# Patient Record
Sex: FEMALE | Race: BLACK OR AFRICAN AMERICAN | Employment: OTHER | URBAN - METROPOLITAN AREA
[De-identification: names, ages, dates, MRNs, and addresses within clinical notes are randomized per-mention and may not be internally consistent; named-entity substitution may affect disease eponyms.]

---

## 2017-01-30 NOTE — PROGRESS NOTES
HISTORY OF PRESENT ILLNESS  Dutch Ladd is a [de-identified] y.o. female. HPI   Follow up on chronic medical problems. Feeling well. No c/o noted. Sleeping good. Appetite is good. Staying active. HTN follow up:  Compliant w/ meds, low salt diet, and exercise. Home bp monitoring 120s/70s. Pt has bp log. Swelling is stable, no headache or dizziness. No chest pain, SOB, palpitations. Otherwise feeling well since the last visit. DM type II follow up:  Compliant w/ meds, diabetic diet, and exercise. She has been better with watching diet. Obtains home glucose monitoring averaging 100-120s. Checks BS daily on most days and prn. Pt does have BS log at visit today. No Rf done for today. Denies any tingling sensation, polyuria and polydipsia. No blurred vision. No significant weight changes. Feeling well since last OV. Hypercholesterolemia follow up:  Compliant low fat, low cholesterol diet. Intolerant to stains and still did not tolerate zetia or welchol. Exercising some. Osteoarthritis:  Patient has osteoarthritis. Takes an occasional Advil which helps the pain. Has had increased pain in the hands from the arthritits. Has stiffness noted. Also has been having more \"crunching noise\" in the neck when turning neck. Has pain in the neck which comes and goes. Symptoms onset: problem is longstanding. Rheumatological ROS: stable, mild-to-moderate joint symptoms intermittently, reasonably well controlled by PRN meds. Response to treatment plan: stable   HM:  Microalbumin: 11/2/2016  Eye exam 11/15/2016 by Dr. Cristhian Soto.   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 180 Tab 3    glucose blood VI test strips (ACCU-CHEK PARESH PLUS TEST STRP) strip use to check blood sugar 2 times daily. 100 Strip 3    furosemide (LASIX) 20 mg tablet Take 1 Tab by mouth daily. 90 Tab 3    Blood-Glucose Meter (ACCU-CHEK PARESH PLUS METER) misc Use to check blood sugar as directed 1 Each 0    ibuprofen (ADVIL LIQUI-GEL) 200 mg cap Take 1 Tab by mouth two (2) times daily as needed.  GARLIC PO Take 1 Tab by mouth daily.  red yeast rice extract 600 mg Cap Take 600 mg by mouth daily.  cod liver oil Cap Take 1 Cap by mouth daily.  omega-3 fatty acids-vitamin e (FISH OIL) 1,000 mg Cap Take 1 Cap by mouth daily.  cholecalciferol, vitamin d3, (VITAMIN D) 1,000 unit tablet Take 1,000 Units by mouth daily. Allergies   Allergen Reactions    Latex Contact Dermatitis    Aspirin Palpitations    Glipizide Shortness of Breath and Swelling    Bactrim Ds [Sulfamethoxazole-Trimethoprim] Other (comments)     Patient experienced pain in legs and buttock and muscle cramping.  Amaryl [Glimepiride] Other (comments)     \"nervous feeling\"    Avocado Rash     Pt states she bruises.     Feldene [Piroxicam] Diarrhea    Losartan Itching     Caused congestion per stated by pt    Derrick Hives    Pcn [Penicillins] Rash    Tylenol [Acetaminophen] Palpitations    Welchol [Colesevelam] Other (comments)     Pt states \"made throat feel raw\"    Zestril [Lisinopril] Cough       Past Medical History   Diagnosis Date    Chronic edema     Diabetes ([facility]     Family history of ischemic heart disease 4/12/2012    Hypercholesterolemia     Hypertension        Past Surgical History   Procedure Laterality Date    Hx breast biopsy  1997    Hx hysterectomy  1972    Endoscopy, colon, diagnostic  12/2006     Dr. Courtney Moya        Family History   Problem Relation Age of Onset    Stroke Mother     Hypertension Mother     Stroke Father     Hypertension Father     Hypertension Brother        Social History   Substance Use Topics    Smoking status: Never Smoker    Smokeless tobacco: Never Used    Alcohol use No        Lab Results  Component Value Date/Time   WBC 7.8 07/30/2016 11:57 AM   HGB 14.0 07/30/2016 11:57 AM   HCT 41.1 07/30/2016 11:57 AM   PLATELET 601 98/89/7715 11:57 AM   MCV 81.5 07/30/2016 11:57 AM       Lab Results  Component Value Date/Time   Cholesterol, total 250 11/02/2016 08:55 AM   HDL Cholesterol 49 11/02/2016 08:55 AM   LDL, calculated 171 11/02/2016 08:55 AM   Triglyceride 149 11/02/2016 08:55 AM   CHOL/HDL Ratio 5.6 12/28/2009 10:43 AM       Lab Results   Component Value Date/Time    Glucose 158 11/02/2016 08:55 AM    Glucose (POC) 270 07/30/2016 01:27 PM    Glucose  11/02/2016 09:00 AM      Lab Results   Component Value Date/Time    Sodium 141 11/02/2016 08:55 AM    Potassium 4.7 11/02/2016 08:55 AM    Chloride 98 11/02/2016 08:55 AM    CO2 27 11/02/2016 08:55 AM    Anion gap 10 07/30/2016 11:57 AM    Glucose 158 11/02/2016 08:55 AM    BUN 19 11/02/2016 08:55 AM    Creatinine 1.17 11/02/2016 08:55 AM    BUN/Creatinine ratio 16 11/02/2016 08:55 AM    GFR est AA 51 11/02/2016 08:55 AM    GFR est non-AA 44 11/02/2016 08:55 AM    Calcium 9.8 11/02/2016 08:55 AM    Bilirubin, total 0.4 07/30/2016 11:57 AM    ALT 23 07/30/2016 11:57 AM    AST 13 07/30/2016 11:57 AM    Alk.  phosphatase 106 07/30/2016 11:57 AM    Protein, total 7.8 07/30/2016 11:57 AM    Albumin 3.3 07/30/2016 11:57 AM    Globulin 4.5 07/30/2016 11:57 AM    A-G Ratio 0.7 07/30/2016 11:57 AM      Lab Results   Component Value Date/Time          Hemoglobin A1c (POC) 7.8 11/02/2016 09:00 AM         Review of Systems Constitutional: Negative for malaise/fatigue. HENT: Negative for congestion. Eyes: Negative for blurred vision. Respiratory: Negative for cough and shortness of breath. Cardiovascular: Negative for chest pain, palpitations and leg swelling. Gastrointestinal: Negative for abdominal pain, constipation and heartburn. Genitourinary: Negative for dysuria, frequency and urgency. Musculoskeletal: Positive for joint pain. Negative for back pain. Neurological: Negative for dizziness, tingling and headaches. Endo/Heme/Allergies: Negative for environmental allergies. Psychiatric/Behavioral: Negative for depression. The patient does not have insomnia. Physical Exam   Constitutional: She appears well-developed and well-nourished. /70  Pulse 78  Temp 97.6 °F (36.4 °C) (Oral)   Resp 16  Ht 5' 2\" (1.575 m)  Wt 181 lb 3.2 oz (82.2 kg)  LMP 04/28/1972  SpO2 97%  BMI 33.14 kg/m2     HENT:   Right Ear: Tympanic membrane and ear canal normal.   Left Ear: Tympanic membrane and ear canal normal.   Nose: No mucosal edema or rhinorrhea. Mouth/Throat: Oropharynx is clear and moist and mucous membranes are normal.   Neck: Normal range of motion. Neck supple. No thyromegaly present. Cardiovascular: Normal rate and regular rhythm. No murmur heard. Pulmonary/Chest: Effort normal and breath sounds normal.   Abdominal: Soft. Bowel sounds are normal. There is no tenderness. Musculoskeletal: Normal range of motion. She exhibits no edema. Lymphadenopathy:     She has no cervical adenopathy. Skin: Skin is warm and dry. Psychiatric: She has a normal mood and affect. Nursing note and vitals reviewed. Marco David was seen today for hypertension, diabetes and cholesterol problem.     Diagnoses and all orders for this visit:    Essential hypertension  Discussed sodium restriction, high k rich diet, maintaining ideal body weight and regular exercise program such as daily walking 30 min perday 4-5 times per week, as physiologic means to achieve blood pressure control.  Medication compliance advised. Controlled type 2 diabetes mellitus without complication, without long-term current use of insulin (HCC)  -     AMB POC HEMOGLOBIN A1C  -     AMB POC GLUCOSE, QUANTITATIVE, BLOOD  -     glyBURIDE (DIABETA) 5 mg tablet; Take 1 Tab by mouth two (2) times daily (with meals). Mixed hyperlipidemia  Continue to monitor. Work on diet and exercise. Statin intolerance  Pt is not interested in medications of any kind for her cholesterol. Understands the complications d/t high cholesterol. Generalized OA  Stable     Encounter for medication monitoring  -     METABOLIC PANEL, BASIC  -     CBC W/O DIFF        Follow-up Disposition:  Return in about 3 months (around 5/17/2017). reviewed diet, exercise and weight control  cardiovascular risk and specific lipid/LDL goals reviewed  reviewed medications and side effects in detail  specific diabetic recommendations: low cholesterol diet, weight control and daily exercise discussed, home glucose monitoring emphasized, foot care discussed and Podiatry visits discussed, annual eye examinations at Ophthalmology discussed and glycohemoglobin and other lab monitoring discussed     I have discussed diagnosis listed in this note with pt and/or family. I have discussed treatment plans and options and the risk/benefit analysis of those options, including safe use of medications and possible medication side effects. Through the use of shared decision making we have agreed to the above plan. The patient has received an after-visit summary and questions were answered concerning future plans and follow up. Advise pt of any urgent changes then to proceed to the ER.

## 2017-02-01 ENCOUNTER — OFFICE VISIT (OUTPATIENT)
Dept: FAMILY MEDICINE CLINIC | Age: 82
End: 2017-02-01

## 2017-02-01 VITALS
RESPIRATION RATE: 16 BRPM | DIASTOLIC BLOOD PRESSURE: 70 MMHG | OXYGEN SATURATION: 97 % | HEART RATE: 78 BPM | TEMPERATURE: 97.6 F | BODY MASS INDEX: 33.34 KG/M2 | SYSTOLIC BLOOD PRESSURE: 130 MMHG | HEIGHT: 62 IN | WEIGHT: 181.2 LBS

## 2017-02-01 DIAGNOSIS — E78.2 MIXED HYPERLIPIDEMIA: ICD-10-CM

## 2017-02-01 DIAGNOSIS — M15.9 GENERALIZED OA: ICD-10-CM

## 2017-02-01 DIAGNOSIS — E11.9 CONTROLLED TYPE 2 DIABETES MELLITUS WITHOUT COMPLICATION, WITHOUT LONG-TERM CURRENT USE OF INSULIN (HCC): ICD-10-CM

## 2017-02-01 DIAGNOSIS — Z51.81 ENCOUNTER FOR MEDICATION MONITORING: ICD-10-CM

## 2017-02-01 DIAGNOSIS — Z78.9 STATIN INTOLERANCE: ICD-10-CM

## 2017-02-01 DIAGNOSIS — I10 ESSENTIAL HYPERTENSION: Primary | ICD-10-CM

## 2017-02-01 LAB
GLUCOSE POC: 137 MG/DL
HBA1C MFR BLD HPLC: 7.7 %

## 2017-02-01 RX ORDER — GLYBURIDE 5 MG/1
5 TABLET ORAL 2 TIMES DAILY WITH MEALS
Qty: 180 TAB | Refills: 3 | Status: SHIPPED | OUTPATIENT
Start: 2017-02-01 | End: 2017-05-17 | Stop reason: SDUPTHER

## 2017-02-01 NOTE — PROGRESS NOTES
Pt here today for follow up on BP, cholesterol, and DM. Hgb A1C:  11/2/2016    BMP 11/2/2016    Lipid: 11/2/2016    Microalbumin: 11/2/2016    Eye exam 11/15/2016 by Dr. Rain Duckworth.     Mammogram: 8/1/2016

## 2017-02-01 NOTE — MR AVS SNAPSHOT
Visit Information Date & Time Provider Department Dept. Phone Encounter #  
 2/1/2017  8:30 AM Naseem Hester Alex [phone] [id number] Follow-up Instructions Return in about 3 months (around 5/17/2017). Upcoming Health Maintenance Date Due  
 FOOT EXAM Q1 10/28/2015 HEMOGLOBIN A1C Q6M 5/2/2017 MEDICARE YEARLY EXAM 8/3/2017 MICROALBUMIN Q1 11/2/2017 LIPID PANEL Q1 11/2/2017 EYE EXAM RETINAL OR DILATED Q1 11/15/2017 GLAUCOMA SCREENING Q2Y 11/15/2018 DTaP/Tdap/Td series (3 - Td) 4/15/2025 Allergies as of 2/1/2017  Review Complete On: 2/1/2017 By: Kirit Thomas MD  
  
 Severity Noted Reaction Type Reactions Latex  02/21/2012   Systemic Contact Dermatitis Aspirin High 04/09/2010    Palpitations Glipizide High 04/29/2013   Systemic Shortness of Breath, Swelling Bactrim Ds [Sulfamethoxazole-trimethoprim] Medium 05/12/2010    Other (comments) Patient experienced pain in legs and buttock and muscle cramping. Amaryl [Glimepiride]  01/06/2016    Other (comments) \"nervous feeling\" Avocado  05/06/2016    Rash Pt states she bruises. Feldene [Piroxicam]  04/09/2010    Diarrhea Losartan  07/22/2011    Itching Caused congestion per stated by pt [place]  05/06/2016    Hives Pcn [Penicillins]  04/09/2010    Rash Tylenol [Acetaminophen]  09/07/2013    Palpitations Welchol [Colesevelam]  11/27/2012    Other (comments) Pt states \"made throat feel raw\" Zestril [Lisinopril]  04/09/2010    Cough Current Immunizations  Reviewed on 2/1/2017 Name Date DTAP Vaccine 12/13/2000 Pneumococcal Conjugate (PCV-13) 4/15/2015 Pneumococcal Vaccine (Unspecified Type) 10/18/2005 Td, Adsorbed PF 4/15/2015 Reviewed by Kirit Thomas MD on 2/1/2017 at  8:18 AM  
You Were Diagnosed With   
  
 Codes Comments  Essential hypertension    -  Primary ICD-10-CM: I10 
 ICD-9-CM: 401.9 Controlled type 2 diabetes mellitus without complication, without long-term current use of insulin ([facility]     ICD-10-CM: E11.9 ICD-9-CM: 250.00 Mixed hyperlipidemia     ICD-10-CM: E78.2 ICD-9-CM: 272.2 Generalized OA     ICD-10-CM: M15.9 ICD-9-CM: 715.00 Encounter for medication monitoring     ICD-10-CM: Z51.81 
ICD-9-CM: V58.83 Statin intolerance     ICD-10-CM: Z78.9 ICD-9-CM: 995.27 Vitals BP Pulse Temp Resp Height(growth percentile) Weight(growth percentile) 140/74 (BP 1 Location: Left arm, BP Patient Position: Sitting) 78 97.6 °F (36.4 °C) (Oral) 16 5' 2\" (1.575 m) 181 lb 3.2 oz (82.2 kg) LMP SpO2 BMI OB Status Smoking Status 04/28/1972 97% 33.14 kg/m2 Hysterectomy Never Smoker BMI and BSA Data Body Mass Index Body Surface Area  
 33.14 kg/m 2 1.9 m 2 Preferred Pharmacy Pharmacy Name Phone [address] [phone] Your Updated Medication List  
  
   
This list is accurate as of: 2/1/17  8:45 AM.  Always use your most recent med list. ADVIL LIQUI- mg Cap Generic drug:  ibuprofen Take 1 Tab by mouth two (2) times daily as needed. Blood-Glucose Meter Misc Commonly known as:  ACCU-CHEK PARESH PLUS METER Use to check blood sugar as directed  
  
 cod liver oil Cap Take 1 Cap by mouth daily. FISH OIL 1,000 mg Cap Generic drug:  omega-3 fatty acids-vitamin e Take 1 Cap by mouth daily. furosemide 20 mg tablet Commonly known as:  LASIX Take 1 Tab by mouth daily. GARLIC PO Take 1 Tab by mouth daily. glucose blood VI test strips strip Commonly known as:  ACCU-CHEK PARESH PLUS TEST STRP  
use to check blood sugar 2 times daily. glyBURIDE 5 mg tablet Commonly known as:  Margaret Albadt Take 1 Tab by mouth two (2) times daily (with meals). red yeast rice extract 600 mg Cap Take 600 mg by mouth daily. VITAMIN D3 1,000 unit tablet Generic drug:  cholecalciferol Take 1,000 Units by mouth daily. Prescriptions Sent to Pharmacy Refills  
 glyBURIDE (DIABETA) 5 mg tablet 3 Sig: Take 1 Tab by mouth two (2) times daily (with meals). Class: Normal  
 Pharmacy: [address] [place] [address], [address] [place] [address] #: [phone] Route: Oral  
  
We Performed the Following AMB POC GLUCOSE, QUANTITATIVE, BLOOD [89464 CPT(R)] AMB POC HEMOGLOBIN A1C [43502 CPT(R)] CBC W/O DIFF [15890 CPT(R)] METABOLIC PANEL, BASIC [98413 CPT(R)] Follow-up Instructions Return in about 3 months (around 5/17/2017). Introducing [facility]! Libia Labm introduces Quick[employer] patient portal. Now you can access parts of your medical record, email your doctor's office, and request medication refills online. 1. In your internet browser, go to [email or web address] 2. Click on the First Time User? Click Here link in the Sign In box. You will see the New Member Sign Up page. 3. Enter your QuickPay Access Code exactly as it appears below. You will not need to use this code after youve completed the sign-up process. If you do not sign up before the expiration date, you must request a new code. · QuickPay Access Code: [email or web address] Expires: 5/2/2017  8:45 AM 
 
4. Enter the last four digits of your Social Security Number (xxxx) and Date of Birth (mm/dd/yyyy) as indicated and click Submit. You will be taken to the next sign-up page. 5. Create a [employer] ID. This will be your QuickPay login ID and cannot be changed, so think of one that is secure and easy to remember. 6. Create a QuickPay password. You can change your password at any time. 7. Enter your Password Reset Question and Answer. This can be used at a later time if you forget your password. 8. Enter your e-mail address. You will receive e-mail notification when new information is available in [address] 9. Click Sign Up. You can now view and download portions of your medical record. 10. Click the Download Summary menu link to download a portable copy of your medical information. If you have questions, please visit the Frequently Asked Questions section of the [employer] website. Remember, [employer] is NOT to be used for urgent needs. For medical emergencies, dial 911. Now available from your iPhone and Android! Please provide this summary of care documentation to your next provider. Your primary care clinician is listed as Pinky Fitzgerald. If you have any questions after today's visit, please call [phone].

## 2017-02-02 LAB
BUN SERPL-MCNC: 16 MG/DL (ref 8–27)
BUN/CREAT SERPL: 17 (ref 11–26)
CALCIUM SERPL-MCNC: 9.8 MG/DL (ref 8.7–10.3)
CHLORIDE SERPL-SCNC: 104 MMOL/L (ref 96–106)
CO2 SERPL-SCNC: 26 MMOL/L (ref 18–29)
CREAT SERPL-MCNC: 0.93 MG/DL (ref 0.57–1)
ERYTHROCYTE [DISTWIDTH] IN BLOOD BY AUTOMATED COUNT: 14.3 % (ref 12.3–15.4)
GLUCOSE SERPL-MCNC: 128 MG/DL (ref 65–99)
HCT VFR BLD AUTO: 37.5 % (ref 34–46.6)
HGB BLD-MCNC: 12.6 G/DL (ref 11.1–15.9)
INTERPRETATION: NORMAL
MCH RBC QN AUTO: 27.1 PG (ref 26.6–33)
MCHC RBC AUTO-ENTMCNC: 33.6 G/DL (ref 31.5–35.7)
MCV RBC AUTO: 81 FL (ref 79–97)
PLATELET # BLD AUTO: 242 X10E3/UL (ref 150–379)
POTASSIUM SERPL-SCNC: 4.8 MMOL/L (ref 3.5–5.2)
RBC # BLD AUTO: 4.65 X10E6/UL (ref 3.77–5.28)
SODIUM SERPL-SCNC: 143 MMOL/L (ref 134–144)
WBC # BLD AUTO: 6.1 X10E3/UL (ref 3.4–10.8)

## 2017-03-08 DIAGNOSIS — E11.9 CONTROLLED TYPE 2 DIABETES MELLITUS WITHOUT COMPLICATION, WITHOUT LONG-TERM CURRENT USE OF INSULIN (HCC): ICD-10-CM

## 2017-03-08 NOTE — TELEPHONE ENCOUNTER
Pt says she needs help with getting her Accu check test strips. She says they only gave her 2 boxes, was supposed to send another 2 boxes after she called them back, but she hasn't gotten them yet. She says this was about a month ago and she has opened her last box. She has not called them in the past month \"assuming they just weren't going to send it. \" If possible to call her [facility] DATA mail order she would appreciate it.     Pt# [phone]

## 2017-04-15 ENCOUNTER — HOSPITAL ENCOUNTER (EMERGENCY)
Age: 82
Discharge: HOME | End: 2017-04-15
Attending: EMERGENCY MEDICINE
Payer: COMMERCIAL

## 2017-04-15 ENCOUNTER — APPOINTMENT (OUTPATIENT)
Dept: CT IMAGING | Age: 82
End: 2017-04-15
Attending: EMERGENCY MEDICINE
Payer: COMMERCIAL

## 2017-04-15 VITALS
OXYGEN SATURATION: 97 % | BODY MASS INDEX: 33.31 KG/M2 | TEMPERATURE: 98.7 F | DIASTOLIC BLOOD PRESSURE: 53 MMHG | HEART RATE: 82 BPM | HEIGHT: 62 IN | SYSTOLIC BLOOD PRESSURE: 116 MMHG | RESPIRATION RATE: 16 BRPM | WEIGHT: 181 LBS

## 2017-04-15 DIAGNOSIS — N39.0 URINARY TRACT INFECTION WITHOUT HEMATURIA, SITE UNSPECIFIED: Primary | ICD-10-CM

## 2017-04-15 LAB
ALBUMIN SERPL BCP-MCNC: 3.4 G/DL (ref 3.5–5)
ALBUMIN/GLOB SERPL: 0.8 {RATIO} (ref 1.1–2.2)
ALP SERPL-CCNC: 107 U/L (ref 45–117)
ALT SERPL-CCNC: 20 U/L (ref 12–78)
AMYLASE SERPL-CCNC: 67 U/L (ref 25–115)
ANION GAP BLD CALC-SCNC: 7 MMOL/L (ref 5–15)
APPEARANCE UR: ABNORMAL
AST SERPL W P-5'-P-CCNC: 9 U/L (ref 15–37)
BACTERIA URNS QL MICRO: ABNORMAL /HPF
BASOPHILS # BLD AUTO: 0 K/UL (ref 0–0.1)
BASOPHILS # BLD: 0 % (ref 0–1)
BILIRUB SERPL-MCNC: 0.4 MG/DL (ref 0.2–1)
BILIRUB UR QL: NEGATIVE
BUN SERPL-MCNC: 21 MG/DL (ref 6–20)
BUN/CREAT SERPL: 19 (ref 12–20)
CALCIUM SERPL-MCNC: 9.4 MG/DL (ref 8.5–10.1)
CHLORIDE SERPL-SCNC: 104 MMOL/L (ref 97–108)
CO2 SERPL-SCNC: 29 MMOL/L (ref 21–32)
COLOR UR: ABNORMAL
CREAT SERPL-MCNC: 1.12 MG/DL (ref 0.55–1.02)
EOSINOPHIL # BLD: 0.2 K/UL (ref 0–0.4)
EOSINOPHIL NFR BLD: 4 % (ref 0–7)
EPITH CASTS URNS QL MICRO: ABNORMAL /LPF
ERYTHROCYTE [DISTWIDTH] IN BLOOD BY AUTOMATED COUNT: 13.9 % (ref 11.5–14.5)
GLOBULIN SER CALC-MCNC: 4.5 G/DL (ref 2–4)
GLUCOSE SERPL-MCNC: 154 MG/DL (ref 65–100)
GLUCOSE UR STRIP.AUTO-MCNC: NEGATIVE MG/DL
HCT VFR BLD AUTO: 38 % (ref 35–47)
HGB BLD-MCNC: 12.7 G/DL (ref 11.5–16)
HGB UR QL STRIP: ABNORMAL
KETONES UR QL STRIP.AUTO: NEGATIVE MG/DL
LEUKOCYTE ESTERASE UR QL STRIP.AUTO: ABNORMAL
LIPASE SERPL-CCNC: 176 U/L (ref 73–393)
LYMPHOCYTES # BLD AUTO: 27 % (ref 12–49)
LYMPHOCYTES # BLD: 1.8 K/UL (ref 0.8–3.5)
MCH RBC QN AUTO: 26.7 PG (ref 26–34)
MCHC RBC AUTO-ENTMCNC: 33.4 G/DL (ref 30–36.5)
MCV RBC AUTO: 79.8 FL (ref 80–99)
MONOCYTES # BLD: 0.5 K/UL (ref 0–1)
MONOCYTES NFR BLD AUTO: 8 % (ref 5–13)
NEUTS SEG # BLD: 4.1 K/UL (ref 1.8–8)
NEUTS SEG NFR BLD AUTO: 61 % (ref 32–75)
NITRITE UR QL STRIP.AUTO: NEGATIVE
PH UR STRIP: 5.5 [PH] (ref 5–8)
PLATELET # BLD AUTO: 244 K/UL (ref 150–400)
POTASSIUM SERPL-SCNC: 3.9 MMOL/L (ref 3.5–5.1)
PROT SERPL-MCNC: 7.9 G/DL (ref 6.4–8.2)
PROT UR STRIP-MCNC: ABNORMAL MG/DL
RBC # BLD AUTO: 4.76 M/UL (ref 3.8–5.2)
RBC #/AREA URNS HPF: ABNORMAL /HPF (ref 0–5)
SODIUM SERPL-SCNC: 140 MMOL/L (ref 136–145)
SP GR UR REFRACTOMETRY: 1.01 (ref 1–1.03)
UROBILINOGEN UR QL STRIP.AUTO: 0.2 EU/DL (ref 0.2–1)
WBC # BLD AUTO: 6.6 K/UL (ref 3.6–11)
WBC URNS QL MICRO: >100 /HPF (ref 0–4)

## 2017-04-15 PROCEDURE — 74177 CT ABD & PELVIS W/CONTRAST: CPT

## 2017-04-15 PROCEDURE — 74011636320 HC RX REV CODE- 636/320: Performed by: EMERGENCY MEDICINE

## 2017-04-15 PROCEDURE — 74011250636 HC RX REV CODE- 250/636: Performed by: EMERGENCY MEDICINE

## 2017-04-15 PROCEDURE — 99284 EMERGENCY DEPT VISIT MOD MDM: CPT

## 2017-04-15 PROCEDURE — 96361 HYDRATE IV INFUSION ADD-ON: CPT

## 2017-04-15 PROCEDURE — 81001 URINALYSIS AUTO W/SCOPE: CPT | Performed by: EMERGENCY MEDICINE

## 2017-04-15 PROCEDURE — 96365 THER/PROPH/DIAG IV INF INIT: CPT

## 2017-04-15 PROCEDURE — 96375 TX/PRO/DX INJ NEW DRUG ADDON: CPT

## 2017-04-15 PROCEDURE — 74011000250 HC RX REV CODE- 250: Performed by: EMERGENCY MEDICINE

## 2017-04-15 PROCEDURE — 82150 ASSAY OF AMYLASE: CPT | Performed by: EMERGENCY MEDICINE

## 2017-04-15 PROCEDURE — 74011000258 HC RX REV CODE- 258: Performed by: EMERGENCY MEDICINE

## 2017-04-15 PROCEDURE — 83690 ASSAY OF LIPASE: CPT | Performed by: EMERGENCY MEDICINE

## 2017-04-15 PROCEDURE — 36415 COLL VENOUS BLD VENIPUNCTURE: CPT | Performed by: EMERGENCY MEDICINE

## 2017-04-15 PROCEDURE — 80053 COMPREHEN METABOLIC PANEL: CPT | Performed by: EMERGENCY MEDICINE

## 2017-04-15 PROCEDURE — 85025 COMPLETE CBC W/AUTO DIFF WBC: CPT | Performed by: EMERGENCY MEDICINE

## 2017-04-15 RX ORDER — CEPHALEXIN 500 MG/1
500 CAPSULE ORAL 4 TIMES DAILY
Qty: 28 CAP | Refills: 0 | Status: SHIPPED | OUTPATIENT
Start: 2017-04-15 | End: 2017-04-22

## 2017-04-15 RX ORDER — SODIUM CHLORIDE 0.9 % (FLUSH) 0.9 %
10 SYRINGE (ML) INJECTION
Status: COMPLETED | OUTPATIENT
Start: 2017-04-15 | End: 2017-04-15

## 2017-04-15 RX ORDER — FAMOTIDINE 10 MG/ML
20 INJECTION INTRAVENOUS
Status: COMPLETED | OUTPATIENT
Start: 2017-04-15 | End: 2017-04-15

## 2017-04-15 RX ORDER — DIPHENHYDRAMINE HYDROCHLORIDE 50 MG/ML
50 INJECTION, SOLUTION INTRAMUSCULAR; INTRAVENOUS ONCE
Status: COMPLETED | OUTPATIENT
Start: 2017-04-15 | End: 2017-04-15

## 2017-04-15 RX ADMIN — SODIUM CHLORIDE 1000 ML: 900 INJECTION, SOLUTION INTRAVENOUS at 12:45

## 2017-04-15 RX ADMIN — IOPAMIDOL 100 ML: 755 INJECTION, SOLUTION INTRAVENOUS at 13:27

## 2017-04-15 RX ADMIN — FAMOTIDINE 20 MG: 10 INJECTION, SOLUTION INTRAVENOUS at 13:39

## 2017-04-15 RX ADMIN — Medication 10 ML: at 13:27

## 2017-04-15 RX ADMIN — DIPHENHYDRAMINE HYDROCHLORIDE 50 MG: 50 INJECTION, SOLUTION INTRAMUSCULAR; INTRAVENOUS at 13:39

## 2017-04-15 RX ADMIN — SODIUM CHLORIDE 100 ML: 900 INJECTION, SOLUTION INTRAVENOUS at 13:27

## 2017-04-15 RX ADMIN — METHYLPREDNISOLONE SODIUM SUCCINATE 125 MG: 125 INJECTION, POWDER, FOR SOLUTION INTRAMUSCULAR; INTRAVENOUS at 13:39

## 2017-04-15 RX ADMIN — CEFTRIAXONE 1 G: 1 INJECTION, POWDER, FOR SOLUTION INTRAMUSCULAR; INTRAVENOUS at 14:00

## 2017-04-15 NOTE — ED TRIAGE NOTES
Pt reports left lower quad abd pain that began 1 weeks ago. Pt states she has not been having normal bowel movements. Pt also reports the pain is worse if she coughs or sneezes.

## 2017-04-15 NOTE — ED NOTES
I have reviewed discharge instructions with the patient. The patient verbalized understanding. Pt assisted to exit via wheel chair, no acute distress noted.

## 2017-04-15 NOTE — ED NOTES
Pt returned from CT, stated to CT tech that she is feeling itchy and daughter states she looks like she is having some redness to her cheeks and lips, Dr. Wilson Andrade notified and at bedside, medications ordered.

## 2017-04-15 NOTE — DISCHARGE INSTRUCTIONS
Urinary Tract Infection in Women: Care Instructions  Your Care Instructions    A urinary tract infection, or UTI, is a general term for an infection anywhere between the kidneys and the urethra (where urine comes out). Most UTIs are bladder infections. They often cause pain or burning when you urinate. UTIs are caused by bacteria and can be cured with antibiotics. Be sure to complete your treatment so that the infection goes away. Follow-up care is a key part of your treatment and safety. Be sure to make and go to all appointments, and call your doctor if you are having problems. It's also a good idea to know your test results and keep a list of the medicines you take. How can you care for yourself at home? · Take your antibiotics as directed. Do not stop taking them just because you feel better. You need to take the full course of antibiotics. · Drink extra water and other fluids for the next day or two. This may help wash out the bacteria that are causing the infection. (If you have kidney, heart, or liver disease and have to limit fluids, talk with your doctor before you increase your fluid intake.)  · Avoid drinks that are carbonated or have caffeine. They can irritate the bladder. · Urinate often. Try to empty your bladder each time. · To relieve pain, take a hot bath or lay a heating pad set on low over your lower belly or genital area. Never go to sleep with a heating pad in place. To prevent UTIs  · Drink plenty of water each day. This helps you urinate often, which clears bacteria from your system. (If you have kidney, heart, or liver disease and have to limit fluids, talk with your doctor before you increase your fluid intake.)  · Urinate when you need to. · Urinate right after you have sex. · Change sanitary pads often. · Avoid douches, bubble baths, feminine hygiene sprays, and other feminine hygiene products that have deodorants.   · After going to the bathroom, wipe from front to back.  When should you call for help? Call your doctor now or seek immediate medical care if:  · Symptoms such as fever, chills, nausea, or vomiting get worse or appear for the first time. · You have new pain in your back just below your rib cage. This is called flank pain. · There is new blood or pus in your urine. · You have any problems with your antibiotic medicine. Watch closely for changes in your health, and be sure to contact your doctor if:  · You are not getting better after taking an antibiotic for 2 days. · Your symptoms go away but then come back. Where can you learn more? Go to http://james-rachel.info/. Enter Q588 in the search box to learn more about \"Urinary Tract Infection in Women: Care Instructions. \"  Current as of: November 28, 2016  Content Version: 11.2  © 7312-6155 [employer]. Care instructions adapted under license by [employer] (which disclaims liability or warranty for this information). If you have questions about a medical condition or this instruction, always ask your healthcare professional. [address] any warranty or liability for your use of this information. We hope that we have addressed all of your medical concerns. The examination and treatment you received in the Emergency Department were for an emergent problem and were not intended as complete care. It is important that you follow up with your healthcare provider(s) for ongoing care. If your symptoms worsen or do not improve as expected, and you are unable to reach your usual health care provider(s), you should return to the Emergency Department. Today's healthcare is undergoing tremendous change, and patient satisfaction surveys are one of the many tools to assess the quality of medical care. You may receive a survey from the [employer] organization regarding your experience in the Emergency Department.   I hope that your experience has been completely positive, particularly the medical care that I provided. As such, please participate in the survey; anything less than excellent does not meet my expectations or intentions. [address] [facility] and [address] [place] [address] participate in nationally recognized quality of care measures. If your blood pressure is greater than 120/80, as reported below, we urge that you seek medical care to address the potential of high blood pressure, commonly known as hypertension. Hypertension can be hereditary or can be caused by certain medical conditions, pain, stress, or \"white coat syndrome. \"       Please make an appointment with your health care provider(s) for follow up of your Emergency Department visit. VITALS:   Patient Vitals for the past 8 hrs:   Temp Pulse Resp BP SpO2   04/15/17 1336 - - - 142/71 99 %   04/15/17 1300 - - - 126/70 97 %   04/15/17 1230 - - - 121/73 97 %   04/15/17 1200 - - - 126/80 97 %   04/15/17 1116 98.7 °F (37.1 °C) (!) 102 16 135/75 98 %          Thank you for allowing us to provide you with medical care today. We realize that you have many choices for your emergency care needs. Please choose us in the future for any continued health care needs. Marquetta Osgood Edda Rice, MD    [facility].   Office: [phone]            Recent Results (from the past 24 hour(s))   CBC WITH AUTOMATED DIFF    Collection Time: 04/15/17 11:22 AM   Result Value Ref Range    WBC 6.6 3.6 - 11.0 K/uL    RBC 4.76 3.80 - 5.20 M/uL    HGB 12.7 11.5 - 16.0 g/dL    HCT 38.0 35.0 - 47.0 %    MCV 79.8 (L) 80.0 - 99.0 FL    MCH 26.7 26.0 - 34.0 PG    MCHC 33.4 30.0 - 36.5 g/dL    RDW 13.9 11.5 - 14.5 %    PLATELET 798 098 - 840 K/uL    NEUTROPHILS 61 32 - 75 %    LYMPHOCYTES 27 12 - 49 %    MONOCYTES 8 5 - 13 %    EOSINOPHILS 4 0 - 7 %    BASOPHILS 0 0 - 1 %    ABS. NEUTROPHILS 4.1 1.8 - 8.0 K/UL    ABS.  LYMPHOCYTES 1.8 0.8 - 3.5 K/UL ABS. MONOCYTES 0.5 0.0 - 1.0 K/UL    ABS. EOSINOPHILS 0.2 0.0 - 0.4 K/UL    ABS. BASOPHILS 0.0 0.0 - 0.1 K/UL   METABOLIC PANEL, COMPREHENSIVE    Collection Time: 04/15/17 11:22 AM   Result Value Ref Range    Sodium 140 136 - 145 mmol/L    Potassium 3.9 3.5 - 5.1 mmol/L    Chloride 104 97 - 108 mmol/L    CO2 29 21 - 32 mmol/L    Anion gap 7 5 - 15 mmol/L    Glucose 154 (H) 65 - 100 mg/dL    BUN 21 (H) 6 - 20 MG/DL    Creatinine 1.12 (H) 0.55 - 1.02 MG/DL    BUN/Creatinine ratio 19 12 - 20      GFR est AA 57 (L) >60 ml/min/1.73m2    GFR est non-AA 47 (L) >60 ml/min/1.73m2    Calcium 9.4 8.5 - 10.1 MG/DL    Bilirubin, total 0.4 0.2 - 1.0 MG/DL    ALT (SGPT) 20 12 - 78 U/L    AST (SGOT) 9 (L) 15 - 37 U/L    Alk. phosphatase 107 45 - 117 U/L    Protein, total 7.9 6.4 - 8.2 g/dL    Albumin 3.4 (L) 3.5 - 5.0 g/dL    Globulin 4.5 (H) 2.0 - 4.0 g/dL    A-G Ratio 0.8 (L) 1.1 - 2.2     LIPASE    Collection Time: 04/15/17 11:22 AM   Result Value Ref Range    Lipase 176 73 - 393 U/L   AMYLASE    Collection Time: 04/15/17 11:22 AM   Result Value Ref Range    Amylase 67 25 - 115 U/L   URINALYSIS W/MICROSCOPIC    Collection Time: 04/15/17 11:52 AM   Result Value Ref Range    Color YELLOW/STRAW      Appearance TURBID (A) CLEAR      Specific gravity 1.013 1.003 - 1.030      pH (UA) 5.5 5.0 - 8.0      Protein TRACE (A) NEG mg/dL    Glucose NEGATIVE  NEG mg/dL    Ketone NEGATIVE  NEG mg/dL    Bilirubin NEGATIVE  NEG      Blood MODERATE (A) NEG      Urobilinogen 0.2 0.2 - 1.0 EU/dL    Nitrites NEGATIVE  NEG      Leukocyte Esterase LARGE (A) NEG      WBC >100 (H) 0 - 4 /hpf    RBC 0-5 0 - 5 /hpf    Epithelial cells FEW FEW /lpf    Bacteria 1+ (A) NEG /hpf       Ct Abd Pelv W Cont    Result Date: 4/15/2017  EXAM: CT ABDOMEN PELVIS WITH CONTRAST INDICATION:  Left lower quadrant. . COMPARISON: None. CONTRAST: 100 mL of Isovue-370.  TECHNIQUE: Multislice helical CT was performed from the diaphragm to the symphysis pubis during uneventful rapid bolus intravenous contrast administration. Oral contrast was not administered. Contiguous 5 mm axial images were reconstructed and lung and soft tissue windows were generated. Coronal and sagittal reformations were generated. CT dose reduction was achieved through use of a standardized protocol tailored for this examination and automatic exposure control for dose modulation. FINDINGS: LOWER CHEST: The visualized portions of the lung bases are clear. ABDOMEN: Liver: The liver is normal in size and contour with no focal abnormality. The attenuation of the liver is diffusely decreased throughout. Gallbladder and bile ducts: There is no calcified gallstone or biliary dilatation. Spleen: No abnormality. Pancreas: No abnormality. Adrenal glands: No abnormality. Kidneys: No abnormality. PELVIS: Reproductive organs: The uterus is absent. Bladder: No abnormality. BOWEL AND MESENTERY: The small bowel is normal.  There is no mesenteric mass or adenopathy. The appendix is not identified. PERITONEUM: There is no ascites or free intraperitoneal air. RETROPERITONEUM: The aorta is atherosclerotic and tapers without aneurysm. There is no retroperitoneal adenopathy or mass. There is no pelvic mass or adenopathy. BONES AND SOFT TISSUES: There is multilevel degenerative disc disease throughout the thoracic and lumbar spine. IMPRESSION: 1. No acute abdominal or pelvic abnormality. 2. Hepatic steatosis. 3. Atherosclerotic abdominal aorta without aneurysm. 4. Status post hysterectomy. 5. Lumbar spondylosis.

## 2017-04-15 NOTE — ED NOTES
Pt assisted to bedside commode. Pt now back in bed resting comfortably, no complaints at this time VSS, call bell within reach, family at bedside. States the itching is now gone.

## 2017-04-15 NOTE — ED PROVIDER NOTES
HPI Comments: 81 y.o. female with past medical history significant for DM, hypercholesterolemia, HTN, chronic edema, and family history of ischemic heart disease who presents to the ED with chief complaint of abdominal pain. Patient reports LLQ abdominal pain that occasionally radiates to the back and nausea with onset ~1 week ago. Patient reports her pain is worse with deep breathing. Patient denies a history of kidney stones and diverticulitis. Patient denies dysuria, fever, vomiting, and loss of appetite. There are no other acute medical concerns at this time. PCP: Hina Bianchi MD    Note written by Tristan Kumar, as dictated by Hernán Plascencia MD 12:23 PM     The history is provided by the patient. Past Medical History:   Diagnosis Date    Chronic edema     Diabetes ([address]     Family history of ischemic heart disease 4/12/2012    Hypercholesterolemia     Hypertension        Past Surgical History:   Procedure Laterality Date    ENDOSCOPY, COLON, DIAGNOSTIC  12/2006    Dr. Tati Domingo HX BREAST BIOPSY  1997    HX HYSTERECTOMY  1972         Family History:   Problem Relation Age of Onset    Stroke Mother     Hypertension Mother     Stroke Father     Hypertension Father     Hypertension Brother        Social History     Social History    Marital status:      Spouse name: N/A    Number of children: N/A    Years of education: N/A     Occupational History    Not on file. Social History Main Topics    Smoking status: Never Smoker    Smokeless tobacco: Never Used    Alcohol use No    Drug use: No    Sexual activity: Not Currently     Other Topics Concern    Not on file     Social History Narrative         ALLERGIES: Latex; Aspirin; Glipizide; Bactrim ds [sulfamethoxazole-trimethoprim]; Amaryl [glimepiride]; Avocado; Feldene [piroxicam]; Losartan; Galax; Pcn [penicillins]; Tylenol [acetaminophen];  Welchol [colesevelam]; and Zestril [lisinopril]    Review of Systems   Constitutional: Negative for appetite change and fever. Gastrointestinal: Positive for abdominal pain and nausea. Negative for vomiting. Genitourinary: Negative for dysuria. All other systems reviewed and are negative. Vitals:    04/15/17 1116   BP: 135/75   Pulse: (!) 102   Resp: 16   Temp: 98.7 °F (37.1 °C)   SpO2: 98%   Weight: 82.1 kg (181 lb)   Height: 5' 2\" (1.575 m)            Physical Exam   Constitutional: She is oriented to person, place, and time. She appears well-developed and well-nourished. No distress. No acute distress. HENT:   Head: Normocephalic and atraumatic. Eyes: Conjunctivae are normal. No scleral icterus. Neck: Neck supple. No tracheal deviation present. Cardiovascular: Normal rate, regular rhythm, normal heart sounds and intact distal pulses. Exam reveals no gallop and no friction rub. No murmur heard. Pulmonary/Chest: Effort normal and breath sounds normal. She has no wheezes. She has no rales. Abdominal: Soft. She exhibits no distension. There is tenderness. There is no rebound and no guarding. Minimally tender in the LLQ of the abdomen; no flank tenderness. Musculoskeletal: She exhibits no edema. Neurological: She is alert and oriented to person, place, and time. Skin: Skin is warm and dry. No rash noted. Psychiatric: She has a normal mood and affect. Nursing note and vitals reviewed.   Note written by Tristan Woodson, as dictated by Eduardo Elaine MD 12:26 PM      MDM  Number of Diagnoses or Management Options  Urinary tract infection without hematuria, site unspecified:      Amount and/or Complexity of Data Reviewed  Clinical lab tests: ordered and reviewed  Tests in the radiology section of CPT®: ordered and reviewed  Tests in the medicine section of CPT®: ordered and reviewed    Risk of Complications, Morbidity, and/or Mortality  Presenting problems: moderate  Diagnostic procedures: moderate  Management options: moderate    Patient Progress  Patient progress: improved    ED Course       Procedures    PROGRESS NOTE:  2:35 PM  Patient has a UTI, probably a lower one. Her CT scan was negative for any acute problems. Will discharge home on Keflex.

## 2017-04-26 ENCOUNTER — APPOINTMENT (OUTPATIENT)
Dept: GENERAL RADIOLOGY | Age: 82
End: 2017-04-26
Attending: EMERGENCY MEDICINE
Payer: MEDICARE

## 2017-04-26 ENCOUNTER — HOSPITAL ENCOUNTER (EMERGENCY)
Age: 82
Discharge: HOME | End: 2017-04-26
Attending: EMERGENCY MEDICINE
Payer: MEDICARE

## 2017-04-26 ENCOUNTER — APPOINTMENT (OUTPATIENT)
Dept: CT IMAGING | Age: 82
End: 2017-04-26
Attending: EMERGENCY MEDICINE
Payer: MEDICARE

## 2017-04-26 VITALS
SYSTOLIC BLOOD PRESSURE: 123 MMHG | HEART RATE: 86 BPM | HEIGHT: 62 IN | TEMPERATURE: 98.2 F | BODY MASS INDEX: 33.75 KG/M2 | WEIGHT: 183.4 LBS | DIASTOLIC BLOOD PRESSURE: 59 MMHG | RESPIRATION RATE: 16 BRPM | OXYGEN SATURATION: 99 %

## 2017-04-26 DIAGNOSIS — M51.9 LUMBAR DISC DISEASE: ICD-10-CM

## 2017-04-26 DIAGNOSIS — N39.0 URINARY TRACT INFECTION WITHOUT HEMATURIA, SITE UNSPECIFIED: Primary | ICD-10-CM

## 2017-04-26 LAB
ALBUMIN SERPL BCP-MCNC: 3 G/DL (ref 3.5–5)
ALBUMIN/GLOB SERPL: 0.7 {RATIO} (ref 1.1–2.2)
ALP SERPL-CCNC: 98 U/L (ref 45–117)
ALT SERPL-CCNC: 23 U/L (ref 12–78)
ANION GAP BLD CALC-SCNC: 5 MMOL/L (ref 5–15)
APPEARANCE UR: CLEAR
AST SERPL W P-5'-P-CCNC: 12 U/L (ref 15–37)
BACTERIA URNS QL MICRO: NEGATIVE /HPF
BASOPHILS # BLD AUTO: 0 K/UL (ref 0–0.1)
BASOPHILS # BLD: 0 % (ref 0–1)
BILIRUB SERPL-MCNC: 0.3 MG/DL (ref 0.2–1)
BILIRUB UR QL: NEGATIVE
BUN SERPL-MCNC: 20 MG/DL (ref 6–20)
BUN/CREAT SERPL: 19 (ref 12–20)
CALCIUM SERPL-MCNC: 8.9 MG/DL (ref 8.5–10.1)
CHLORIDE SERPL-SCNC: 107 MMOL/L (ref 97–108)
CO2 SERPL-SCNC: 30 MMOL/L (ref 21–32)
COLOR UR: ABNORMAL
CREAT SERPL-MCNC: 1.08 MG/DL (ref 0.55–1.02)
EOSINOPHIL # BLD: 0.2 K/UL (ref 0–0.4)
EOSINOPHIL NFR BLD: 3 % (ref 0–7)
EPITH CASTS URNS QL MICRO: ABNORMAL /LPF
ERYTHROCYTE [DISTWIDTH] IN BLOOD BY AUTOMATED COUNT: 14 % (ref 11.5–14.5)
GLOBULIN SER CALC-MCNC: 4.1 G/DL (ref 2–4)
GLUCOSE SERPL-MCNC: 120 MG/DL (ref 65–100)
GLUCOSE UR STRIP.AUTO-MCNC: NEGATIVE MG/DL
HCT VFR BLD AUTO: 35.9 % (ref 35–47)
HGB BLD-MCNC: 12.1 G/DL (ref 11.5–16)
HGB UR QL STRIP: ABNORMAL
HYALINE CASTS URNS QL MICRO: ABNORMAL /LPF (ref 0–5)
KETONES UR QL STRIP.AUTO: NEGATIVE MG/DL
LEUKOCYTE ESTERASE UR QL STRIP.AUTO: ABNORMAL
LIPASE SERPL-CCNC: 143 U/L (ref 73–393)
LYMPHOCYTES # BLD AUTO: 29 % (ref 12–49)
LYMPHOCYTES # BLD: 2.1 K/UL (ref 0.8–3.5)
MCH RBC QN AUTO: 26.9 PG (ref 26–34)
MCHC RBC AUTO-ENTMCNC: 33.7 G/DL (ref 30–36.5)
MCV RBC AUTO: 79.8 FL (ref 80–99)
MONOCYTES # BLD: 0.4 K/UL (ref 0–1)
MONOCYTES NFR BLD AUTO: 6 % (ref 5–13)
NEUTS SEG # BLD: 4.4 K/UL (ref 1.8–8)
NEUTS SEG NFR BLD AUTO: 62 % (ref 32–75)
NITRITE UR QL STRIP.AUTO: NEGATIVE
PH UR STRIP: 5.5 [PH] (ref 5–8)
PLATELET # BLD AUTO: 240 K/UL (ref 150–400)
POTASSIUM SERPL-SCNC: 3.5 MMOL/L (ref 3.5–5.1)
PROT SERPL-MCNC: 7.1 G/DL (ref 6.4–8.2)
PROT UR STRIP-MCNC: ABNORMAL MG/DL
RBC # BLD AUTO: 4.5 M/UL (ref 3.8–5.2)
RBC #/AREA URNS HPF: ABNORMAL /HPF (ref 0–5)
SODIUM SERPL-SCNC: 142 MMOL/L (ref 136–145)
SP GR UR REFRACTOMETRY: 1.01 (ref 1–1.03)
UROBILINOGEN UR QL STRIP.AUTO: 0.2 EU/DL (ref 0.2–1)
WBC # BLD AUTO: 7.2 K/UL (ref 3.6–11)
WBC URNS QL MICRO: ABNORMAL /HPF (ref 0–4)

## 2017-04-26 PROCEDURE — 81001 URINALYSIS AUTO W/SCOPE: CPT | Performed by: EMERGENCY MEDICINE

## 2017-04-26 PROCEDURE — 83690 ASSAY OF LIPASE: CPT | Performed by: EMERGENCY MEDICINE

## 2017-04-26 PROCEDURE — 85025 COMPLETE CBC W/AUTO DIFF WBC: CPT | Performed by: EMERGENCY MEDICINE

## 2017-04-26 PROCEDURE — 71020 XR CHEST PA LAT: CPT

## 2017-04-26 PROCEDURE — 99284 EMERGENCY DEPT VISIT MOD MDM: CPT

## 2017-04-26 PROCEDURE — 36415 COLL VENOUS BLD VENIPUNCTURE: CPT | Performed by: EMERGENCY MEDICINE

## 2017-04-26 PROCEDURE — 72131 CT LUMBAR SPINE W/O DYE: CPT

## 2017-04-26 PROCEDURE — 80053 COMPREHEN METABOLIC PANEL: CPT | Performed by: EMERGENCY MEDICINE

## 2017-04-26 RX ORDER — CEFUROXIME AXETIL 250 MG/1
250 TABLET ORAL 2 TIMES DAILY
Qty: 20 TAB | Refills: 0 | Status: SHIPPED | OUTPATIENT
Start: 2017-04-26 | End: 2017-05-17

## 2017-04-26 RX ORDER — PREDNISONE 20 MG/1
40 TABLET ORAL DAILY
Qty: 14 TAB | Refills: 0 | Status: SHIPPED | OUTPATIENT
Start: 2017-04-26 | End: 2017-05-03

## 2017-04-26 RX ORDER — OXYCODONE HYDROCHLORIDE 5 MG/1
5 TABLET ORAL
Qty: 30 TAB | Refills: 0 | Status: SHIPPED | OUTPATIENT
Start: 2017-04-26 | End: 2017-05-17

## 2017-04-26 RX ORDER — PREDNISONE 20 MG/1
40 TABLET ORAL DAILY
Qty: 14 TAB | Refills: 0 | Status: SHIPPED | OUTPATIENT
Start: 2017-04-26 | End: 2017-04-26

## 2017-04-26 RX ORDER — CEFUROXIME AXETIL 250 MG/1
250 TABLET ORAL 2 TIMES DAILY
Qty: 20 TAB | Refills: 0 | Status: SHIPPED | OUTPATIENT
Start: 2017-04-26 | End: 2017-04-26

## 2017-04-26 NOTE — ED TRIAGE NOTES
Triage note: Patient c/o left sided flank pain, worse with deep inspiration and coughing beginning 2-3 weeks ago. Patient denies shortness of breath.

## 2017-04-26 NOTE — DISCHARGE INSTRUCTIONS
Urinary Tract Infection in Women: Care Instructions  Your Care Instructions    A urinary tract infection, or UTI, is a general term for an infection anywhere between the kidneys and the urethra (where urine comes out). Most UTIs are bladder infections. They often cause pain or burning when you urinate. UTIs are caused by bacteria and can be cured with antibiotics. Be sure to complete your treatment so that the infection goes away. Follow-up care is a key part of your treatment and safety. Be sure to make and go to all appointments, and call your doctor if you are having problems. It's also a good idea to know your test results and keep a list of the medicines you take. How can you care for yourself at home? · Take your antibiotics as directed. Do not stop taking them just because you feel better. You need to take the full course of antibiotics. · Drink extra water and other fluids for the next day or two. This may help wash out the bacteria that are causing the infection. (If you have kidney, heart, or liver disease and have to limit fluids, talk with your doctor before you increase your fluid intake.)  · Avoid drinks that are carbonated or have caffeine. They can irritate the bladder. · Urinate often. Try to empty your bladder each time. · To relieve pain, take a hot bath or lay a heating pad set on low over your lower belly or genital area. Never go to sleep with a heating pad in place. To prevent UTIs  · Drink plenty of water each day. This helps you urinate often, which clears bacteria from your system. (If you have kidney, heart, or liver disease and have to limit fluids, talk with your doctor before you increase your fluid intake.)  · Urinate when you need to. · Urinate right after you have sex. · Change sanitary pads often. · Avoid douches, bubble baths, feminine hygiene sprays, and other feminine hygiene products that have deodorants.   · After going to the bathroom, wipe from front to back.  When should you call for help? Call your doctor now or seek immediate medical care if:  · Symptoms such as fever, chills, nausea, or vomiting get worse or appear for the first time. · You have new pain in your back just below your rib cage. This is called flank pain. · There is new blood or pus in your urine. · You have any problems with your antibiotic medicine. Watch closely for changes in your health, and be sure to contact your doctor if:  · You are not getting better after taking an antibiotic for 2 days. · Your symptoms go away but then come back. Where can you learn more? Go to http://james-rachel.info/. Enter P574 in the search box to learn more about \"Urinary Tract Infection in Women: Care Instructions. \"  Current as of: November 28, 2016  Content Version: 11.2  © 8190-0328 [employer]. Care instructions adapted under license by [employer] (which disclaims liability or warranty for this information). If you have questions about a medical condition or this instruction, always ask your healthcare professional. [address] any warranty or liability for your use of this information. We hope that we have addressed all of your medical concerns. The examination and treatment you received in the Emergency Department were for an emergent problem and were not intended as complete care. It is important that you follow up with your healthcare provider(s) for ongoing care. If your symptoms worsen or do not improve as expected, and you are unable to reach your usual health care provider(s), you should return to the Emergency Department. Today's healthcare is undergoing tremendous change, and patient satisfaction surveys are one of the many tools to assess the quality of medical care. You may receive a survey from the [employer] organization regarding your experience in the Emergency Department.   I hope that your experience has been completely positive, particularly the medical care that I provided. As such, please participate in the survey; anything less than excellent does not meet my expectations or intentions. [address] and [facility] participate in nationally recognized quality of care measures. If your blood pressure is greater than 120/80, as reported below, we urge that you seek medical care to address the potential of high blood pressure, commonly known as hypertension. Hypertension can be hereditary or can be caused by certain medical conditions, pain, stress, or \"white coat syndrome. \"       Please make an appointment with your health care provider(s) for follow up of your Emergency Department visit. VITALS:   Patient Vitals for the past 8 hrs:   Temp Pulse Resp BP SpO2   04/26/17 1342 - 86 16 128/63 98 %   04/26/17 0908 98.2 °F (36.8 °C) 88 16 147/79 97 %          Thank you for allowing us to provide you with medical care today. We realize that you have many choices for your emergency care needs. Please choose us in the future for any continued health care needs. Mauro Francis, [address].   Office: [phone]            Recent Results (from the past 24 hour(s))   URINALYSIS W/MICROSCOPIC    Collection Time: 04/26/17 10:01 AM   Result Value Ref Range    Color YELLOW/STRAW      Appearance CLEAR CLEAR      Specific gravity 1.013 1.003 - 1.030      pH (UA) 5.5 5.0 - 8.0      Protein TRACE (A) NEG mg/dL    Glucose NEGATIVE  NEG mg/dL    Ketone NEGATIVE  NEG mg/dL    Bilirubin NEGATIVE  NEG      Blood TRACE (A) NEG      Urobilinogen 0.2 0.2 - 1.0 EU/dL    Nitrites NEGATIVE  NEG      Leukocyte Esterase LARGE (A) NEG      WBC 20-50 0 - 4 /hpf    RBC 0-5 0 - 5 /hpf    Epithelial cells FEW FEW /lpf    Bacteria NEGATIVE  NEG /hpf    Hyaline cast 0-2 0 - 5 /lpf   CBC WITH AUTOMATED DIFF    Collection Time: 04/26/17 12:10 PM   Result Value Ref Range    WBC 7.2 3.6 - 11.0 K/uL    RBC 4.50 3.80 - 5.20 M/uL    HGB 12.1 11.5 - 16.0 g/dL    HCT 35.9 35.0 - 47.0 %    MCV 79.8 (L) 80.0 - 99.0 FL    MCH 26.9 26.0 - 34.0 PG    MCHC 33.7 30.0 - 36.5 g/dL    RDW 14.0 11.5 - 14.5 %    PLATELET 451 416 - 613 K/uL    NEUTROPHILS 62 32 - 75 %    LYMPHOCYTES 29 12 - 49 %    MONOCYTES 6 5 - 13 %    EOSINOPHILS 3 0 - 7 %    BASOPHILS 0 0 - 1 %    ABS. NEUTROPHILS 4.4 1.8 - 8.0 K/UL    ABS. LYMPHOCYTES 2.1 0.8 - 3.5 K/UL    ABS. MONOCYTES 0.4 0.0 - 1.0 K/UL    ABS. EOSINOPHILS 0.2 0.0 - 0.4 K/UL    ABS. BASOPHILS 0.0 0.0 - 0.1 K/UL   METABOLIC PANEL, COMPREHENSIVE    Collection Time: 04/26/17 12:10 PM   Result Value Ref Range    Sodium 142 136 - 145 mmol/L    Potassium 3.5 3.5 - 5.1 mmol/L    Chloride 107 97 - 108 mmol/L    CO2 30 21 - 32 mmol/L    Anion gap 5 5 - 15 mmol/L    Glucose 120 (H) 65 - 100 mg/dL    BUN 20 6 - 20 MG/DL    Creatinine 1.08 (H) 0.55 - 1.02 MG/DL    BUN/Creatinine ratio 19 12 - 20      GFR est AA 59 (L) >60 ml/min/1.73m2    GFR est non-AA 49 (L) >60 ml/min/1.73m2    Calcium 8.9 8.5 - 10.1 MG/DL    Bilirubin, total 0.3 0.2 - 1.0 MG/DL    ALT (SGPT) 23 12 - 78 U/L    AST (SGOT) 12 (L) 15 - 37 U/L    Alk. phosphatase 98 45 - 117 U/L    Protein, total 7.1 6.4 - 8.2 g/dL    Albumin 3.0 (L) 3.5 - 5.0 g/dL    Globulin 4.1 (H) 2.0 - 4.0 g/dL    A-G Ratio 0.7 (L) 1.1 - 2.2     LIPASE    Collection Time: 04/26/17 12:10 PM   Result Value Ref Range    Lipase 143 73 - 393 U/L       Xr Chest Pa Lat    Result Date: 4/26/2017  History: Cough and right pleural pain Frontal and lateral views of the chest show clear lungs. There is atherosclerosis of the aorta. The heart, mediastinum and pulmonary vasculature are normal. There are degenerative changes of the spine. IMPRESSION: No acute findings.      Ct Spine Lumb Wo Cont    Result Date: 4/26/2017  EXAM:  CT SPINE LUMB WO CONT INDICATION:  Back pain, <6 wks; radiation of back pain to ant iliac crest on the right- ?compression fx COMPARISON: None. TECHNIQUE:   Unenhanced multislice helical CT of the lumbar spine was performed in the axial plane. Coronal and sagittal reconstructions were obtained. CT dose reduction was achieved through use of a standardized protocol tailored for this examination and automatic exposure control for dose modulation. FINDINGS: There is a slight C-shaped curvature, the apex directed to the left. Vacuum phenomena are noted at most lumbar levels. T11-12: There is a diffuse disc bulge and bilateral ligamentum flavum hypertrophy. This results in narrowing of the central canal to 7.1 mm. There is mild right and mild to moderate left neural foraminal stenosis. (Series 3, image 10). T12-L1: The spinal canal is widely patent. There is a disc osteophyte complex, eccentric to the left which results in mild left-sided neural foraminal stenosis. (Series 3, image 19) L1-L2:   There is a disc osteophyte complex, eccentric to the left. Bilateral ligamentum flavum hypertrophy is present. The central canal is triangulated to 6.8 mm. There is mild left-sided neural foraminal stenosis (reference series 3, image 33). L2-L3:   There is a disc osteophyte complex, eccentric to the right. The central canal measures 6.5 mm anterior to posterior. There is mild bilateral neural foraminal stenosis (reference series 3, image 45). L3-L4:   There is a disc osteophyte complex, bilateral ligamentum flavum hypertrophy and bilateral facet arthropathy. The central canal measures 8.8 mm anterior to posterior. There is mild left and moderate right neural foraminal stenosis (reference series 3, image 57) L4-L5:   There is a disc osteophyte complex. The central canal measures 6.8 mm anterior to posterior. There is bilateral facet arthropathy and ligamentum flavum hypertrophy. Moderate severe right and severe left neuroforaminal stenosis is present (reference series 3, image 65).  L5-S1:   There is a disc osteophyte complex. Bilateral facet arthropathy is noted. There is severe right and moderate severe left neural foraminal stenosis (reference series 3, image 74). 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

## 2017-04-26 NOTE — ED PROVIDER NOTES
HPI Comments: 81 y.o. female with past medical history significant for DM, hypercholesterolemia, hypertension and hysterectomy who presents from home with chief complaint of flank pain. Patient complains that for the past 2-3 weeks she has had LUQ abdominal and left chest pain, generally rating at 7/10. She notes pleuritic pain. She denies dyspnea on exertion. She denies any constipation, abnormal bowels movements, change in appetite, weight loss, wheezing, fever, headache, double vision or blurred vision. Per daughters, patient recently had a UTI for which she completed a course of antibiotics. Patient does not have history of diverticulitis but family does. There are no other acute medical concerns at this time. Social hx: Nonsmoker. No alcohol use. PCP: Hoang Stevens MD    Note written by clayton Kuo, as dictated by Mercedes Baker MD 10:10 AM      The history is provided by the patient. Past Medical History:   Diagnosis Date    Chronic edema     Diabetes ([facility] [address]     Family history of ischemic heart disease 4/12/2012    Hypercholesterolemia     Hypertension        Past Surgical History:   Procedure Laterality Date    ENDOSCOPY, COLON, DIAGNOSTIC  12/2006    Dr. Beverley Hickey HX BREAST BIOPSY  1997    HX HYSTERECTOMY  1972         Family History:   Problem Relation Age of Onset    Stroke Mother     Hypertension Mother     Stroke Father     Hypertension Father     Hypertension Brother        Social History     Social History    Marital status:      Spouse name: N/A    Number of children: N/A    Years of education: N/A     Occupational History    Not on file. Social History Main Topics    Smoking status: Never Smoker    Smokeless tobacco: Never Used    Alcohol use No    Drug use: No    Sexual activity: Not Currently     Other Topics Concern    Not on file     Social History Narrative         ALLERGIES: Latex; Aspirin; Glipizide;  Bactrim ds [sulfamethoxazole-trimethoprim]; Contrast agent [iodine]; Amaryl [glimepiride]; Avocado; Feldene [piroxicam]; Losartan; Derrick; Pcn [penicillins]; Tylenol [acetaminophen]; Welchol [colesevelam]; and Zestril [lisinopril]    Review of Systems   Constitutional: Negative for appetite change, chills and fever. HENT: Negative for congestion. Eyes: Negative for visual disturbance. Respiratory: Positive for cough. Negative for chest tightness, shortness of breath and wheezing. Cardiovascular: Positive for chest pain. Gastrointestinal: Positive for abdominal pain. Negative for diarrhea and vomiting. Genitourinary: Negative for dysuria and frequency. Musculoskeletal: Negative for joint swelling. Skin: Negative for rash. Neurological: Negative for speech difficulty and headaches. All other systems reviewed and are negative. Vitals:    04/26/17 0908   BP: 147/79   Pulse: 88   Resp: 16   Temp: 98.2 °F (36.8 °C)   SpO2: 97%   Weight: 83.2 kg (183 lb 6.4 oz)   Height: 5' 2\" (1.575 m)            Physical Exam   Constitutional: She is oriented to person, place, and time. She appears well-developed and well-nourished. No distress. HENT:   Head: Normocephalic and atraumatic. Nose: Nose normal.   Eyes: Conjunctivae are normal. Pupils are equal, round, and reactive to light. No scleral icterus. Neck: Normal range of motion. Neck supple. No JVD present. No tracheal deviation present. No thyromegaly present. Cardiovascular: Normal rate, regular rhythm and normal heart sounds. No murmur heard. Pulmonary/Chest: Effort normal. No respiratory distress. She has no wheezes. She has no rales. She exhibits tenderness (Left chest). Basilar crackles. Abdominal: Soft. Bowel sounds are normal. She exhibits no mass. There is tenderness (LUQ). There is no rebound and no guarding. Musculoskeletal: Normal range of motion. She exhibits no edema.    Neurological: She is alert and oriented to person, place, and time. No cranial nerve deficit. Coordination normal.   Skin: Skin is warm and dry. No rash noted. She is not diaphoretic. No erythema. Psychiatric: She has a normal mood and affect. Her behavior is normal.   Nursing note and vitals reviewed. Note written by clayton Olvera, as dictated by Mendel Bellis, MD 10:12 AM      [facility]  ED Course       Procedures  12:38 PM  Patient is describing radicular pain which wraps around her left hip. Will CT Lumbar spine to see if she has a compression fracture. 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 Will discharge with rx for Lortab and Ceftin.

## 2017-04-26 NOTE — PROGRESS NOTES
Admission Medication Reconciliation:    Information obtained from: Patient    Significant PMH/Disease States:   Past Medical History:   Diagnosis Date    Chronic edema     Diabetes ([facility] [address]     Family history of ischemic heart disease 4/12/2012    Hypercholesterolemia     Hypertension        Chief Complaint for this Admission:    Chief Complaint   Patient presents with    Flank Pain         Allergies:  Latex; Aspirin; Glipizide; Bactrim ds [sulfamethoxazole-trimethoprim]; Contrast agent [iodine]; Amaryl [glimepiride]; Avocado; Feldene [piroxicam]; Losartan; [place]; Pcn [penicillins]; Tylenol [acetaminophen]; Welchol [colesevelam]; and Zestril [lisinopril]    Prior to Admission Medications:   Prior to Admission Medications   Prescriptions Last Dose Informant Patient Reported? Taking? GARLIC PO 2/00/0556  Yes Yes   Sig: Take 1,000 mg by mouth daily. cholecalciferol, vitamin d3, (VITAMIN D) 1,000 unit tablet 4/26/2017  Yes Yes   Sig: Take 1,000 Units by mouth daily. cod liver oil Cap 4/26/2017  Yes Yes   Sig: Take 1 Cap by mouth daily. furosemide (LASIX) 20 mg tablet 4/25/2017  No Yes   Sig: Take 1 Tab by mouth daily. glyBURIDE (DIABETA) 5 mg tablet 4/26/2017  No Yes   Sig: Take 1 Tab by mouth two (2) times daily (with meals). ibuprofen (ADVIL LIQUI-GEL) 200 mg cap   Yes Yes   Sig: Take 1 Tab by mouth two (2) times daily as needed. omega-3 fatty acids-vitamin e (FISH OIL) 1,000 mg Cap 4/26/2017  Yes Yes   Sig: Take 1 Cap by mouth daily. red yeast rice extract 600 mg Cap 4/26/2017  Yes Yes   Sig: Take 600 mg by mouth daily. Facility-Administered Medications: None         Comments/Recommendations: Spoke with patient. No changes to list on file. Last doses updated.       Edson Muller, SienaD

## 2017-04-27 NOTE — CALL BACK NOTE
[facility] Department Follow Up Call Record    Discharged to : Home/Family Home/Home Health/Skilled Facility/Rehab/Assisted Living/Other__home_____  1) Did you receive your discharge instructions? Yes        2) Do you understand them? Yes         3) Are you able to follow them? Yes          If NO, what can I clarify for you? 4) Do you understand your diagnosis? Yes         5) Do you know which symptoms should prompt you to call the doctor? Yes     6) Were you able to fill and  any medications that were prescribed? Yes     7) You were prescribed ___________for ____________________. Common side effects of this medication are____________________. This is not a complete list so please review the forms given from the pharmacy for a complete list.      8) Are there any questions about your medications? No   Benita Avila reports she is not taking the oxycodone due to it makes her nauseous . She is taking Advil instead. She reports feeling \"a little better today. Have you scheduled any recommended doctors appointments (specialty, PCP) Encouraged follow up. If NO, what barriers are you encountering (transportation/lost contact info/cost/  didnt think necessary/no PCP  9) If discharged with Home Health, has the agency contacted you to schedule visit? No  10) Is there anyone available to help you at home (meals, errands, transportation    monitoring) (adult children, neighbors, private duty companions) Yes daughters   6) Are you on a special diet? Yes DM        If YES, do you understand the requirements for this diet? Education provided? 12) If presented with cough, bronchitis, COPD, asthma, is it ok to ask that the   respiratory disease management educator call you? Not applicable      13)  A) If presented with fall, were you issued an assistive device in the ED    Are you using? Not applicable  B) If given RX for device, have you obtained? Not applicable       If NO, barriers? C) Therapist recommended:NO   Are you able to implement the suggestions? Not applicable        If NO, barriers to implementation? D) Are you having any difficulties with mobility inside your home?     (steps, bed, tub)No   If YES, ask if the [facility] PT can contact patient and good time and number?  14)  At the end of your discharge instructions, there is information about accessing [facility], have you had a chance to review those? No         Do you have any questions about signing up for this service? We encourage our patients to be active participants in their healthcare and this site is one of the ways to do that. It will allow you to access parts of your medical record, email your doctors office, schedule appointments, and request medications refills . 15) Are there any other questions that I can answer for you regarding    your Emergency department visit?  NO             Estimated Call Time:____9:57 AM  _______________ Date/Time:_______________

## 2017-05-17 ENCOUNTER — OFFICE VISIT (OUTPATIENT)
Dept: FAMILY MEDICINE CLINIC | Age: 82
End: 2017-05-17

## 2017-05-17 VITALS
DIASTOLIC BLOOD PRESSURE: 79 MMHG | BODY MASS INDEX: 33.34 KG/M2 | SYSTOLIC BLOOD PRESSURE: 145 MMHG | RESPIRATION RATE: 18 BRPM | HEIGHT: 62 IN | WEIGHT: 181.2 LBS | TEMPERATURE: 98.2 F | OXYGEN SATURATION: 96 % | HEART RATE: 86 BPM

## 2017-05-17 DIAGNOSIS — Z51.81 ENCOUNTER FOR MEDICATION MONITORING: ICD-10-CM

## 2017-05-17 DIAGNOSIS — M15.9 PRIMARY OSTEOARTHRITIS INVOLVING MULTIPLE JOINTS: ICD-10-CM

## 2017-05-17 DIAGNOSIS — E55.9 HYPOVITAMINOSIS D: ICD-10-CM

## 2017-05-17 DIAGNOSIS — E78.2 MIXED HYPERLIPIDEMIA: ICD-10-CM

## 2017-05-17 DIAGNOSIS — I10 ESSENTIAL HYPERTENSION: Primary | ICD-10-CM

## 2017-05-17 DIAGNOSIS — Z78.9 STATIN INTOLERANCE: ICD-10-CM

## 2017-05-17 DIAGNOSIS — E11.9 CONTROLLED TYPE 2 DIABETES MELLITUS WITHOUT COMPLICATION, WITHOUT LONG-TERM CURRENT USE OF INSULIN (HCC): ICD-10-CM

## 2017-05-17 LAB
BILIRUB UR QL STRIP: NEGATIVE
GLUCOSE POC: 127 MG/DL
GLUCOSE UR-MCNC: NEGATIVE MG/DL
HBA1C MFR BLD HPLC: 8.7 %
KETONES P FAST UR STRIP-MCNC: NEGATIVE MG/DL
PH UR STRIP: 7.5 [PH] (ref 4.6–8)
PROT UR QL STRIP: NORMAL MG/DL
SP GR UR STRIP: 1.01 (ref 1–1.03)
UA UROBILINOGEN AMB POC: NORMAL (ref 0.2–1)
URINALYSIS CLARITY POC: CLEAR
URINALYSIS COLOR POC: YELLOW
URINE BLOOD POC: NEGATIVE
URINE LEUKOCYTES POC: NORMAL
URINE NITRITES POC: NEGATIVE

## 2017-05-17 RX ORDER — BLOOD SUGAR DIAGNOSTIC
STRIP MISCELLANEOUS
COMMUNITY
Start: 2017-05-08 | End: 2017-12-19 | Stop reason: SDUPTHER

## 2017-05-17 RX ORDER — GLYBURIDE 5 MG/1
7.5 TABLET ORAL 2 TIMES DAILY WITH MEALS
Qty: 180 TAB | Refills: 3
Start: 2017-05-17 | End: 2017-07-31 | Stop reason: SDUPTHER

## 2017-05-17 NOTE — MR AVS SNAPSHOT
Visit Information Date & Time Provider Department Dept. Phone Encounter #  
 5/17/2017  8:30 AM Freddie ValverdeNaseem [phone] [id number] Follow-up Instructions Return in about 3 months (around 8/17/2017). Upcoming Health Maintenance Date Due  
 FOOT EXAM Q1 10/28/2015 HEMOGLOBIN A1C Q6M 8/1/2017 INFLUENZA AGE 9 TO ADULT 8/1/2017 MEDICARE YEARLY EXAM 8/3/2017 MICROALBUMIN Q1 11/2/2017 LIPID PANEL Q1 11/2/2017 EYE EXAM RETINAL OR DILATED Q1 11/15/2017 GLAUCOMA SCREENING Q2Y 11/15/2018 DTaP/Tdap/Td series (3 - Td) 4/15/2025 Allergies as of 5/17/2017  Review Complete On: 5/17/2017 By: Freddie Valverde MD  
  
 Severity Noted Reaction Type Reactions Latex  02/21/2012   Systemic Contact Dermatitis Aspirin High 04/09/2010    Palpitations Glipizide High 04/29/2013   Systemic Shortness of Breath, Swelling Bactrim Ds [Sulfamethoxazole-trimethoprim] Medium 05/12/2010    Other (comments) Patient experienced pain in legs and buttock and muscle cramping. Contrast Agent [Iodine] Medium 04/15/2017   Intolerance Hives, Itching Itchy throat Amaryl [Glimepiride]  01/06/2016    Other (comments) \"nervous feeling\" Avocado  05/06/2016    Rash Pt states she bruises. Feldene [Piroxicam]  04/09/2010    Diarrhea Losartan  07/22/2011    Itching Caused congestion per stated by pt Derrick  05/06/2016    Hives Pcn [Penicillins]  04/09/2010    Rash Tylenol [Acetaminophen]  09/07/2013    Palpitations Welchol [Colesevelam]  11/27/2012    Other (comments) Pt states \"made throat feel raw\" Zestril [Lisinopril]  04/09/2010    Cough Current Immunizations  Reviewed on 5/17/2017 Name Date DTAP Vaccine 12/13/2000 Pneumococcal Conjugate (PCV-13) 4/15/2015 Pneumococcal Vaccine (Unspecified Type) 10/18/2005 Td, Adsorbed PF 4/15/2015 Reviewed by Romain Bansal MD on 5/17/2017 at  8:43 AM  
You Were Diagnosed With   
  
 Codes Comments Essential hypertension    -  Primary ICD-10-CM: I10 
ICD-9-CM: 401.9 Controlled type 2 diabetes mellitus without complication, without long-term current use of insulin ([facility]     ICD-10-CM: E11.9 ICD-9-CM: 250.00 Statin intolerance     ICD-10-CM: Z78.9 ICD-9-CM: 995.27 Mixed hyperlipidemia     ICD-10-CM: E78.2 ICD-9-CM: 272.2 Primary osteoarthritis involving multiple joints     ICD-10-CM: M15.0 ICD-9-CM: 715.09 Hypovitaminosis D     ICD-10-CM: E55.9 ICD-9-CM: 268.9 Encounter for medication monitoring     ICD-10-CM: Z51.81 
ICD-9-CM: V58.83 Vitals BP Pulse Temp Resp Height(growth percentile) Weight(growth percentile) 145/79 (BP 1 Location: Left arm, BP Patient Position: Sitting) 86 98.2 °F (36.8 °C) (Oral) 18 5' 2\" (1.575 m) 181 lb 3.2 oz (82.2 kg) LMP SpO2 BMI OB Status Smoking Status 04/28/1972 96% 33.14 kg/m2 Hysterectomy Never Smoker Vitals History BMI and BSA Data Body Mass Index Body Surface Area  
 33.14 kg/m 2 1.9 m 2 Preferred Pharmacy Pharmacy Name Phone [place] [phone] [facility] [address] [phone] Your Updated Medication List  
  
   
This list is accurate as of: 5/17/17  9:30 AM.  Always use your most recent med list.  
  
  
  
  
 ACCU-CHEK PARESH PLUS TEST STRP strip Generic drug:  glucose blood VI test strips ADVIL LIQUI- mg Cap Generic drug:  ibuprofen Take 1 Tab by mouth two (2) times daily as needed. cod liver oil Cap Take 1 Cap by mouth daily. FISH OIL 1,000 mg Cap Generic drug:  omega-3 fatty acids-vitamin e Take 1 Cap by mouth daily. furosemide 20 mg tablet Commonly known as:  LASIX Take 1 Tab by mouth daily. GARLIC PO Take 1,000 mg by mouth daily. glyBURIDE 5 mg tablet Commonly known as:  Ernesto Barry Take 1.5 Tabs by mouth two (2) times daily (with meals). red yeast rice extract 600 mg Cap Take 600 mg by mouth daily. VITAMIN D3 1,000 unit tablet Generic drug:  cholecalciferol Take 1,000 Units by mouth daily. We Performed the Following AMB POC GLUCOSE, QUANTITATIVE, BLOOD [90217 CPT(R)] AMB POC HEMOGLOBIN A1C [98547 CPT(R)] AMB POC URINALYSIS DIP STICK AUTO W/ MICRO [14506 CPT(R)] LIPID PANEL [58447 CPT(R)] METABOLIC PANEL, BASIC [32342 CPT(R)] VITAMIN D, 25 HYDROXY U1392946 CPT(R)] Follow-up Instructions Return in about 3 months (around 8/17/2017). Introducing \[facility] [place] [facility]\"" & HEALTH SERVICES! [facility] introduces [employer] patient portal. Now you can access parts of your medical record, email your doctor's office, and request medication refills online. 1. In your internet browser, go to [email or web address] 2. Click on the First Time User? Click Here link in the Sign In box. You will see the New Member Sign Up page. 3. Enter your Appifier Access Code exactly as it appears below. You will not need to use this code after youve completed the sign-up process. If you do not sign up before the expiration date, you must request a new code. · Appifier Access Code: [email or web address] Expires: 8/2/2017  9:01 PM 
 
4. Enter the last four digits of your Social Security Number (xxxx) and Date of Birth (mm/dd/yyyy) as indicated and click Submit. You will be taken to the next sign-up page. 5. Create a betNOWt ID. This will be your Appifier login ID and cannot be changed, so think of one that is secure and easy to remember. 6. Create a Appifier password. You can change your password at any time. 7. Enter your Password Reset Question and Answer. This can be used at a later time if you forget your password. 8. Enter your e-mail address. You will receive e-mail notification when new information is available in [id number]. 9. Click Sign Up. You can now view and download portions of your medical record. 10. Click the Download Summary menu link to download a portable copy of your medical information. If you have questions, please visit the Frequently Asked Questions section of the [employer] website. Remember, [employer] is NOT to be used for urgent needs. For medical emergencies, dial 911. Now available from your iPhone and Android! Please provide this summary of care documentation to your next provider. Your primary care clinician is listed as Hoang Stevens. If you have any questions after today's visit, please call [phone].

## 2017-05-17 NOTE — PROGRESS NOTES
HISTORY OF PRESENT ILLNESS  Hiren Pearson is a 81 y.o. female. HPI   Follow up on chronic medical problems. Feeling well. No c/o noted. Sleeping good. Appetite is good. Staying active. HTN follow up:  Compliant w/ meds, low salt diet, and exercise. Home bp monitoring 120s/70s. Pt has bp log. Swelling is stable, no headache or dizziness. No chest pain, SOB, palpitations. Otherwise feeling well since the last visit. DM type II follow up:  Compliant w/ meds, diabetic diet, and exercise. She has been better with watching diet. Obtains home glucose monitoring averaging 100-120s. Checks BS daily on most days and prn. Pt does have BS log at visit today. No Rf done for today. Denies any tingling sensation, polyuria and polydipsia. No blurred vision. No significant weight changes. Feeling well since last OV. Hypercholesterolemia follow up:  Compliant low fat, low cholesterol diet. Intolerant to stains and still did not tolerate zetia or welchol. Exercising some. Osteoarthritis:  Patient has osteoarthritis. Takes an occasional Advil which helps the pain. Has had increased pain in the hands from the arthritits. Has stiffness noted. Also has been having more \"crunching noise\" in the neck when turning neck. Has pain in the neck which comes and goes. Symptoms onset: problem is longstanding. Rheumatological ROS: stable, mild-to-moderate joint symptoms intermittently, reasonably well controlled by PRN meds. Response to treatment plan: stable   HM:  Microalbumin: 11/2/2016  Eye exam 11/15/2016 by Dr. Nabil Osborne.   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 Max Daily Amount: 20 mg. 30 Tab 0    cefUROXime (CEFTIN) 250 mg tablet Take 1 Tab by mouth two (2) times a day. 20 Tab 0    glyBURIDE (DIABETA) 5 mg tablet Take 1 Tab by mouth two (2) times daily (with meals). 180 Tab 3    furosemide (LASIX) 20 mg tablet Take 1 Tab by mouth daily. 90 Tab 3    ibuprofen (ADVIL LIQUI-GEL) 200 mg cap Take 1 Tab by mouth two (2) times daily as needed.  GARLIC PO Take 4,025 mg by mouth daily.  red yeast rice extract 600 mg Cap Take 600 mg by mouth daily.  cod liver oil Cap Take 1 Cap by mouth daily.  omega-3 fatty acids-vitamin e (FISH OIL) 1,000 mg Cap Take 1 Cap by mouth daily.  cholecalciferol, vitamin d3, (VITAMIN D) 1,000 unit tablet Take 1,000 Units by mouth daily. Allergies   Allergen Reactions    Latex Contact Dermatitis    Aspirin Palpitations    Glipizide Shortness of Breath and Swelling    Bactrim Ds [Sulfamethoxazole-Trimethoprim] Other (comments)     Patient experienced pain in legs and buttock and muscle cramping.  Contrast Agent [Iodine] Hives and Itching     Itchy throat    Amaryl [Glimepiride] Other (comments)     \"nervous feeling\"    Avocado Rash     Pt states she bruises.     Feldene [Piroxicam] Diarrhea    Losartan Itching     Caused congestion per stated by pt    Derrick Hives    Pcn [Penicillins] Rash    Tylenol [Acetaminophen] Palpitations    Welchol [Colesevelam] Other (comments)     Pt states \"made throat feel raw\"    Zestril [Lisinopril] Cough         Past Medical History:   Diagnosis Date    Chronic edema     Diabetes ([facility] [address]     Family history of ischemic heart disease 4/12/2012    Hypercholesterolemia     Hypertension          Past Surgical History:   Procedure Laterality Date    ENDOSCOPY, COLON, DIAGNOSTIC  12/2006    Dr. Cristian Barrera [address]    [address]         Family History   Problem Relation Age of Onset    Stroke Mother     Hypertension Mother     Stroke Father     Hypertension Father     Hypertension Brother        Social History   Substance Use Topics    Smoking status: Never Smoker    Smokeless tobacco: Never Used    Alcohol use No        Lab Results   Component Value Date/Time    WBC 7.2 04/26/2017 12:10 PM    HGB 12.1 04/26/2017 12:10 PM    HCT 35.9 04/26/2017 12:10 PM    PLATELET 177 55/88/6389 12:10 PM    MCV 79.8 04/26/2017 12:10 PM       Lab Results   Component Value Date/Time    Cholesterol, total 250 11/02/2016 08:55 AM    HDL Cholesterol 49 11/02/2016 08:55 AM    LDL, calculated 171 11/02/2016 08:55 AM    Triglyceride 149 11/02/2016 08:55 AM    CHOL/HDL Ratio 5.6 12/28/2009 10:43 AM       Lab Results   Component Value Date/Time    Glucose 120 04/26/2017 12:10 PM    Glucose (POC) 270 07/30/2016 01:27 PM    Glucose  02/01/2017 08:46 AM      Lab Results   Component Value Date/Time    Sodium 142 04/26/2017 12:10 PM    Potassium 3.5 04/26/2017 12:10 PM    Chloride 107 04/26/2017 12:10 PM    CO2 30 04/26/2017 12:10 PM    Anion gap 5 04/26/2017 12:10 PM    Glucose 120 04/26/2017 12:10 PM    BUN 20 04/26/2017 12:10 PM    Creatinine 1.08 04/26/2017 12:10 PM    BUN/Creatinine ratio 19 04/26/2017 12:10 PM    GFR est AA 59 04/26/2017 12:10 PM    GFR est non-AA 49 04/26/2017 12:10 PM    Calcium 8.9 04/26/2017 12:10 PM    Bilirubin, total 0.3 04/26/2017 12:10 PM    ALT (SGPT) 23 04/26/2017 12:10 PM    AST (SGOT) 12 04/26/2017 12:10 PM    Alk.  phosphatase 98 04/26/2017 12:10 PM    Protein, total 7.1 04/26/2017 12:10 PM    Albumin 3.0 04/26/2017 12:10 PM    Globulin 4.1 04/26/2017 12:10 PM    A-G Ratio 0.7 04/26/2017 12:10 PM      Lab Results Component Value Date/Time          Hemoglobin A1c (POC) 7.8 11/02/2016 09:00 AM         Review of Systems   Constitutional: Negative for malaise/fatigue. HENT: Negative for congestion. Eyes: Negative for blurred vision. Respiratory: Negative for cough and shortness of breath. Cardiovascular: Negative for chest pain, palpitations and leg swelling. Gastrointestinal: Negative for abdominal pain, constipation and heartburn. Genitourinary: Negative for dysuria, frequency and urgency. Musculoskeletal: Positive for joint pain. Negative for back pain. Neurological: Negative for dizziness, tingling and headaches. Endo/Heme/Allergies: Negative for environmental allergies. Psychiatric/Behavioral: Negative for depression. The patient does not have insomnia. Physical Exam   Constitutional: She appears well-developed and well-nourished. Visit Vitals    /79 (BP 1 Location: Left arm, BP Patient Position: Sitting)    Pulse 86    Temp 98.2 °F (36.8 °C) (Oral)    Resp 18    Ht 5' 2\" (1.575 m)    Wt 181 lb 3.2 oz (82.2 kg)    LMP 04/28/1972    SpO2 96%    BMI 33.14 kg/m2     HENT:   Right Ear: Tympanic membrane and ear canal normal.   Left Ear: Tympanic membrane and ear canal normal.   Nose: No mucosal edema or rhinorrhea. Mouth/Throat: Oropharynx is clear and moist and mucous membranes are normal.   Neck: Normal range of motion. Neck supple. No thyromegaly present. Cardiovascular: Normal rate and regular rhythm. No murmur heard. Pulmonary/Chest: Effort normal and breath sounds normal.   Abdominal: Soft. Bowel sounds are normal. There is no tenderness. Musculoskeletal: Normal range of motion. She exhibits no edema. Lymphadenopathy:     She has no cervical adenopathy. Skin: Skin is warm and dry. Psychiatric: She has a normal mood and affect. Nursing note and vitals reviewed.     LEXII and Erna Cruz was seen today for hypertension, diabetes and cholesterol problem. Diagnoses and all orders for this visit:    Essential hypertension  Discussed sodium restriction, high k rich diet, maintaining ideal body weight and regular exercise program such as daily walking 30 min perday 4-5 times per week, as physiologic means to achieve blood pressure control.  Medication compliance advised. Controlled type 2 diabetes mellitus without complication, without long-term current use of insulin ([facility])  a1c level is up to 8.7%. She admits that she has not been doing as good with her diet. Wants to work at at diet. -     AMB POC HEMOGLOBIN A1C  -     AMB POC GLUCOSE, QUANTITATIVE, BLOOD  -    Increase  glyBURIDE (DIABETA) 5 mg tablet; Take 1.5 Tabs by mouth two (2) times daily (with meals). Statin intolerance//  Mixed hyperlipidemia  -     LIPID PANEL    Primary osteoarthritis involving multiple joints  Stable     Hypovitaminosis D  -     VITAMIN D, 25 HYDROXY    Encounter for medication monitoring  -     METABOLIC PANEL, BASIC  -     AMB POC URINALYSIS DIP STICK AUTO W/ MICRO      Follow-up Disposition:  Return in about 3 months (around 8/17/2017). reviewed diet, exercise and weight control  cardiovascular risk and specific lipid/LDL goals reviewed  reviewed medications and side effects in detail  specific diabetic recommendations: low cholesterol diet, weight control and daily exercise discussed, home glucose monitoring emphasized, foot care discussed and Podiatry visits discussed, annual eye examinations at Ophthalmology discussed and glycohemoglobin and other lab monitoring discussed  use of aspirin to prevent MI and TIA's discussed      I have discussed diagnosis listed in this note with pt and/or family. I have discussed treatment plans and options and the risk/benefit analysis of those options, including safe use of medications and possible medication side effects. Through the use of shared decision making we have agreed to the above plan.  The patient has received an after-visit summary and questions were answered concerning future plans and follow up. Advise pt of any urgent changes then to proceed to the ER.

## 2017-05-17 NOTE — PROGRESS NOTES
Chief Complaint   Patient presents with    Hypertension     F/U    Diabetes     F/U    Cholesterol Problem     F/U         Hgb A1C: 02/01/2017    CMP 04/26/2017    Lipid: 11/2/2016    Microalbumin: 11/2/2016    Eye exam 11/15/2016 by Dr. Jadon Richards.     Mammogram: 8/1/2016

## 2017-05-18 LAB
25(OH)D3+25(OH)D2 SERPL-MCNC: 37 NG/ML (ref 30–100)
BUN SERPL-MCNC: 13 MG/DL (ref 8–27)
BUN/CREAT SERPL: 14 (ref 12–28)
CALCIUM SERPL-MCNC: 9.3 MG/DL (ref 8.7–10.3)
CHLORIDE SERPL-SCNC: 101 MMOL/L (ref 96–106)
CHOLEST SERPL-MCNC: 247 MG/DL (ref 100–199)
CO2 SERPL-SCNC: 27 MMOL/L (ref 18–29)
CREAT SERPL-MCNC: 0.91 MG/DL (ref 0.57–1)
GLUCOSE SERPL-MCNC: 134 MG/DL (ref 65–99)
HDLC SERPL-MCNC: 41 MG/DL
INTERPRETATION, 910389: NORMAL
INTERPRETATION: NORMAL
LDLC SERPL CALC-MCNC: 171 MG/DL (ref 0–99)
PDF IMAGE, 910387: NORMAL
POTASSIUM SERPL-SCNC: 3.9 MMOL/L (ref 3.5–5.2)
SODIUM SERPL-SCNC: 142 MMOL/L (ref 134–144)
TRIGL SERPL-MCNC: 176 MG/DL (ref 0–149)
VLDLC SERPL CALC-MCNC: 35 MG/DL (ref 5–40)

## 2017-07-10 DIAGNOSIS — I10 ESSENTIAL HYPERTENSION WITH GOAL BLOOD PRESSURE LESS THAN 140/90: ICD-10-CM

## 2017-07-10 NOTE — TELEPHONE ENCOUNTER
----- Message from Camron Jessica sent at 7/10/2017  3:38 PM EDT -----  Regarding: Dr. Emma Arana / Yanelis Lehman  Pt requesting a refill on her \"Furosemide 20 Mg\" to be sent to the [address] and the pt best contact number is [phone].

## 2017-07-11 RX ORDER — FUROSEMIDE 20 MG/1
20 TABLET ORAL DAILY
Qty: 90 TAB | Refills: 3 | Status: SHIPPED | OUTPATIENT
Start: 2017-07-11 | End: 2018-07-17 | Stop reason: SDUPTHER

## 2017-07-31 DIAGNOSIS — E11.9 CONTROLLED TYPE 2 DIABETES MELLITUS WITHOUT COMPLICATION, WITHOUT LONG-TERM CURRENT USE OF INSULIN (HCC): ICD-10-CM

## 2017-07-31 RX ORDER — GLYBURIDE 5 MG/1
7.5 TABLET ORAL 2 TIMES DAILY WITH MEALS
Qty: 180 TAB | Refills: 3 | Status: SHIPPED | OUTPATIENT
Start: 2017-07-31 | End: 2017-08-16

## 2017-08-10 ENCOUNTER — HOSPITAL ENCOUNTER (OUTPATIENT)
Dept: MAMMOGRAPHY | Age: 82
Discharge: HOME | End: 2017-08-10
Attending: FAMILY MEDICINE
Payer: MEDICARE

## 2017-08-10 DIAGNOSIS — Z12.31 VISIT FOR SCREENING MAMMOGRAM: ICD-10-CM

## 2017-08-10 PROCEDURE — 77067 SCR MAMMO BI INCL CAD: CPT

## 2017-08-16 ENCOUNTER — OFFICE VISIT (OUTPATIENT)
Dept: FAMILY MEDICINE CLINIC | Age: 82
End: 2017-08-16

## 2017-08-16 ENCOUNTER — PATIENT OUTREACH (OUTPATIENT)
Dept: FAMILY MEDICINE CLINIC | Age: 82
End: 2017-08-16

## 2017-08-16 VITALS
OXYGEN SATURATION: 97 % | HEIGHT: 62 IN | TEMPERATURE: 97.4 F | SYSTOLIC BLOOD PRESSURE: 138 MMHG | RESPIRATION RATE: 16 BRPM | BODY MASS INDEX: 32.97 KG/M2 | DIASTOLIC BLOOD PRESSURE: 76 MMHG | HEART RATE: 86 BPM | WEIGHT: 179.2 LBS

## 2017-08-16 DIAGNOSIS — Z13.39 SCREENING FOR ALCOHOLISM: ICD-10-CM

## 2017-08-16 DIAGNOSIS — E11.9 CONTROLLED TYPE 2 DIABETES MELLITUS WITHOUT COMPLICATION, WITHOUT LONG-TERM CURRENT USE OF INSULIN (HCC): ICD-10-CM

## 2017-08-16 DIAGNOSIS — Z00.00 ROUTINE GENERAL MEDICAL EXAMINATION AT A HEALTH CARE FACILITY: ICD-10-CM

## 2017-08-16 DIAGNOSIS — I10 ESSENTIAL HYPERTENSION: Primary | ICD-10-CM

## 2017-08-16 DIAGNOSIS — R07.89 ATYPICAL CHEST PAIN: ICD-10-CM

## 2017-08-16 DIAGNOSIS — M15.9 GENERALIZED OA: ICD-10-CM

## 2017-08-16 DIAGNOSIS — E78.2 MIXED HYPERLIPIDEMIA: ICD-10-CM

## 2017-08-16 DIAGNOSIS — Z51.81 ENCOUNTER FOR MEDICATION MONITORING: ICD-10-CM

## 2017-08-16 DIAGNOSIS — Z78.9 STATIN INTOLERANCE: ICD-10-CM

## 2017-08-16 LAB
GLUCOSE POC: 113 MG/DL
HBA1C MFR BLD HPLC: 7.6 %

## 2017-08-16 RX ORDER — GLYBURIDE 5 MG/1
5 TABLET ORAL 2 TIMES DAILY WITH MEALS
COMMUNITY
End: 2018-04-17 | Stop reason: SDUPTHER

## 2017-08-16 NOTE — PATIENT INSTRUCTIONS
Medicare Part B Preventive Services Limitations Recommendation Scheduled   Bone Mass Measurement  (age 72 & older, biennial) Requires diagnosis related to osteoporosis or estrogen deficiency. Biennial benefit unless patient has history of long-term glucocorticoid tx or baseline is needed because initial test was by other method  Done 10/2013   Cardiovascular Screening Blood Tests (every 5 years)  Total cholesterol, HDL, Triglycerides Order as a panel if possible  Done 5/2017   Colorectal Cancer Screening  -Fecal occult blood test (annual)  -Flexible sigmoidoscopy (5y)  -Screening colonoscopy (10y)  -Barium Enema   Had in past-now age 81   Counseling to Prevent Tobacco Use (up to 8 sessions per year)  - Counseling greater than 3 and up to 10 minutes  - Counseling greater than 10 minutes Patients must be asymptomatic of tobacco-related conditions to receive as preventive service  Non smoker   Diabetes Screening Tests (at least every 3 years, Medicare covers annually or at 6-month intervals for prediabetic patients)    Fasting blood sugar (FBS) or glucose tolerance test (GTT) Patient must be diagnosed with one of the following:  -Hypertension, Dyslipidemia, obesity, previous impaired FBS or GTT  Or any two of the following: overweight, FH of diabetes, age ? 72, history of gestational diabetes, birth of baby weighing more than 9 pounds  Last A1C was 8.7 on 5/2017   Diabetes Self-Management Training (DSMT) (no USPSTF recommendation) Requires referral by treating physician for patient with diabetes or renal disease. 10 hours of initial DSMT session of no less than 30 minutes each in a continuous 12-month period. 2 hours of follow-up DSMT in subsequent years.   N/A   Glaucoma Screening (no USPSTF recommendation) Diabetes mellitus, family history, , age 48 or over,  American, age 72 or over  Last eye exam was 11/2016   Human Immunodeficiency Virus (HIV) Screening (annually for increased risk patients)  HIV-1 and HIV-2 by EIA, ANDRES, rapid antibody test, or oral mucosa transudate Patient must be at increased risk for HIV infection per USPSTF guidelines or pregnant. Tests covered annually for patients at increased risk. Pregnant patients may receive up to 3 test during pregnancy. Not high risk   Medical Nutrition Therapy (MNT) (for diabetes or renal disease not recommended schedule) Requires referral by treating physician for patient with diabetes or renal disease. Can be provided in same year as diabetes self-management training (DSMT), and CMS recommends medical nutrition therapy take place after DSMT. Up to 3 hours for initial year and 2 hours in subsequent years. N/A   Shingles Vaccination A shingles vaccine is also recommended once in a lifetime after age 61  Declined today   Seasonal Influenza Vaccination (annually)   Fall 2017   Pneumococcal Vaccination (once after 72)   All done   Hepatitis B Vaccinations (if medium/high risk) Medium/high risk factors:  End-stage renal disease,  Hemophiliacs who received Factor VIII or IX concentrates, Clients of institutions for the mentally retarded, Persons who live in the same house as a HepB virus carrier, Homosexual men, Illicit injectable drug abusers. N/A for people born 80-46   Screening Mammography (biennial age 54-69) Annually (age 36 or over)  Done 8/2017   Screening Pap Tests and Pelvic Examination (up to age 79 and after 79 if unknown history or abnormal study last 10 years) Every 25 months except high risk  Hysterectomy   Ultrasound Screening for Abdominal Aortic Aneurysm (AAA) (once) Patient must be referred through IPPE and not have had a screening for abdominal aortic aneurysm before under Medicare.   Limited to patients who meet one of the following criteria:  - Men who are 73-68 years old and have smoked more than 100 cigarettes in their lifetime.  -Anyone with a FH of AAA  -Anyone recommended for screening by USPSTF  N/A

## 2017-08-16 NOTE — PROGRESS NOTES
Chief Complaint   Patient presents with    Hypertension     3 month follow up    Diabetes     3 month follow up    Cholesterol Problem     3 month follow up       Hgb A1C: 5/17/2017    BMP 5/17/2017    Lipid: 5/17/2017    Microalbumin: 11/2/2016    Eye exam 11/15/2016 by Dr. Marco Antonio Arana.     Mammogram: 8/1/2016

## 2017-08-16 NOTE — PROGRESS NOTES
This is a Subsequent Medicare Annual Wellness Visit providing Personalized Prevention Plan Services (PPPS) (Performed 12 months after initial AWV and PPPS )    I have reviewed the patient's medical history in detail and updated the computerized patient record. History     Past Medical History:   Diagnosis Date    Chronic edema     Diabetes ([address]     Family history of ischemic heart disease 4/12/2012    Hypercholesterolemia     Hypertension       Past Surgical History:   Procedure Laterality Date    ENDOSCOPY, COLON, DIAGNOSTIC  12/2006    Dr. Reba Jiang [address]    [address]     Current Outpatient Prescriptions   Medication Sig Dispense Refill    glyBURIDE (DIABETA) 5 mg tablet Take 5 mg by mouth two (2) times daily (with meals).  furosemide (LASIX) 20 mg tablet Take 1 Tab by mouth daily. 90 Tab 3    ACCU-CHEK PARESH PLUS TEST STRP strip Use to check BS 2 times daily      ibuprofen (ADVIL LIQUI-GEL) 200 mg cap Take 1 Tab by mouth two (2) times daily as needed.  GARLIC PO Take 3,621 mg by mouth daily.  red yeast rice extract 600 mg Cap Take 600 mg by mouth daily.  cod liver oil Cap Take 1 Cap by mouth daily.  omega-3 fatty acids-vitamin e (FISH OIL) 1,000 mg Cap Take 1 Cap by mouth daily.  cholecalciferol, vitamin d3, (VITAMIN D) 1,000 unit tablet Take 1,000 Units by mouth daily. Allergies   Allergen Reactions    Latex Contact Dermatitis    Aspirin Palpitations    Glipizide Shortness of Breath and Swelling    Bactrim Ds [Sulfamethoxazole-Trimethoprim] Other (comments)     Patient experienced pain in legs and buttock and muscle cramping.  Contrast Agent [Iodine] Hives and Itching     Itchy throat    Amaryl [Glimepiride] Other (comments)     \"nervous feeling\"    Avocado Rash     Pt states she bruises.     Feldene [Piroxicam] Diarrhea    Losartan Itching     Caused congestion per stated by pt    [place] Hives    Pcn [Penicillins] Rash    Tylenol [Acetaminophen] Palpitations    Welchol [Colesevelam] Other (comments)     Pt states \"made throat feel raw\"    Zestril [Lisinopril] Cough     Family History   Problem Relation Age of Onset   [address] [facility] Stroke Mother     Hypertension Mother     Stroke Father     Hypertension Father     Hypertension Brother      Social History   Substance Use Topics    Smoking status: Never Smoker    Smokeless tobacco: Never Used    Alcohol use No     Patient Active Problem List   Diagnosis Code    Osteoarthritis M19.90    Hypovitaminosis D E55.9    Tricuspid valve disorders, 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    Family history of ischemic heart disease Z82.49    Mixed hyperlipidemia E78.2    Mitral valve disorders I05.9    Obesity, unspecified E66.9    Generalized OA M15.9    Diabetes mellitus type 2, controlled ([address] E11.9    Essential hypertension I10    Encounter for medication monitoring Z51.81    Statin intolerance Z78.9       Depression Risk Factor Screening:     PHQ over the last two weeks 8/16/2017   Little interest or pleasure in doing things Not at all   Feeling down, depressed or hopeless Not at all   Total Score PHQ 2 0     Alcohol Risk Factor Screening: On any occasion during the past 3 months, have you had more than 3 drinks containing alcohol? No    Do you average more than 7 drinks per week? No        Functional Ability and Level of Safety:     Hearing Loss   normal-to-mild    Activities of Daily Living   Self-care. Requires assistance with: no ADLs    Fall Risk   Fall Risk Assessment, last 12 mths 8/16/2017   Able to walk? Yes   Fall in past 12 months? No     Abuse Screen   Patient is not abused    Review of Systems   Not required    Physical Examination     Evaluation of Cognitive Function:  Mood/affect:  happy  Appearance: age appropriate  Family member/caregiver input: here alone    No exam performed today, done by Dr Isabel Song.     Patient Care Team:  Abena Cano MD as PCP - General   Violet Colby Rn Nurse Navigator    Advice/Referrals/Counseling   Education and counseling provided:  End-of-Life planning (with patient's consent) Advanced Directives discussed with patient, given Your Right to Decide booklet and Advanced Directive paperwork to completed and bring back for chart. Declined Zoster Vaccine today      Assessment/Plan   As directed by Dr Zaira Gan.

## 2017-08-16 NOTE — PROGRESS NOTES
HISTORY OF PRESENT ILLNESS  Kacey Fonseca is a 81 y.o. female. HPI   Follow up on chronic medical problems. Feeling well. No c/o noted. Sleeping good. Appetite is good. Staying active. HTN follow up:  Compliant w/ meds, low salt diet, and exercise. Home bp monitoring 120s/70s. Pt has bp log. Swelling is stable, no headache or dizziness. No SOB, palpitations. Had pain in the left side of the chest on yesterday. Lasted most of the day. No SOB. No diaphoresis. Nonexertional.  Feels better today. Otherwise feeling well since the last visit. DM type II follow up:  Compliant w/ meds, diabetic diet, and exercise. She has been better with watching diet. Obtains home glucose monitoring averaging 100-120s. Checks BS daily on most days and prn. Pt does have BS log at visit today. No Rf done for today. Denies any tingling sensation, polyuria and polydipsia. No blurred vision. No significant weight changes. Feeling well since last OV. Hypercholesterolemia follow up:  Compliant low fat, low cholesterol diet. Intolerant to stains and did not tolerate zetia or welchol. Exercising some. Osteoarthritis:  Patient has osteoarthritis. Takes an occasional Advil which helps the pain. Has had increased pain in the hands from the arthritits. Has stiffness noted. Also has been having more \"crunching noise\" in the neck when turning neck. Has pain in the neck which comes and goes. Symptoms onset: problem is longstanding. Rheumatological ROS: stable, mild-to-moderate joint symptoms intermittently, reasonably well controlled by PRN meds. Response to treatment plan: stable   HM:  Microalbumin: 11/2/2016  Eye exam 11/15/2016 by Dr. Byron Barksdale.   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 180 Tab 3    furosemide (LASIX) 20 mg tablet Take 1 Tab by mouth daily. 90 Tab 3    ACCU-CHEK PARESH PLUS TEST STRP strip       ibuprofen (ADVIL LIQUI-GEL) 200 mg cap Take 1 Tab by mouth two (2) times daily as needed.  GARLIC PO Take 3,392 mg by mouth daily.  red yeast rice extract 600 mg Cap Take 600 mg by mouth daily.  cod liver oil Cap Take 1 Cap by mouth daily.  omega-3 fatty acids-vitamin e (FISH OIL) 1,000 mg Cap Take 1 Cap by mouth daily.  cholecalciferol, vitamin d3, (VITAMIN D) 1,000 unit tablet Take 1,000 Units by mouth daily. Allergies   Allergen Reactions    Latex Contact Dermatitis    Aspirin Palpitations    Glipizide Shortness of Breath and Swelling    Bactrim Ds [Sulfamethoxazole-Trimethoprim] Other (comments)     Patient experienced pain in legs and buttock and muscle cramping.  Contrast Agent [Iodine] Hives and Itching     Itchy throat    Amaryl [Glimepiride] Other (comments)     \"nervous feeling\"    Avocado Rash     Pt states she bruises.     Feldene [Piroxicam] Diarrhea    Losartan Itching     Caused congestion per stated by pt    Derrick Hives    Pcn [Penicillins] Rash    Tylenol [Acetaminophen] Palpitations    Welchol [Colesevelam] Other (comments)     Pt states \"made throat feel raw\"    Zestril [Lisinopril] Cough   17 0  Past Medical History:   Diagnosis Date    Chronic edema     Diabetes ([facility]     Family history of ischemic heart disease 4/12/2012    Hypercholesterolemia     Hypertension      L  Past Surgical History: Procedure Laterality Date    ENDOSCOPY, COLON, DIAGNOSTIC  12/2006    Dr. Clarice Vu HX [facility]   ab Results   Component Value Date/Time    Glucose 134 05/17/2017 09:07 AM    Glucose (POC) 270 07/30/2016 01:27 PM    Glucose  05/17/2017 09:00 AM      Lab Results   Component Value Date/Time    Sodium 142 05/17/2017 09:07 AM    Potassium 3.9 05/17/2017 09:07 AM    Chloride 101 05/17/2017 09:07 AM    CO2 27 05/17/2017 09:07 AM    Anion gap 5 04/26/2017 12:10 PM    Glucose 134 05/17/2017 09:07 AM    BUN 13 05/17/2017 09:07 AM    Creatinine 0.91 05/17/2017 09:07 AM    BUN/Creatinine ratio 14 05/17/2017 09:07 AM    GFR est AA 68 05/17/2017 09:07 AM    GFR est non-AA 59 05/17/2017 09:07 AM    Calcium 9.3 05/17/2017 09:07 AM    Bilirubin, total 0.3 04/26/2017 12:10 PM    ALT (SGPT) 23 04/26/2017 12:10 PM    AST (SGOT) 12 04/26/2017 12:10 PM    Alk. phosphatase 98 04/26/2017 12:10 PM    Protein, total 7.1 04/26/2017 12:10 PM    Albumin 3.0 04/26/2017 12:10 PM    Globulin 4.1 04/26/2017 12:10 PM    A-G Ratio 0.7 04/26/2017 12:10 PM      Lab Results   Component Value Date/Time          Hemoglobin A1c (POC) 7.8 11/02/2016 09:00 AM         Review of Systems   Constitutional: Negative for malaise/fatigue. HENT: Negative for congestion. Eyes: Negative for blurred vision. Respiratory: Negative for cough and shortness of breath. Cardiovascular: Negative for chest pain, palpitations and leg swelling. Gastrointestinal: Negative for abdominal pain, constipation and heartburn. Genitourinary: Negative for dysuria, frequency and urgency. Musculoskeletal: Positive for joint pain. Negative for back pain. Neurological: Negative for dizziness, tingling and headaches. Endo/Heme/Allergies: Negative for environmental allergies. Psychiatric/Behavioral: Negative for depression. The patient does not have insomnia.         Physical Exam   Constitutional: She appears well-developed and well-nourished. /76 (BP 1 Location: Left arm, BP Patient Position: Sitting)  Pulse 86  Temp 97.4 °F (36.3 °C) (Oral)   Resp 16  Ht 5' 2\" (1.575 m)  Wt 179 lb 3.2 oz (81.3 kg)  LMP 04/28/1972  SpO2 97%  BMI 32.78 kg/m2    HENT:   Right Ear: Tympanic membrane and ear canal normal.   Left Ear: Tympanic membrane and ear canal normal.   Nose: No mucosal edema or rhinorrhea. Mouth/Throat: Oropharynx is clear and moist and mucous membranes are normal.   Neck: Normal range of motion. Neck supple. No thyromegaly present. Cardiovascular: Normal rate and regular rhythm. No murmur heard. Pulmonary/Chest: Effort normal and breath sounds normal.   Abdominal: Soft. Bowel sounds are normal. There is no tenderness. Musculoskeletal: Normal range of motion. She exhibits trace edema. Lymphadenopathy:     She has no cervical adenopathy. Skin: Skin is warm and dry. Psychiatric: She has a normal mood and affect. Nursing note and vitals reviewed. ASSESSMENT and PLAN  Diagnoses and all orders for this visit:    1. Essential hypertension  Discussed sodium restriction, high k rich diet, maintaining ideal body weight and regular exercise program such as daily walking 30 min perday 4-5 times per week, as physiologic means to achieve blood pressure control.  Medication compliance advised. 2. Controlled type 2 diabetes mellitus without complication, without long-term current use of insulin (HCC)  -     AMB POC HEMOGLOBIN A1C  -     AMB POC GLUCOSE, QUANTITATIVE, BLOOD    3. Mixed hyperlipidemia//  4. Statin intolerance  Continue to monitor with diet. 5. Generalized OA  Stable     6. Encounter for medication monitoring  -     METABOLIC PANEL, BASIC    7. Atypical chest pain  -     AMB POC EKG ROUTINE W/ 12 LEADS, INTER & REP  -     REFERRAL TO CARDIOLOGY    8. Routine general medical examination at a health care facility//  9.  Screening for alcoholism  Per NN      Follow-up Disposition:  Return in about 4 months (around 12/16/2017). reviewed diet, exercise and weight control  cardiovascular risk and specific lipid/LDL goals reviewed  reviewed medications and side effects in detail  specific diabetic recommendations: low cholesterol diet, weight control and daily exercise discussed, home glucose monitoring emphasized, all medications, side effects and compliance discussed carefully, foot care discussed and Podiatry visits discussed, annual eye examinations at Ophthalmology discussed and glycohemoglobin and other lab monitoring discussed    I have discussed diagnosis listed in this note with pt and/or family. I have discussed treatment plans and options and the risk/benefit analysis of those options, including safe use of medications and possible medication side effects. Through the use of shared decision making we have agreed to the above plan. The patient has received an after-visit summary and questions were answered concerning future plans and follow up. Advise pt of any urgent changes then to proceed to the ER.

## 2017-08-16 NOTE — MR AVS SNAPSHOT
Visit Information Date & Time Provider Department Dept. Phone Encounter #  
 8/16/2017  8:15 AM Rosio Shin MD [facility] [phone] [id number] Follow-up Instructions Return in about 4 months (around 12/16/2017). Upcoming Health Maintenance Date Due  
 FOOT EXAM Q1 10/28/2015 MEDICARE YEARLY EXAM 8/3/2017 MICROALBUMIN Q1 11/2/2017 EYE EXAM RETINAL OR DILATED Q1 11/15/2017 HEMOGLOBIN A1C Q6M 11/17/2017 LIPID PANEL Q1 5/17/2018 GLAUCOMA SCREENING Q2Y 11/15/2018 DTaP/Tdap/Td series (3 - Td) 4/15/2025 Allergies as of 8/16/2017  Review Complete On: 8/16/2017 By: Rosio Shin MD  
  
 Severity Noted Reaction Type Reactions Latex  02/21/2012   Systemic Contact Dermatitis Aspirin High 04/09/2010    Palpitations Glipizide High 04/29/2013   Systemic Shortness of Breath, Swelling Bactrim Ds [Sulfamethoxazole-trimethoprim] Medium 05/12/2010    Other (comments) Patient experienced pain in legs and buttock and muscle cramping. Contrast Agent [Iodine] Medium 04/15/2017   Intolerance Hives, Itching Itchy throat Amaryl [Glimepiride]  01/06/2016    Other (comments) \"nervous feeling\" Avocado  05/06/2016    Rash Pt states she bruises. Feldene [Piroxicam]  04/09/2010    Diarrhea Losartan  07/22/2011    Itching Caused congestion per stated by pt [place]  05/06/2016    Hives Pcn [Penicillins]  04/09/2010    Rash Tylenol [Acetaminophen]  09/07/2013    Palpitations Welchol [Colesevelam]  11/27/2012    Other (comments) Pt states \"made throat feel raw\" Zestril [Lisinopril]  04/09/2010    Cough Current Immunizations  Reviewed on 8/16/2017 Name Date DTAP Vaccine 12/13/2000 Pneumococcal Conjugate (PCV-13) 4/15/2015 Td, Adsorbed PF 4/15/2015 ZZZ-RETIRED (DO NOT USE) Pneumococcal Vaccine (Unspecified Type) 10/18/2005 Reviewed by Rajat Pierre MD on 8/16/2017 at  8:07 AM  
You Were Diagnosed With   
  
 Codes Comments Essential hypertension    -  Primary ICD-10-CM: I10 
ICD-9-CM: 401.9 Controlled type 2 diabetes mellitus without complication, without long-term current use of insulin ([facility]     ICD-10-CM: E11.9 ICD-9-CM: 250.00 Mixed hyperlipidemia     ICD-10-CM: E78.2 ICD-9-CM: 272.2 Statin intolerance     ICD-10-CM: Z78.9 ICD-9-CM: 995.27 Generalized OA     ICD-10-CM: M15.9 ICD-9-CM: 715.00 Encounter for medication monitoring     ICD-10-CM: Z51.81 
ICD-9-CM: V58.83 Atypical chest pain     ICD-10-CM: R07.89 ICD-9-CM: 786.59 Routine general medical examination at a health care facility     ICD-10-CM: Z00.00 ICD-9-CM: V70.0 Screening for alcoholism     ICD-10-CM: Z13.89 ICD-9-CM: V79.1 Vitals BP Pulse Temp Resp Height(growth percentile) Weight(growth percentile) 138/76 (BP 1 Location: Left arm, BP Patient Position: Sitting) 86 97.4 °F (36.3 °C) (Oral) 16 5' 2\" (1.575 m) 179 lb 3.2 oz (81.3 kg) LMP SpO2 BMI OB Status Smoking Status 04/28/1972 97% 32.78 kg/m2 Hysterectomy Never Smoker BMI and BSA Data Body Mass Index Body Surface Area 32.78 kg/m 2 1.89 m 2 Preferred Pharmacy Pharmacy Name Phone [address] [facility] [address] [place] [address] [phone] Your Updated Medication List  
  
   
This list is accurate as of: 8/16/17  9:07 AM.  Always use your most recent med list.  
  
  
  
  
 ACCU-CHEK PARESH PLUS TEST STRP strip Generic drug:  glucose blood VI test strips Use to check BS 2 times daily ADVIL LIQUI- mg Cap Generic drug:  ibuprofen Take 1 Tab by mouth two (2) times daily as needed. cod liver oil Cap Take 1 Cap by mouth daily. FISH OIL 1,000 mg Cap Generic drug:  omega-3 fatty acids-vitamin e Take 1 Cap by mouth daily. furosemide 20 mg tablet Commonly known as:  LASIX Take 1 Tab by mouth daily. GARLIC PO Take 1,000 mg by mouth daily. glyBURIDE 5 mg tablet Commonly known as:  Rachel Ponto Take 5 mg by mouth two (2) times daily (with meals). red yeast rice extract 600 mg Cap Take 600 mg by mouth daily. VITAMIN D3 1,000 unit tablet Generic drug:  cholecalciferol Take 1,000 Units by mouth daily. We Performed the Following AMB POC EKG ROUTINE W/ 12 LEADS, INTER & REP [66081 CPT(R)] AMB POC GLUCOSE, QUANTITATIVE, BLOOD [74087 CPT(R)] AMB POC HEMOGLOBIN A1C [57177 CPT(R)] METABOLIC PANEL, BASIC [87611 CPT(R)] REFERRAL TO CARDIOLOGY [UXS20 Custom] Follow-up Instructions Return in about 4 months (around 12/16/2017). Referral Information Referral ID Referred By Referred To  
  
 [id number] REINALDO[address] MD   
   [address] [place] [address] Phone: [phone] Fax: [phone] Visits Status Start Date End Date 1 New Request 8/16/17 8/16/18 If your referral has a status of pending review or denied, additional information will be sent to support the outcome of this decision. Patient Instructions Medicare Part B Preventive Services Limitations Recommendation Scheduled Bone Mass Measurement 
(age 72 & older, biennial) Requires diagnosis related to osteoporosis or estrogen deficiency. Biennial benefit unless patient has history of long-term glucocorticoid tx or baseline is needed because initial test was by other method  Done 10/2013 Cardiovascular Screening Blood Tests (every 5 years) Total cholesterol, HDL, Triglycerides Order as a panel if possible  Done 5/2017 Colorectal Cancer Screening 
-Fecal occult blood test (annual) -Flexible sigmoidoscopy (5y) 
-Screening colonoscopy (10y) -Barium Enema   Had in past-now age 81 Counseling to Prevent Tobacco Use (up to 8 sessions per year) - Counseling greater than 3 and up to 10 minutes - Counseling greater than 10 minutes Patients must be asymptomatic of tobacco-related conditions to receive as preventive service  Non smoker Diabetes Screening Tests (at least every 3 years, Medicare covers annually or at 6-month intervals for prediabetic patients) Fasting blood sugar (FBS) or glucose tolerance test (GTT) Patient must be diagnosed with one of the following: 
-Hypertension, Dyslipidemia, obesity, previous impaired FBS or GTT 
Or any two of the following: overweight, FH of diabetes, age ? 72, history of gestational diabetes, birth of baby weighing more than 9 pounds  Last A1C was 8.7 on 5/2017 Diabetes Self-Management Training (DSMT) (no USPSTF recommendation) Requires referral by treating physician for patient with diabetes or renal disease. 10 hours of initial DSMT session of no less than 30 minutes each in a continuous 12-month period. 2 hours of follow-up DSMT in subsequent years. N/A Glaucoma Screening (no USPSTF recommendation) Diabetes mellitus, family history, , age 48 or over,  American, age 72 or over  Last eye exam was 11/2016 Human Immunodeficiency Virus (HIV) Screening (annually for increased risk patients) HIV-1 and HIV-2 by EIA, ANDRES, rapid antibody test, or oral mucosa transudate Patient must be at increased risk for HIV infection per USPSTF guidelines or pregnant. Tests covered annually for patients at increased risk. Pregnant patients may receive up to 3 test during pregnancy. Not high risk Medical Nutrition Therapy (MNT) (for diabetes or renal disease not recommended schedule) Requires referral by treating physician for patient with diabetes or renal disease. Can be provided in same year as diabetes self-management training (DSMT), and CMS recommends medical nutrition therapy take place after DSMT. Up to 3 hours for initial year and 2 hours in subsequent years.   N/A  
 Shingles Vaccination A shingles vaccine is also recommended once in a lifetime after age 61  Declined today Seasonal Influenza Vaccination (annually)   Fall 2017 Pneumococcal Vaccination (once after 65)   All done Hepatitis B Vaccinations (if medium/high risk) Medium/high risk factors:  End-stage renal disease, Hemophiliacs who received Factor VIII or IX concentrates, Clients of institutions for the mentally retarded, Persons who live in the same house as a HepB virus carrier, Homosexual men, Illicit injectable drug abusers. N/A for people born 1010-0200 Screening Mammography (biennial age 54-69) Annually (age 36 or over)  Done 8/2017 Screening Pap Tests and Pelvic Examination (up to age 79 and after 79 if unknown history or abnormal study last 10 years) Every 24 months except high risk  Hysterectomy Ultrasound Screening for Abdominal Aortic Aneurysm (AAA) (once) Patient must be referred through IPPE and not have had a screening for abdominal aortic aneurysm before under Medicare. Limited to patients who meet one of the following criteria: 
- Men who are 73-68 years old and have smoked more than 100 cigarettes in their lifetime. 
-Anyone with a FH of AAA 
-Anyone recommended for screening by USPSTF  N/A Introducing [facility]! [facility] introduces [employer] patient portal. Now you can access parts of your medical record, email your doctor's office, and request medication refills online. 1. In your internet browser, go to [email or web address] 2. Click on the First Time User? Click Here link in the Sign In box. You will see the New Member Sign Up page. 3. Enter your [employer] Access Code exactly as it appears below. You will not need to use this code after youve completed the sign-up process. If you do not sign up before the expiration date, you must request a new code. · [employer] Access Code: [email or web address] Expires: 11/14/2017  8:52 AM 
 
 4. Enter the last four digits of your Social Security Number (xxxx) and Date of Birth (mm/dd/yyyy) as indicated and click Submit. You will be taken to the next sign-up page. 5. Create a [employer] ID. This will be your [employer] login ID and cannot be changed, so think of one that is secure and easy to remember. 6. Create a [employer] password. You can change your password at any time. 7. Enter your Password Reset Question and Answer. This can be used at a later time if you forget your password. 8. Enter your e-mail address. You will receive e-mail notification when new information is available in [address] 9. Click Sign Up. You can now view and download portions of your medical record. 10. Click the Download Summary menu link to download a portable copy of your medical information. If you have questions, please visit the Frequently Asked Questions section of the [employer] website. Remember, [employer] is NOT to be used for urgent needs. For medical emergencies, dial 911. Now available from your iPhone and Android! Please provide this summary of care documentation to your next provider. Your primary care clinician is listed as Pam King. If you have any questions after today's visit, please call [phone].

## 2017-08-17 ENCOUNTER — TELEPHONE (OUTPATIENT)
Dept: FAMILY MEDICINE CLINIC | Age: 82
End: 2017-08-17

## 2017-08-17 LAB
BUN SERPL-MCNC: 21 MG/DL (ref 8–27)
BUN/CREAT SERPL: 21 (ref 12–28)
CALCIUM SERPL-MCNC: 9.3 MG/DL (ref 8.7–10.3)
CHLORIDE SERPL-SCNC: 102 MMOL/L (ref 96–106)
CO2 SERPL-SCNC: 23 MMOL/L (ref 18–29)
CREAT SERPL-MCNC: 1 MG/DL (ref 0.57–1)
GLUCOSE SERPL-MCNC: 121 MG/DL (ref 65–99)
INTERPRETATION: NORMAL
POTASSIUM SERPL-SCNC: 3.9 MMOL/L (ref 3.5–5.2)
SODIUM SERPL-SCNC: 142 MMOL/L (ref 134–144)

## 2017-08-17 NOTE — TELEPHONE ENCOUNTER
Advised patient that referral coordinator called her. Appointment with cardiology was given. Advised if she had any more questions to call office.

## 2017-08-17 NOTE — TELEPHONE ENCOUNTER
----- Message from Connie Barrera sent at 8/16/2017  2:41 PM EDT -----  Regarding: Dr Coleman April  Pt is returning call from nurse or referral person, she is not share who call her , please call pt at [phone].

## 2017-08-18 LAB
CHOLEST SERPL-MCNC: 244 MG/DL (ref 100–199)
HDLC SERPL-MCNC: 52 MG/DL
INTERPRETATION, 910389: NORMAL
LDLC SERPL CALC-MCNC: 168 MG/DL (ref 0–99)
TRIGL SERPL-MCNC: 121 MG/DL (ref 0–149)
VLDLC SERPL CALC-MCNC: 24 MG/DL (ref 5–40)

## 2017-09-05 ENCOUNTER — HOSPITAL ENCOUNTER (OUTPATIENT)
Dept: MAMMOGRAPHY | Age: 82
Discharge: HOME | End: 2017-09-05
Attending: FAMILY MEDICINE
Payer: MEDICARE

## 2017-09-05 ENCOUNTER — HOSPITAL ENCOUNTER (OUTPATIENT)
Dept: ULTRASOUND IMAGING | Age: 82
Discharge: HOME | End: 2017-09-05
Attending: FAMILY MEDICINE
Payer: MEDICARE

## 2017-09-05 DIAGNOSIS — R92.8 ABNORMAL MAMMOGRAM OF LEFT BREAST: ICD-10-CM

## 2017-09-05 PROCEDURE — 77065 DX MAMMO INCL CAD UNI: CPT

## 2017-09-05 PROCEDURE — 76642 ULTRASOUND BREAST LIMITED: CPT

## 2017-09-15 ENCOUNTER — OFFICE VISIT (OUTPATIENT)
Dept: CARDIOLOGY CLINIC | Age: 82
End: 2017-09-15

## 2017-09-15 VITALS
DIASTOLIC BLOOD PRESSURE: 66 MMHG | HEART RATE: 92 BPM | WEIGHT: 179.6 LBS | BODY MASS INDEX: 33.05 KG/M2 | OXYGEN SATURATION: 97 % | SYSTOLIC BLOOD PRESSURE: 122 MMHG | HEIGHT: 62 IN | RESPIRATION RATE: 20 BRPM

## 2017-09-15 DIAGNOSIS — E78.2 MIXED HYPERLIPIDEMIA: ICD-10-CM

## 2017-09-15 DIAGNOSIS — I36.9 TRICUSPID VALVE DISORDERS, SPECIFIED AS NONRHEUMATIC: ICD-10-CM

## 2017-09-15 DIAGNOSIS — I10 ESSENTIAL HYPERTENSION: ICD-10-CM

## 2017-09-15 DIAGNOSIS — I05.9 MITRAL VALVE DISEASE: ICD-10-CM

## 2017-09-15 DIAGNOSIS — Z82.49 FAMILY HISTORY OF ISCHEMIC HEART DISEASE: ICD-10-CM

## 2017-09-15 DIAGNOSIS — E11.8 TYPE 2 DIABETES MELLITUS WITH COMPLICATION, WITHOUT LONG-TERM CURRENT USE OF INSULIN (HCC): ICD-10-CM

## 2017-09-15 DIAGNOSIS — R07.2 PRECORDIAL PAIN: Primary | ICD-10-CM

## 2017-09-15 NOTE — PROGRESS NOTES
Subjective/HPI:     Freddie Daniel is a 81 y.o. female is here for new patient consultation. She was last seen in our office in 2012. She reports she is having episodes of chest discomfort, occurs under her left breast. Feels like a constant pain. She will take some Tums and it will eventually go away. She hasnt noticed a correlation to activity. Notices it more in the afternoon and evenings. This is something that occurs perhaps a couple times a month. The patient denies shortness of breath, orthopnea, PND, LE edema, palpitations, syncope, presyncope or fatigue. She had a stress test last year in Aug and she cant recall why they did the test.       PCP Provider  Lucrecia Cole MD  Past Medical History:   Diagnosis Date    Chronic edema     Diabetes ([address]     Family history of ischemic heart disease 4/12/2012    Hypercholesterolemia     Hypertension       Past Surgical History:   Procedure Laterality Date    ENDOSCOPY, COLON, DIAGNOSTIC  12/2006    Dr. Xavier White [address]    HX HYSTERECTOMY  1972     Allergies   Allergen Reactions    Latex Contact Dermatitis    Aspirin Palpitations    Glipizide Shortness of Breath and Swelling    Bactrim Ds [Sulfamethoxazole-Trimethoprim] Other (comments)     Patient experienced pain in legs and buttock and muscle cramping.  Contrast Agent [Iodine] Hives and Itching     Itchy throat    Amaryl [Glimepiride] Other (comments)     \"nervous feeling\"    Avocado Rash     Pt states she bruises.     Feldene [Piroxicam] Diarrhea    Losartan Itching     Caused congestion per stated by pt    [place] Hives    Oxycodone Diarrhea and Nausea Only    Pcn [Penicillins] Rash    Pineapple Rash    Tylenol [Acetaminophen] Palpitations    Welchol [Colesevelam] Other (comments)     Pt states \"made throat feel raw\"    Zestril [Lisinopril] Cough      Family History   Problem Relation Age of Onset    Stroke Mother     Hypertension Mother    [facility] Stroke Father     Hypertension Father     Hypertension Brother       Current Outpatient Prescriptions   Medication Sig    glyBURIDE (DIABETA) 5 mg tablet Take 5 mg by mouth two (2) times daily (with meals).  furosemide (LASIX) 20 mg tablet Take 1 Tab by mouth daily.  ACCU-CHEK PARESH PLUS TEST STRP strip Use to check BS 2 times daily    ibuprofen (ADVIL LIQUI-GEL) 200 mg cap Take 1 Tab by mouth two (2) times daily as needed.  GARLIC PO Take 3,689 mg by mouth daily.  red yeast rice extract 600 mg Cap Take 600 mg by mouth daily.  cod liver oil Cap Take 1 Cap by mouth daily.  omega-3 fatty acids-vitamin e (FISH OIL) 1,000 mg Cap Take 1 Cap by mouth daily.  cholecalciferol, vitamin d3, (VITAMIN D) 1,000 unit tablet Take 1,000 Units by mouth daily. No current facility-administered medications for this visit. Vitals:    09/15/17 1351 09/15/17 1403   BP: 126/64 122/66   Pulse: 92    Resp: 20    SpO2: 97%    Weight: 179 lb 9.6 oz (81.5 kg)    Height: 5' 2\" (1.575 m)      Social History     Social History    Marital status:      Spouse name: N/A    Number of children: N/A    Years of education: N/A     Occupational History    Not on file. Social History Main Topics    Smoking status: Never Smoker    Smokeless tobacco: Never Used    Alcohol use No    Drug use: No    Sexual activity: Not Currently     Other Topics Concern    Not on file     Social History Narrative       I have reviewed the nurses notes, vitals, problem list, allergy list, medical history, family, social history and medications. Review of Symptoms:    General: Pt denies excessive weight gain or loss. Pt is able to conduct ADL's  HEENT: Denies blurred vision, headaches, epistaxis and difficulty swallowing. Respiratory: Denies shortness of breath, GUTIERREZ, wheezing or stridor.   Cardiovascular: + precordial pain, denies palpitations, edema or PND  Gastrointestinal: Denies poor appetite, indigestion, abdominal pain or blood in stool  Urinary: Denies dysuria, pyuria  Musculoskeletal: Denies pain or swelling from muscles or joints  Neurologic: Denies tremor, paresthesias, or sensory motor disturbance  Skin: Denies rash, itching or texture change. Psych: Denies depression        Physical Exam:      General: Well developed, in no acute distress, cooperative and alert  HEENT: No carotid bruits, no JVD, trach is midline. Neck Supple, PEERL, EOM intact. Heart:  Normal S1/S2 negative S3 or S4. Regular, +SWATI, no gallop or rub.   Respiratory: Clear bilaterally x 4, no wheezing or rales  Abdomen:   Soft, non-tender, no masses, bowel sounds are active.   Extremities:  No edema, normal cap refill, no cyanosis, atraumatic. Neuro: A&Ox3, speech clear, gait stable. Skin: Skin color is normal. No rashes or lesions.  Non diaphoretic  Vascular: 2+ pulses symmetric in all extremities    Cardiographics    ECG: Sinus Rhythm  -Short [place] syndrome, PVC    -Prominent R(V1)     -Diffuse ST depression      Results for orders placed or performed during the hospital encounter of 07/30/16   EKG, 12 LEAD, INITIAL   Result Value Ref Range    Ventricular Rate 102 BPM    Atrial Rate 102 BPM    P-R Interval 130 ms    QRS Duration 78 ms    Q-T Interval 324 ms    QTC Calculation (Bezet) 422 ms    Calculated P Axis 51 degrees    Calculated R Axis 22 degrees    Calculated T Axis 48 degrees    Diagnosis       Sinus tachycardia  No previous ECGs available  Confirmed by Xena Stratton MD, Teresa Real ([id number]) on 7/31/2016 3:24:55 PM           Cardiology Labs:  Lab Results   Component Value Date/Time    Cholesterol, total 244 08/17/2017 08:49 AM    HDL Cholesterol 52 08/17/2017 08:49 AM    LDL, calculated 168 08/17/2017 08:49 AM    Triglyceride 121 08/17/2017 08:49 AM    CHOL/HDL Ratio 5.6 12/28/2009 10:43 AM       Lab Results   Component Value Date/Time    Sodium 142 08/16/2017 08:49 AM    Potassium 3.9 08/16/2017 08:49 AM    Chloride 102 08/16/2017 08:49 AM CO2 23 08/16/2017 08:49 AM    Anion gap 5 04/26/2017 12:10 PM    Glucose 121 08/16/2017 08:49 AM    BUN 21 08/16/2017 08:49 AM    Creatinine 1.00 08/16/2017 08:49 AM    BUN/Creatinine ratio 21 08/16/2017 08:49 AM    GFR est AA 61 08/16/2017 08:49 AM    GFR est non-AA 53 08/16/2017 08:49 AM    Calcium 9.3 08/16/2017 08:49 AM    AST (SGOT) 12 04/26/2017 12:10 PM    Alk. phosphatase 98 04/26/2017 12:10 PM    Protein, total 7.1 04/26/2017 12:10 PM    Albumin 3.0 04/26/2017 12:10 PM    Globulin 4.1 04/26/2017 12:10 PM    A-G Ratio 0.7 04/26/2017 12:10 PM    ALT (SGPT) 23 04/26/2017 12:10 PM           Assessment:     Assessment:     Diagnoses and all orders for this visit:    1. Precordial pain  -     AMB POC EKG ROUTINE W/ 12 LEADS, INTER & REP  -     STRESS TEST LEXISCAN/CARDIOLITE; Future    2. Mixed hyperlipidemia  -     AMB POC EKG ROUTINE W/ 12 LEADS, INTER & REP  -     STRESS TEST LEXISCAN/CARDIOLITE; Future    3. Essential hypertension  -     AMB POC EKG ROUTINE W/ 12 LEADS, INTER & REP  -     STRESS TEST LEXISCAN/CARDIOLITE; Future    4. Family history of ischemic heart disease  -     AMB POC EKG ROUTINE W/ 12 LEADS, INTER & REP  -     STRESS TEST LEXISCAN/CARDIOLITE; Future    5. Type 2 diabetes mellitus with complication, without long-term current use of insulin (HCC)  -     AMB POC EKG ROUTINE W/ 12 LEADS, INTER & REP  -     STRESS TEST LEXISCAN/CARDIOLITE; Future    6. Mitral valve disease  -     AMB POC EKG ROUTINE W/ 12 LEADS, INTER & REP  -     STRESS TEST LEXISCAN/CARDIOLITE; Future    7. Tricuspid valve disorders, specified as nonrheumatic  -     AMB POC EKG ROUTINE W/ 12 LEADS, INTER & REP  -     STRESS TEST LEXISCAN/CARDIOLITE; Future        ICD-10-CM ICD-9-CM    1. Precordial pain R07.2 786.51 AMB POC EKG ROUTINE W/ 12 LEADS, INTER & REP      STRESS TEST LEXISCAN/CARDIOLITE   2. Mixed hyperlipidemia E78.2 272.2 AMB POC EKG ROUTINE W/ 12 LEADS, INTER & REP      STRESS TEST LEXISCAN/CARDIOLITE   3. Essential hypertension I10 401.9 AMB POC EKG ROUTINE W/ 12 LEADS, INTER & REP      STRESS TEST LEXISCAN/CARDIOLITE   4. Family history of ischemic heart disease Z82.49 V17.3 AMB POC EKG ROUTINE W/ 12 LEADS, INTER & REP      STRESS TEST LEXISCAN/CARDIOLITE   5. Type 2 diabetes mellitus with complication, without long-term current use of insulin (HCC) E11.8 250.90 AMB POC EKG ROUTINE W/ 12 LEADS, INTER & REP      STRESS TEST LEXISCAN/CARDIOLITE   6. Mitral valve disease I05.9 394.9 AMB POC EKG ROUTINE W/ 12 LEADS, INTER & REP      STRESS TEST LEXISCAN/CARDIOLITE   7. Tricuspid valve disorders, 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 424.2 AMB POC EKG ROUTINE W/ 12 LEADS, INTER & REP      STRESS TEST LEXISCAN/CARDIOLITE     Orders Placed This Encounter    AMB POC EKG ROUTINE W/ 12 LEADS, INTER & REP     Order Specific Question:   Reason for Exam:     Answer:   Routine    STRESS TEST LEXISCAN/CARDIOLITE     Standing Status:   Future     Standing Expiration Date:   3/15/2018     Order Specific Question:   Reason for Exam:     Answer:   CP        Plan:     Patient presents today for new pt consultation with c/o left sided chest pain, occurs a couple times per month on avg. She has taken Tums with some gradual relief. She denies other cardiac complaints. She remain in SR. BP is normotensive. Stress echo 8/16 neg. Echo in 2011 with NL EF, mild MR, mild to mod TR. Lipids not at goal, hx of being intolerant to statins/zetia/welchol. I will evaluate with a lexiscan stress test, her stress echo she was only able to walk 3 minutes which is really suboptimal.  f/u after testing.     Hugh Taveras MD

## 2017-09-15 NOTE — PROGRESS NOTES
Chief Complaint   Patient presents with   [facility].     new pt; last seen in 2012    Chest Pain     pt c/o on and off pain under L side of breast x 1-1/2 mos ago

## 2017-09-29 ENCOUNTER — CLINICAL SUPPORT (OUTPATIENT)
Dept: CARDIOLOGY CLINIC | Age: 82
End: 2017-09-29

## 2017-09-29 DIAGNOSIS — I36.9 TRICUSPID VALVE DISORDERS, SPECIFIED AS NONRHEUMATIC: ICD-10-CM

## 2017-09-29 DIAGNOSIS — R07.2 PRECORDIAL PAIN: ICD-10-CM

## 2017-09-29 DIAGNOSIS — I10 ESSENTIAL HYPERTENSION: ICD-10-CM

## 2017-09-29 DIAGNOSIS — E78.2 MIXED HYPERLIPIDEMIA: ICD-10-CM

## 2017-09-29 DIAGNOSIS — Z82.49 FAMILY HISTORY OF ISCHEMIC HEART DISEASE: ICD-10-CM

## 2017-09-29 DIAGNOSIS — I05.9 MITRAL VALVE DISEASE: ICD-10-CM

## 2017-09-29 DIAGNOSIS — E11.8 TYPE 2 DIABETES MELLITUS WITH COMPLICATION, WITHOUT LONG-TERM CURRENT USE OF INSULIN (HCC): ICD-10-CM

## 2017-10-03 NOTE — PROGRESS NOTES
Spoke with patient  Verified patient with 2 patient identifier  Informed per Joelle NP Stress imaging is normal.  Patient verbalized understanding.

## 2017-12-19 ENCOUNTER — OFFICE VISIT (OUTPATIENT)
Dept: FAMILY MEDICINE CLINIC | Age: 82
End: 2017-12-19

## 2017-12-19 VITALS
BODY MASS INDEX: 33.05 KG/M2 | RESPIRATION RATE: 16 BRPM | TEMPERATURE: 97.5 F | HEART RATE: 79 BPM | WEIGHT: 179.6 LBS | SYSTOLIC BLOOD PRESSURE: 130 MMHG | DIASTOLIC BLOOD PRESSURE: 72 MMHG | HEIGHT: 62 IN | OXYGEN SATURATION: 98 %

## 2017-12-19 DIAGNOSIS — E78.2 MIXED HYPERLIPIDEMIA: ICD-10-CM

## 2017-12-19 DIAGNOSIS — R82.90 NONSPECIFIC FINDING ON EXAMINATION OF URINE: ICD-10-CM

## 2017-12-19 DIAGNOSIS — L02.229 BOIL OF TRUNK: ICD-10-CM

## 2017-12-19 DIAGNOSIS — I10 ESSENTIAL HYPERTENSION: Primary | ICD-10-CM

## 2017-12-19 DIAGNOSIS — Z51.81 ENCOUNTER FOR MEDICATION MONITORING: ICD-10-CM

## 2017-12-19 DIAGNOSIS — E11.9 CONTROLLED TYPE 2 DIABETES MELLITUS WITHOUT COMPLICATION, WITHOUT LONG-TERM CURRENT USE OF INSULIN (HCC): ICD-10-CM

## 2017-12-19 DIAGNOSIS — M15.9 GENERALIZED OA: ICD-10-CM

## 2017-12-19 LAB
BILIRUB UR QL STRIP: NEGATIVE
GLUCOSE POC: 116 MG/DL
GLUCOSE UR-MCNC: NEGATIVE MG/DL
HBA1C MFR BLD HPLC: 7.7 %
KETONES P FAST UR STRIP-MCNC: NEGATIVE MG/DL
PH UR STRIP: 6 [PH] (ref 4.6–8)
PROT UR QL STRIP: NORMAL
SP GR UR STRIP: 1.01 (ref 1–1.03)
UA UROBILINOGEN AMB POC: NORMAL (ref 0.2–1)
URINALYSIS CLARITY POC: CLEAR
URINALYSIS COLOR POC: YELLOW
URINE BLOOD POC: NORMAL
URINE LEUKOCYTES POC: NORMAL
URINE NITRITES POC: NEGATIVE

## 2017-12-19 RX ORDER — LEVOFLOXACIN 500 MG/1
500 TABLET, FILM COATED ORAL DAILY
Qty: 10 TAB | Refills: 0 | Status: SHIPPED | OUTPATIENT
Start: 2017-12-19 | End: 2017-12-19 | Stop reason: SDUPTHER

## 2017-12-19 RX ORDER — BLOOD SUGAR DIAGNOSTIC
STRIP MISCELLANEOUS
Qty: 100 STRIP | Refills: 11 | Status: SHIPPED | OUTPATIENT
Start: 2017-12-19 | End: 2018-07-25 | Stop reason: SDUPTHER

## 2017-12-19 RX ORDER — LEVOFLOXACIN 500 MG/1
500 TABLET, FILM COATED ORAL DAILY
Qty: 10 TAB | Refills: 0 | Status: SHIPPED | OUTPATIENT
Start: 2017-12-19 | End: 2017-12-29

## 2017-12-19 NOTE — PROGRESS NOTES
HISTORY OF PRESENT ILLNESS  Bonnie Castrejon is a 81 y.o. female. HPI   Follow up on chronic medical problems. Feeling well. Sleeping good. Appetite is good. Staying active. C/o boil in the mid chest in between the breast that has been draining over the past day. Has been bloody drainage noted. Started off being red and tender. HTN follow up:  Compliant w/ meds, low salt diet, and exercise. Home bp monitoring 120s/70s. Pt has bp log. Swelling is stable, no headache or dizziness. No chest pain, SOB or palpitations. Otherwise feeling well since the last visit. DM type II follow up:  Compliant w/ meds, diabetic diet, and exercise. She has been better with watching diet. Obtains home glucose monitoring averaging 100-150s. Admits that she has been eating more sweets over the past 2 months. Checks BS daily on most days and prn. Pt does have BS log at visit today. No Rf done for today. Denies any tingling sensation, polyuria and polydipsia. No blurred vision. No significant weight changes. Feeling well since last OV. Hypercholesterolemia follow up:  Compliant low fat, low cholesterol diet. Intolerant to stains and did not tolerate zetia or welchol. Exercising some. Osteoarthritis:  Patient has osteoarthritis. Takes an occasional Advil which helps the pain. Has had increased pain in the hands from the arthritits. Has stiffness noted in the hands and knees. Symptoms onset: problem is longstanding. Rheumatological ROS: stable, mild-to-moderate joint symptoms intermittently, reasonably well controlled by PRN meds.    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    Family history of ischemic heart disease Z82.49    Mixed hyperlipidemia E78.2    Mitral valve disease I05.9    Obesity, unspecified E66.9    Generalized OA M15.9  Diabetes mellitus type 2, controlled ([facility] [address] E11.9    Essential hypertension I10    Encounter for medication monitoring Z51.81    Statin intolerance Z78.9       Current Outpatient Prescriptions   Medication Sig Dispense Refill    ACCU-CHEK PARESH PLUS TEST STRP strip Use to check BS 2 times daily 100 Strip 11    levoFLOXacin (LEVAQUIN) 500 mg tablet Take 1 Tab by mouth daily for 10 days. 10 Tab 0    glyBURIDE (DIABETA) 5 mg tablet Take 5 mg by mouth two (2) times daily (with meals).  furosemide (LASIX) 20 mg tablet Take 1 Tab by mouth daily. 90 Tab 3    ibuprofen (ADVIL LIQUI-GEL) 200 mg cap Take 1 Tab by mouth two (2) times daily as needed.  GARLIC PO Take 5,653 mg by mouth daily.  red yeast rice extract 600 mg Cap Take 600 mg by mouth daily.  cod liver oil Cap Take 1 Cap by mouth daily.  omega-3 fatty acids-vitamin e (FISH OIL) 1,000 mg Cap Take 1 Cap by mouth daily.  cholecalciferol, vitamin d3, (VITAMIN D) 1,000 unit tablet Take 1,000 Units by mouth daily. Allergies   Allergen Reactions    Latex Contact Dermatitis    Aspirin Palpitations    Glipizide Shortness of Breath and Swelling    Bactrim Ds [Sulfamethoxazole-Trimethoprim] Other (comments)     Patient experienced pain in legs and buttock and muscle cramping.  Contrast Agent [Iodine] Hives and Itching     Itchy throat    Amaryl [Glimepiride] Other (comments)     \"nervous feeling\"    Avocado Rash     Pt states she bruises.     Feldene [Piroxicam] Diarrhea    Losartan Itching     Caused congestion per stated by pt    Derrick Hives    Oxycodone Diarrhea and Nausea Only    Pcn [Penicillins] Rash    Pineapple Rash    Tylenol [Acetaminophen] Palpitations    Welchol [Colesevelam] Other (comments)     Pt states \"made throat feel raw\"    Zestril [Lisinopril] Cough   17 0  Past Medical History:   Diagnosis Date    Chronic edema     [facility]     Family history of ischemic heart disease 4/12/2012  Hypercholesterolemia     Hypertension      Lab Results   Component Value Date/Time    Glucose 121 08/16/2017 08:49 AM    Glucose (POC) 270 07/30/2016 01:27 PM    Glucose  12/19/2017 09:33 AM      Lab Results   Component Value Date/Time    Sodium 142 08/16/2017 08:49 AM    Potassium 3.9 08/16/2017 08:49 AM    Chloride 102 08/16/2017 08:49 AM    CO2 23 08/16/2017 08:49 AM    Anion gap 5 04/26/2017 12:10 PM    Glucose 121 08/16/2017 08:49 AM    BUN 21 08/16/2017 08:49 AM    Creatinine 1.00 08/16/2017 08:49 AM    BUN/Creatinine ratio 21 08/16/2017 08:49 AM    GFR est AA 61 08/16/2017 08:49 AM    GFR est non-AA 53 08/16/2017 08:49 AM    Calcium 9.3 08/16/2017 08:49 AM    Bilirubin, total 0.3 04/26/2017 12:10 PM    ALT (SGPT) 23 04/26/2017 12:10 PM    AST (SGOT) 12 04/26/2017 12:10 PM    Alk. phosphatase 98 04/26/2017 12:10 PM    Protein, total 7.1 04/26/2017 12:10 PM    Albumin 3.0 04/26/2017 12:10 PM    Globulin 4.1 04/26/2017 12:10 PM    A-G Ratio 0.7 04/26/2017 12:10 PM      Lab Results   Component Value Date/Time          Hemoglobin A1c (POC) 7.7 12/19/2017 09:33 AM       Review of Systems   Constitutional: Negative for malaise/fatigue. HENT: Negative for congestion. Eyes: Negative for blurred vision. Respiratory: Negative for cough and shortness of breath. Cardiovascular: Negative for chest pain, palpitations and leg swelling. Gastrointestinal: Negative for abdominal pain, constipation and heartburn. Genitourinary: Negative for dysuria, frequency and urgency. Musculoskeletal: Positive for joint pain. Negative for back pain. Neurological: Negative for dizziness, tingling and headaches. Endo/Heme/Allergies: Negative for environmental allergies. Psychiatric/Behavioral: Negative for depression. The patient does not have insomnia. Physical Exam   Constitutional: She appears well-developed and well-nourished.    /72  Pulse 79  Temp 97.5 °F (36.4 °C) (Oral)   Resp 16  Ht 5' 2\" (1.575 m)  Wt 179 lb 9.6 oz (81.5 kg)  LMP 04/28/1972  SpO2 98%  BMI 32.85 kg/m2    HENT:   Right Ear: Tympanic membrane and ear canal normal.   Left Ear: Tympanic membrane and ear canal normal.   Nose: No mucosal edema or rhinorrhea. Mouth/Throat: Oropharynx is clear and moist and mucous membranes are normal.   Neck: Normal range of motion. Neck supple. No thyromegaly present. Cardiovascular: Normal rate and regular rhythm. No murmur heard. Pulmonary/Chest: Effort normal and breath sounds normal.   Abdominal: Soft. Bowel sounds are normal. There is no tenderness. Musculoskeletal: Normal range of motion. She exhibits trace edema. Lymphadenopathy:     She has no cervical adenopathy. Skin: Skin is warm and dry. 1.5 cm boil in the mid chest in the fold of the breat with bloody drainage. Tender. Culture done. Psychiatric: She has a normal mood and affect. Nursing note and vitals reviewed. ASSESSMENT and PLAN  Diagnoses and all orders for this visit:    1. Essential hypertension  Discussed sodium restriction, high k rich diet, maintaining ideal body weight and regular exercise program such as daily walking 30 min perday 4-5 times per week, as physiologic means to achieve blood pressure control.  Medication compliance advised. 2. Type 2 diabetes mellitus without complication, without long-term current use of insulin ([facility])  a1c level up to 7.7%. She will cut back on her sweet intake and work on her diet. -     AMB POC GLUCOSE, QUANTITATIVE, BLOOD  -     AMB POC HEMOGLOBIN A1C  -     MICROALBUMIN, UR, RAND W/ MICROALBUMIN/CREA RATIO  -     AMB POC URINALYSIS DIP STICK AUTO W/ MICRO  -     ACCU-CHEK PARESH PLUS TEST STRP strip; Use to check BS 2 times daily    3. Mixed hyperlipidemia  -     LIPID PANEL  Continue to monitor. Work on diet and exercise. 4. Generalized OA  Stable     5. Boil of trunk  -     AEROBIC BACTERIAL CULTURE  -     levoFLOXacin (LEVAQUIN) 500 mg tablet;  Take 1 Tab by mouth daily for 10 days. Warm compresses    6. Encounter for medication monitoring  -     METABOLIC PANEL, COMPREHENSIVE  -     AMB POC COMPLETE CBC, AUTOMATED    7. Nonspecific finding on examination of urine  -     CULTURE, URINE    Follow-up Disposition:  Return in about 4 months (around 4/9/2018). reviewed diet, exercise and weight control  cardiovascular risk and specific lipid/LDL goals reviewed  reviewed medications and side effects in detail  specific diabetic recommendations: low cholesterol diet, weight control and daily exercise discussed, home glucose monitoring emphasized, all medications, side effects and compliance discussed carefully, foot care discussed and Podiatry visits discussed, annual eye examinations at Ophthalmology discussed and glycohemoglobin and other lab monitoring discussed     I have discussed diagnosis listed in this note with pt and/or family. I have discussed treatment plans and options and the risk/benefit analysis of those options, including safe use of medications and possible medication side effects. Through the use of shared decision making we have agreed to the above plan. The patient has received an after-visit summary and questions were answered concerning future plans and follow up. Advise pt of any urgent changes then to proceed to the ER. Portillo Fitch

## 2017-12-19 NOTE — PROGRESS NOTES
Pt is here for follow up on HTN, diabetes and cholesterol. States she has an area on her chest that was dark.  States she woke up this morning and had blood all over her chest.     Mammogram 9/5/17    Eye exam  Pt states scheduled with Dr Mayra Woodward for Jan. 2018

## 2017-12-19 NOTE — MR AVS SNAPSHOT
Visit Information Date & Time Provider Department Dept. Phone Encounter #  
 12/19/2017  8:15 AM [place] MD [facility] [phone] [id number] Follow-up Instructions Return in about 4 months (around 4/9/2018). Upcoming Health Maintenance Date Due  
 FOOT EXAM Q1 10/28/2015 MICROALBUMIN Q1 11/2/2017 EYE EXAM RETINAL OR DILATED Q1 11/15/2017 HEMOGLOBIN A1C Q6M 2/16/2018 MEDICARE YEARLY EXAM 8/17/2018 LIPID PANEL Q1 8/17/2018 GLAUCOMA SCREENING Q2Y 11/15/2018 DTaP/Tdap/Td series (3 - Td) 4/15/2025 Allergies as of 12/19/2017  Review Complete On: 12/19/2017 By: Yuliet Leavitt LPN Severity Noted Reaction Type Reactions Latex  02/21/2012   Systemic Contact Dermatitis Aspirin High 04/09/2010    Palpitations Glipizide High 04/29/2013   Systemic Shortness of Breath, Swelling Bactrim Ds [Sulfamethoxazole-trimethoprim] Medium 05/12/2010    Other (comments) Patient experienced pain in legs and buttock and muscle cramping. Contrast Agent [Iodine] Medium 04/15/2017   Intolerance Hives, Itching Itchy throat Amaryl [Glimepiride]  01/06/2016    Other (comments) \"nervous feeling\" Avocado  05/06/2016    Rash Pt states she bruises. Feldene [Piroxicam]  04/09/2010    Diarrhea Losartan  07/22/2011    Itching Caused congestion per stated by pt Derrick  05/06/2016    Hives Oxycodone  09/15/2017    Diarrhea, Nausea Only Pcn [Penicillins]  04/09/2010    Rash Pineapple  09/15/2017    Rash Tylenol [Acetaminophen]  09/07/2013    Palpitations Welchol [Colesevelam]  11/27/2012    Other (comments) Pt states \"made throat feel raw\" Zestril [Lisinopril]  04/09/2010    Cough Current Immunizations  Reviewed on 8/16/2017 Name Date DTAP Vaccine 12/13/2000 Pneumococcal Conjugate (PCV-13) 4/15/2015 Td, Adsorbed PF 4/15/2015 ZZZ-RETIRED (DO NOT USE) Pneumococcal Vaccine (Unspecified Type) 10/18/2005 Not reviewed this visit You Were Diagnosed With   
  
 Codes Comments Essential hypertension    -  Primary ICD-10-CM: I10 
ICD-9-CM: 401.9 Controlled type 2 diabetes mellitus without complication, without long-term current use of insulin ([facility]     ICD-10-CM: E11.9 ICD-9-CM: 250.00 Mixed hyperlipidemia     ICD-10-CM: E78.2 ICD-9-CM: 272.2 Generalized OA     ICD-10-CM: M15.9 ICD-9-CM: 715.00 Boil of trunk     ICD-10-CM: L02.229 ICD-9-CM: 680.2 Encounter for medication monitoring     ICD-10-CM: Z51.81 
ICD-9-CM: V58.83 Vitals BP Pulse Temp Resp Height(growth percentile) Weight(growth percentile) 143/72 (BP 1 Location: Left arm, BP Patient Position: Sitting) 79 97.5 °F (36.4 °C) (Oral) 16 5' 2\" (1.575 m) 179 lb 9.6 oz (81.5 kg) LMP SpO2 BMI OB Status Smoking Status 04/28/1972 98% 32.85 kg/m2 Hysterectomy Never Smoker Vitals History BMI and BSA Data Body Mass Index Body Surface Area  
 32.85 kg/m 2 1.89 m 2 Preferred Pharmacy Pharmacy Name Phone [id number], [place] - [address] [phone] Your Updated Medication List  
  
   
This list is accurate as of: 12/19/17  9:55 AM.  Always use your most recent med list.  
  
  
  
  
 ACCU-CHEK PARESH PLUS TEST STRP strip Generic drug:  glucose blood VI test strips Use to check BS 2 times daily ADVIL LIQUI- mg Cap Generic drug:  ibuprofen Take 1 Tab by mouth two (2) times daily as needed. cod liver oil Cap Take 1 Cap by mouth daily. FISH OIL 1,000 mg Cap Generic drug:  omega-3 fatty acids-vitamin e Take 1 Cap by mouth daily. furosemide 20 mg tablet Commonly known as:  LASIX Take 1 Tab by mouth daily. GARLIC PO Take 1,000 mg by mouth daily. glyBURIDE 5 mg tablet Commonly known as:  Arias Catena Take 5 mg by mouth two (2) times daily (with meals). levoFLOXacin 500 mg tablet Commonly known as:  Castillo Rene Take 1 Tab by mouth daily for 10 days. red yeast rice extract 600 mg Cap Take 600 mg by mouth daily. VITAMIN D3 1,000 unit tablet Generic drug:  cholecalciferol Take 1,000 Units by mouth daily. Prescriptions Sent to Pharmacy Refills ACCU-CHEK PARESH PLUS TEST STRP strip 11 Sig: Use to check BS 2 times daily Class: Normal  
 Pharmacy: [address] [place] [address], [facility] [facility] [address] Ph #: [phone]  
 levoFLOXacin (LEVAQUIN) 500 mg tablet 0 Sig: Take 1 Tab by mouth daily for 10 days. Class: Normal  
 Pharmacy: [facility]/pharmacy [address] Ph #: [phone] Route: Oral  
  
We Performed the Following AEROBIC BACTERIAL CULTURE [id number] CPT(R)] AMB POC COMPLETE CBC, AUTOMATED [22479 CPT(R)] AMB POC GLUCOSE, QUANTITATIVE, BLOOD [20822 CPT(R)] AMB POC HEMOGLOBIN A1C [85467 CPT(R)] AMB POC URINALYSIS DIP STICK AUTO W/ MICRO [56797 CPT(R)] LIPID PANEL [47536 CPT(R)] METABOLIC PANEL, COMPREHENSIVE [07390 CPT(R)] MICROALBUMIN, UR, RAND W/ MICROALBUMIN/CREA RATIO [id number] CPT(R)] Follow-up Instructions Return in about 4 months (around 4/9/2018). Introducing [facility]! Moy Ayala introduces [employer] patient portal. Now you can access parts of your medical record, email your doctor's office, and request medication refills online. 1. In your internet browser, go to [email or web address] 2. Click on the First Time User? Click Here link in the Sign In box. You will see the New Member Sign Up page. 3. Enter your [employer] Access Code exactly as it appears below. You will not need to use this code after youve completed the sign-up process.  If you do not sign up before the expiration date, you must request a new code. · [employer] Access Code: [email or web address] Expires: 3/19/2018  9:51 AM 
 
4. Enter the last four digits of your Social Security Number (xxxx) and Date of Birth (mm/dd/yyyy) as indicated and click Submit. You will be taken to the next sign-up page. 5. Create a [employer] ID. This will be your [employer] login ID and cannot be changed, so think of one that is secure and easy to remember. 6. Create a [employer] password. You can change your password at any time. 7. Enter your Password Reset Question and Answer. This can be used at a later time if you forget your password. 8. Enter your e-mail address. You will receive e-mail notification when new information is available in [address] 9. Click Sign Up. You can now view and download portions of your medical record. 10. Click the Download Summary menu link to download a portable copy of your medical information. If you have questions, please visit the Frequently Asked Questions section of the [employer] website. Remember, [employer] is NOT to be used for urgent needs. For medical emergencies, dial 911. Now available from your iPhone and Android! Please provide this summary of care documentation to your next provider. Your primary care clinician is listed as Khanh Ramirez. If you have any questions after today's visit, please call [phone].

## 2017-12-20 LAB
ALBUMIN SERPL-MCNC: 3.9 G/DL (ref 3.5–4.7)
ALBUMIN/CREAT UR: 113.5 MG/G CREAT (ref 0–30)
ALBUMIN/GLOB SERPL: 1.3 {RATIO} (ref 1.2–2.2)
ALP SERPL-CCNC: 105 IU/L (ref 39–117)
ALT SERPL-CCNC: 14 IU/L (ref 0–32)
AST SERPL-CCNC: 12 IU/L (ref 0–40)
BACTERIA UR CULT: ABNORMAL
BILIRUB SERPL-MCNC: 0.4 MG/DL (ref 0–1.2)
BUN SERPL-MCNC: 18 MG/DL (ref 8–27)
BUN/CREAT SERPL: 20 (ref 12–28)
CALCIUM SERPL-MCNC: 9.3 MG/DL (ref 8.7–10.3)
CHLORIDE SERPL-SCNC: 105 MMOL/L (ref 96–106)
CHOLEST SERPL-MCNC: 222 MG/DL (ref 100–199)
CO2 SERPL-SCNC: 26 MMOL/L (ref 18–29)
CREAT SERPL-MCNC: 0.88 MG/DL (ref 0.57–1)
CREAT UR-MCNC: 87.4 MG/DL
GLOBULIN SER CALC-MCNC: 3 G/DL (ref 1.5–4.5)
GLUCOSE SERPL-MCNC: 113 MG/DL (ref 65–99)
HDLC SERPL-MCNC: 49 MG/DL
INTERPRETATION, 910389: NORMAL
LDLC SERPL CALC-MCNC: 147 MG/DL (ref 0–99)
Lab: NORMAL
MICROALBUMIN UR-MCNC: 99.2 UG/ML
POTASSIUM SERPL-SCNC: 4.1 MMOL/L (ref 3.5–5.2)
PROT SERPL-MCNC: 6.9 G/DL (ref 6–8.5)
SODIUM SERPL-SCNC: 143 MMOL/L (ref 134–144)
TRIGL SERPL-MCNC: 130 MG/DL (ref 0–149)
VLDLC SERPL CALC-MCNC: 26 MG/DL (ref 5–40)

## 2017-12-22 LAB
BACTERIA SPEC AEROBE CULT: ABNORMAL
BACTERIA SPEC AEROBE CULT: ABNORMAL

## 2018-04-17 ENCOUNTER — OFFICE VISIT (OUTPATIENT)
Dept: FAMILY MEDICINE CLINIC | Age: 83
End: 2018-04-17

## 2018-04-17 VITALS
OXYGEN SATURATION: 97 % | HEIGHT: 62 IN | BODY MASS INDEX: 33.53 KG/M2 | DIASTOLIC BLOOD PRESSURE: 72 MMHG | WEIGHT: 182.2 LBS | TEMPERATURE: 96.5 F | RESPIRATION RATE: 16 BRPM | SYSTOLIC BLOOD PRESSURE: 151 MMHG | HEART RATE: 74 BPM

## 2018-04-17 DIAGNOSIS — E78.2 MIXED HYPERLIPIDEMIA: ICD-10-CM

## 2018-04-17 DIAGNOSIS — E66.9 NON MORBID OBESITY: ICD-10-CM

## 2018-04-17 DIAGNOSIS — I10 ESSENTIAL HYPERTENSION: Primary | ICD-10-CM

## 2018-04-17 DIAGNOSIS — Z51.81 ENCOUNTER FOR MEDICATION MONITORING: ICD-10-CM

## 2018-04-17 DIAGNOSIS — E11.21 TYPE 2 DIABETES WITH NEPHROPATHY (HCC): ICD-10-CM

## 2018-04-17 DIAGNOSIS — L72.0 EPIDERMOID CYST OF SKIN OF CHEST: ICD-10-CM

## 2018-04-17 DIAGNOSIS — M15.9 GENERALIZED OA: ICD-10-CM

## 2018-04-17 LAB
GLUCOSE POC: 149 MG/DL
HBA1C MFR BLD HPLC: 8.2 %

## 2018-04-17 RX ORDER — GLYBURIDE 5 MG/1
5 TABLET ORAL 2 TIMES DAILY WITH MEALS
Qty: 180 TAB | Refills: 3 | Status: SHIPPED | OUTPATIENT
Start: 2018-04-17 | End: 2018-05-10 | Stop reason: SDUPTHER

## 2018-04-17 NOTE — PROGRESS NOTES
HISTORY OF PRESENT ILLNESS  Marilyn Anand is a 81 y.o. female. HPI   Follow up on chronic medical problems. Feeling well. Sleeping good. Appetite is good. Staying active. C/o boil in the mid chest in between the breast that has still been draining. Has been bloody drainage noted. Not as tender as it was when it first started a few months ago. HTN follow up:  Compliant w/ meds, low salt diet, and exercise. Home bp monitoring 120-130s/70s. Pt has bp log. Swelling is stable, no headache or dizziness. No chest pain, SOB or palpitations. Otherwise feeling well since the last visit. DM type II follow up:  Compliant w/ meds, diabetic diet, and exercise. She has been better with watching diet. Obtains home glucose monitoring averaging 100-150s. Admits that she has been eating more sweets over the past 2 months. Checks BS daily on most days and prn. Pt does have BS log at visit today. Denies any tingling sensation, polyuria and polydipsia. No blurred vision. No significant weight changes. Feeling well since last OV. Hypercholesterolemia follow up:  Compliant low fat, low cholesterol diet. Intolerant to stains and did not tolerate zetia or welchol. Exercising some. Osteoarthritis:  Patient has osteoarthritis. Takes an occasional Advil which helps the pain. Has had increased pain in the hands from the arthritits. Has stiffness noted in the hands and knees. Symptoms onset: problem is longstanding. Rheumatological ROS: stable, mild-to-moderate joint symptoms intermittently, reasonably well controlled by PRN meds.    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 E11.9    Essential hypertension I10    Encounter for medication monitoring Z51.81    Statin intolerance Z78.9       Current Outpatient Prescriptions   Medication Sig Dispense Refill    ACCU-CHEK PARESH PLUS TEST STRP strip Use to check BS 2 times daily 100 Strip 11    glyBURIDE (DIABETA) 5 mg tablet Take 5 mg by mouth two (2) times daily (with meals).  furosemide (LASIX) 20 mg tablet Take 1 Tab by mouth daily. 90 Tab 3    ibuprofen (ADVIL LIQUI-GEL) 200 mg cap Take 1 Tab by mouth two (2) times daily as needed.  GARLIC PO Take 5,154 mg by mouth daily.  red yeast rice extract 600 mg Cap Take 600 mg by mouth daily.  cod liver oil Cap Take 1 Cap by mouth daily.  omega-3 fatty acids-vitamin e (FISH OIL) 1,000 mg Cap Take 1 Cap by mouth daily.  cholecalciferol, vitamin d3, (VITAMIN D) 1,000 unit tablet Take 1,000 Units by mouth daily. Allergies   Allergen Reactions    Latex Contact Dermatitis    Aspirin Palpitations    Glipizide Shortness of Breath and Swelling    Bactrim Ds [Sulfamethoxazole-Trimethoprim] Other (comments)     Patient experienced pain in legs and buttock and muscle cramping.  Contrast Agent [Iodine] Hives and Itching     Itchy throat    Amaryl [Glimepiride] Other (comments)     \"nervous feeling\"    Avocado Rash     Pt states she bruises.     Feldene [Piroxicam] Diarrhea    Losartan Itching     Caused congestion per stated by pt    Derrick Hives    Oxycodone Diarrhea and Nausea Only    Pcn [Penicillins] Rash    Pineapple Rash    Tylenol [Acetaminophen] Palpitations    Welchol [Colesevelam] Other (comments)     Pt states \"made throat feel raw\"    Zestril [Lisinopril] Cough   17 0  Past Medical History:   Diagnosis Date    Chronic edema     [facility]     Family history of ischemic heart disease 4/12/2012    Hypercholesterolemia     Hypertension      Lab Results   Component Value Date/Time Glucose 113 (H) 12/19/2017 09:33 AM    Glucose (POC) 270 (H) 07/30/2016 01:27 PM    Glucose  12/19/2017 09:33 AM      Lab Results   Component Value Date/Time    Sodium 143 12/19/2017 09:33 AM    Potassium 4.1 12/19/2017 09:33 AM    Chloride 105 12/19/2017 09:33 AM    CO2 26 12/19/2017 09:33 AM    Anion gap 5 04/26/2017 12:10 PM    Glucose 113 (H) 12/19/2017 09:33 AM    BUN 18 12/19/2017 09:33 AM    Creatinine 0.88 12/19/2017 09:33 AM    BUN/Creatinine ratio 20 12/19/2017 09:33 AM    GFR est AA 71 12/19/2017 09:33 AM    GFR est non-AA 62 12/19/2017 09:33 AM    Calcium 9.3 12/19/2017 09:33 AM    Bilirubin, total 0.4 12/19/2017 09:33 AM    ALT (SGPT) 14 12/19/2017 09:33 AM    AST (SGOT) 12 12/19/2017 09:33 AM    Alk. phosphatase 105 12/19/2017 09:33 AM    Protein, total 6.9 12/19/2017 09:33 AM    Albumin 3.9 12/19/2017 09:33 AM    Globulin 4.1 (H) 04/26/2017 12:10 PM    A-G Ratio 1.3 12/19/2017 09:33 AM      Lab Results   Component Value Date/Time          Hemoglobin A1c (POC) 7.7 12/19/2017 09:33 AM       Review of Systems   Constitutional: Negative for malaise/fatigue. HENT: Negative for congestion. Eyes: Negative for blurred vision. Respiratory: Negative for cough and shortness of breath. Cardiovascular: Negative for chest pain, palpitations and leg swelling. Gastrointestinal: Negative for abdominal pain, constipation and heartburn. Genitourinary: Negative for dysuria, frequency and urgency. Musculoskeletal: Positive for joint pain. Negative for back pain. Neurological: Negative for dizziness, tingling and headaches. Endo/Heme/Allergies: Negative for environmental allergies. Psychiatric/Behavioral: Negative for depression. The patient does not have insomnia. Physical Exam   Constitutional: She appears well-developed and well-nourished. /72 (BP 1 Location: Left arm, BP Patient Position: Sitting) Comment: Pt states she has not taken any medication this morning.   Pulse 74 Temp 96.5 °F (35.8 °C) (Oral)   Resp 16  Ht 5' 2\" (1.575 m)  Wt 182 lb 3.2 oz (82.6 kg)  LMP 04/28/1972  SpO2 97%  BMI 33.32 kg/m2    HENT:   Right Ear: Tympanic membrane and ear canal normal.   Left Ear: Tympanic membrane and ear canal normal.   Nose: No mucosal edema or rhinorrhea. Mouth/Throat: Oropharynx is clear and moist and mucous membranes are normal.   Neck: Normal range of motion. Neck supple. No thyromegaly present. Cardiovascular: Normal rate and regular rhythm. No murmur heard. Pulmonary/Chest: Effort normal and breath sounds normal.   Abdominal: Soft. Bowel sounds are normal. There is no tenderness. Musculoskeletal: Normal range of motion. She exhibits trace edema. Foot exam done . Normal sensation to PP, LT and vibration. Sensation intact to microfilament testing. Pulses intact. No swelling. No skin lesions or sores noted. No tinea present. Lymphadenopathy:     She has no cervical adenopathy. Skin: Skin is warm and dry. 1.5 cm boil in the mid chest in the fold of the breat with drainage. Tender. Culture done. Psychiatric: She has a normal mood and affect. Nursing note and vitals reviewed. ASSESSMENT and PLAN  Diagnoses and all orders for this visit:    1. Essential hypertension    2. Type 2 diabetes with nephropathy (HCC)  a1c level is up to 8.2%. Pt wants to do better with her diet prior to increasing glyburide.    -     AMB POC HEMOGLOBIN A1C  -     AMB POC GLUCOSE, QUANTITATIVE, BLOOD  -     glyBURIDE (DIABETA) 5 mg tablet; Take 1 Tab by mouth two (2) times daily (with meals). 3. Mixed hyperlipidemia  -     LIPID PANEL    4. Generalized OA  Stable     5. Encounter for medication monitoring  -     METABOLIC PANEL, BASIC  -     CBC W/O DIFF    6. Epidermoid cyst of skin of chest  -     AEROBIC BACTERIAL CULTURE  -     REFERRAL TO GENERAL SURGERY    7. Non morbid obesity  I have reviewed/discussed the above normal BMI with the patient.   I have recommended the following interventions: dietary management education, guidance, and counseling . Follow-up Disposition:  Return in about 3 months (around 7/17/2018). reviewed diet, exercise and weight control  cardiovascular risk and specific lipid/LDL goals reviewed  reviewed medications and side effects in detail  specific diabetic recommendations: low cholesterol diet, weight control and daily exercise discussed, home glucose monitoring emphasized, all medications, side effects and compliance discussed carefully, foot care discussed and Podiatry visits discussed, annual eye examinations at Ophthalmology discussed and glycohemoglobin and other lab monitoring discussed     I have discussed diagnosis listed in this note with pt and/or family. I have discussed treatment plans and options and the risk/benefit analysis of those options, including safe use of medications and possible medication side effects. Through the use of shared decision making we have agreed to the above plan. The patient has received an after-visit summary and questions were answered concerning future plans and follow up. Advise pt of any urgent changes then to proceed to the ER. Olivia Porras

## 2018-04-17 NOTE — PROGRESS NOTES
Chief Complaint   Patient presents with    Hypertension     follow up    Diabetes     follow up    Cholesterol Problem     follow up       Eye exam 1/12/2018 by Dr. Dominik King. Mammogram: 8/10/2017    1. Have you been to the ER, urgent care clinic since your last visit? Hospitalized since your last visit? No    2. Have you seen or consulted any other health care providers outside of the [facility] since your last visit? Include any pap smears or colon screening.  No

## 2018-04-17 NOTE — MR AVS SNAPSHOT
[facility] 
 
 
 [address] [facility] [address] [address] 
[phone] Patient: Emmanuel Santos MRN: [id number] :1935 Visit Information Date & Time Provider Department Dept. Phone Encounter #  
 2018  8:00 AM Brenda Marquez MD [facility] [phone] [id number] Follow-up Instructions Return in about 3 months (around 2018). Upcoming Health Maintenance Date Due  
 FOOT EXAM Q1 10/28/2015 HEMOGLOBIN A1C Q6M 2018 MEDICARE YEARLY EXAM 2018 MICROALBUMIN Q1 2018 LIPID PANEL Q1 2018 EYE EXAM RETINAL OR DILATED Q1 2019 GLAUCOMA SCREENING Q2Y 2020 DTaP/Tdap/Td series (3 - Tdap) 4/15/2025 Allergies as of 2018  Review Complete On: 2018 By: Brenda Marquez MD  
  
 Severity Noted Reaction Type Reactions Latex  2012   Systemic Contact Dermatitis Aspirin High 2010    Palpitations Glipizide High 2013   Systemic Shortness of Breath, Swelling Bactrim Ds [Sulfamethoxazole-trimethoprim] Medium 2010    Other (comments) Patient experienced pain in legs and buttock and muscle cramping. Contrast Agent [Iodine] Medium 04/15/2017   Intolerance Hives, Itching Itchy throat Amaryl [Glimepiride]  2016    Other (comments) \"nervous feeling\" Avocado  2016    Rash Pt states she bruises. Feldene [Piroxicam]  2010    Diarrhea Losartan  2011    Itching Caused congestion per stated by pt [facility]  2016    Hives Oxycodone  09/15/2017    Diarrhea, Nausea Only Pcn [Penicillins]  2010    Rash Pineapple  09/15/2017    Rash Tylenol [Acetaminophen]  2013    Palpitations Welchol [Colesevelam]  2012    Other (comments) Pt states \"made throat feel raw\" Zestril [Lisinopril]  2010    Cough Current Immunizations  Reviewed on 2018 Name Date DTAP Vaccine 12/13/2000 Pneumococcal Conjugate (PCV-13) 4/15/2015 Td, Adsorbed PF 4/15/2015 ZZZ-RETIRED (DO NOT USE) Pneumococcal Vaccine (Unspecified Type) 10/18/2005 Reviewed by Efrain Andres MD on 4/17/2018 at  8:19 AM  
You Were Diagnosed With   
  
 Codes Comments Essential hypertension    -  Primary ICD-10-CM: I10 
ICD-9-CM: 401.9 Type 2 diabetes with nephropathy (HCC)     ICD-10-CM: E11.21 
ICD-9-CM: 250.40, 583.81 Mixed hyperlipidemia     ICD-10-CM: E78.2 ICD-9-CM: 272.2 Generalized OA     ICD-10-CM: M15.9 ICD-9-CM: 715.00 Encounter for medication monitoring     ICD-10-CM: Z51.81 
ICD-9-CM: V58.83 Epidermoid cyst of skin of chest     ICD-10-CM: L72.0 ICD-9-CM: 706. 2 Vitals BP Pulse Temp Resp Height(growth percentile) Weight(growth percentile) 151/72 (BP 1 Location: Left arm, BP Patient Position: Sitting) 74 96.5 °F (35.8 °C) (Oral) 16 5' 2\" (1.575 m) 182 lb 3.2 oz (82.6 kg) LMP SpO2 BMI OB Status Smoking Status 04/28/1972 97% 33.32 kg/m2 Hysterectomy Never Smoker Vitals History BMI and BSA Data Body Mass Index Body Surface Area  
 33.32 kg/m 2 1.9 m 2 Preferred Pharmacy Pharmacy Name Phone [address] [facility] [address] [place] [address] [phone] Your Updated Medication List  
  
   
This list is accurate as of 4/17/18  9:08 AM.  Always use your most recent med list.  
  
  
  
  
 ACCU-CHEK PARESH PLUS TEST STRP strip Generic drug:  glucose blood VI test strips Use to check BS 2 times daily ADVIL LIQUI- mg Cap Generic drug:  ibuprofen Take 1 Tab by mouth two (2) times daily as needed. cod liver oil Cap Take 1 Cap by mouth daily. FISH OIL 1,000 mg Cap Generic drug:  omega-3 fatty acids-vitamin e Take 1 Cap by mouth daily. furosemide 20 mg tablet Commonly known as:  LASIX Take 1 Tab by mouth daily.   
  
 GARLIC PO  
 Take 1,000 mg by mouth daily. glyBURIDE 5 mg tablet Commonly known as:  Heather Scott Take 1 Tab by mouth two (2) times daily (with meals). red yeast rice extract 600 mg Cap Take 600 mg by mouth daily. VITAMIN D3 1,000 unit tablet Generic drug:  cholecalciferol Take 1,000 Units by mouth daily. Prescriptions Sent to Pharmacy Refills  
 glyBURIDE (DIABETA) 5 mg tablet 3 Sig: Take 1 Tab by mouth two (2) times daily (with meals). Class: Normal  
 Pharmacy: [address] [place] [address], [facility] [address] [place] [address] #: [phone] Route: Oral  
  
We Performed the Following AEROBIC BACTERIAL CULTURE [id number] CPT(R)] AMB POC GLUCOSE, QUANTITATIVE, BLOOD [71527 CPT(R)] AMB POC HEMOGLOBIN A1C [04371 CPT(R)] CBC W/O DIFF [57203 CPT(R)] LIPID PANEL [81621 CPT(R)] METABOLIC PANEL, BASIC [89217 CPT(R)] REFERRAL TO GENERAL SURGERY [REF27 Custom] Follow-up Instructions Return in about 3 months (around 7/17/2018). Referral Information Referral ID Referred By Referred To  
  
 [id number] REINALDO, [address] Rica Ye MD   
   [address] [place] [address] Phone: [phone] Fax: [phone] Visits Status Start Date End Date 1 New Request 4/17/18 4/17/19 If your referral has a status of pending review or denied, additional information will be sent to support the outcome of this decision. Introducing [facility]! [facility] introduces [place] patient portal. Now you can access parts of your medical record, email your doctor's office, and request medication refills online. 1. In your internet browser, go to [email or web address] 2. Click on the First Time User? Click Here link in the Sign In box. You will see the New Member Sign Up page. 3. Enter your [place] Access Code exactly as it appears below.  You will not need to use this code after youve completed the sign-up process. If you do not sign up before the expiration date, you must request a new code. · [employer] Access Code: [facility] Expires: 7/16/2018  9:08 AM 
 
4. Enter the last four digits of your Social Security Number (xxxx) and Date of Birth (mm/dd/yyyy) as indicated and click Submit. You will be taken to the next sign-up page. 5. Create a Iotelligent ID. This will be your Iotelligent login ID and cannot be changed, so think of one that is secure and easy to remember. 6. Create a [employer]elligent password. You can change your password at any time. 7. Enter your Password Reset Question and Answer. This can be used at a later time if you forget your password. 8. Enter your e-mail address. You will receive e-mail notification when new information is available in [address] 9. Click Sign Up. You can now view and download portions of your medical record. 10. Click the Download Summary menu link to download a portable copy of your medical information. If you have questions, please visit the Frequently Asked Questions section of the [employer] website. Remember, [employer] is NOT to be used for urgent needs. For medical emergencies, dial 911. Now available from your iPhone and Android! Please provide this summary of care documentation to your next provider. Your primary care clinician is listed as Dahlia Blake. If you have any questions after today's visit, please call [phone].

## 2018-04-18 LAB
BUN SERPL-MCNC: 22 MG/DL (ref 8–27)
BUN/CREAT SERPL: 21 (ref 12–28)
CALCIUM SERPL-MCNC: 9.7 MG/DL (ref 8.7–10.3)
CHLORIDE SERPL-SCNC: 102 MMOL/L (ref 96–106)
CHOLEST SERPL-MCNC: 238 MG/DL (ref 100–199)
CO2 SERPL-SCNC: 26 MMOL/L (ref 18–29)
CREAT SERPL-MCNC: 1.07 MG/DL (ref 0.57–1)
ERYTHROCYTE [DISTWIDTH] IN BLOOD BY AUTOMATED COUNT: 15 % (ref 12.3–15.4)
GFR SERPLBLD CREATININE-BSD FMLA CKD-EPI: 48 ML/MIN/1.73
GFR SERPLBLD CREATININE-BSD FMLA CKD-EPI: 56 ML/MIN/1.73
GLUCOSE SERPL-MCNC: 149 MG/DL (ref 65–99)
HCT VFR BLD AUTO: 38.9 % (ref 34–46.6)
HDLC SERPL-MCNC: 54 MG/DL
HGB BLD-MCNC: 13 G/DL (ref 11.1–15.9)
INTERPRETATION, 910389: NORMAL
INTERPRETATION: NORMAL
LDLC SERPL CALC-MCNC: 158 MG/DL (ref 0–99)
MCH RBC QN AUTO: 27 PG (ref 26.6–33)
MCHC RBC AUTO-ENTMCNC: 33.4 G/DL (ref 31.5–35.7)
MCV RBC AUTO: 81 FL (ref 79–97)
PDF IMAGE, 910387: NORMAL
PLATELET # BLD AUTO: 259 X10E3/UL (ref 150–379)
POTASSIUM SERPL-SCNC: 4.3 MMOL/L (ref 3.5–5.2)
RBC # BLD AUTO: 4.82 X10E6/UL (ref 3.77–5.28)
SODIUM SERPL-SCNC: 142 MMOL/L (ref 134–144)
TRIGL SERPL-MCNC: 131 MG/DL (ref 0–149)
VLDLC SERPL CALC-MCNC: 26 MG/DL (ref 5–40)
WBC # BLD AUTO: 5.7 X10E3/UL (ref 3.4–10.8)

## 2018-04-20 LAB
BACTERIA SPEC AEROBE CULT: ABNORMAL
BACTERIA SPEC AEROBE CULT: ABNORMAL

## 2018-04-24 ENCOUNTER — OFFICE VISIT (OUTPATIENT)
Dept: SURGERY | Age: 83
End: 2018-04-24

## 2018-04-24 VITALS
HEIGHT: 62 IN | HEART RATE: 72 BPM | DIASTOLIC BLOOD PRESSURE: 81 MMHG | TEMPERATURE: 98.5 F | WEIGHT: 180.2 LBS | OXYGEN SATURATION: 100 % | BODY MASS INDEX: 33.16 KG/M2 | SYSTOLIC BLOOD PRESSURE: 162 MMHG

## 2018-04-24 DIAGNOSIS — L02.91 ABSCESS: Primary | ICD-10-CM

## 2018-04-24 RX ORDER — LIDOCAINE HYDROCHLORIDE AND EPINEPHRINE 20; 10 MG/ML; UG/ML
10 INJECTION, SOLUTION INFILTRATION; PERINEURAL ONCE
Qty: 10 ML | Refills: 0
Start: 2018-04-24 | End: 2018-04-24

## 2018-04-24 NOTE — PROGRESS NOTES
1. Have you been to the ER, urgent care clinic since your last visit? Hospitalized since your last visit? New patient  2. Have you seen or consulted any other health care providers outside of the [address] [place] [address] since your last visit? Include any pap smears or colon screening. New patient. Does not have advanced directive.

## 2018-04-24 NOTE — MR AVS SNAPSHOT
[address] [facility] [address], [address], [address] [place] [address] [facility] 
[phone] Patient: Luis Torres MRN:  [id number] Visit Information Date & Time Provider Department Dept. Phone Encounter #  
 2018  1:40 PM Ifrah Soliman MD [facility] [id number] Your Appointments 2018  3:00 PM  
ESTABLISHED PATIENT with Ifrah Soliman MD  
[facility] Dr. Willian Oneal ([facility]) Appt Note: 2 wk f/u cyst on chest  
 [facility], [address] [facility], [address] [address]  
[address], [address] [facility], [address] [place] [address] [address] [address]  
  
    
 2018  8:00 AM  
ROUTINE CARE with Chad Moran MD  
[facility]) Appt Note: 3m follow up  
 [address] [facility] [address] [address]  
[phone] [address] [facility] [address] Upcoming Health Maintenance Date Due  
 MEDICARE YEARLY EXAM 2018 HEMOGLOBIN A1C Q6M 10/17/2018 MICROALBUMIN Q1 2018 EYE EXAM RETINAL OR DILATED Q1 2019 FOOT EXAM Q1 2019 LIPID PANEL Q1 2019 GLAUCOMA SCREENING Q2Y 2020 DTaP/Tdap/Td series (3 - Tdap) 4/15/2025 Allergies as of 2018  Review Complete On: 2018 By: Chad Moran MD  
  
 Severity Noted Reaction Type Reactions Latex  2012   Systemic Contact Dermatitis Aspirin High 2010    Palpitations Glipizide High 2013   Systemic Shortness of Breath, Swelling Bactrim Ds [Sulfamethoxazole-trimethoprim] Medium 2010    Other (comments) Patient experienced pain in legs and buttock and muscle cramping. Contrast Agent [Iodine] Medium 04/15/2017   Intolerance Hives, Itching Itchy throat Amaryl [Glimepiride]  2016    Other (comments) \"nervous feeling\" Avocado  05/06/2016    Rash Pt states she bruises. Feldene [Piroxicam]  04/09/2010    Diarrhea Losartan  07/22/2011    Itching Caused congestion per stated by pt [place]  05/06/2016    Hives Oxycodone  09/15/2017    Diarrhea, Nausea Only Pcn [Penicillins]  04/09/2010    Rash Pineapple  09/15/2017    Rash Tylenol [Acetaminophen]  09/07/2013    Palpitations Welchol [Colesevelam]  11/27/2012    Other (comments) Pt states \"made throat feel raw\" Zestril [Lisinopril]  04/09/2010    Cough Current Immunizations  Reviewed on 4/17/2018 Name Date DTAP Vaccine 12/13/2000 Pneumococcal Conjugate (PCV-13) 4/15/2015 Td, Adsorbed PF 4/15/2015 ZZZ-RETIRED (DO NOT USE) Pneumococcal Vaccine (Unspecified Type) 10/18/2005 Not reviewed this visit Vitals BP Pulse Temp Height(growth percentile) Weight(growth percentile) LMP  
 162/81 (BP 1 Location: Right arm, BP Patient Position: Sitting) 72 98.5 °F (36.9 °C) 5' 2\" (1.575 m) 180 lb 3.2 oz (81.7 kg) 04/28/1972 SpO2 BMI OB Status Smoking Status 100% 32.96 kg/m2 Hysterectomy Never Smoker BMI and BSA Data Body Mass Index Body Surface Area  
 32.96 kg/m 2 1.89 m 2 Preferred Pharmacy Pharmacy Name Phone [address] [phone] Your Updated Medication List  
  
   
This list is accurate as of 4/24/18  3:37 PM.  Always use your most recent med list.  
  
  
  
  
 ACCU-CHEK PARESH PLUS TEST STRP strip Generic drug:  glucose blood VI test strips Use to check BS 2 times daily ADVIL LIQUI- mg Cap Generic drug:  ibuprofen Take 1 Tab by mouth two (2) times daily as needed. cod liver oil Cap Take 1 Cap by mouth daily. FISH OIL 1,000 mg Cap Generic drug:  omega-3 fatty acids-vitamin e Take 1 Cap by mouth daily. furosemide 20 mg tablet Commonly known as:  LASIX Take 1 Tab by mouth daily. GARLIC PO Take 1,000 mg by mouth daily. glyBURIDE 5 mg tablet Commonly known as:  Ferguson Camila Take 1 Tab by mouth two (2) times daily (with meals). red yeast rice extract 600 mg Cap Take 600 mg by mouth daily. VITAMIN D3 1,000 unit tablet Generic drug:  cholecalciferol Take 1,000 Units by mouth daily. Introducing [facility]! [employer] introduces [employer]y patient portal. Now you can access parts of your medical record, email your doctor's office, and request medication refills online. 1. In your internet browser, go to [email or web address] 2. Click on the First Time User? Click Here link in the Sign In box. You will see the New Member Sign Up page. 3. Enter your Findery Access Code exactly as it appears below. You will not need to use this code after youve completed the sign-up process. If you do not sign up before the expiration date, you must request a new code. · Findery Access Code: [facility] Expires: 7/16/2018  9:08 AM 
 
4. Enter the last four digits of your Social Security Number (xxxx) and Date of Birth (mm/dd/yyyy) as indicated and click Submit. You will be taken to the next sign-up page. 5. Create a Findery ID. This will be your Findery login ID and cannot be changed, so think of one that is secure and easy to remember. 6. Create a Findery password. You can change your password at any time. 7. Enter your Password Reset Question and Answer. This can be used at a later time if you forget your password. 8. Enter your e-mail address. You will receive e-mail notification when new information is available in [address] 9. Click Sign Up. You can now view and download portions of your medical record. 10. Click the Download Summary menu link to download a portable copy of your medical information.  
 
If you have questions, please visit the Frequently Asked Questions section of the [employer]. Remember, [employer] is NOT to be used for urgent needs. For medical emergencies, dial 911. Now available from your iPhone and Android! Please provide this summary of care documentation to your next provider. Your primary care clinician is listed as Meme Dodd. If you have any questions after today's visit, please call [phone].

## 2018-04-29 NOTE — PROGRESS NOTES
To: Cathy Barajas MD    From: Keerthi Hodges MD    Thank you for sending Joel Casas to see us. Encounter Date: 4/24/2018  History and Physical    Assessment:   Infected sebaceous cyst between breasts. Body mass index is 32.96 kg/(m^2). Plan:   I recommended I&D, excisional of cyst wall. Risks including bleeding and infection were explained to the patient. The patient expressed understanding of the risks, and all questions were answered to the patient's satisfaction. HPI:   Ko Napoles is a 81 y.o. female who is seen in consultation at the request of Cathy Barajas MD for evaluation of a draining spot between her breasts. It was first noticed a few weeks ago. It has been inflamed. It has been painful. There has been drainage. Past Medical History:   Diagnosis Date    Chronic edema     Diabetes ([address]     Family history of ischemic heart disease 4/12/2012    Hypercholesterolemia     Hypertension      Past Surgical History:   Procedure Laterality Date    ENDOSCOPY, COLON, DIAGNOSTIC  12/2006    Dr. Kaitlin Roque [address]    [facility] [address] [facility] [address] [facility] [address]      Family History   Problem Relation Age of Onset   [place] Stroke Mother     Hypertension Mother     Stroke Father     Hypertension Father     Hypertension Brother      Social History   Substance Use Topics    Smoking status: Never Smoker    Smokeless tobacco: Never Used    Alcohol use No      Current Outpatient Prescriptions   Medication Sig    glyBURIDE (DIABETA) 5 mg tablet Take 1 Tab by mouth two (2) times daily (with meals).  ACCU-CHEK PARESH PLUS TEST STRP strip Use to check BS 2 times daily    furosemide (LASIX) 20 mg tablet Take 1 Tab by mouth daily.  ibuprofen (ADVIL LIQUI-GEL) 200 mg cap Take 1 Tab by mouth two (2) times daily as needed.  GARLIC PO Take 8,111 mg by mouth daily.  red yeast rice extract 600 mg Cap Take 600 mg by mouth daily.     cod liver oil Cap Take 1 Cap by mouth daily.  omega-3 fatty acids-vitamin e (FISH OIL) 1,000 mg Cap Take 1 Cap by mouth daily.  cholecalciferol, vitamin d3, (VITAMIN D) 1,000 unit tablet Take 1,000 Units by mouth daily. No current facility-administered medications for this visit. Allergies: Allergies   Allergen Reactions    Latex Contact Dermatitis    Aspirin Palpitations    Glipizide Shortness of Breath and Swelling    Bactrim Ds [Sulfamethoxazole-Trimethoprim] Other (comments)     Patient experienced pain in legs and buttock and muscle cramping.  Contrast Agent [Iodine] Hives and Itching     Itchy throat    Amaryl [Glimepiride] Other (comments)     \"nervous feeling\"    Avocado Rash     Pt states she bruises.  Feldene [Piroxicam] Diarrhea    Losartan Itching     Caused congestion per stated by pt    Derrick Hives    Oxycodone Diarrhea and Nausea Only    Pcn [Penicillins] Rash    Pineapple Rash    Tylenol [Acetaminophen] Palpitations    Welchol [Colesevelam] Other (comments)     Pt states \"made throat feel raw\"    Zestril [Lisinopril] Cough       Review of Systems:  10 systems reviewed. See scanned sheet in \"Media\" section. See HPI for pertinent positives and negatives. Objective:     Visit Vitals    /81 (BP 1 Location: Right arm, BP Patient Position: Sitting)    Pulse 72    Temp 98.5 °F (36.9 °C)    Ht 5' 2\" (1.575 m)    Wt 81.7 kg (180 lb 3.2 oz)    SpO2 100%    BMI 32.96 kg/m2       Physical Exam:  General appearance  Alert, cooperative, no distress, appears stated age               Lungs   Clear to auscultation bilaterally   Heart  Regular rate and rhythm. No murmur, rub or gallop   Abdomen   Soft, non-tender. Neurologic Without overt sensory or motor deficit     Focused exam of the chest reveals a sinus pore between her breasts. +turbid d/c with palpation. No erythema.       Incision/Drainage Procedure Note    Encounter Date: 4/24/2018    Location of lesion: above     Time out at performed by me (see consent form):  * Patient was identified by name and date of birth   * Agreement on procedure being performed was verified  * Risks and Benefits explained to the patient  * Procedure site verified and marked as necessary  * Patient was positioned for comfort  * Consent was signed and verified    Procedure Details: The risks,benefits and alternatives were explained and consent was obtained for the procedure. Time out was performed. The area was prepped with betadine and draped in the usual manner. Local anesthesia with 2% lidocaine with epinephrine was infiltrated into the skin overlying the abscess. An incision was made. A Small amount of pus and sebaceous material was obtained. A culture was not obtained. The cyst sac was debrided. Non-viable tissue was debrided. The wound was irrigated with peroxide. The wound was packed with iodoform gauze. A sterile dressing was then applied. Estimated Blood Loss:  minimal     Disposition:  Procedure well tolerated. Post-procedure pain scale: 0/10. Plan:  BID packing, follow-up in one week.     Signed By: Juauqin Ramírez MD

## 2018-05-08 ENCOUNTER — OFFICE VISIT (OUTPATIENT)
Dept: SURGERY | Age: 83
End: 2018-05-08

## 2018-05-08 VITALS
HEIGHT: 62 IN | TEMPERATURE: 98.7 F | DIASTOLIC BLOOD PRESSURE: 84 MMHG | RESPIRATION RATE: 18 BRPM | HEART RATE: 84 BPM | BODY MASS INDEX: 32.94 KG/M2 | WEIGHT: 179 LBS | SYSTOLIC BLOOD PRESSURE: 157 MMHG | OXYGEN SATURATION: 96 %

## 2018-05-08 DIAGNOSIS — L03.313 CELLULITIS OF CHEST WALL: Primary | ICD-10-CM

## 2018-05-08 RX ORDER — LATANOPROST 50 UG/ML
1 SOLUTION/ DROPS OPHTHALMIC
COMMUNITY
Start: 2018-04-27 | End: 2020-02-05

## 2018-05-08 RX ORDER — CIPROFLOXACIN 500 MG/1
500 TABLET ORAL 2 TIMES DAILY
Qty: 20 TAB | Refills: 0 | Status: SHIPPED | OUTPATIENT
Start: 2018-05-08 | End: 2018-05-18

## 2018-05-08 NOTE — PROGRESS NOTES
Chief Complaint   Patient presents with    Surgical Follow-up     2 week f/u, cyst on chest.      1. Have you been to the ER, urgent care clinic since your last visit? Hospitalized since your last visit? No    2. Have you seen or consulted any other health care providers outside of the [address] [place] [address] since your last visit? Include any pap smears or colon screening.  No

## 2018-05-10 DIAGNOSIS — E11.21 TYPE 2 DIABETES WITH NEPHROPATHY (HCC): ICD-10-CM

## 2018-05-10 RX ORDER — GLYBURIDE 5 MG/1
5 TABLET ORAL 2 TIMES DAILY WITH MEALS
Qty: 180 TAB | Refills: 3 | Status: SHIPPED | OUTPATIENT
Start: 2018-05-10 | End: 2018-07-25

## 2018-05-14 NOTE — MR AVS SNAPSHOT
Spoke with pt, reviewed labs. Pt states he was recently diagnosed with a fib and put on cardizem 30mg TID from his cardiologist. He will decrease tac to 1/1 (already 2/1) and repeat labs 5/21/18.   Visit Information Date & Time Provider Department Dept. Phone Encounter #  
 9/15/2017  2:30 PM Evelyne Thorne, [address] [facility] [phone] Your Appointments 12/19/2017  8:15 AM  
ROUTINE CARE with Scott Santa MD  
[facility] [address]) Appt Note: 6mth f/u  
 [address] [facility] [address]  
[phone] [address] [facility] [address] Upcoming Health Maintenance Date Due  
 FOOT EXAM Q1 10/28/2015 MICROALBUMIN Q1 11/2/2017 EYE EXAM RETINAL OR DILATED Q1 11/15/2017 HEMOGLOBIN A1C Q6M 2/16/2018 MEDICARE YEARLY EXAM 8/17/2018 LIPID PANEL Q1 8/17/2018 GLAUCOMA SCREENING Q2Y 11/15/2018 DTaP/Tdap/Td series (3 - Td) 4/15/2025 Allergies as of 9/15/2017  Review Complete On: 9/15/2017 By: Queenie Silva, NP Severity Noted Reaction Type Reactions Latex  02/21/2012   Systemic Contact Dermatitis Aspirin High 04/09/2010    Palpitations Glipizide High 04/29/2013   Systemic Shortness of Breath, Swelling Bactrim Ds [Sulfamethoxazole-trimethoprim] Medium 05/12/2010    Other (comments) Patient experienced pain in legs and buttock and muscle cramping. Contrast Agent [Iodine] Medium 04/15/2017   Intolerance Hives, Itching Itchy throat Amaryl [Glimepiride]  01/06/2016    Other (comments) \"nervous feeling\" Avocado  05/06/2016    Rash Pt states she bruises. Feldene [Piroxicam]  04/09/2010    Diarrhea Losartan  07/22/2011    Itching Caused congestion per stated by pt [place]  05/06/2016    Hives Oxycodone  09/15/2017    Diarrhea, Nausea Only Pcn [Penicillins]  04/09/2010    Rash Pineapple  09/15/2017    Rash Tylenol [Acetaminophen]  09/07/2013    Palpitations Welchol [Colesevelam]  11/27/2012    Other (comments) Pt states \"made throat feel raw\" Zestril [Lisinopril]  04/09/2010    Cough Current Immunizations  Reviewed on 8/16/2017 Name Date DTAP Vaccine 12/13/2000 Pneumococcal Conjugate (PCV-13) 4/15/2015 Td, Adsorbed PF 4/15/2015 ZZZ-RETIRED (DO NOT USE) Pneumococcal Vaccine (Unspecified Type) 10/18/2005 Not reviewed this visit You Were Diagnosed With   
  
 Codes Comments Precordial pain    -  Primary ICD-10-CM: R07.2 ICD-9-CM: 786.51 Mixed hyperlipidemia     ICD-10-CM: E78.2 ICD-9-CM: 272.2 Essential hypertension     ICD-10-CM: I10 
ICD-9-CM: 401.9 Family history of ischemic heart disease     ICD-10-CM: Z82.49 
ICD-9-CM: V17.3 Type 2 diabetes mellitus with complication, without long-term current use of insulin (HCC)     ICD-10-CM: E11.8 ICD-9-CM: 250.90 Mitral valve disease     ICD-10-CM: I05.9 ICD-9-CM: 394.9 Tricuspid valve disorders, specified as nonrheumatic     ICD-10-CM: I36.9 ICD-9-CM: 424.2 Vitals BP Pulse Resp Height(growth percentile) Weight(growth percentile) LMP  
 122/66 (BP 1 Location: Right arm, BP Patient Position: Sitting) 92 20 5' 2\" (1.575 m) 179 lb 9.6 oz (81.5 kg) 04/28[birth year]1972 SpO2 BMI OB Status Smoking Status 97% 32.85 kg/m2 Hysterectomy Never Smoker Vitals History BMI and BSA Data Body Mass Index Body Surface Area  
 32.85 kg/m 2 1.89 m 2 Preferred Pharmacy Pharmacy Name Phone [address] [facility] [address] [facility] [address] [phone] Your Updated Medication List  
  
   
This list is accurate as of: 9/15/17  2:54 PM.  Always use your most recent med list.  
  
  
  
  
 ACCU-CHEK PARESH PLUS TEST STRP strip Generic drug:  glucose blood VI test strips Use to check BS 2 times daily ADVIL LIQUI- mg Cap Generic drug:  ibuprofen Take 1 Tab by mouth two (2) times daily as needed. cod liver oil Cap Take 1 Cap by mouth daily. FISH OIL 1,000 mg Cap Generic drug:  omega-3 fatty acids-vitamin e  
 Take 1 Cap by mouth daily. furosemide 20 mg tablet Commonly known as:  LASIX Take 1 Tab by mouth daily. GARLIC PO Take 1,000 mg by mouth daily. glyBURIDE 5 mg tablet Commonly known as:  Gaye Buttery Take 5 mg by mouth two (2) times daily (with meals). red yeast rice extract 600 mg Cap Take 600 mg by mouth daily. VITAMIN D3 1,000 unit tablet Generic drug:  cholecalciferol Take 1,000 Units by mouth daily. We Performed the Following AMB POC EKG ROUTINE W/ 12 LEADS, INTER & REP [96081 CPT(R)] To-Do List   
 09/15/2017 ECG:  STRESS TEST LEXISCAN/CARDIOLITE Introducing [address]! [facility] [employer] introduces gate5 patient portal. Now you can access parts of your medical record, email your doctor's office, and request medication refills online. 1. In your internet browser, go to [email or web address] 2. Click on the First Time User? Click Here link in the Sign In box. You will see the New Member Sign Up page. 3. Enter your gate5 Access Code exactly as it appears below. You will not need to use this code after youve completed the sign-up process. If you do not sign up before the expiration date, you must request a new code. · [email or web address] Access Code: [email or web address] Expires: 11/14/2017  8:52 AM 
 
4. Enter the last four digits of your Social Security Number (xxxx) and Date of Birth (mm/dd/yyyy) as indicated and click Submit. You will be taken to the next sign-up page. 5. Create a gate5 ID. This will be your gate5 login ID and cannot be changed, so think of one that is secure and easy to remember. 6. Create a gate5 password. You can change your password at any time. 7. Enter your Password Reset Question and Answer. This can be used at a later time if you forget your password. 8. Enter your e-mail address. You will receive e-mail notification when new information is available in [address] 9. Click Sign Up. You can now view and download portions of your medical record. 10. Click the Download Summary menu link to download a portable copy of your medical information. If you have questions, please visit the Frequently Asked Questions section of the [facility] website. Remember, [facility] is NOT to be used for urgent needs. For medical emergencies, dial 911. Now available from your iPhone and Android! Please provide this summary of care documentation to your next provider. Your primary care clinician is listed as Moy Basilio. If you have any questions after today's visit, please call [phone].

## 2018-05-25 NOTE — PROGRESS NOTES
To: Rajat Pierre MD  From: Priscila Leavitt MD    Thank you for referring Reji Florian. Encounter Date: 5/8/2018    Subjective:      Melania Sidhu is a 81 y.o. female presents for postop care following I&D. Dressing changes have been going well. Objective:     General:  alert, cooperative, no distress, appears stated age   Incision:  healing well but there is erythema extending toward right. No pus. Assessment:     S/p infected cyst excision. Small amount of cellulitis. Plan:     Abx. Continue packing as instructed. Follow-up in 2 week(s) for wound check.       Priscila Leavitt MD

## 2018-07-17 DIAGNOSIS — I10 ESSENTIAL HYPERTENSION WITH GOAL BLOOD PRESSURE LESS THAN 140/90: ICD-10-CM

## 2018-07-17 RX ORDER — FUROSEMIDE 20 MG/1
TABLET ORAL
Qty: 90 TAB | Refills: 3 | Status: SHIPPED | OUTPATIENT
Start: 2018-07-17 | End: 2019-05-20 | Stop reason: SDUPTHER

## 2018-07-25 ENCOUNTER — OFFICE VISIT (OUTPATIENT)
Dept: FAMILY MEDICINE CLINIC | Age: 83
End: 2018-07-25

## 2018-07-25 VITALS
SYSTOLIC BLOOD PRESSURE: 128 MMHG | TEMPERATURE: 97 F | HEIGHT: 62 IN | RESPIRATION RATE: 18 BRPM | BODY MASS INDEX: 32.42 KG/M2 | WEIGHT: 176.2 LBS | OXYGEN SATURATION: 96 % | DIASTOLIC BLOOD PRESSURE: 70 MMHG | HEART RATE: 88 BPM

## 2018-07-25 DIAGNOSIS — I10 ESSENTIAL HYPERTENSION: Primary | ICD-10-CM

## 2018-07-25 DIAGNOSIS — E66.9 NON MORBID OBESITY: ICD-10-CM

## 2018-07-25 DIAGNOSIS — Z51.81 ENCOUNTER FOR MEDICATION MONITORING: ICD-10-CM

## 2018-07-25 DIAGNOSIS — M15.9 PRIMARY OSTEOARTHRITIS INVOLVING MULTIPLE JOINTS: ICD-10-CM

## 2018-07-25 DIAGNOSIS — E11.21 TYPE 2 DIABETES WITH NEPHROPATHY (HCC): ICD-10-CM

## 2018-07-25 DIAGNOSIS — E78.2 MIXED HYPERLIPIDEMIA: ICD-10-CM

## 2018-07-25 DIAGNOSIS — Z78.9 STATIN INTOLERANCE: ICD-10-CM

## 2018-07-25 LAB
GLUCOSE POC: 102 MG/DL
HBA1C MFR BLD HPLC: 7.7 %

## 2018-07-25 RX ORDER — BLOOD SUGAR DIAGNOSTIC
STRIP MISCELLANEOUS
Qty: 300 STRIP | Refills: 3 | Status: SHIPPED | OUTPATIENT
Start: 2018-07-25 | End: 2018-11-02 | Stop reason: SDUPTHER

## 2018-07-25 RX ORDER — GLYBURIDE 5 MG/1
5 TABLET ORAL
Qty: 90 TAB | Refills: 3 | Status: SHIPPED | OUTPATIENT
Start: 2018-07-25 | End: 2018-11-02 | Stop reason: SDUPTHER

## 2018-07-25 RX ORDER — GLYBURIDE 5 MG/1
5 TABLET ORAL
COMMUNITY
End: 2018-07-25 | Stop reason: SDUPTHER

## 2018-07-25 NOTE — PROGRESS NOTES
HISTORY OF PRESENT ILLNESS  Erine Mckeon is a 81 y.o. female. HPI   Follow up on chronic medical problems. Feeling well. Sleeping good. Appetite is good. Staying active. HTN follow up:  Compliant w/ meds, low salt diet, and exercise. Home bp monitoring 120-130s/70s. Pt has bp log. Swelling is overall improved with her compression stocking. No headache or dizziness. No chest pain, SOB or palpitations. DM type II follow up:  Compliant w/ meds, diabetic diet, and exercise. She has been better with watching diet. Obtains home glucose monitoring averaging 100-130s. Checks BS daily on most days and prn. Pt does have BS log at visit today. Denies any tingling sensation, polyuria and polydipsia. No blurred vision. No significant weight changes. Feeling well since last OV. Hypercholesterolemia follow up:  Compliant low fat, low cholesterol diet. Intolerant to stains and did not tolerate zetia or welchol. Exercising some. Osteoarthritis:  Patient has osteoarthritis. Takes an occasional Advil which helps the pain. Has had increased pain in the hands from the arthritits. Has stiffness noted in the hands and knees. Symptoms onset: problem is longstanding. Rheumatological ROS: stable, mild-to-moderate joint symptoms intermittently, reasonably well controlled by PRN meds.    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 90 Tab 3    ACCU-CHEK PARESH PLUS TEST STRP strip Use to check BS 2 times daily 300 Strip 3    furosemide (LASIX) 20 mg tablet TAKE 1 TABLET EVERY DAY 90 Tab 3    latanoprost (XALATAN) 0.005 % ophthalmic solution Administer 1 Drop to both eyes nightly.  ibuprofen (ADVIL LIQUI-GEL) 200 mg cap Take 1 Tab by mouth two (2) times daily as needed.  GARLIC PO Take 0,571 mg by mouth daily.  red yeast rice extract 600 mg Cap Take 600 mg by mouth daily.  cod liver oil Cap Take 1 Cap by mouth daily.  omega-3 fatty acids-vitamin e (FISH OIL) 1,000 mg Cap Take 1 Cap by mouth daily.  cholecalciferol, vitamin d3, (VITAMIN D) 1,000 unit tablet Take 1,000 Units by mouth daily. Allergies   Allergen Reactions    Latex Contact Dermatitis    Aspirin Palpitations    Glipizide Shortness of Breath and Swelling    Bactrim Ds [Sulfamethoxazole-Trimethoprim] Other (comments)     Patient experienced pain in legs and buttock and muscle cramping.  Contrast Agent [Iodine] Hives and Itching     Itchy throat    Amaryl [Glimepiride] Other (comments)     \"nervous feeling\"    Avocado Rash     Pt states she bruises.     Feldene [Piroxicam] Diarrhea    Losartan Itching     Caused congestion per stated by pt    [place] Hives    Oxycodone Diarrhea and Nausea Only    Pcn [Penicillins] Rash    Pineapple Rash    Tylenol [Acetaminophen] Palpitations    Welchol [Colesevelam] Other (comments)     Pt states \"made throat feel raw\"    Zestril [Lisinopril] Cough       Past Medical History:   Diagnosis Date    Chronic edema     Diabetes ([address]     Family history of ischemic heart disease 4/12/2012    Hypercholesterolemia     Hypertension        Past Surgical History:   Procedure Laterality Date    ENDOSCOPY, COLON, DIAGNOSTIC  12/2006    Dr. Ellyn Puri HX HYSTERECTOMY  1972       Family History   Problem Relation Age of Onset   Steven Hawthorne Stroke Mother     Hypertension Mother     Stroke Father     Hypertension Father     Hypertension Brother        Social History   Substance Use Topics    Smoking status: Never Smoker    Smokeless tobacco: Never Used    Alcohol use No          Lab Results  Component Value Date/Time   WBC 5.7 04/17/2018 08:31 AM   HGB 13.0 04/17/2018 08:31 AM   HCT 38.9 04/17/2018 08:31 AM   PLATELET 305 77/15/4431 08:31 AM   MCV 81 04/17/2018 08:31 AM     Lab Results  Component Value Date/Time   Cholesterol, total 238 (H) 04/17/2018 08:31 AM   HDL Cholesterol 54 04/17/2018 08:31 AM   LDL, calculated 158 (H) 04/17/2018 08:31 AM   Triglyceride 131 04/17/2018 08:31 AM   CHOL/HDL Ratio 5.6 (H) 12/28/2009 10:43 AM     Lab Results   Component Value Date/Time    Sodium 142 04/17/2018 08:31 AM    Potassium 4.3 04/17/2018 08:31 AM    Chloride 102 04/17/2018 08:31 AM    CO2 26 04/17/2018 08:31 AM    Anion gap 5 04/26/2017 12:10 PM    Glucose 149 (H) 04/17/2018 08:31 AM    BUN 22 04/17/2018 08:31 AM    Creatinine 1.07 (H) 04/17/2018 08:31 AM    BUN/Creatinine ratio 21 04/17/2018 08:31 AM    GFR est AA 56 (L) 04/17/2018 08:31 AM    GFR est non-AA 48 (L) 04/17/2018 08:31 AM    Calcium 9.7 04/17/2018 08:31 AM    Bilirubin, total 0.4 12/19/2017 09:33 AM    ALT (SGPT) 14 12/19/2017 09:33 AM    AST (SGOT) 12 12/19/2017 09:33 AM    Alk. phosphatase 105 12/19/2017 09:33 AM    Protein, total 6.9 12/19/2017 09:33 AM    Albumin 3.9 12/19/2017 09:33 AM    Globulin 4.1 (H) 04/26/2017 12:10 PM    A-G Ratio 1.3 12/19/2017 09:33 AM      Lab Results   Component Value Date/Time    Hemoglobin A1c 10.4 (H) 01/28/2014 08:40 AM    Hemoglobin A1c (POC) 7.7 07/25/2018 08:43 AM         Review of Systems   Constitutional: Negative for malaise/fatigue. HENT: Negative for congestion. Eyes: Negative for blurred vision. Respiratory: Negative for cough and shortness of breath. Cardiovascular: Negative for chest pain, palpitations and leg swelling. Gastrointestinal: Negative for abdominal pain, constipation and heartburn. Genitourinary: Negative for dysuria, frequency and urgency. Musculoskeletal: Positive for joint pain. Negative for back pain. Neurological: Negative for dizziness, tingling and headaches. Endo/Heme/Allergies: Negative for environmental allergies. Psychiatric/Behavioral: Negative for depression. The patient does not have insomnia. Physical Exam   Constitutional: She appears well-developed and well-nourished. /70 (BP 1 Location: Right arm, BP Patient Position: Sitting)  Pulse 88  Temp 97 °F (36.1 °C) (Oral)   Resp 18  Ht 5' 2\" (1.575 m)  Wt 176 lb 3.2 oz (79.9 kg)  LMP 04/28/1972  SpO2 96%  BMI 32.23 kg/m2   HENT:   Right Ear: Tympanic membrane and ear canal normal.   Left Ear: Tympanic membrane and ear canal normal.   Nose: No mucosal edema or rhinorrhea. Mouth/Throat: Oropharynx is clear and moist and mucous membranes are normal.   Neck: Normal range of motion. Neck supple. No thyromegaly present. Cardiovascular: Normal rate and regular rhythm. No murmur heard. Pulmonary/Chest: Effort normal and breath sounds normal.   Abdominal: Soft. Bowel sounds are normal. There is no tenderness. Musculoskeletal: Normal range of motion. She exhibits no edema. Lymphadenopathy:     She has no cervical adenopathy. Skin: Skin is warm and dry. Psychiatric: She has a normal mood and affect. Nursing note and vitals reviewed. ASSESSMENT and PLAN  Diagnoses and all orders for this visit:    1. Essential hypertension  Stable at goal.    Discussed sodium restriction, high k rich diet, maintaining ideal body weight and regular exercise program such as daily walking 30 min perday 4-5 times per week, as physiologic means to achieve blood pressure control.  Medication compliance advised.      2. Type 2 diabetes with nephropathy (HCC)  a1c level is improved to 7.7%  -     AMB POC HEMOGLOBIN A1C  -     AMB POC GLUCOSE, QUANTITATIVE, BLOOD  -     glyBURIDE (DIABETA) 5 mg tablet; Take 1 Tab by mouth daily (after breakfast). -     ACCU-CHEK PARESH PLUS TEST STRP strip; Use to check BS 2 times daily    3. Mixed hyperlipidemia//  Statin intolerance  Continue to monitor. Work on diet and exercise. 4. Primary osteoarthritis involving multiple joints  Stable   Advil as needed. 6. Encounter for medication monitoring  -     METABOLIC PANEL, BASIC    7. Non morbid obesity  I have reviewed/discussed the above normal BMI with the patient. I have recommended the following interventions: dietary management education, guidance, and counseling . Follow-up Disposition:  Return in about 3 months (around 10/25/2018). reviewed diet, exercise and weight control  cardiovascular risk and specific lipid/LDL goals reviewed  reviewed medications and side effects in detail  specific diabetic recommendations: low cholesterol diet, weight control and daily exercise discussed, home glucose monitoring emphasized, all medications, side effects and compliance discussed carefully, foot care discussed and Podiatry visits discussed, annual eye examinations at Ophthalmology discussed and glycohemoglobin and other lab monitoring discussed     I have discussed diagnosis listed in this note with pt and/or family. I have discussed treatment plans and options and the risk/benefit analysis of those options, including safe use of medications and possible medication side effects. Through the use of shared decision making we have agreed to the above plan. The patient has received an after-visit summary and questions were answered concerning future plans and follow up. Advise pt of any urgent changes then to proceed to the ER.

## 2018-07-25 NOTE — MR AVS SNAPSHOT
[address] [facility] 
 
 
 [address] [facility] [place] [address] [id number] 
[phone] Patient: Shellie Duckworth MRN: [id number] :1935 Visit Information Date & Time Provider Department Dept. Phone Encounter #  
 2018  8:00 AM Francisco Glaser MD [facility] [phone] [id number] Follow-up Instructions Return in about 3 months (around 10/25/2018). Upcoming Health Maintenance Date Due Influenza Age 5 to Adult 2018 MEDICARE YEARLY EXAM 2018 HEMOGLOBIN A1C Q6M 10/17/2018 MICROALBUMIN Q1 2018 EYE EXAM RETINAL OR DILATED Q1 2019 FOOT EXAM Q1 2019 LIPID PANEL Q1 2019 GLAUCOMA SCREENING Q2Y 2020 DTaP/Tdap/Td series (3 - Tdap) 4/15/2025 Allergies as of 2018  Review Complete On: 2018 By: Francisco Glaser MD  
  
 Severity Noted Reaction Type Reactions Latex  2012   Systemic Contact Dermatitis Aspirin High 2010    Palpitations Glipizide High 2013   Systemic Shortness of Breath, Swelling Bactrim Ds [Sulfamethoxazole-trimethoprim] Medium 2010    Other (comments) Patient experienced pain in legs and buttock and muscle cramping. Contrast Agent [Iodine] Medium 04/15/2017   Intolerance Hives, Itching Itchy throat Amaryl [Glimepiride]  2016    Other (comments) \"nervous feeling\" Avocado  2016    Rash Pt states she bruises. Feldene [Piroxicam]  2010    Diarrhea Losartan  2011    Itching Caused congestion per stated by pt Derrick  2016    Hives Oxycodone  09/15/2017    Diarrhea, Nausea Only Pcn [Penicillins]  2010    Rash Pineapple  09/15/2017    Rash Tylenol [Acetaminophen]  2013    Palpitations Welchol [Colesevelam]  2012    Other (comments) Pt states \"made throat feel raw\" Zestril [Lisinopril]  2010    Cough Current Immunizations  Reviewed on 7/25/2018 Name Date DTAP Vaccine 12/13/2000 Pneumococcal Conjugate (PCV-13) 4/15/2015 Td, Adsorbed PF 4/15/2015 ZZZ-RETIRED (DO NOT USE) Pneumococcal Vaccine (Unspecified Type) 10/18/2005 Reviewed by Tiffany Moreno MD on 7/25/2018 at  8:30 AM  
You Were Diagnosed With   
  
 Codes Comments Essential hypertension    -  Primary ICD-10-CM: I10 
ICD-9-CM: 401.9 Type 2 diabetes with nephropathy (HCC)     ICD-10-CM: E11.21 
ICD-9-CM: 250.40, 583.81 Mixed hyperlipidemia     ICD-10-CM: E78.2 ICD-9-CM: 272.2 Statin intolerance     ICD-10-CM: Z78.9 ICD-9-CM: 995.27 Encounter for medication monitoring     ICD-10-CM: Z51.81 
ICD-9-CM: V58.83 Vitals BP Pulse Temp Resp Height(growth percentile) Weight(growth percentile) 128/70 (BP 1 Location: Right arm, BP Patient Position: Sitting) 88 97 °F (36.1 °C) (Oral) 18 5' 2\" (1.575 m) 176 lb 3.2 oz (79.9 kg) LMP SpO2 BMI OB Status Smoking Status 04/28/1972 96% 32.23 kg/m2 Hysterectomy Never Smoker BMI and BSA Data Body Mass Index Body Surface Area  
 32.23 kg/m 2 1.87 m 2 Preferred Pharmacy Pharmacy Name Phone [address] [phone] Your Updated Medication List  
  
   
This list is accurate as of 7/25/18  9:00 AM.  Always use your most recent med list.  
  
  
  
  
 ACCU-CHEK PARESH PLUS TEST STRP strip Generic drug:  glucose blood VI test strips Use to check BS 2 times daily ADVIL LIQUI- mg Cap Generic drug:  ibuprofen Take 1 Tab by mouth two (2) times daily as needed. cod liver oil Cap Take 1 Cap by mouth daily. FISH OIL 1,000 mg Cap Generic drug:  omega-3 fatty acids-vitamin e Take 1 Cap by mouth daily. furosemide 20 mg tablet Commonly known as:  LASIX TAKE 1 TABLET EVERY DAY  
  
 GARLIC PO Take 1,000 mg by mouth daily. glyBURIDE 5 mg tablet Commonly known as:  Deb Martínez Take 1 Tab by mouth daily (after breakfast). latanoprost 0.005 % ophthalmic solution Commonly known as:  Jorge Alvarado Administer 1 Drop to both eyes nightly. red yeast rice extract 600 mg Cap Take 600 mg by mouth daily. VITAMIN D3 1,000 unit tablet Generic drug:  cholecalciferol Take 1,000 Units by mouth daily. Prescriptions Sent to Pharmacy Refills  
 glyBURIDE (DIABETA) 5 mg tablet 3 Sig: Take 1 Tab by mouth daily (after breakfast). Class: Normal  
 Pharmacy: [address] [place] [address] Ph #: [phone] Route: Oral  
 ACCU-CHEK PARESH PLUS TEST STRP strip 3 Sig: Use to check BS 2 times daily Class: Normal  
 Pharmacy: [address] [place] [address] Ph #: [phone] We Performed the Following AMB POC GLUCOSE, QUANTITATIVE, BLOOD [40891 CPT(R)] AMB POC HEMOGLOBIN A1C [68877 CPT(R)] METABOLIC PANEL, BASIC [84220 CPT(R)] Follow-up Instructions Return in about 3 months (around 10/25/2018). Introducing [facility]! Sathya Velazquez introduces [employer] patient portal. Now you can access parts of your medical record, email your doctor's office, and request medication refills online. 1. In your internet browser, go to [email or web address] 2. Click on the First Time User? Click Here link in the Sign In box. You will see the New Member Sign Up page. 3. Enter your [employer] Access Code exactly as it appears below. You will not need to use this code after youve completed the sign-up process. If you do not sign up before the expiration date, you must request a new code. · [employer] Access Code: [email or web address] Expires: 10/23/2018  7:43 AM 
 
4.  Enter the last four digits of your Social Security Number (xxxx) and Date of Birth (mm/dd/yyyy) as indicated and click Submit. You will be taken to the next sign-up page. 5. Create a [employer] ID. This will be your [employer] login ID and cannot be changed, so think of one that is secure and easy to remember. 6. Create a [employer] password. You can change your password at any time. 7. Enter your Password Reset Question and Answer. This can be used at a later time if you forget your password. 8. Enter your e-mail address. You will receive e-mail notification when new information is available in [address] 9. Click Sign Up. You can now view and download portions of your medical record. 10. Click the Download Summary menu link to download a portable copy of your medical information. If you have questions, please visit the Frequently Asked Questions section of the [employer] website. Remember, [employer] is NOT to be used for urgent needs. For medical emergencies, dial 911. Now available from your iPhone and Android! Please provide this summary of care documentation to your next provider. Your primary care clinician is listed as Mukul Florez. If you have any questions after today's visit, please call [phone].

## 2018-07-25 NOTE — PROGRESS NOTES
Chief Complaint   Patient presents with    Hypertension     follow up    Diabetes     follow up    Cholesterol Problem     follow up       Eye exam 1/12/2018 by Dr. Iesha Saravia. Mammogram: 8/10/2017    1. Have you been to the ER, urgent care clinic since your last visit? Hospitalized since your last visit? No    2. Have you seen or consulted any other health care providers outside of the [address] [place] [address] since your last visit? Include any pap smears or colon screening.  No

## 2018-07-26 LAB
BUN SERPL-MCNC: 22 MG/DL (ref 8–27)
BUN/CREAT SERPL: 20 (ref 12–28)
CALCIUM SERPL-MCNC: 9.7 MG/DL (ref 8.7–10.3)
CHLORIDE SERPL-SCNC: 103 MMOL/L (ref 96–106)
CO2 SERPL-SCNC: 24 MMOL/L (ref 20–29)
CREAT SERPL-MCNC: 1.08 MG/DL (ref 0.57–1)
GLUCOSE SERPL-MCNC: 103 MG/DL (ref 65–99)
INTERPRETATION: NORMAL
POTASSIUM SERPL-SCNC: 4.7 MMOL/L (ref 3.5–5.2)
SODIUM SERPL-SCNC: 142 MMOL/L (ref 134–144)

## 2018-09-27 ENCOUNTER — HOSPITAL ENCOUNTER (OUTPATIENT)
Dept: MAMMOGRAPHY | Age: 83
Discharge: HOME | End: 2018-09-27
Attending: FAMILY MEDICINE
Payer: MEDICARE

## 2018-09-27 DIAGNOSIS — Z12.31 VISIT FOR SCREENING MAMMOGRAM: ICD-10-CM

## 2018-09-27 PROCEDURE — 77067 SCR MAMMO BI INCL CAD: CPT

## 2018-10-08 ENCOUNTER — HOSPITAL ENCOUNTER (OUTPATIENT)
Dept: MAMMOGRAPHY | Age: 83
Discharge: HOME | End: 2018-10-08
Attending: FAMILY MEDICINE
Payer: MEDICARE

## 2018-10-08 DIAGNOSIS — R92.8 ABNORMAL MAMMOGRAM OF RIGHT BREAST: ICD-10-CM

## 2018-10-08 PROCEDURE — 77061 BREAST TOMOSYNTHESIS UNI: CPT

## 2018-11-02 ENCOUNTER — OFFICE VISIT (OUTPATIENT)
Dept: FAMILY MEDICINE CLINIC | Age: 83
End: 2018-11-02

## 2018-11-02 VITALS
WEIGHT: 180 LBS | HEIGHT: 62 IN | DIASTOLIC BLOOD PRESSURE: 72 MMHG | OXYGEN SATURATION: 97 % | TEMPERATURE: 97.2 F | BODY MASS INDEX: 33.13 KG/M2 | HEART RATE: 86 BPM | SYSTOLIC BLOOD PRESSURE: 140 MMHG

## 2018-11-02 DIAGNOSIS — M15.9 PRIMARY OSTEOARTHRITIS INVOLVING MULTIPLE JOINTS: ICD-10-CM

## 2018-11-02 DIAGNOSIS — E78.2 MIXED HYPERLIPIDEMIA: ICD-10-CM

## 2018-11-02 DIAGNOSIS — E66.9 NON MORBID OBESITY: ICD-10-CM

## 2018-11-02 DIAGNOSIS — Z78.9 STATIN INTOLERANCE: ICD-10-CM

## 2018-11-02 DIAGNOSIS — R82.90 NONSPECIFIC FINDING ON EXAMINATION OF URINE: ICD-10-CM

## 2018-11-02 DIAGNOSIS — Z51.81 ENCOUNTER FOR MEDICATION MONITORING: ICD-10-CM

## 2018-11-02 DIAGNOSIS — I10 ESSENTIAL HYPERTENSION: Primary | ICD-10-CM

## 2018-11-02 DIAGNOSIS — Z00.00 MEDICARE ANNUAL WELLNESS VISIT, SUBSEQUENT: ICD-10-CM

## 2018-11-02 DIAGNOSIS — E11.21 TYPE 2 DIABETES WITH NEPHROPATHY (HCC): ICD-10-CM

## 2018-11-02 LAB
BILIRUB UR QL STRIP: NEGATIVE
GLUCOSE POC: 106 MG/DL
GLUCOSE UR-MCNC: NEGATIVE MG/DL
HBA1C MFR BLD HPLC: 7.5 %
KETONES P FAST UR STRIP-MCNC: NEGATIVE MG/DL
PH UR STRIP: 7.5 [PH] (ref 4.6–8)
PROT UR QL STRIP: NEGATIVE
SP GR UR STRIP: 1.01 (ref 1–1.03)
UA UROBILINOGEN AMB POC: NORMAL (ref 0.2–1)
URINALYSIS CLARITY POC: CLEAR
URINALYSIS COLOR POC: YELLOW
URINE BLOOD POC: NEGATIVE
URINE LEUKOCYTES POC: NORMAL
URINE NITRITES POC: NEGATIVE

## 2018-11-02 RX ORDER — GLYBURIDE 5 MG/1
5 TABLET ORAL
Qty: 90 TAB | Refills: 3 | Status: SHIPPED | OUTPATIENT
Start: 2018-11-02 | End: 2019-07-17 | Stop reason: SDUPTHER

## 2018-11-02 RX ORDER — BLOOD SUGAR DIAGNOSTIC
STRIP MISCELLANEOUS
Qty: 300 STRIP | Refills: 3 | Status: SHIPPED | OUTPATIENT
Start: 2018-11-02 | End: 2019-12-13 | Stop reason: SDUPTHER

## 2018-11-02 NOTE — PROGRESS NOTES
HISTORY OF PRESENT ILLNESS Solitario Cárdenas is a 81 y.o. female. HPI Follow up on chronic medical problems. Feeling well. Sleeping good. Appetite is good. Staying active. HTN follow up: 
Compliant w/ meds, low salt diet, and exercise. Home bp monitoring 120-130s/70s. Pt has bp log. Swelling is overall improved with her compression stocking. No headache or dizziness. No chest pain, SOB or palpitations. DM type II follow up: 
Compliant w/ meds, diabetic diet, and exercise. She has been better with watching diet. Obtains home glucose monitoring averaging 100-130s. Checks BS daily on most days and prn. Pt does have BS log at visit today. Denies any tingling sensation, polyuria and polydipsia. No blurred vision. No significant weight changes. Feeling well since last OV. Hypercholesterolemia follow up: 
Compliant low fat, low cholesterol diet. Intolerant to stains and did not tolerate zetia or welchol. Exercising some. Osteoarthritis: 
Patient has osteoarthritis. Takes an occasional Advil which helps the pain. Has had increased pain in the hands from the arthritits. Has stiffness noted in the hands and knees. Symptoms onset: problem is longstanding. Rheumatological ROS: stable, mild-to-moderate joint symptoms intermittently, reasonably well controlled by PRN meds. Response to treatment plan: stable Patient Active Problem List  
Diagnosis Code  Osteoarthritis M19.90  
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  
 Mixed hyperlipidemia E78.2  Mitral valve disease I05.9  Obesity, unspecified E66.9  
 Generalized OA M15.9  Diabetes mellitus type 2, controlled ([address] E11.9  
 Essential hypertension I10  
 Encounter for medication monitoring Z51.81  
 Statin intolerance Z78.9  Type 2 diabetes with nephropathy (HCC) E11.21 Current Outpatient Medications Medication Sig Dispense Refill  glyBURIDE (DIABETA) 5 mg tablet Take 1 Tab by mouth daily (after breakfast). 90 Tab 3  ACCU-CHEK PARESH PLUS TEST STRP strip Use to check BS 2 times daily 300 Strip 3  furosemide (LASIX) 20 mg tablet TAKE 1 TABLET EVERY DAY 90 Tab 3  
 latanoprost (XALATAN) 0.005 % ophthalmic solution Administer 1 Drop to both eyes nightly.  ibuprofen (ADVIL LIQUI-GEL) 200 mg cap Take 1 Tab by mouth two (2) times daily as needed.  GARLIC PO Take 7,565 mg by mouth daily.  red yeast rice extract 600 mg Cap Take 600 mg by mouth daily.  cod liver oil Cap Take 1 Cap by mouth daily.  omega-3 fatty acids-vitamin e (FISH OIL) 1,000 mg Cap Take 1 Cap by mouth daily.  cholecalciferol, vitamin d3, (VITAMIN D) 1,000 unit tablet Take 1,000 Units by mouth daily. Allergies Allergen Reactions  Latex Contact Dermatitis  Aspirin Palpitations  Glipizide Shortness of Breath and Swelling  Bactrim Ds [Sulfamethoxazole-Trimethoprim] Other (comments) Patient experienced pain in legs and buttock and muscle cramping.  Contrast Agent [Iodine] Hives and Itching Itchy throat  Amaryl [Glimepiride] Other (comments) \"nervous feeling\"  Avocado Rash Pt states she bruises.  Feldene [Piroxicam] Diarrhea  Losartan Itching Caused congestion per stated by pt [address]  Oxycodone Diarrhea and Nausea Only  Pcn [Penicillins] Rash  Pineapple Rash  Tylenol [Acetaminophen] Palpitations  Welchol [Colesevelam] Other (comments) Pt states \"made throat feel raw\"  Zestril [Lisinopril] Cough Past Medical History:  
Diagnosis Date  Chronic edema  Diabetes ([address]  Family history of ischemic heart disease 4/12/2012  Hypercholesterolemia  Hypertension Past Surgical History:  
Procedure Laterality Date  ENDOSCOPY, COLON, DIAGNOSTIC  12/2006 Dr. Winston Medel [address] [facility]  [address] [facility] Family History Problem Relation Age of Onset  Stroke Mother  Hypertension Mother  Stroke Father  Hypertension Father  Hypertension Brother Social History Tobacco Use  Smoking status: Never Smoker  Smokeless tobacco: Never Used Substance Use Topics  Alcohol use: No  
  Alcohol/week: 0.0 oz Lab Results Component Value Date/Time WBC 5.7 04/17/2018 08:31 AM  
 HGB 13.0 04/17/2018 08:31 AM  
 HCT 38.9 04/17/2018 08:31 AM  
 PLATELET 809 86/59/0769 08:31 AM  
 MCV 81 04/17/2018 08:31 AM  
 
Lab Results Component Value Date/Time Cholesterol, total 238 (H) 04/17/2018 08:31 AM  
 HDL Cholesterol 54 04/17/2018 08:31 AM  
 LDL, calculated 158 (H) 04/17/2018 08:31 AM  
 Triglyceride 131 04/17/2018 08:31 AM  
 CHOL/HDL Ratio 5.6 (H) 12/28/2009 10:43 AM  
 
Lab Results Component Value Date/Time Sodium 142 07/25/2018 08:43 AM  
 Potassium 4.7 07/25/2018 08:43 AM  
 Chloride 103 07/25/2018 08:43 AM  
 CO2 24 07/25/2018 08:43 AM  
 Anion gap 5 04/26/2017 12:10 PM  
 Glucose 103 (H) 07/25/2018 08:43 AM  
 BUN 22 07/25/2018 08:43 AM  
 Creatinine 1.08 (H) 07/25/2018 08:43 AM  
 BUN/Creatinine ratio 20 07/25/2018 08:43 AM  
 GFR est AA 55 (L) 07/25/2018 08:43 AM  
 GFR est non-AA 48 (L) 07/25/2018 08:43 AM  
 Calcium 9.7 07/25/2018 08:43 AM  
 Bilirubin, total 0.4 12/19/2017 09:33 AM  
 ALT (SGPT) 14 12/19/2017 09:33 AM  
 AST (SGOT) 12 12/19/2017 09:33 AM  
 Alk. phosphatase 105 12/19/2017 09:33 AM  
 Protein, total 6.9 12/19/2017 09:33 AM  
 Albumin 3.9 12/19/2017 09:33 AM  
 Globulin 4.1 (H) 04/26/2017 12:10 PM  
 A-G Ratio 1.3 12/19/2017 09:33 AM  
  
Lab Results Component Value Date/Time Hemoglobin A1c 10.4 (H) 01/28/2014 08:40 AM  
 Hemoglobin A1c (POC) 7.7 07/25/2018 08:43 AM  
   
Review of Systems Constitutional: Negative for malaise/fatigue. HENT: Negative for congestion. Eyes: Negative for blurred vision. Respiratory: Negative for cough and shortness of breath. Cardiovascular: Negative for chest pain, palpitations and leg swelling. Gastrointestinal: Negative for abdominal pain, constipation and heartburn. Genitourinary: Negative for dysuria, frequency and urgency. Musculoskeletal: Positive for joint pain. Negative for back pain. Neurological: Negative for dizziness, tingling and headaches. Endo/Heme/Allergies: Negative for environmental allergies. Psychiatric/Behavioral: Negative for depression. The patient does not have insomnia. Physical Exam  
Constitutional: She appears well-developed and well-nourished. /70 (BP 1 Location: Right arm, BP Patient Position: Sitting)  Pulse 88  Temp 97 °F (36.1 °C) (Oral)   Resp 18  Ht 5' 2\" (1.575 m)  Wt 176 lb 3.2 oz (79.9 kg)  LMP 04/28/1972  SpO2 96%  BMI 32.23 kg/m2 HENT:  
Right Ear: Tympanic membrane and ear canal normal.  
Left Ear: Tympanic membrane and ear canal normal.  
Nose: No mucosal edema or rhinorrhea. Mouth/Throat: Oropharynx is clear and moist and mucous membranes are normal.  
Neck: Normal range of motion. Neck supple. No thyromegaly present. Cardiovascular: Normal rate and regular rhythm. No murmur heard. Pulmonary/Chest: Effort normal and breath sounds normal.  
Abdominal: Soft. Bowel sounds are normal. There is no tenderness. Musculoskeletal: Normal range of motion. She exhibits no edema. Lymphadenopathy:  
  She has no cervical adenopathy. Skin: Skin is warm and dry. Psychiatric: She has a normal mood and affect. Nursing note and vitals reviewed. ASSESSMENT and PLAN Diagnoses and all orders for this visit: 1. Essential hypertension Discussed sodium restriction, high k rich diet, maintaining ideal body weight and regular exercise program such as daily walking 30 min perday 4-5 times per week, as physiologic means to achieve blood pressure control.  Medication compliance advised. Overall her BP is stable with her home readings. 2. Type 2 diabetes with nephropathy ([address] -     AMB POC HEMOGLOBIN A1C 
-     AMB POC GLUCOSE, QUANTITATIVE, BLOOD 
-     Refill glyBURIDE (DIABETA) 5 mg tablet; Take 1 Tab by mouth daily (after breakfast). -     ACCU-CHEK PARESH PLUS TEST STRP strip; Use to check BS 2 times daily 3. Mixed hyperlipidemia -     LIPID PANEL 4. Primary osteoarthritis involving multiple joints Stable 5. Statin intolerance 6. Encounter for medication monitoring -     METABOLIC PANEL, COMPREHENSIVE 7. Non morbid obesity I have reviewed/discussed the above normal BMI with the patient. I have recommended the following interventions: dietary management education, guidance, and counseling . Follow-up Disposition: Not on File 
reviewed diet, exercise and weight control 
cardiovascular risk and specific lipid/LDL goals reviewed 
reviewed medications and side effects in detail 
specific diabetic recommendations: low cholesterol diet, weight control and daily exercise discussed, home glucose monitoring emphasized, foot care discussed and Podiatry visits discussed, annual eye examinations at Ophthalmology discussed and glycohemoglobin and other lab monitoring discussed

## 2018-11-02 NOTE — PROGRESS NOTES
Chief Complaint Patient presents with  Hypertension  
  follow up  Diabetes  
  follow up  Cholesterol Problem  
  follow up Eye exam 1/12/2018 by Dr. Robel Holman. Mammogram: 10/8/2018 Patient declined flu vaccine. 1. Have you been to the ER, urgent care clinic since your last visit? Hospitalized since your last visit? No 
 
2. Have you seen or consulted any other health care providers outside of the [address] [place] [address] since your last visit? Include any pap smears or colon screening.  No

## 2018-11-02 NOTE — LETTER
11/7/2018 2:15 PM 
 
Ms. Caleb Rae [address] [facility] Dear Caleb Rae: 
 
Please find your most recent results below. Resulted Orders METABOLIC PANEL, COMPREHENSIVE Result Value Ref Range Glucose 108 (H) 65 - 99 mg/dL BUN 18 8 - 27 mg/dL Creatinine 1.06 (H) 0.57 - 1.00 mg/dL GFR est non-AA 49 (L) >59 mL/min/1.73 GFR est AA 57 (L) >59 mL/min/1.73  
 BUN/Creatinine ratio 17 12 - 28 Sodium 144 134 - 144 mmol/L Potassium 4.4 3.5 - 5.2 mmol/L Chloride 102 96 - 106 mmol/L  
 CO2 27 20 - 29 mmol/L Calcium 9.5 8.7 - 10.3 mg/dL Protein, total 7.0 6.0 - 8.5 g/dL Albumin 4.1 3.5 - 4.7 g/dL GLOBULIN, TOTAL 2.9 1.5 - 4.5 g/dL A-G Ratio 1.4 1.2 - 2.2 Bilirubin, total 0.4 0.0 - 1.2 mg/dL Alk. phosphatase 103 39 - 117 IU/L  
 AST (SGOT) 14 0 - 40 IU/L  
 ALT (SGPT) 13 0 - 32 IU/L Narrative Performed at:  [address]  [id number] : Daisy Basurto MD, Phone:  [phone] LIPID PANEL Result Value Ref Range Cholesterol, total 254 (H) 100 - 199 mg/dL Triglyceride 133 0 - 149 mg/dL HDL Cholesterol 52 >39 mg/dL VLDL, calculated 27 5 - 40 mg/dL LDL, calculated 175 (H) 0 - 99 mg/dL Narrative Performed at:  [address]  [id number] : Daisy Basurto MD, Phone:  [phone] AMB POC HEMOGLOBIN A1C Result Value Ref Range Hemoglobin A1c (POC) 7.5 % Narrative Hemoglobin A1c Increased risk for diabetes: 5.7-6.4% Diabetes >6.4% Glycemic control for diabetics: <7.0% [facility] [address] AMB POC GLUCOSE, QUANTITATIVE, BLOOD Result Value Ref Range Glucose  mg/dL Narrative Reference Range  WB Glucose   mg/dl [facility] [address] [facility], [address] [place] [address] Street AMB POC URINALYSIS DIP STICK AUTO W/ MICRO Result Value Ref Range Color (UA POC) Yellow Clarity (UA POC) Clear Glucose (UA POC) Negative Negative Bilirubin (UA POC) Negative Negative Ketones (UA POC) Negative Negative Specific gravity (UA POC) 1.015 1.001 - 1.035 Blood (UA POC) Negative Negative pH (UA POC) 7.5 4.6 - 8.0 Protein (UA POC) Negative Negative Urobilinogen (UA POC) 0.2 mg/dL 0.2 - 1 Nitrites (UA POC) Negative Negative Leukocyte esterase (UA POC) 1+ Negative RECOMMENDATIONS: 
Work on diet and exercise to help lower the cholesterol level. Please call me if you have any questions: [phone] Sincerely, Shobha Santizo MD

## 2018-11-02 NOTE — PROGRESS NOTES
This is the Subsequent Medicare Annual Wellness Exam, performed 12 months or more after the Initial AWV or the last Subsequent AWV I have reviewed the patient's medical history in detail and updated the computerized patient record. History Past Medical History:  
Diagnosis Date  Chronic edema  Diabetes ([address]  Family history of ischemic heart disease 4/12/2012  Hypercholesterolemia  Hypertension Past Surgical History:  
Procedure Laterality Date  ENDOSCOPY, COLON, DIAGNOSTIC  12/2006 Dr. Martina Huertas [address] [facility] [address] [place] [address] Radha Roblero Current Outpatient Medications Medication Sig Dispense Refill  glyBURIDE (DIABETA) 5 mg tablet Take 1 Tab by mouth daily (after breakfast). 90 Tab 3  ACCU-CHEK PARESH PLUS TEST STRP strip Use to check BS 2 times daily 300 Strip 3  furosemide (LASIX) 20 mg tablet TAKE 1 TABLET EVERY DAY 90 Tab 3  
 latanoprost (XALATAN) 0.005 % ophthalmic solution Administer 1 Drop to both eyes nightly.  ibuprofen (ADVIL LIQUI-GEL) 200 mg cap Take 1 Tab by mouth two (2) times daily as needed.  GARLIC PO Take 7,926 mg by mouth daily.  red yeast rice extract 600 mg Cap Take 600 mg by mouth daily.  cod liver oil Cap Take 1 Cap by mouth daily.  omega-3 fatty acids-vitamin e (FISH OIL) 1,000 mg Cap Take 1 Cap by mouth daily.  cholecalciferol, vitamin d3, (VITAMIN D) 1,000 unit tablet Take 1,000 Units by mouth daily. Allergies Allergen Reactions  Latex Contact Dermatitis  Aspirin Palpitations  Glipizide Shortness of Breath and Swelling  Bactrim Ds [Sulfamethoxazole-Trimethoprim] Other (comments) Patient experienced pain in legs and buttock and muscle cramping.  Contrast Agent [Iodine] Hives and Itching Itchy throat  Amaryl [Glimepiride] Other (comments) \"nervous feeling\"  Avocado Rash Pt states she bruises.  Feldene [Piroxicam] Diarrhea  Losartan Itching Caused congestion per stated by pt [address]  Oxycodone Diarrhea and Nausea Only  Pcn [Penicillins] Rash  Pineapple Rash  Tylenol [Acetaminophen] Palpitations  Welchol [Colesevelam] Other (comments) Pt states \"made throat feel raw\"  Zestril [Lisinopril] Cough Family History Problem Relation Age of Onset  Stroke Mother  Hypertension Mother  Stroke Father  Hypertension Father  Hypertension Brother Social History Tobacco Use  Smoking status: Never Smoker  Smokeless tobacco: Never Used Substance Use Topics  Alcohol use: No  
  Alcohol/week: 0.0 oz Patient Active Problem List  
Diagnosis Code  Osteoarthritis M19.90  
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  
 Mixed hyperlipidemia E78.2  Mitral valve disease I05.9  Obesity, unspecified E66.9  
 Generalized OA M15.9  Diabetes mellitus type 2, controlled ([facility] E11.9  
 Essential hypertension I10  
 Encounter for medication monitoring Z51.81  
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 Functional Ability and Level of Safety:  
Hearing Loss Hearing is good. Activities of Daily Living The home contains: handrails Patient does total self care Fall Risk Fall Risk Assessment, last 12 mths 11/2/2018 Able to walk? Yes Fall in past 12 months? No  
Fall with injury? -  
Number of falls in past 12 months - Fall Risk Score - Functional Ability:  
Does the patient exhibit a steady gait? yes How long did it take the patient to get up and walk from a sitting position? seconds Is the patient self reliant? (ie can do own laundry, meals, household chores)  yes Does the patient handle his/her own medications? yes Does the patient handle his/her own money? yes Is the patients home safe (ie good lighting, handrails on stairs and bath, etc.)? yes Did you notice or did patient express any hearing difficulties? no  
Did you notice or did patient express any vision difficulties? no  
  
 
Advance Care Planning:  
Patient was offered the opportunity to discuss advance care planning:  yes Does patient have an Advance Directive:  no If no, did you provide information on Caring Connections? yes Abuse Screen Patient is not abused Cognitive Screening Evaluation of Cognitive Function: 
Has your family/caregiver stated any concerns about your memory: no 
 
 
Patient Care Team  
Patient Care Team: 
Alanna Vela MD as PCP - General 
Leonardo Alaniz MD (Cardiology) Ventura Lewis MD as Surgeon (General Surgery) Assessment/Plan Education and counseling provided: 
Are appropriate based on today's review and evaluation End-of-Life planning (with patient's consent) Diagnoses and all orders for this visit: 
 
1. Medicare Annual Wellness

## 2018-11-03 LAB
ALBUMIN SERPL-MCNC: 4.1 G/DL (ref 3.5–4.7)
ALBUMIN/GLOB SERPL: 1.4 {RATIO} (ref 1.2–2.2)
ALP SERPL-CCNC: 103 IU/L (ref 39–117)
ALT SERPL-CCNC: 13 IU/L (ref 0–32)
AST SERPL-CCNC: 14 IU/L (ref 0–40)
BACTERIA UR CULT: ABNORMAL
BILIRUB SERPL-MCNC: 0.4 MG/DL (ref 0–1.2)
BUN SERPL-MCNC: 18 MG/DL (ref 8–27)
BUN/CREAT SERPL: 17 (ref 12–28)
CALCIUM SERPL-MCNC: 9.5 MG/DL (ref 8.7–10.3)
CHLORIDE SERPL-SCNC: 102 MMOL/L (ref 96–106)
CHOLEST SERPL-MCNC: 254 MG/DL (ref 100–199)
CO2 SERPL-SCNC: 27 MMOL/L (ref 20–29)
CREAT SERPL-MCNC: 1.06 MG/DL (ref 0.57–1)
GLOBULIN SER CALC-MCNC: 2.9 G/DL (ref 1.5–4.5)
GLUCOSE SERPL-MCNC: 108 MG/DL (ref 65–99)
HDLC SERPL-MCNC: 52 MG/DL
INTERPRETATION, 910389: NORMAL
INTERPRETATION: NORMAL
LDLC SERPL CALC-MCNC: 175 MG/DL (ref 0–99)
PDF IMAGE, 910387: NORMAL
POTASSIUM SERPL-SCNC: 4.4 MMOL/L (ref 3.5–5.2)
PROT SERPL-MCNC: 7 G/DL (ref 6–8.5)
SODIUM SERPL-SCNC: 144 MMOL/L (ref 134–144)
TRIGL SERPL-MCNC: 133 MG/DL (ref 0–149)
VLDLC SERPL CALC-MCNC: 27 MG/DL (ref 5–40)

## 2019-03-01 ENCOUNTER — OFFICE VISIT (OUTPATIENT)
Dept: FAMILY MEDICINE CLINIC | Age: 84
End: 2019-03-01

## 2019-03-01 VITALS
BODY MASS INDEX: 33.16 KG/M2 | OXYGEN SATURATION: 98 % | WEIGHT: 180.2 LBS | DIASTOLIC BLOOD PRESSURE: 84 MMHG | TEMPERATURE: 98.1 F | HEART RATE: 88 BPM | SYSTOLIC BLOOD PRESSURE: 136 MMHG | HEIGHT: 62 IN | RESPIRATION RATE: 18 BRPM

## 2019-03-01 DIAGNOSIS — Z78.9 STATIN INTOLERANCE: ICD-10-CM

## 2019-03-01 DIAGNOSIS — E11.21 TYPE 2 DIABETES WITH NEPHROPATHY (HCC): ICD-10-CM

## 2019-03-01 DIAGNOSIS — Z51.81 ENCOUNTER FOR MEDICATION MONITORING: ICD-10-CM

## 2019-03-01 DIAGNOSIS — E78.2 MIXED HYPERLIPIDEMIA: ICD-10-CM

## 2019-03-01 DIAGNOSIS — I10 ESSENTIAL HYPERTENSION: Primary | ICD-10-CM

## 2019-03-01 DIAGNOSIS — M15.9 PRIMARY OSTEOARTHRITIS INVOLVING MULTIPLE JOINTS: ICD-10-CM

## 2019-03-01 LAB
BILIRUB UR QL STRIP: NEGATIVE
GLUCOSE POC: 126 MG/DL
GLUCOSE UR-MCNC: NEGATIVE MG/DL
HBA1C MFR BLD HPLC: 7.7 %
KETONES P FAST UR STRIP-MCNC: NEGATIVE MG/DL
PH UR STRIP: 7.5 [PH] (ref 4.6–8)
PROT UR QL STRIP: NEGATIVE
SP GR UR STRIP: 1.01 (ref 1–1.03)
UA UROBILINOGEN AMB POC: NORMAL (ref 0.2–1)
URINALYSIS CLARITY POC: CLEAR
URINALYSIS COLOR POC: YELLOW
URINE BLOOD POC: NEGATIVE
URINE LEUKOCYTES POC: NORMAL
URINE NITRITES POC: NEGATIVE

## 2019-03-01 NOTE — PATIENT INSTRUCTIONS
Neck Arthritis: Exercises Your Care Instructions Here are some examples of typical rehabilitation exercises for your condition. Start each exercise slowly. Ease off the exercise if you start to have pain. Your doctor or physical therapist will tell you when you can start these exercises and which ones will work best for you. How to do the exercises Neck stretches to the side 1. This stretch works best if you keep your shoulder down as you lean away from it. To help you remember to do this, start by relaxing your shoulders and lightly holding on to your thighs or your chair. 2. Tilt your head toward your shoulder and hold for 15 to 30 seconds. Let the weight of your head stretch your muscles. 3. Repeat 2 to 4 times toward each shoulder. Chin tuck 1. Lie on the floor with a rolled-up towel under your neck. Your head should be touching the floor. 2. Slowly bring your chin toward your chest. 
3. Hold for a count of 6, and then relax for up to 10 seconds. 4. Repeat 8 to 12 times. Active cervical rotation 1. Sit in a firm chair, or stand up straight. 2. Keeping your chin level, turn your head to the right, and hold for 15 to 30 seconds. 3. Turn your head to the left and hold for 15 to 30 seconds. 4. Repeat 2 to 4 times to each side. Shoulder blade squeeze 1. While standing, squeeze your shoulder blades together. 2. Do not raise your shoulders up as you are squeezing. 3. Hold for 6 seconds. 4. Repeat 8 to 12 times. Shoulder rolls 1. Sit comfortably with your feet shoulder-width apart. You can also do this exercise standing up. 2. Roll your shoulders up, then back, and then down in a smooth, circular motion. 3. Repeat 2 to 4 times. Follow-up care is a key part of your treatment and safety. Be sure to make and go to all appointments, and call your doctor if you are having problems. It's also a good idea to know your test results and keep a list of the medicines you take. Where can you learn more? Go to http://james-rachel.info/. Enter S698 in the search box to learn more about \"Neck Arthritis: Exercises. \" Current as of: September 20, 2018 Content Version: 11.9 © 0733-5412 [employer]. Care instructions adapted under license by [employer]) (which disclaims liability or warranty for this information). If you have questions about a medical condition or this instruction, always ask your healthcare professional. [address] any warranty or liability for your use of this information. Neck Strain or Sprain: Rehab Exercises Your Care Instructions Here are some examples of typical rehabilitation exercises for your condition. Start each exercise slowly. Ease off the exercise if you start to have pain. Your doctor or physical therapist will tell you when you can start these exercises and which ones will work best for you. How to do the exercises Neck rotation 1. Sit in a firm chair, or stand up straight. 2. Keeping your chin level, turn your head to the right, and hold for 15 to 30 seconds. 3. Turn your head to the left and hold for 15 to 30 seconds. 4. Repeat 2 to 4 times to each side. Neck stretches 1. Look straight ahead, and tip your right ear to your right shoulder. Do not let your left shoulder rise up as you tip your head to the right. 2. Hold for 15 to 30 seconds. 3. Tilt your head to the left. Do not let your right shoulder rise up as you tip your head to the left. 4. Hold for 15 to 30 seconds. 5. Repeat 2 to 4 times to each side. Forward neck flexion 1. Sit in a firm chair, or stand up straight. 2. Bend your head forward. 3. Hold for 15 to 30 seconds. 4. Repeat 2 to 4 times. Lateral (side) bend strengthening 1. With your right hand, place your first two fingers on your right temple.  
2. Start to bend your head to the side while using gentle pressure from your fingers to keep your head from bending. 3. Hold for about 6 seconds. 4. Repeat 8 to 12 times. 5. Switch hands and repeat the same exercise on your left side. Forward bend strengthening 1. Place your first two fingers of either hand on your forehead. 2. Start to bend your head forward while using gentle pressure from your fingers to keep your head from bending. 3. Hold for about 6 seconds. 4. Repeat 8 to 12 times. Neutral position strengthening 1. Using one hand, place your fingertips on the back of your head at the top of your neck. 2. Start to bend your head backward while using gentle pressure from your fingers to keep your head from bending. 3. Hold for about 6 seconds. 4. Repeat 8 to 12 times. Chin tuck 1. Lie on the floor with a rolled-up towel under your neck. Your head should be touching the floor. 2. Slowly bring your chin toward your chest. 
3. Hold for a count of 6, and then relax for up to 10 seconds. 4. Repeat 8 to 12 times. Follow-up care is a key part of your treatment and safety. Be sure to make and go to all appointments, and call your doctor if you are having problems. It's also a good idea to know your test results and keep a list of the medicines you take. Where can you learn more? Go to http://james-rachel.info/. Enter M679 in the search box to learn more about \"Neck Strain or Sprain: Rehab Exercises. \" Current as of: September 20, 2018 Content Version: 11.9 © 7276-3646 [employer]. Care instructions adapted under license by [employer] (which disclaims liability or warranty for this information). If you have questions about a medical condition or this instruction, always ask your healthcare professional. [address] any warranty or liability for your use of this information.

## 2019-03-01 NOTE — PROGRESS NOTES
Chief Complaint Patient presents with  Hypertension  
  follow up  Diabetes  
  follow up 1. Have you been to the ER, urgent care clinic since your last visit? Hospitalized since your last visit? NO 
 
2. Have you seen or consulted any other health care providers outside of the [address] [place] [address] since your last visit? Include any pap smears or colon screening. NO Concerns today: neck click when turns Mammogram: 10/8/18 Bone Density: 10/17/13 Eye Exam: 2/2019 Dr. Byron Barksdale form signed

## 2019-03-01 NOTE — PROGRESS NOTES
HISTORY OF PRESENT ILLNESS Pushpa Viramontes is a 81 y.o. female. HPI Follow up on chronic medical problems. Feeling well. Sleeping good. Appetite is good. Staying active. HTN follow up: 
Compliant w/ meds, low salt diet, and exercise. Home bp monitoring 120-130s/70s. Pt has bp log. Swelling is overall improved with her compression stocking. No headache or dizziness. No chest pain, SOB or palpitations. DM type II follow up: 
Compliant w/ meds, diabetic diet, and exercise. She has been better with watching diet. Obtains home glucose monitoring averaging 100-130s. Checks BS daily on most days and prn. Pt does have BS log at visit today. Denies any tingling sensation, polyuria and polydipsia. No blurred vision. No significant weight changes. Feeling well since last OV. Hypercholesterolemia follow up: 
Compliant low fat, low cholesterol diet. Intolerant to stains and did not tolerate zetia or welchol. Exercising some. Osteoarthritis: 
Patient has osteoarthritis. Takes an occasional Advil which helps the pain. Has had increased pain and stiffness in the neck that comes and goes. Has stiffness noted in the hands and knees. Symptoms onset: problem is longstanding. Rheumatological ROS: stable, mild-to-moderate joint symptoms intermittently, reasonably well controlled by PRN meds. Response to treatment plan: stable Patient Active Problem List  
Diagnosis Code  Osteoarthritis M19.90  
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  
 Mixed hyperlipidemia E78.2  Mitral valve disease I05.9  Obesity, unspecified E66.9  
 Generalized OA M15.9  Diabetes mellitus type 2, controlled ([address] E11.9  
 Essential hypertension I10  
 Encounter for medication monitoring Z51.81  
 Statin intolerance Z78.9  Type 2 diabetes with nephropathy (HCC) E11.21  
 
 
 Current Outpatient Medications Medication Sig Dispense Refill  glyBURIDE (DIABETA) 5 mg tablet Take 1 Tab by mouth daily (after breakfast). 90 Tab 3  ACCU-CHEK PARESH PLUS TEST STRP strip Use to check BS 2 times daily 300 Strip 3  furosemide (LASIX) 20 mg tablet TAKE 1 TABLET EVERY DAY 90 Tab 3  
 latanoprost (XALATAN) 0.005 % ophthalmic solution Administer 1 Drop to both eyes nightly.  ibuprofen (ADVIL LIQUI-GEL) 200 mg cap Take 1 Tab by mouth two (2) times daily as needed.  GARLIC PO Take 5,068 mg by mouth daily.  red yeast rice extract 600 mg Cap Take 600 mg by mouth daily.  cod liver oil Cap Take 1 Cap by mouth daily.  omega-3 fatty acids-vitamin e (FISH OIL) 1,000 mg Cap Take 1 Cap by mouth daily.  cholecalciferol, vitamin d3, (VITAMIN D) 1,000 unit tablet Take 1,000 Units by mouth daily. Allergies Allergen Reactions  Latex Contact Dermatitis  Aspirin Palpitations  Glipizide Shortness of Breath and Swelling  Bactrim Ds [Sulfamethoxazole-Trimethoprim] Other (comments) Patient experienced pain in legs and buttock and muscle cramping.  Contrast Agent [Iodine] Hives and Itching Itchy throat  Amaryl [Glimepiride] Other (comments) \"nervous feeling\"  Avocado Rash Pt states she bruises.  Feldene [Piroxicam] Diarrhea  Losartan Itching Caused congestion per stated by pt [address]  Oxycodone Diarrhea and Nausea Only  Pcn [Penicillins] Rash  Pineapple Rash  Tylenol [Acetaminophen] Palpitations  Welchol [Colesevelam] Other (comments) Pt states \"made throat feel raw\"  Zestril [Lisinopril] Cough Past Medical History:  
Diagnosis Date  Chronic edema  Diabetes [address] [address] [facility]  Family history of ischemic heart disease 4/12/2012  Hypercholesterolemia  Hypertension Past Surgical History:  
Procedure Laterality Date  ENDOSCOPY, COLON, DIAGNOSTIC  12/2006 Dr. Christi Mcarthur [address] Sukhjinder Johnson Family History Problem Relation Age of Onset  Stroke Mother  Hypertension Mother  Stroke Father  Hypertension Father  Hypertension Brother Social History Tobacco Use  Smoking status: Never Smoker  Smokeless tobacco: Never Used Substance Use Topics  Alcohol use: No  
  Alcohol/week: 0.0 oz Lab Results Component Value Date/Time WBC 5.7 04/17/2018 08:31 AM  
 HGB 13.0 04/17/2018 08:31 AM  
 HCT 38.9 04/17/2018 08:31 AM  
 PLATELET 604 05/97/4661 08:31 AM  
 MCV 81 04/17/2018 08:31 AM  
 
Lab Results Component Value Date/Time Cholesterol, total 254 (H) 11/02/2018 08:37 AM  
 HDL Cholesterol 52 11/02/2018 08:37 AM  
 LDL, calculated 175 (H) 11/02/2018 08:37 AM  
 Triglyceride 133 11/02/2018 08:37 AM  
 CHOL/HDL Ratio 5.6 (H) 12/28/2009 10:43 AM  
 
Lab Results Component Value Date/Time Sodium 144 11/02/2018 08:37 AM  
 Potassium 4.4 11/02/2018 08:37 AM  
 Chloride 102 11/02/2018 08:37 AM  
 CO2 27 11/02/2018 08:37 AM  
 Anion gap 5 04/26/2017 12:10 PM  
 Glucose 108 (H) 11/02/2018 08:37 AM  
 BUN 18 11/02/2018 08:37 AM  
 Creatinine 1.06 (H) 11/02/2018 08:37 AM  
 BUN/Creatinine ratio 17 11/02/2018 08:37 AM  
 GFR est AA 57 (L) 11/02/2018 08:37 AM  
 GFR est non-AA 49 (L) 11/02/2018 08:37 AM  
 Calcium 9.5 11/02/2018 08:37 AM  
 Bilirubin, total 0.4 11/02/2018 08:37 AM  
 ALT (SGPT) 13 11/02/2018 08:37 AM  
 AST (SGOT) 14 11/02/2018 08:37 AM  
 Alk. phosphatase 103 11/02/2018 08:37 AM  
 Protein, total 7.0 11/02/2018 08:37 AM  
 Albumin 4.1 11/02/2018 08:37 AM  
 Globulin 4.1 (H) 04/26/2017 12:10 PM  
 A-G Ratio 1.4 11/02/2018 08:37 AM  
  
Lab Results Component Value Date/Time Hemoglobin A1c 10.4 (H) 01/28/2014 08:40 AM  
 Hemoglobin A1c (POC) 7.5 11/02/2018 08:37 AM  
   
 
Review of Systems Constitutional: Negative for malaise/fatigue. HENT: Negative for congestion. Eyes: Negative for blurred vision. Respiratory: Negative for cough and shortness of breath. Cardiovascular: Negative for chest pain, palpitations and leg swelling. Gastrointestinal: Negative for abdominal pain, constipation and heartburn. Genitourinary: Negative for dysuria, frequency and urgency. Musculoskeletal: Positive for joint pain. Negative for back pain. Neurological: Negative for dizziness, tingling and headaches. Endo/Heme/Allergies: Negative for environmental allergies. Psychiatric/Behavioral: Negative for depression. The patient does not have insomnia. Physical Exam  
Constitutional: She appears well-developed and well-nourished. /70 (BP 1 Location: Right arm, BP Patient Position: Sitting)  Pulse 88  Temp 97 °F (36.1 °C) (Oral)   Resp 18  Ht 5' 2\" (1.575 m)  Wt 176 lb 3.2 oz (79.9 kg)  LMP 04/28/1972  SpO2 96%  BMI 32.23 kg/m2 HENT:  
Right Ear: Tympanic membrane and ear canal normal.  
Left Ear: Tympanic membrane and ear canal normal.  
Nose: No mucosal edema or rhinorrhea. Mouth/Throat: Oropharynx is clear and moist and mucous membranes are normal.  
Neck: Normal range of motion. Neck supple. No thyromegaly present. Cardiovascular: Normal rate and regular rhythm. No murmur heard. Pulmonary/Chest: Effort normal and breath sounds normal.  
Abdominal: Soft. Bowel sounds are normal. There is no tenderness. Musculoskeletal: Normal range of motion. She exhibits no edema. Lymphadenopathy:  
  She has no cervical adenopathy. Skin: Skin is warm and dry. Psychiatric: She has a normal mood and affect. Nursing note and vitals reviewed. ASSESSMENT and PLAN Diagnoses and all orders for this visit: 1. Essential hypertension Discussed sodium restriction, high k rich diet, maintaining ideal body weight and regular exercise program such as daily walking 30 min perday 4-5 times per week, as physiologic means to achieve blood pressure control.  Medication compliance advised. 2. Type 2 diabetes with nephropathy ([address] a1c level is up a bit from 7.5% to 7.7%. We discussed diet and increase physical activity. -     AMB POC HEMOGLOBIN A1C 
-     AMB POC GLUCOSE, QUANTITATIVE, BLOOD 
-     MICROALBUMIN, UR, RAND W/ MICROALB/CREAT RATIO 
-     AMB POC URINALYSIS DIP STICK AUTO W/ MICRO 3. Mixed hyperlipidemia -     LIPID PANEL 4. Primary osteoarthritis involving multiple joints Stable 5. Statin intolerance 6. Encounter for medication monitoring -     METABOLIC PANEL, BASIC 
-     CBC W/O DIFF Follow-up Disposition: 
Return in about 4 months (around 7/1/2019). reviewed diet, exercise and weight control 
cardiovascular risk and specific lipid/LDL goals reviewed 
reviewed medications and side effects in detail 
specific diabetic recommendations: low cholesterol diet, weight control and daily exercise discussed, home glucose monitoring emphasized, all medications, side effects and compliance discussed carefully, foot care discussed and Podiatry visits discussed, annual eye examinations at Ophthalmology discussed and glycohemoglobin and other lab monitoring discussed I have discussed diagnosis listed in this note with pt and/or family. I have discussed treatment plans and options and the risk/benefit analysis of those options, including safe use of medications and possible medication side effects. Through the use of shared decision making we have agreed to the above plan. The patient has received an after-visit summary and questions were answered concerning future plans and follow up. Advise pt of any urgent changes then to proceed to the ER.    
\

## 2019-03-02 LAB
ALBUMIN/CREAT UR: 44.3 MG/G CREAT (ref 0–30)
BUN SERPL-MCNC: 20 MG/DL (ref 8–27)
BUN/CREAT SERPL: 18 (ref 12–28)
CALCIUM SERPL-MCNC: 9.7 MG/DL (ref 8.7–10.3)
CHLORIDE SERPL-SCNC: 102 MMOL/L (ref 96–106)
CHOLEST SERPL-MCNC: 267 MG/DL (ref 100–199)
CO2 SERPL-SCNC: 26 MMOL/L (ref 20–29)
CREAT SERPL-MCNC: 1.1 MG/DL (ref 0.57–1)
CREAT UR-MCNC: 88.3 MG/DL
ERYTHROCYTE [DISTWIDTH] IN BLOOD BY AUTOMATED COUNT: 14.6 % (ref 12.3–15.4)
GLUCOSE SERPL-MCNC: 135 MG/DL (ref 65–99)
HCT VFR BLD AUTO: 42.3 % (ref 34–46.6)
HDLC SERPL-MCNC: 52 MG/DL
HGB BLD-MCNC: 13.5 G/DL (ref 11.1–15.9)
INTERPRETATION, 910389: NORMAL
INTERPRETATION: NORMAL
LDLC SERPL CALC-MCNC: 178 MG/DL (ref 0–99)
Lab: NORMAL
MCH RBC QN AUTO: 26.6 PG (ref 26.6–33)
MCHC RBC AUTO-ENTMCNC: 31.9 G/DL (ref 31.5–35.7)
MCV RBC AUTO: 83 FL (ref 79–97)
MICROALBUMIN UR-MCNC: 39.1 UG/ML
PDF IMAGE, 910387: NORMAL
PLATELET # BLD AUTO: 274 X10E3/UL (ref 150–379)
POTASSIUM SERPL-SCNC: 4.4 MMOL/L (ref 3.5–5.2)
RBC # BLD AUTO: 5.08 X10E6/UL (ref 3.77–5.28)
SODIUM SERPL-SCNC: 144 MMOL/L (ref 134–144)
TRIGL SERPL-MCNC: 184 MG/DL (ref 0–149)
VLDLC SERPL CALC-MCNC: 37 MG/DL (ref 5–40)
WBC # BLD AUTO: 7 X10E3/UL (ref 3.4–10.8)

## 2019-05-20 DIAGNOSIS — I10 ESSENTIAL HYPERTENSION WITH GOAL BLOOD PRESSURE LESS THAN 140/90: ICD-10-CM

## 2019-05-21 RX ORDER — FUROSEMIDE 20 MG/1
TABLET ORAL
Qty: 90 TAB | Refills: 3 | Status: SHIPPED | OUTPATIENT
Start: 2019-05-21 | End: 2019-10-21 | Stop reason: SDUPTHER

## 2019-07-17 ENCOUNTER — OFFICE VISIT (OUTPATIENT)
Dept: FAMILY MEDICINE CLINIC | Age: 84
End: 2019-07-17

## 2019-07-17 VITALS
WEIGHT: 181.8 LBS | TEMPERATURE: 98.4 F | SYSTOLIC BLOOD PRESSURE: 128 MMHG | BODY MASS INDEX: 33.45 KG/M2 | HEIGHT: 62 IN | HEART RATE: 86 BPM | RESPIRATION RATE: 18 BRPM | DIASTOLIC BLOOD PRESSURE: 74 MMHG | OXYGEN SATURATION: 96 %

## 2019-07-17 DIAGNOSIS — E66.9 NON MORBID OBESITY: ICD-10-CM

## 2019-07-17 DIAGNOSIS — Z23 NEED FOR SHINGLES VACCINE: ICD-10-CM

## 2019-07-17 DIAGNOSIS — I10 ESSENTIAL HYPERTENSION: Primary | ICD-10-CM

## 2019-07-17 DIAGNOSIS — Z51.81 ENCOUNTER FOR MEDICATION MONITORING: ICD-10-CM

## 2019-07-17 DIAGNOSIS — Z23 ENCOUNTER FOR IMMUNIZATION: ICD-10-CM

## 2019-07-17 DIAGNOSIS — E78.2 MIXED HYPERLIPIDEMIA: ICD-10-CM

## 2019-07-17 DIAGNOSIS — M15.9 PRIMARY OSTEOARTHRITIS INVOLVING MULTIPLE JOINTS: ICD-10-CM

## 2019-07-17 DIAGNOSIS — E11.21 TYPE 2 DIABETES WITH NEPHROPATHY (HCC): ICD-10-CM

## 2019-07-17 LAB
GLUCOSE POC: 128 MG/DL
HBA1C MFR BLD HPLC: 7.9 %

## 2019-07-17 RX ORDER — GLYBURIDE 5 MG/1
7.5 TABLET ORAL
Qty: 135 TAB | Refills: 3 | Status: SHIPPED | OUTPATIENT
Start: 2019-07-17 | End: 2020-02-05

## 2019-07-17 NOTE — PROGRESS NOTES
HISTORY OF PRESENT ILLNESS  Angela Villela is a 81 y.o. female. Follow up on chronic medical problems. Feeling well. Sleeping good. Appetite is good. Staying active. HTN follow up:  Compliant w/ meds, low salt diet, and exercise. Home bp monitoring 120-130s/70s. Pt has bp log. Swelling is overall improved with her compression stocking. No headache or dizziness. No chest pain, SOB or palpitations. DM type II follow up:  Compliant w/ meds, diabetic diet, and exercise. She has been better with watching diet. Obtains home glucose monitoring averaging 100-130s. Checks BS daily on most days and prn. Pt does have BS log at visit today. Denies any tingling sensation, polyuria and polydipsia. No blurred vision. No significant weight changes. Feeling well since last OV. Hypercholesterolemia follow up:  Compliant low fat, low cholesterol diet. Intolerant to stains and did not tolerate zetia or welchol. Exercising some. Osteoarthritis:  Patient has osteoarthritis. Takes an occasional Advil which helps the pain. Has had increased pain and stiffness in the neck that comes and goes. Has stiffness noted in the hands and knees. Symptoms onset: problem is longstanding. Rheumatological ROS: stable, mild-to-moderate joint symptoms intermittently, reasonably well controlled by PRN meds.    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    Family history of ischemic heart disease Z82.49    Mixed hyperlipidemia E78.2    Mitral valve disease I05.9    Obesity, unspecified E66.9    Generalized OA M15.9    Diabetes mellitus type 2, controlled ([address] E11.9    Essential hypertension I10    Encounter for medication monitoring Z51.81    Statin intolerance Z78.9    Type 2 diabetes with nephropathy ([address] E11.21       Current Outpatient Medications   Medication Sig Dispense Refill    furosemide (LASIX) 20 mg tablet TAKE 1 TABLET EVERY DAY 90 Tab 3    glyBURIDE (DIABETA) 5 mg tablet Take 1 Tab by mouth daily (after breakfast). 90 Tab 3    ACCU-CHEK PARESH PLUS TEST STRP strip Use to check BS 2 times daily 300 Strip 3    latanoprost (XALATAN) 0.005 % ophthalmic solution Administer 1 Drop to both eyes nightly.  ibuprofen (ADVIL LIQUI-GEL) 200 mg cap Take 1 Tab by mouth two (2) times daily as needed.  GARLIC PO Take 3,340 mg by mouth daily.  red yeast rice extract 600 mg Cap Take 600 mg by mouth daily.  cod liver oil Cap Take 1 Cap by mouth daily.  omega-3 fatty acids-vitamin e (FISH OIL) 1,000 mg Cap Take 1 Cap by mouth daily.  cholecalciferol, vitamin d3, (VITAMIN D) 1,000 unit tablet Take 1,000 Units by mouth daily. Allergies   Allergen Reactions    Latex Contact Dermatitis    Aspirin Palpitations    Glipizide Shortness of Breath and Swelling    Bactrim Ds [Sulfamethoxazole-Trimethoprim] Other (comments)     Patient experienced pain in legs and buttock and muscle cramping.  Contrast Agent [Iodine] Hives and Itching     Itchy throat    Amaryl [Glimepiride] Other (comments)     \"nervous feeling\"    Avocado Rash     Pt states she bruises.     Feldene [Piroxicam] Diarrhea    Losartan Itching     Caused congestion per stated by pt    Derrick Hives    Oxycodone Diarrhea and Nausea Only    Pcn [Penicillins] Rash    Pineapple Rash    Tylenol [Acetaminophen] Palpitations    Welchol [Colesevelam] Other (comments)     Pt states \"made throat feel raw\"    Zestril [Lisinopril] Cough         Past Medical History:   Diagnosis Date    Chronic edema     Diabetes ([facility] [address]     Family history of ischemic heart disease 4/12/2012    Hypercholesterolemia     Hypertension          Past Surgical History:   Procedure Laterality Date    ENDOSCOPY, COLON, DIAGNOSTIC  12/2006    Dr. Shree Eagle [address]  HX HYSTERECTOMY  1972         Family History   Problem Relation Age of Onset   [address] [facility] Stroke Mother     Hypertension Mother     Stroke Father     Hypertension Father     Hypertension Brother        Social History     Tobacco Use    Smoking status: Never Smoker    Smokeless tobacco: Never Used   Substance Use Topics    Alcohol use: No     Alcohol/week: 0.0 standard drinks          Lab Results   Component Value Date/Time    WBC 7.0 03/01/2019 08:54 AM    HGB 13.5 03/01/2019 08:54 AM    HCT 42.3 03/01/2019 08:54 AM    PLATELET 015 14/44/6651 08:54 AM    MCV 83 03/01/2019 08:54 AM     Lab Results   Component Value Date/Time    Cholesterol, total 267 (H) 03/01/2019 08:54 AM    HDL Cholesterol 52 03/01/2019 08:54 AM    LDL, calculated 178 (H) 03/01/2019 08:54 AM    Triglyceride 184 (H) 03/01/2019 08:54 AM    CHOL/HDL Ratio 5.6 (H) 12/28/2009 10:43 AM     Lab Results   Component Value Date/Time    Sodium 144 03/01/2019 08:54 AM    Potassium 4.4 03/01/2019 08:54 AM    Chloride 102 03/01/2019 08:54 AM    CO2 26 03/01/2019 08:54 AM    Anion gap 5 04/26/2017 12:10 PM    Glucose 135 (H) 03/01/2019 08:54 AM    BUN 20 03/01/2019 08:54 AM    Creatinine 1.10 (H) 03/01/2019 08:54 AM    BUN/Creatinine ratio 18 03/01/2019 08:54 AM    GFR est AA 54 (L) 03/01/2019 08:54 AM    GFR est non-AA 47 (L) 03/01/2019 08:54 AM    Calcium 9.7 03/01/2019 08:54 AM    Bilirubin, total 0.4 11/02/2018 08:37 AM    ALT (SGPT) 13 11/02/2018 08:37 AM    AST (SGOT) 14 11/02/2018 08:37 AM    Alk. phosphatase 103 11/02/2018 08:37 AM    Protein, total 7.0 11/02/2018 08:37 AM    Albumin 4.1 11/02/2018 08:37 AM    Globulin 4.1 (H) 04/26/2017 12:10 PM    A-G Ratio 1.4 11/02/2018 08:37 AM      Lab Results   Component Value Date/Time    Hemoglobin A1c 10.4 (H) 01/28/2014 08:40 AM    Hemoglobin A1c (POC) 7.7 03/01/2019 08:45 AM         Review of Systems   Constitutional: Negative for malaise/fatigue. HENT: Negative for congestion.     Eyes: Negative for blurred vision. Respiratory: Negative for cough and shortness of breath. Cardiovascular: Negative for chest pain, palpitations and leg swelling. Gastrointestinal: Negative for abdominal pain, constipation and heartburn. Genitourinary: Negative for dysuria, frequency and urgency. Musculoskeletal: Positive for joint pain. Negative for back pain. Neurological: Negative for dizziness, tingling and headaches. Endo/Heme/Allergies: Negative for environmental allergies. Psychiatric/Behavioral: Negative for depression. The patient does not have insomnia. Physical Exam   Constitutional: She appears well-developed and well-nourished. /74 (BP 1 Location: Left arm, BP Patient Position: Sitting)   Pulse 86   Temp 98.4 °F (36.9 °C) (Oral)   Resp 18   Ht 5' 2\" (1.575 m)   Wt 181 lb 12.8 oz (82.5 kg)   LMP 04/28/1972   SpO2 96%   BMI 33.25 kg/m²    HENT:   Right Ear: Tympanic membrane and ear canal normal.   Left Ear: Tympanic membrane and ear canal normal.   Nose: No mucosal edema or rhinorrhea. Mouth/Throat: Oropharynx is clear and moist and mucous membranes are normal.   Neck: Normal range of motion. Neck supple. No thyromegaly present. Cardiovascular: Normal rate, regular rhythm and normal heart sounds. Exam reveals no gallop. Pulmonary/Chest: Effort normal and breath sounds normal.   Abdominal: Soft. Normal appearance and bowel sounds are normal. She exhibits no mass. There is no tenderness. Musculoskeletal: Normal range of motion. She exhibits no edema. Lymphadenopathy:     She has no cervical adenopathy. Skin: Skin is warm and dry. Psychiatric: She has a normal mood and affect. Nursing note and vitals reviewed. ASSESSMENT and PLAN  Diagnoses and all orders for this visit:    1. Essential hypertension  Stable     2. Type 2 diabetes with nephropathy (HCC)  a1c level is up to 7.0%. Increase glyburide to 7.5 mg daily. Discussed diet.     -     AMB POC HEMOGLOBIN A1C  - AMB POC GLUCOSE, QUANTITATIVE, BLOOD    3. Mixed hyperlipidemia  Continue to monitor. Work on diet and exercise. Declines taking a statin for her cholesterol.    -     LIPID PANEL    4. Primary osteoarthritis involving multiple joints  Stable     5. Encounter for medication monitoring  -     METABOLIC PANEL, COMPREHENSIVE    6. Non morbid obesity  I have reviewed/discussed the above normal BMI with the patient. I have recommended the following interventions: dietary management education, guidance, and counseling . Follow-up and Dispositions    · Return in about 4 months (around 11/4/2019). reviewed diet, exercise and weight control  cardiovascular risk and specific lipid/LDL goals reviewed  reviewed medications and side effects in detail  specific diabetic recommendations: low cholesterol diet, weight control and daily exercise discussed, home glucose monitoring emphasized, all medications, side effects and compliance discussed carefully, foot care discussed and Podiatry visits discussed, annual eye examinations at Ophthalmology discussed and glycohemoglobin and other lab monitoring discussed     I have discussed diagnosis listed in this note with pt and/or family. I have discussed treatment plans and options and the risk/benefit analysis of those options, including safe use of medications and possible medication side effects. Through the use of shared decision making we have agreed to the above plan. The patient has received an after-visit summary and questions were answered concerning future plans and follow up. Advise pt of any urgent changes then to proceed to the ER.

## 2019-07-17 NOTE — PROGRESS NOTES
Chief Complaint   Patient presents with    Cholesterol Problem     follow up    Diabetes     follow up    Hypertension     follow up     A1C 3/1/2019    Microalbumin 3/1/2019    Patient states she had an eye exam 1/2019 by Dr. Ralph Bran. Mammogram: 10/8/2018    Verbal order received per Dr. Myriam Resendiz 23 IM for need for immunization-VORB. Pt received Pneumococcal 23 IM in left deltoid without any difficulty. 1. Have you been to the ER, urgent care clinic since your last visit? Hospitalized since your last visit? No    2. Have you seen or consulted any other health care providers outside of the [address] [place] [address] since your last visit? Include any pap smears or colon screening.  No

## 2019-07-18 LAB
ALBUMIN SERPL-MCNC: 4.1 G/DL (ref 3.5–4.7)
ALBUMIN/GLOB SERPL: 1.4 {RATIO} (ref 1.2–2.2)
ALP SERPL-CCNC: 106 IU/L (ref 39–117)
ALT SERPL-CCNC: 8 IU/L (ref 0–32)
AST SERPL-CCNC: 13 IU/L (ref 0–40)
BILIRUB SERPL-MCNC: 0.4 MG/DL (ref 0–1.2)
BUN SERPL-MCNC: 19 MG/DL (ref 8–27)
BUN/CREAT SERPL: 16 (ref 12–28)
CALCIUM SERPL-MCNC: 9.8 MG/DL (ref 8.7–10.3)
CHLORIDE SERPL-SCNC: 104 MMOL/L (ref 96–106)
CHOLEST SERPL-MCNC: 266 MG/DL (ref 100–199)
CO2 SERPL-SCNC: 27 MMOL/L (ref 20–29)
COMMENT, 011824: ABNORMAL
CREAT SERPL-MCNC: 1.16 MG/DL (ref 0.57–1)
GLOBULIN SER CALC-MCNC: 3 G/DL (ref 1.5–4.5)
GLUCOSE SERPL-MCNC: 139 MG/DL (ref 65–99)
HDLC SERPL-MCNC: 49 MG/DL
INTERPRETATION, 910389: NORMAL
INTERPRETATION: NORMAL
LDLC SERPL CALC-MCNC: 195 MG/DL (ref 0–99)
PDF IMAGE, 910387: NORMAL
POTASSIUM SERPL-SCNC: 4.7 MMOL/L (ref 3.5–5.2)
PROT SERPL-MCNC: 7.1 G/DL (ref 6–8.5)
SODIUM SERPL-SCNC: 143 MMOL/L (ref 134–144)
TRIGL SERPL-MCNC: 110 MG/DL (ref 0–149)
VLDLC SERPL CALC-MCNC: 22 MG/DL (ref 5–40)

## 2019-08-23 ENCOUNTER — OFFICE VISIT (OUTPATIENT)
Dept: FAMILY MEDICINE CLINIC | Age: 84
End: 2019-08-23

## 2019-08-23 VITALS
SYSTOLIC BLOOD PRESSURE: 131 MMHG | BODY MASS INDEX: 33.75 KG/M2 | OXYGEN SATURATION: 97 % | HEIGHT: 62 IN | HEART RATE: 84 BPM | WEIGHT: 183.4 LBS | DIASTOLIC BLOOD PRESSURE: 65 MMHG | RESPIRATION RATE: 16 BRPM | TEMPERATURE: 96.8 F

## 2019-08-23 DIAGNOSIS — L72.3 SEBACEOUS CYST: Primary | ICD-10-CM

## 2019-08-23 RX ORDER — CLINDAMYCIN HYDROCHLORIDE 300 MG/1
300 CAPSULE ORAL 3 TIMES DAILY
Qty: 30 CAP | Refills: 0 | Status: SHIPPED | OUTPATIENT
Start: 2019-08-23 | End: 2019-09-05

## 2019-08-23 RX ORDER — CEPHALEXIN 500 MG/1
500 CAPSULE ORAL 3 TIMES DAILY
Qty: 30 CAP | Refills: 0 | Status: ON HOLD | OUTPATIENT
Start: 2019-08-23 | End: 2019-09-05

## 2019-08-23 NOTE — PATIENT INSTRUCTIONS
Epidermoid Cyst: Care Instructions  Your Care Instructions  An epidermoid (say \"zv-[id number]-IPP-[id number]\") cyst is a lump just under the skin. These cysts can form when a hair follicle becomes blocked. They are common in acne and may occur on the face, neck, back, and genitals. However, they can form anywhere on the body. These cysts are not cancer and do not lead to cancer. They tend not to hurt, but they can sometimes become swollen and painful. They also may break open (rupture) and cause scarring. These cysts sometimes do not cause problems and may not need treatment. If you have a cyst that is swollen and hurts, your doctor may inject it with a medicine to help it heal. But it is more likely that a painful cyst will need to be removed. Your doctor will give you a shot of numbing medicine and cut into the cyst to drain it or remove it. This makes the symptoms go away. Follow-up care is a key part of your treatment and safety. Be sure to make and go to all appointments, and call your doctor if you are having problems. It's also a good idea to know your test results and keep a list of the medicines you take. How can you care for yourself at home? · Do not squeeze the cyst or poke it with a needle to open it. This can cause swelling, redness, and infection. · Always have a doctor look at any new lumps you get to make sure that they are not serious. When should you call for help? Watch closely for changes in your health, and be sure to contact your doctor if:    · You have a fever, redness, or swelling after you get a shot of medicine in the cyst.     · You see or feel a new lump on your skin. Where can you learn more? Go to http://james-rachel.info/. Enter E504 in the search box to learn more about \"Epidermoid Cyst: Care Instructions. \"  Current as of: April 1, 2019  Content Version: 12.1  © 8765-6961 [employer].  Care instructions adapted under license by [employer] (which disclaims liability or warranty for this information). If you have questions about a medical condition or this instruction, always ask your healthcare professional. [address] any warranty or liability for your use of this information.

## 2019-08-23 NOTE — PROGRESS NOTES
Subjective:    Jen Pérez is a 81 y.o. female who presents for cyst on the mid left side of the abd over the past week that needs to be I and Ded. We have discussed this procedure, including option of not performing surgery, technique of surgery and potential for scarring. Oubjective:   Patient appears well. Visit Vitals  /65 (BP 1 Location: Left arm, BP Patient Position: Sitting)   Pulse 84   Temp 96.8 °F (36 °C) (Oral)   Resp 16   Ht 5' 2\" (1.575 m)   Wt 183 lb 6.4 oz (83.2 kg)   LMP 04/28/1972   SpO2 97%   BMI 33.54 kg/m²     Skin: 4 cm indurated area sebaceous cyst in the left mid abd      Assessment:   sebaceous cyst    Procedure Note:   After informed consent was obtained, using betadine for cleansing and 1% lidocaine with epinephrine for anesthetic, with sterile technique, incision and drainage of abscess was performed. Copious amount of purulent foul smelling drainage which was culture. Cyst pocket was irrigated with NS and pack with gauze. Wound care instructions provided. Warm compresses. Pull  packing in 24 hours. Be alert for any signs of cutaneous infection. The procedure was well tolerated without complications. Follow up: recheck in 3 days. ASSESSMENT and PLAN  Diagnoses and all orders for this visit:    1. Sebaceous cyst  -     AEROBIC BACTERIAL CULTURE    Other orders  -     clindamycin (CLEOCIN) 300 mg capsule; Take 1 Cap by mouth three (3) times daily for 10 days. -     cephALEXin (KEFLEX) 500 mg capsule; Take 1 Cap by mouth three (3) times daily for 10 days. Follow-up and Dispositions    · Return in about 3 days (around 8/26/2019).        reviewed medications and side effects in detail

## 2019-08-23 NOTE — PROGRESS NOTES
Chief Complaint   Patient presents with    Cyst     patient c/o cyst on abd- painful when touched     A1C 7/17/2019      1. Have you been to the ER, urgent care clinic since your last visit? Hospitalized since your last visit? No    2. Have you seen or consulted any other health care providers outside of the [address] [place] [address] since your last visit? Include any pap smears or colon screening.  no

## 2019-08-23 NOTE — PROGRESS NOTES
[facility]  OFFICE PROCEDURE PROGRESS NOTE        Chart reviewed for the following:   Ketan Botello LPN, have reviewed the History, Physical and updated the Allergic reactions for Andrew Lanza     TIME OUT performed immediately prior to start of procedure:   Ketan Botello LPN, have performed the following reviews on Andrew Lanza prior to the start of the procedure:            * Patient was identified by name and date of birth   * Agreement on procedure being performed was verified  * Risks and Benefits explained to the patient  * Procedure site verified and marked as necessary  * Patient was positioned for comfort  * Consent was signed and verified     Time: 12:15pm      Date of procedure: 8/23/2019    Procedure performed by:  Artem Cedeño MD    Provider assisted by: Soy Warren LPN    Patient assisted by: NONE    How tolerated by patient: Well    Post Procedural Pain Scale: 8    Comments: Incision and Drainage of abdomen cyst

## 2019-08-26 ENCOUNTER — TELEPHONE (OUTPATIENT)
Dept: SURGERY | Age: 84
End: 2019-08-26

## 2019-08-26 ENCOUNTER — OFFICE VISIT (OUTPATIENT)
Dept: FAMILY MEDICINE CLINIC | Age: 84
End: 2019-08-26

## 2019-08-26 ENCOUNTER — OFFICE VISIT (OUTPATIENT)
Dept: SURGERY | Age: 84
End: 2019-08-26

## 2019-08-26 VITALS
HEART RATE: 117 BPM | HEIGHT: 62 IN | WEIGHT: 183.7 LBS | RESPIRATION RATE: 16 BRPM | OXYGEN SATURATION: 100 % | BODY MASS INDEX: 33.8 KG/M2 | DIASTOLIC BLOOD PRESSURE: 74 MMHG | TEMPERATURE: 99.9 F | SYSTOLIC BLOOD PRESSURE: 124 MMHG

## 2019-08-26 VITALS
HEIGHT: 62 IN | TEMPERATURE: 98.1 F | BODY MASS INDEX: 33.9 KG/M2 | SYSTOLIC BLOOD PRESSURE: 130 MMHG | RESPIRATION RATE: 18 BRPM | HEART RATE: 98 BPM | OXYGEN SATURATION: 97 % | WEIGHT: 184.2 LBS | DIASTOLIC BLOOD PRESSURE: 71 MMHG

## 2019-08-26 DIAGNOSIS — L02.211 ABDOMINAL WALL ABSCESS: Primary | ICD-10-CM

## 2019-08-26 DIAGNOSIS — Z23 ENCOUNTER FOR IMMUNIZATION: ICD-10-CM

## 2019-08-26 RX ORDER — LIDOCAINE HYDROCHLORIDE AND EPINEPHRINE 20; 10 MG/ML; UG/ML
5 INJECTION, SOLUTION INFILTRATION; PERINEURAL ONCE
Qty: 20 ML | Refills: 0
Start: 2019-08-26 | End: 2019-08-26

## 2019-08-26 NOTE — PROGRESS NOTES
To: Ru Betts MD    From: Citlaly Andrade MD    Thank you for sending Anila Bishop back to see us. Nice to see her again. Encounter Date: 8/26/2019    Subjective:      Alphonso Smith is a 81 y.o. female presents for evaluation of and abd wall infection. Had abscess lanced last week by Dr. Derek Small. Changed packing today. Still with pus. Still sore. No f/c. Objective: There were no vitals taken for this visit. General:  alert, cooperative, no distress, appears stated age   Abdomen: LUQ I&D site without significant erythema. +indurated. Pus expressed. Assessment:     Abd wall soft tissue infection needing further debridement. Plan:     Below. Incision/Drainage Procedure Note    Encounter Date: 8/26/2019    Location of lesion:  above     Time out at performed by me (see consent form):  * Patient was identified by name and date of birth   * Agreement on procedure being performed was verified  * Risks and Benefits explained to the patient  * Procedure site verified and marked as necessary  * Patient was positioned for comfort  * Consent was signed and verified    Procedure Details: The risks,benefits and alternatives were explained and consent was obtained for the procedure. Time out was performed. The area was prepped with chlorhexidine. Local anesthesia was infiltrated into the skin overlying the abscess. The prior stab incision was enlarged and an ellipse of skin was removed. Pus was evacuated. A culture was not obtained since it was last week. The cyst sac was entirely debrided. Non-viable tissue was debrided. The wound was irrigated with peroxide. The wound was packed with sterile gauze. A sterile dressing was then applied. Estimated Blood Loss:  minimal     Disposition:  Procedure well tolerated. Post-procedure pain scale: 0/10. Plan:  Daily packing by sharonda - instructions given, follow-up in one week.     Signed By: Linda Patrick Venice Yadav MD

## 2019-08-26 NOTE — PATIENT INSTRUCTIONS
Remove old bandage and packing at the end of your shower after you have rinse off all your soap and shampoo. Then, rinse wound out with clean shower water then blot dry. Apply ice for 5 minutes to deaden the pain. Then pack it with ribbon gauze and cover with new bandage. Do this at least once daily. Twice if possible.

## 2019-08-26 NOTE — TELEPHONE ENCOUNTER
Patient's daughter calling to request home health care for her mother. Patient at first did not want a nurse coming to her house to take care of her wound but now after getting home, patient has decided she does want home health. Please advise.

## 2019-08-26 NOTE — PROGRESS NOTES
Appt made with Dr. Ankit Yuen today at 10:40am (8/26/2019) and will need to arrive at 10:20am. Patient given appt information and verbalized understanding.

## 2019-08-26 NOTE — Clinical Note
8/26/19 Patient: Mat Patterson YOB: 1935 Date of Visit: 8/26/2019 Lucas De La Cruz MD 
[address] [place] [id number] VIA In Basket Dear Lucas De La Cruz MD, Thank you for referring Ms. Nikita Scherer to [address] [place] [address] for evaluation. My notes for this consultation are attached. If you have questions, please do not hesitate to call me. I look forward to following your patient along with you.  
 
 
Sincerely, 
 
Shree Epperson MD

## 2019-08-26 NOTE — PROGRESS NOTES
Chief Complaint   Patient presents with    Cyst     follow up cyst on abdomen     1. Have you been to the ER, urgent care clinic since your last visit? Hospitalized since your last visit? No    2. Have you seen or consulted any other health care providers outside of the [address] [place] [address] since your last visit? Include any pap smears or colon screening. NO    Complaining of left hip joint hurting. Order placed for FLU SHOT per Verbal Order from Dr. Lawson on 8/26/2019 due to need. Flu injection, 0.5 ml  given in left arm, IM , no reaction.

## 2019-08-26 NOTE — PROGRESS NOTES
abd wall abd abscess still has some drainage and soreness. Pulled out packing on Saturday. No fever or chills over the weekend. Tolerating Keflex and clindamycin. On exam left mid abd slight warm to touch around the abscess and has some redness, induration and firmness. Has some purulent drainage. WBC 10.2    Refer to surgery. Culture is pending.

## 2019-08-27 NOTE — TELEPHONE ENCOUNTER
Patient's daughter called to ask if you were able to put in an order for home health care. Please call back.

## 2019-08-28 ENCOUNTER — DOCUMENTATION ONLY (OUTPATIENT)
Dept: SURGERY | Age: 84
End: 2019-08-28

## 2019-08-28 ENCOUNTER — HOME HEALTH ADMISSION (OUTPATIENT)
Dept: HOME HEALTH SERVICES | Facility: HOME HEALTH | Age: 84
End: 2019-08-28
Payer: MEDICARE

## 2019-08-28 ENCOUNTER — TELEPHONE (OUTPATIENT)
Dept: FAMILY MEDICINE CLINIC | Age: 84
End: 2019-08-28

## 2019-08-28 LAB — BACTERIA SPEC AEROBE CULT: NORMAL

## 2019-08-28 NOTE — TELEPHONE ENCOUNTER
Spoke with Francisco Javier Chirinos, patient's daughter (Verified HIPPA) Verified patient with 2 patient identifiers. Informed daughter, that [facility], will contact her to set up appointment pending insurance approval.   Daughter verbalized understanding.

## 2019-08-29 ENCOUNTER — HOME CARE VISIT (OUTPATIENT)
Dept: SCHEDULING | Facility: HOME HEALTH | Age: 84
End: 2019-08-29
Payer: MEDICARE

## 2019-08-29 PROCEDURE — G0299 HHS/HOSPICE OF RN EA 15 MIN: HCPCS

## 2019-08-29 PROCEDURE — 3331090002 HH PPS REVENUE DEBIT

## 2019-08-29 PROCEDURE — 3331090001 HH PPS REVENUE CREDIT

## 2019-08-29 PROCEDURE — 400013 HH SOC

## 2019-08-30 ENCOUNTER — HOME CARE VISIT (OUTPATIENT)
Dept: SCHEDULING | Facility: HOME HEALTH | Age: 84
End: 2019-08-30
Payer: MEDICARE

## 2019-08-30 VITALS
TEMPERATURE: 98.2 F | DIASTOLIC BLOOD PRESSURE: 72 MMHG | RESPIRATION RATE: 16 BRPM | SYSTOLIC BLOOD PRESSURE: 130 MMHG | HEART RATE: 76 BPM | OXYGEN SATURATION: 99 %

## 2019-08-30 PROCEDURE — G0300 HHS/HOSPICE OF LPN EA 15 MIN: HCPCS

## 2019-08-30 PROCEDURE — 3331090001 HH PPS REVENUE CREDIT

## 2019-08-30 PROCEDURE — 3331090002 HH PPS REVENUE DEBIT

## 2019-08-31 ENCOUNTER — HOME CARE VISIT (OUTPATIENT)
Dept: SCHEDULING | Facility: HOME HEALTH | Age: 84
End: 2019-08-31
Payer: MEDICARE

## 2019-08-31 PROCEDURE — G0300 HHS/HOSPICE OF LPN EA 15 MIN: HCPCS

## 2019-08-31 PROCEDURE — 3331090001 HH PPS REVENUE CREDIT

## 2019-08-31 PROCEDURE — 3331090002 HH PPS REVENUE DEBIT

## 2019-09-01 PROCEDURE — 3331090001 HH PPS REVENUE CREDIT

## 2019-09-01 PROCEDURE — 3331090002 HH PPS REVENUE DEBIT

## 2019-09-02 VITALS
SYSTOLIC BLOOD PRESSURE: 118 MMHG | OXYGEN SATURATION: 98 % | TEMPERATURE: 98.1 F | RESPIRATION RATE: 17 BRPM | HEART RATE: 74 BPM | DIASTOLIC BLOOD PRESSURE: 70 MMHG

## 2019-09-02 PROCEDURE — 3331090001 HH PPS REVENUE CREDIT

## 2019-09-02 PROCEDURE — 3331090002 HH PPS REVENUE DEBIT

## 2019-09-03 ENCOUNTER — HOME CARE VISIT (OUTPATIENT)
Dept: SCHEDULING | Facility: HOME HEALTH | Age: 84
End: 2019-09-03
Payer: MEDICARE

## 2019-09-03 ENCOUNTER — HOSPITAL ENCOUNTER (INPATIENT)
Age: 84
LOS: 2 days | Discharge: HOME HEALTH | DRG: 603 | End: 2019-09-05
Attending: SURGERY | Admitting: SURGERY
Payer: MEDICARE

## 2019-09-03 ENCOUNTER — OFFICE VISIT (OUTPATIENT)
Dept: SURGERY | Age: 84
End: 2019-09-03

## 2019-09-03 VITALS
OXYGEN SATURATION: 96 % | HEART RATE: 108 BPM | TEMPERATURE: 99.4 F | SYSTOLIC BLOOD PRESSURE: 150 MMHG | RESPIRATION RATE: 20 BRPM | HEIGHT: 62 IN | DIASTOLIC BLOOD PRESSURE: 78 MMHG | WEIGHT: 181.9 LBS | BODY MASS INDEX: 33.47 KG/M2

## 2019-09-03 DIAGNOSIS — L02.211 ABDOMINAL WALL ABSCESS: Primary | ICD-10-CM

## 2019-09-03 DIAGNOSIS — L02.211 ABSCESS OF ABDOMINAL WALL: Primary | ICD-10-CM

## 2019-09-03 LAB
ANION GAP SERPL CALC-SCNC: 7 MMOL/L (ref 5–15)
BASOPHILS # BLD: 0 K/UL (ref 0–0.1)
BASOPHILS NFR BLD: 0 % (ref 0–1)
BUN SERPL-MCNC: 19 MG/DL (ref 6–20)
BUN/CREAT SERPL: 15 (ref 12–20)
CALCIUM SERPL-MCNC: 8.9 MG/DL (ref 8.5–10.1)
CHLORIDE SERPL-SCNC: 106 MMOL/L (ref 97–108)
CO2 SERPL-SCNC: 22 MMOL/L (ref 21–32)
COMMENT, HOLDF: NORMAL
CREAT SERPL-MCNC: 1.27 MG/DL (ref 0.55–1.02)
DIFFERENTIAL METHOD BLD: ABNORMAL
EOSINOPHIL # BLD: 0.2 K/UL (ref 0–0.4)
EOSINOPHIL NFR BLD: 1 % (ref 0–7)
ERYTHROCYTE [DISTWIDTH] IN BLOOD BY AUTOMATED COUNT: 13.1 % (ref 11.5–14.5)
GLUCOSE SERPL-MCNC: 276 MG/DL (ref 65–100)
HCT VFR BLD AUTO: 32.8 % (ref 35–47)
HGB BLD-MCNC: 10.9 G/DL (ref 11.5–16)
IMM GRANULOCYTES # BLD AUTO: 0 K/UL (ref 0–0.04)
IMM GRANULOCYTES NFR BLD AUTO: 0 % (ref 0–0.5)
LYMPHOCYTES # BLD: 1.4 K/UL (ref 0.8–3.5)
LYMPHOCYTES NFR BLD: 12 % (ref 12–49)
MCH RBC QN AUTO: 26.6 PG (ref 26–34)
MCHC RBC AUTO-ENTMCNC: 33.2 G/DL (ref 30–36.5)
MCV RBC AUTO: 80 FL (ref 80–99)
MONOCYTES # BLD: 0.7 K/UL (ref 0–1)
MONOCYTES NFR BLD: 6 % (ref 5–13)
NEUTS SEG # BLD: 9 K/UL (ref 1.8–8)
NEUTS SEG NFR BLD: 81 % (ref 32–75)
NRBC # BLD: 0 K/UL (ref 0–0.01)
NRBC BLD-RTO: 0 PER 100 WBC
PLATELET # BLD AUTO: 401 K/UL (ref 150–400)
PMV BLD AUTO: 9.9 FL (ref 8.9–12.9)
POTASSIUM SERPL-SCNC: 4.8 MMOL/L (ref 3.5–5.1)
RBC # BLD AUTO: 4.1 M/UL (ref 3.8–5.2)
SAMPLES BEING HELD,HOLD: NORMAL
SODIUM SERPL-SCNC: 135 MMOL/L (ref 136–145)
WBC # BLD AUTO: 11.3 K/UL (ref 3.6–11)

## 2019-09-03 PROCEDURE — 36415 COLL VENOUS BLD VENIPUNCTURE: CPT

## 2019-09-03 PROCEDURE — 80048 BASIC METABOLIC PNL TOTAL CA: CPT

## 2019-09-03 PROCEDURE — 74011250636 HC RX REV CODE- 250/636: Performed by: SURGERY

## 2019-09-03 PROCEDURE — 74011000258 HC RX REV CODE- 258: Performed by: SURGERY

## 2019-09-03 PROCEDURE — 3331090001 HH PPS REVENUE CREDIT

## 2019-09-03 PROCEDURE — 85025 COMPLETE CBC W/AUTO DIFF WBC: CPT

## 2019-09-03 PROCEDURE — 3331090002 HH PPS REVENUE DEBIT

## 2019-09-03 PROCEDURE — 65270000029 HC RM PRIVATE

## 2019-09-03 RX ORDER — SODIUM CHLORIDE 0.9 % (FLUSH) 0.9 %
5-40 SYRINGE (ML) INJECTION AS NEEDED
Status: DISCONTINUED | OUTPATIENT
Start: 2019-09-03 | End: 2019-09-05 | Stop reason: HOSPADM

## 2019-09-03 RX ORDER — ENOXAPARIN SODIUM 100 MG/ML
40 INJECTION SUBCUTANEOUS EVERY 24 HOURS
Status: DISCONTINUED | OUTPATIENT
Start: 2019-09-03 | End: 2019-09-05 | Stop reason: HOSPADM

## 2019-09-03 RX ORDER — SODIUM CHLORIDE 9 MG/ML
50 INJECTION, SOLUTION INTRAVENOUS CONTINUOUS
Status: DISCONTINUED | OUTPATIENT
Start: 2019-09-03 | End: 2019-09-05

## 2019-09-03 RX ORDER — HYDROCODONE BITARTRATE AND ACETAMINOPHEN 5; 325 MG/1; MG/1
1 TABLET ORAL
Status: DISCONTINUED | OUTPATIENT
Start: 2019-09-03 | End: 2019-09-05 | Stop reason: HOSPADM

## 2019-09-03 RX ORDER — DIPHENHYDRAMINE HCL 25 MG
25 CAPSULE ORAL
Status: ACTIVE | OUTPATIENT
Start: 2019-09-03 | End: 2019-09-04

## 2019-09-03 RX ORDER — HYDROMORPHONE HYDROCHLORIDE 1 MG/ML
0.5 INJECTION, SOLUTION INTRAMUSCULAR; INTRAVENOUS; SUBCUTANEOUS
Status: DISCONTINUED | OUTPATIENT
Start: 2019-09-03 | End: 2019-09-05 | Stop reason: HOSPADM

## 2019-09-03 RX ORDER — NALOXONE HYDROCHLORIDE 0.4 MG/ML
0.4 INJECTION, SOLUTION INTRAMUSCULAR; INTRAVENOUS; SUBCUTANEOUS AS NEEDED
Status: DISCONTINUED | OUTPATIENT
Start: 2019-09-03 | End: 2019-09-05 | Stop reason: HOSPADM

## 2019-09-03 RX ORDER — SODIUM CHLORIDE 0.9 % (FLUSH) 0.9 %
5-40 SYRINGE (ML) INJECTION EVERY 8 HOURS
Status: DISCONTINUED | OUTPATIENT
Start: 2019-09-03 | End: 2019-09-05 | Stop reason: HOSPADM

## 2019-09-03 RX ORDER — ONDANSETRON 2 MG/ML
4 INJECTION INTRAMUSCULAR; INTRAVENOUS
Status: DISCONTINUED | OUTPATIENT
Start: 2019-09-03 | End: 2019-09-05 | Stop reason: HOSPADM

## 2019-09-03 RX ORDER — IBUPROFEN 400 MG/1
400 TABLET ORAL
Status: DISCONTINUED | OUTPATIENT
Start: 2019-09-03 | End: 2019-09-05 | Stop reason: HOSPADM

## 2019-09-03 RX ADMIN — SODIUM CHLORIDE 100 ML/HR: 900 INJECTION, SOLUTION INTRAVENOUS at 20:27

## 2019-09-03 RX ADMIN — Medication 10 ML: at 20:25

## 2019-09-03 RX ADMIN — ENOXAPARIN SODIUM 40 MG: 40 INJECTION SUBCUTANEOUS at 20:29

## 2019-09-03 RX ADMIN — PIPERACILLIN SODIUM,TAZOBACTAM SODIUM 3.38 G: 3; .375 INJECTION, POWDER, FOR SOLUTION INTRAVENOUS at 20:24

## 2019-09-03 NOTE — PROGRESS NOTES
Returns today for check up. Completed abx. On exam, still with erythema. Site well granulated. On US, there is a deeper pocket of infection. Encounter Date: 9/3/2019    Location of lesion:  Left mid-abdomen     Time out at performed by me (see consent form):  * Patient was identified by name and date of birth   * Agreement on procedure being performed was verified  * Risks and Benefits explained to the patient  * Procedure site verified and marked as necessary  * Patient was positioned for comfort  * Consent was signed and verified    Procedure Details: The risks,benefits and alternatives were explained and consent was obtained for the procedure. Time out was performed. The area was prepped with alcohol and draped in the usual manner. Local anesthesia was infiltrated into the skin overlying the erythema. The prior incision was lengthened and the SC adipose was probed with a hemostat. Copious pus was encountered. It was evacuated and irrigated with peroxide then saline until clean. The wound was packed with beatdine soaked kerlex. A sterile dressing was then applied. Estimated Blood Loss:  minimal     Disposition:  Procedure well tolerated. Post-procedure pain scale: 0/10. Plan:  Admit for IV abx.       Signed By: Juwan Delgado MD

## 2019-09-03 NOTE — PROGRESS NOTES
Chief Complaint   Patient presents with    Follow-up     s/p I&D abdominal wall abscess on 8/26/19.     1. Have you been to the ER, urgent care clinic since your last visit? Hospitalized since your last visit?no    2. Have you seen or consulted any other health care providers outside of the [address] [place] [address] since your last visit? Include any pap smears or colon screening.  no

## 2019-09-03 NOTE — LETTER
9/3/19 Patient: Luis Mejia YOB: 1935 Date of Visit: 9/3/2019 Rossana Sanders MD 
[address] [place] [id number] VIA In Basket Dear Rossana Sanders MD, 
 
 
I am admitting Ms. Hale for a day or 2 of IV antibiotics. Please see attached. Hope all is well.    
 
 
Sincerely, 
 
Gary Douglas MD

## 2019-09-04 LAB
ANION GAP SERPL CALC-SCNC: 7 MMOL/L (ref 5–15)
BASOPHILS # BLD: 0 K/UL (ref 0–0.1)
BASOPHILS NFR BLD: 0 % (ref 0–1)
BUN SERPL-MCNC: 16 MG/DL (ref 6–20)
BUN/CREAT SERPL: 15 (ref 12–20)
CALCIUM SERPL-MCNC: 8.7 MG/DL (ref 8.5–10.1)
CHLORIDE SERPL-SCNC: 110 MMOL/L (ref 97–108)
CO2 SERPL-SCNC: 25 MMOL/L (ref 21–32)
CREAT SERPL-MCNC: 1.1 MG/DL (ref 0.55–1.02)
DIFFERENTIAL METHOD BLD: ABNORMAL
EOSINOPHIL # BLD: 0.2 K/UL (ref 0–0.4)
EOSINOPHIL NFR BLD: 3 % (ref 0–7)
ERYTHROCYTE [DISTWIDTH] IN BLOOD BY AUTOMATED COUNT: 13 % (ref 11.5–14.5)
GLUCOSE SERPL-MCNC: 173 MG/DL (ref 65–100)
HCT VFR BLD AUTO: 31.8 % (ref 35–47)
HGB BLD-MCNC: 10.3 G/DL (ref 11.5–16)
IMM GRANULOCYTES # BLD AUTO: 0 K/UL (ref 0–0.04)
IMM GRANULOCYTES NFR BLD AUTO: 0 % (ref 0–0.5)
LYMPHOCYTES # BLD: 1.3 K/UL (ref 0.8–3.5)
LYMPHOCYTES NFR BLD: 19 % (ref 12–49)
MCH RBC QN AUTO: 26.2 PG (ref 26–34)
MCHC RBC AUTO-ENTMCNC: 32.4 G/DL (ref 30–36.5)
MCV RBC AUTO: 80.9 FL (ref 80–99)
MONOCYTES # BLD: 0.6 K/UL (ref 0–1)
MONOCYTES NFR BLD: 8 % (ref 5–13)
NEUTS SEG # BLD: 5 K/UL (ref 1.8–8)
NEUTS SEG NFR BLD: 70 % (ref 32–75)
NRBC # BLD: 0 K/UL (ref 0–0.01)
NRBC BLD-RTO: 0 PER 100 WBC
PLATELET # BLD AUTO: 404 K/UL (ref 150–400)
PMV BLD AUTO: 9.7 FL (ref 8.9–12.9)
POTASSIUM SERPL-SCNC: 4.2 MMOL/L (ref 3.5–5.1)
RBC # BLD AUTO: 3.93 M/UL (ref 3.8–5.2)
SODIUM SERPL-SCNC: 142 MMOL/L (ref 136–145)
WBC # BLD AUTO: 7.2 K/UL (ref 3.6–11)

## 2019-09-04 PROCEDURE — 36415 COLL VENOUS BLD VENIPUNCTURE: CPT

## 2019-09-04 PROCEDURE — 65270000029 HC RM PRIVATE

## 2019-09-04 PROCEDURE — 77030018836 HC SOL IRR NACL ICUM -A

## 2019-09-04 PROCEDURE — 85025 COMPLETE CBC W/AUTO DIFF WBC: CPT

## 2019-09-04 PROCEDURE — 74011000258 HC RX REV CODE- 258: Performed by: SURGERY

## 2019-09-04 PROCEDURE — 74011250636 HC RX REV CODE- 250/636: Performed by: SURGERY

## 2019-09-04 PROCEDURE — 3331090002 HH PPS REVENUE DEBIT

## 2019-09-04 PROCEDURE — 74011000250 HC RX REV CODE- 250: Performed by: SURGERY

## 2019-09-04 PROCEDURE — 80048 BASIC METABOLIC PNL TOTAL CA: CPT

## 2019-09-04 PROCEDURE — 3331090001 HH PPS REVENUE CREDIT

## 2019-09-04 RX ADMIN — Medication 10 ML: at 21:40

## 2019-09-04 RX ADMIN — Medication 10 ML: at 05:41

## 2019-09-04 RX ADMIN — SODIUM CHLORIDE 100 ML/HR: 900 INJECTION, SOLUTION INTRAVENOUS at 05:40

## 2019-09-04 RX ADMIN — PIPERACILLIN SODIUM,TAZOBACTAM SODIUM 3.38 G: 3; .375 INJECTION, POWDER, FOR SOLUTION INTRAVENOUS at 12:32

## 2019-09-04 RX ADMIN — PIPERACILLIN SODIUM,TAZOBACTAM SODIUM 3.38 G: 3; .375 INJECTION, POWDER, FOR SOLUTION INTRAVENOUS at 18:37

## 2019-09-04 RX ADMIN — Medication 10 ML: at 13:24

## 2019-09-04 RX ADMIN — HYOSCYAMINE SULFATE: 16 SOLUTION at 13:22

## 2019-09-04 RX ADMIN — HYDROMORPHONE HYDROCHLORIDE 0.5 MG: 1 INJECTION, SOLUTION INTRAMUSCULAR; INTRAVENOUS; SUBCUTANEOUS at 13:22

## 2019-09-04 RX ADMIN — PIPERACILLIN SODIUM,TAZOBACTAM SODIUM 3.38 G: 3; .375 INJECTION, POWDER, FOR SOLUTION INTRAVENOUS at 05:39

## 2019-09-04 RX ADMIN — ENOXAPARIN SODIUM 40 MG: 40 INJECTION SUBCUTANEOUS at 18:38

## 2019-09-04 RX ADMIN — Medication 10 ML: at 00:17

## 2019-09-04 RX ADMIN — SODIUM CHLORIDE 50 ML/HR: 900 INJECTION, SOLUTION INTRAVENOUS at 16:15

## 2019-09-04 RX ADMIN — PIPERACILLIN SODIUM,TAZOBACTAM SODIUM 3.38 G: 3; .375 INJECTION, POWDER, FOR SOLUTION INTRAVENOUS at 00:17

## 2019-09-04 RX ADMIN — ONDANSETRON 4 MG: 2 INJECTION INTRAMUSCULAR; INTRAVENOUS at 16:20

## 2019-09-04 NOTE — PROGRESS NOTES
Surgery NP Progress Note    Admitted for Active Problems:    Abscess of abdominal wall (9/3/2019)      Pt seen with Dr. Camila Kulkarni      Assessment:   Problem resolving. Plan/Recommendations/Medical Decision Making:     - Mobilize with nursing and OOB to chair for meals  - Continue diet  - Pain management- Continue current pain control methods.   - VTE Prophylaxis: Lovenox    Discharge Planning    Plan for patient to discharge to Home with Home Health for Wound/Incision Care    Anticipated discharge date 19    Incision/Wound Care Needs:  Home health to provide dressing changes every day per discharge instructions- Dressing will be daily with Dakins packing. Discharge plan discussed with:  Patient, Care Manager and Primary Care Team Provider    Subjective:     Patient has no new complaints. Appropriate amount of local discomfort with dressing change. Pain control adequate. Objective:     Blood pressure 118/58, pulse 86, temperature 98 °F (36.7 °C), resp. rate 17, last menstrual period 1972, SpO2 100 %, not currently breastfeeding. Temp (24hrs), Av.6 °F (37 °C), Min:98 °F (36.7 °C), Max:99.4 °F (37.4 °C)      Recent Results (from the past 48 hour(s))   SAMPLES BEING HELD    Collection Time: 19  8:00 PM   Result Value Ref Range    SAMPLES BEING HELD 1 LAV, 1 PST     COMMENT        Add-on orders for these samples will be processed based on acceptable specimen integrity and analyte stability, which may vary by analyte.    METABOLIC PANEL, BASIC    Collection Time: 19  8:00 PM   Result Value Ref Range    Sodium 135 (L) 136 - 145 mmol/L    Potassium 4.8 3.5 - 5.1 mmol/L    Chloride 106 97 - 108 mmol/L    CO2 22 21 - 32 mmol/L    Anion gap 7 5 - 15 mmol/L    Glucose 276 (H) 65 - 100 mg/dL    BUN 19 6 - 20 MG/DL    Creatinine 1.27 (H) 0.55 - 1.02 MG/DL    BUN/Creatinine ratio 15 12 - 20      GFR est AA 49 (L) >60 ml/min/1.73m2    GFR est non-AA 40 (L) >60 ml/min/1.73m2    Calcium 8.9 8.5 - 10.1 MG/DL   CBC WITH AUTOMATED DIFF    Collection Time: 09/03/19  9:07 PM   Result Value Ref Range    WBC 11.3 (H) 3.6 - 11.0 K/uL    RBC 4.10 3.80 - 5.20 M/uL    HGB 10.9 (L) 11.5 - 16.0 g/dL    HCT 32.8 (L) 35.0 - 47.0 %    MCV 80.0 80.0 - 99.0 FL    MCH 26.6 26.0 - 34.0 PG    MCHC 33.2 30.0 - 36.5 g/dL    RDW 13.1 11.5 - 14.5 %    PLATELET 799 (H) 344 - 400 K/uL    MPV 9.9 8.9 - 12.9 FL    NRBC 0.0 0  WBC    ABSOLUTE NRBC 0.00 0.00 - 0.01 K/uL    NEUTROPHILS 81 (H) 32 - 75 %    LYMPHOCYTES 12 12 - 49 %    MONOCYTES 6 5 - 13 %    EOSINOPHILS 1 0 - 7 %    BASOPHILS 0 0 - 1 %    IMMATURE GRANULOCYTES 0 0.0 - 0.5 %    ABS. NEUTROPHILS 9.0 (H) 1.8 - 8.0 K/UL    ABS. LYMPHOCYTES 1.4 0.8 - 3.5 K/UL    ABS. MONOCYTES 0.7 0.0 - 1.0 K/UL    ABS. EOSINOPHILS 0.2 0.0 - 0.4 K/UL    ABS. BASOPHILS 0.0 0.0 - 0.1 K/UL    ABS. IMM.  GRANS. 0.0 0.00 - 0.04 K/UL    DF AUTOMATED     METABOLIC PANEL, BASIC    Collection Time: 09/04/19  5:43 AM   Result Value Ref Range    Sodium 142 136 - 145 mmol/L    Potassium 4.2 3.5 - 5.1 mmol/L    Chloride 110 (H) 97 - 108 mmol/L    CO2 25 21 - 32 mmol/L    Anion gap 7 5 - 15 mmol/L    Glucose 173 (H) 65 - 100 mg/dL    BUN 16 6 - 20 MG/DL    Creatinine 1.10 (H) 0.55 - 1.02 MG/DL    BUN/Creatinine ratio 15 12 - 20      GFR est AA 57 (L) >60 ml/min/1.73m2    GFR est non-AA 47 (L) >60 ml/min/1.73m2    Calcium 8.7 8.5 - 10.1 MG/DL   CBC WITH AUTOMATED DIFF    Collection Time: 09/04/19  9:52 AM   Result Value Ref Range    WBC 7.2 3.6 - 11.0 K/uL    RBC 3.93 3.80 - 5.20 M/uL    HGB 10.3 (L) 11.5 - 16.0 g/dL    HCT 31.8 (L) 35.0 - 47.0 %    MCV 80.9 80.0 - 99.0 FL    MCH 26.2 26.0 - 34.0 PG    MCHC 32.4 30.0 - 36.5 g/dL    RDW 13.0 11.5 - 14.5 %    PLATELET 588 (H) 114 - 400 K/uL    MPV 9.7 8.9 - 12.9 FL    NRBC 0.0 0  WBC    ABSOLUTE NRBC 0.00 0.00 - 0.01 K/uL    NEUTROPHILS 70 32 - 75 %    LYMPHOCYTES 19 12 - 49 %    MONOCYTES 8 5 - 13 %    EOSINOPHILS 3 0 - 7 %    BASOPHILS 0 0 - 1 %    IMMATURE GRANULOCYTES 0 0.0 - 0.5 %    ABS. NEUTROPHILS 5.0 1.8 - 8.0 K/UL    ABS. LYMPHOCYTES 1.3 0.8 - 3.5 K/UL    ABS. MONOCYTES 0.6 0.0 - 1.0 K/UL    ABS. EOSINOPHILS 0.2 0.0 - 0.4 K/UL    ABS. BASOPHILS 0.0 0.0 - 0.1 K/UL    ABS. IMM. GRANS. 0.0 0.00 - 0.04 K/UL    DF AUTOMATED         Pt resting in bed. NAD   SCDs for mechanical DVT proph while in bed    There is no height or weight on file to calculate BMI. Reference: BMI greater than 30 is classified as obesity and greater than 40 is classified as morbid obesity. There were no vitals filed for this visit.       Aimee Yanez, NP   MSN, APRN, FNP-C, CWOCN-AP    09/04/19

## 2019-09-04 NOTE — ROUTINE PROCESS
The following appointments have been successfully scheduled:    Date/time Wednesday, September 11, 2019 11:15 AM  Patient  Angie Enciso 1935 (80VC F) #[id number] #[id number]  [facility] OFFICE  Appointment type Transitional Care  Provider Lizette Concepcion

## 2019-09-04 NOTE — PROGRESS NOTES
Plan:  -Home   [facility] SN for wound care  -Family to transport at d/c       Reason for Admission:   Abdomina abscess               RRAT Score:     30             Resources/supports as identified by patient/family:   Supportive family                 Top Challenges facing patient (as identified by patient/family and CM): Finances/Medication cost?   No needs identified                  Transportation? Pt drives and has family to assist               Support system or lack thereof? Supportive family (named dtr and niece)                     Living arrangements? Lives alone              Self-care/ADLs/Cognition? Independent/Oriented          Current Advanced Directive/Advance Care Plan:  Not on file/Full code                           Plan for utilizing home health:    SN                 Transition of Care Plan:      Home                 11:30AM  CM met with pt to complete assessment and discuss d/c planning. Pt is an 80 yo female admitted for an abdominal abscess. Pt lives alone in a 2 story home where she resides on the first floor. Pt reports being independent at baseline, including driving. No DME use. Pt's dtr, Remi Avelar, lives relatively nearby and is supportive, although she works during the day. Pt mentions her niece as a support as she was the one who brought her to the hospital. Pt is [facility] getting [facility] through [facility]. Referral sent to [facility] through [address] [place] [address] for resumption. CM discussed possibility of long-term IV abx, pending cultures and MD's plan. Pt states she has had IV abx at home in the past. No therapy ordered IP at this time. Family member likely to transport home, depending on time. 3:54PM  CM discussed with NP. Plan for d/c tomorrow with [facility] for wound care. Order for daily packing until the 9th when pt returns to see surgeon. CM notified [facility] of d/c tomorrow and wound care needs. Awaiting final confirmation from them.        CM will continue to follow and assist with d/c planning. Care Management Interventions  PCP Verified by CM: Yes(Dr. Derek Small)  Mode of Transport at Discharge: Other (see comment)(Pt's dtr )  Transition of Care Consult (CM Consult): Discharge Planning, [address]: Yes  Discharge Durable Medical Equipment: No  Physical Therapy Consult: No  Occupational Therapy Consult: No  Speech Therapy Consult: No  Current Support Network: Own Home, Lives Alone  Confirm Follow Up Transport: Family  Plan discussed with Pt/Family/Caregiver:  Yes      Madina Haney MSW  Care Manager

## 2019-09-04 NOTE — PROGRESS NOTES
General Surgery End of Shift Nursing Note    Bedside shift change report given to Najma RN (oncoming nurse) by Sophia Noble RN (offgoing nurse). Report included the following information SBAR, Kardex, Intake/Output, MAR and Recent Results. Shift worked:   7p-7a   Summary of shift:    Patient cooperative no issues overnight. Tolerated IV Zosyn. Issues for physician to address:        Number times ambulated in hallway past shift: Ambulated to bathroom with 1 assist    Number of times OOB to chair past shift: 0    Pain Management:  Current medication: None requested    GI:    Current diet:  DIET DIABETIC WITH OPTIONS Consistent Carb 1800kcal; Regular    Tolerating current diet: YES  Passing flatus: YES          Patient Safety:    Falls Score: 2  Bed Alarm On? No  Sitter?  No    Lindsay Eden

## 2019-09-04 NOTE — PROGRESS NOTES
Subjective:    Patient seen and examined by me today. Feels well. No fever. Objective:    Blood pressure 118/58, pulse 86, temperature 98 °F (36.7 °C), resp. rate 17, last menstrual period 04/28/1972, SpO2 100 %, not currently breastfeeding. Exam - A&O, NAD.  CTAB, RRR. Abdomen soft, ND, NABS, appropriately TTP, less cellulitis, wound clean. No edema. Assessment: POD#1 II&D of deep SC abd wall abscess. Active Hospital Problems    Diagnosis Date Noted    Abscess of abdominal wall 09/03/2019     - labs reviewed -- WBC down after I&D and IV abx.  - I/O's reviewed. - vital signs reviewed -- stable. Plan:  - packing changed today. Dakin's. - cont Zosyn today. - IVF - decrease.  - DVT prophylaxis - Lovenox. Discharge Planning    Plan for patient to discharge to Home with Home Health for Wound/Incision Care    Anticipated discharge date:  9/5/19. Incision/Wound Care Needs:  Home health to provide dressing changes every day per discharge instructions    Discharge plan discussed with:  Family/Caregiver - daughter. And NP Mary Salvador.                 Farheen Rice MD

## 2019-09-05 ENCOUNTER — HOME CARE VISIT (OUTPATIENT)
Dept: HOME HEALTH SERVICES | Facility: HOME HEALTH | Age: 84
End: 2019-09-05
Payer: MEDICARE

## 2019-09-05 VITALS
DIASTOLIC BLOOD PRESSURE: 56 MMHG | TEMPERATURE: 98.6 F | SYSTOLIC BLOOD PRESSURE: 133 MMHG | OXYGEN SATURATION: 99 % | HEART RATE: 89 BPM | RESPIRATION RATE: 16 BRPM

## 2019-09-05 LAB
ANION GAP SERPL CALC-SCNC: 4 MMOL/L (ref 5–15)
BUN SERPL-MCNC: 19 MG/DL (ref 6–20)
BUN/CREAT SERPL: 19 (ref 12–20)
CALCIUM SERPL-MCNC: 8.8 MG/DL (ref 8.5–10.1)
CHLORIDE SERPL-SCNC: 110 MMOL/L (ref 97–108)
CO2 SERPL-SCNC: 26 MMOL/L (ref 21–32)
CREAT SERPL-MCNC: 1.02 MG/DL (ref 0.55–1.02)
GLUCOSE SERPL-MCNC: 243 MG/DL (ref 65–100)
POTASSIUM SERPL-SCNC: 4 MMOL/L (ref 3.5–5.1)
SODIUM SERPL-SCNC: 140 MMOL/L (ref 136–145)

## 2019-09-05 PROCEDURE — 36415 COLL VENOUS BLD VENIPUNCTURE: CPT

## 2019-09-05 PROCEDURE — 3331090002 HH PPS REVENUE DEBIT

## 2019-09-05 PROCEDURE — 74011000258 HC RX REV CODE- 258: Performed by: SURGERY

## 2019-09-05 PROCEDURE — 74011250636 HC RX REV CODE- 250/636: Performed by: SURGERY

## 2019-09-05 PROCEDURE — 3331090001 HH PPS REVENUE CREDIT

## 2019-09-05 PROCEDURE — 80048 BASIC METABOLIC PNL TOTAL CA: CPT

## 2019-09-05 RX ORDER — TRAMADOL HYDROCHLORIDE 50 MG/1
50 TABLET ORAL
Qty: 15 TAB | Refills: 0 | Status: SHIPPED | OUTPATIENT
Start: 2019-09-05 | End: 2019-09-19

## 2019-09-05 RX ORDER — CEPHALEXIN 500 MG/1
500 CAPSULE ORAL 4 TIMES DAILY
Qty: 28 CAP | Refills: 0 | Status: SHIPPED | OUTPATIENT
Start: 2019-09-05 | End: 2019-09-12

## 2019-09-05 RX ADMIN — HYOSCYAMINE SULFATE: 16 SOLUTION at 08:57

## 2019-09-05 RX ADMIN — PIPERACILLIN SODIUM,TAZOBACTAM SODIUM 3.38 G: 3; .375 INJECTION, POWDER, FOR SOLUTION INTRAVENOUS at 00:00

## 2019-09-05 RX ADMIN — PIPERACILLIN SODIUM,TAZOBACTAM SODIUM 3.38 G: 3; .375 INJECTION, POWDER, FOR SOLUTION INTRAVENOUS at 06:16

## 2019-09-05 RX ADMIN — Medication 10 ML: at 06:22

## 2019-09-05 NOTE — DISCHARGE INSTRUCTIONS
Discharge Instructions:  Dr. Concha Santacruz    Follow up in the office as scheduled Monday 9/9/19 at 10:00 AM.   [phone]. Activity:  Walk regularly starting immediately. You may resume driving unless still requiring narcotics for pain. Diet:  You may resume normal diet. Wound Care:  Dressing changes daily with Dakins moist to dry packing. HH will do this until you see Dr. Concha Santacruz on Monday. Then you may start a wound VAC. If you experience a lot of drainage, develop redness around the wound, or a fever over 101 F occurs please call the office. You may shower. No baths or swimming until cleared by surgeon. Medications:  Resume home medications as indicated on the Medical Reconciliation form. Do not use blood thinners (such as Aspirin, Coumadin, or Plavix) until 3 days after procedure. Pain medications:  Non steroidal antiinflammatories (ibuprofen -- Advil or Motrin) seem to work best for post surgical pain. Try these first as prescribed. A narcotic prescription will also be given for breakthrough pain. PUT ICE ON YOUR INCISIONS 20 MINUTES EVERY HOUR WHILE THEY REMAIN SORE. PLACE A CLOTH BETWEEN YOUR SKIN THE THE ICE BAG. Colace or Miralax should be used twice daily to prevent constipation while on narcotics. If you are still having trouble having a BM after 1-2 days, try milk of magnesia. If this does not work within 24 hours, try a bottle of magnesium citrate. Narcotics and anesthesia sometimes cause nausea and vomiting. If persistent please call the office. Do not hesitate to call with questions or concerns.     Sonja Carey MD  Tel [phone]  Fax [phone]

## 2019-09-05 NOTE — PROGRESS NOTES
Plan:  -Home   [facility] SN for wound care  -Possible [employer] wound vac to be delivered to pt's home (if approved by insurance)  -Family to transport at d/c       9:38AM  D/c order acknowledged by CM. CM notified [facility] of d/c plan. They will contact pt to arrange time for packing wound care Friday, Saturday, and Sunday, per [facility] order. Appt scheduled with surgeon on Monday, the 9th, for f/u. PCP and surgery f/u appts scheduled and on AVS. CM PC to [facility] Jaime Rist to update on d/c. Per surgery team, pt would benefit from [employer] wound vac. Order and notes faxed to [facility]. Due to insurance, uncertain if it will be approved. If approved, plan for vac to be delivered to pt's home by this w/e for pt to take to surgery f/u on Monday to be applied. If not approved, surgery will determine next steps at that time. Pt ready for d/c from CM perspective. 2:49PM  CM notified by surgery team of call from [facility] updating the need for call to [facility] [employer] ([phone], claim #[id number]) to attempt to expedite authorization. CM PC to [facility] [employer]. Spoke with woman named Beulah (sp?). CM informed that surgery team must call [employer] to request expedited authorization (reference #[id number]). CM notified surgery NP. CM available for any additional needs. Care Management Interventions  PCP Verified by CM: Yes(Dr. Griselda Skains)  Mode of Transport at Discharge:  Other (see comment)(Pt's dtr )  Transition of Care Consult (CM Consult): Discharge Planning, [address]: Yes  Discharge Durable Medical Equipment: No  Physical Therapy Consult: No  Occupational Therapy Consult: No  Speech Therapy Consult: No  Current Support Network: Own Home, Lives Alone  Confirm Follow Up Transport: Family  Plan discussed with Pt/Family/Caregiver: Yes  Freedom of Choice Offered: Yes  Discharge Location  Discharge Placement: Home with home health       Martha Bailey, [address] [place], [place] [id number]

## 2019-09-05 NOTE — DISCHARGE SUMMARY
Discharge Summary    Patient ID:  Ed Alfaro  [id number]  female  81 y.o.  1935    Admit date: 9/3/2019    Discharge date: 9/5/2019    Admitting Physician: Kacy Mascorro MD     Consulting Physician(s):   Treatment Team: Attending Provider: Jane Latif MD; Utilization Review: Jaylen Jaramillo RN; Care Manager: Gypsy Spine; Nurse Practitioner: Eleazar Sparks NP; Primary Nurse: Devon Mcdaniel RN                         HPI:  Pt is a 81 y.o. female who presents at this time with abdominal wall abscess- deep requiring acute care monitoring and/or treatment.     Problem List:   Problem List as of 9/5/2019 Date Reviewed: 9/3/2019          Codes Class Noted - Resolved    Abscess of abdominal wall ICD-10-CM: L02.211  ICD-9-CM: 682.2  9/3/2019 - Present        Type 2 diabetes with nephropathy ([facility] ICD-10-CM: E11.21  ICD-9-CM: 250.40, 583.81  4/17/2018 - Present        Statin intolerance ICD-10-CM: Z78.9  ICD-9-CM: 995.27  5/6/2016 - Present        Essential hypertension ICD-10-CM: I10  ICD-9-CM: 401.9  1/6/2016 - Present        Encounter for medication monitoring ICD-10-CM: Z51.81  ICD-9-CM: V58.83  1/6/2016 - Present        Diabetes mellitus type 2, controlled ([facility] ICD-10-CM: E11.9  ICD-9-CM: 250.00  10/16/2015 - Present        Generalized OA ICD-10-CM: M15.9  ICD-9-CM: 715.00  5/28/2014 - Present        Tricuspid valve disorders, specified as nonrheumatic ICD-10-CM: I36.9  ICD-9-CM: 424.2  4/12/2012 - Present        Family history of ischemic heart disease ICD-10-CM: Z82.49  ICD-9-CM: V17.3  4/12/2012 - Present        Mixed hyperlipidemia ICD-10-CM: E78.2  ICD-9-CM: 272.2  4/12/2012 - Present        Mitral valve disease ICD-10-CM: I05.9  ICD-9-CM: 394.9  4/12/2012 - Present        Obesity, unspecified ICD-10-CM: E66.9  ICD-9-CM: 278.00  4/12/2012 - Present        Osteoarthritis ICD-10-CM: M19.90  ICD-9-CM: 715.90  5/4/2010 - Present        Hypovitaminosis D ICD-10-CM: E55.9  ICD-9-CM: 268.9  5/4/2010 - Present        RESOLVED: Cellulitis ICD-10-CM: L03.90  ICD-9-CM: 682.9  9/7/2013 - 9/10/2013        RESOLVED: Essential hypertension, benign ICD-10-CM: I10  ICD-9-CM: 401.1  4/12/2012 - 9/24/2013        RESOLVED: DM w/o complication type II ([facility] ICD-10-CM: E11.9  ICD-9-CM: 250.00  10/21/2011 - 10/16/2015        RESOLVED: Hypertension ICD-10-CM: I10  ICD-9-CM: 401.9  Unknown - 1/6/2016        RESOLVED: Encounter for long-term (current) use of other medications ICD-10-CM: Z79.899  ICD-9-CM: V58.69  5/4/2010 - 10/16/2015        RESOLVED: Hypercholesterolemia ICD-10-CM: E78.00  ICD-9-CM: 272.0  Unknown - 9/24/2013        RESOLVED: Elevated blood pressure ICD-10-CM: R03.0  ICD-9-CM: VNS0173  Unknown - 5/4/2010               Hospital Course:  Patient was put on IV abx and daily Dakins dressings/packing to wound. Pain Management:  Dilaudid premed for dressing change. Patient felt this made her sick and did not want to continue. Transfusions:    Number of units banked PRBCs =   none     Activity: Patient mobilized with nursing and was found to be safe and steady with ambulation. Discharged to: Home with [facility] for dressing changes. Condition on Discharge: Stable    Discharge instructions:    - Take medications as prescribed  - Diet Regular  - Discharge activity:    - Activity as tolerated    - Ambulate several times a day   - Do not drive if taking opioid pain medications  - Dressing changes daily until Monday, then [facility] after assessed by Dr. Krysta Bae if approved by insurance. Allergies: Allergies   Allergen Reactions    Latex Contact Dermatitis    Aspirin Palpitations    Glipizide Shortness of Breath and Swelling    Bactrim Ds [Sulfamethoxazole-Trimethoprim] Other (comments)     Patient experienced pain in legs and buttock and muscle cramping.     Contrast Agent [Iodine] Hives and Itching     Itchy throat    Amaryl [Glimepiride] Other (comments)     \"nervous feeling\"    Avocado Rash     Pt states she bruises.  Feldene [Piroxicam] Diarrhea    Levofloxacin Unknown (comments)    Losartan Itching     Caused congestion per stated by pt    Derrick Hives    Oxycodone Diarrhea and Nausea Only    Pcn [Penicillins] Rash    Pineapple Rash    Prednisone Nausea and Vomiting    Tylenol [Acetaminophen] Palpitations    Welchol [Colesevelam] Other (comments)     Pt states \"made throat feel raw\"    Zestril [Lisinopril] Cough              -DISCHARGE MEDICATION LIST     Current Discharge Medication List      START taking these medications    Details   traMADol (ULTRAM) 50 mg tablet Take 1 Tab by mouth daily as needed for Pain for up to 14 days. Prior to dressing change IF NEEDED  Qty: 15 Tab, Refills: 0    Associated Diagnoses: Abscess of abdominal wall         CONTINUE these medications which have CHANGED    Details   cephALEXin (KEFLEX) 500 mg capsule Take 1 Cap by mouth four (4) times daily for 7 days. Qty: 28 Cap, Refills: 0         CONTINUE these medications which have NOT CHANGED    Details   glyBURIDE (DIABETA) 5 mg tablet Take 1.5 Tabs by mouth Daily (before breakfast). Qty: 135 Tab, Refills: 3    Associated Diagnoses: Type 2 diabetes with nephropathy (HCC)      furosemide (LASIX) 20 mg tablet TAKE 1 TABLET EVERY DAY  Qty: 90 Tab, Refills: 3    Associated Diagnoses: Essential hypertension with goal blood pressure less than 140/90      ACCU-CHEK PARESH PLUS TEST STRP strip Use to check BS 2 times daily  Qty: 300 Strip, Refills: 3    Associated Diagnoses: Type 2 diabetes with nephropathy (HCC)      latanoprost (XALATAN) 0.005 % ophthalmic solution Administer 1 Drop to both eyes nightly. ibuprofen (ADVIL LIQUI-GEL) 200 mg cap Take 1 Tab by mouth two (2) times daily as needed. GARLIC PO Take 0,100 mg by mouth daily. 1000mg tab      red yeast rice extract 600 mg Cap Take 600 mg by mouth daily. cod liver oil Cap Take 1 Cap by mouth daily.   Vitamin A 1125mcg  vitamin D 400 IU omega-3 fatty acids-vitamin e (FISH OIL) 1,000 mg Cap Take 1 Cap by mouth daily. Associated Diagnoses: Hypercholesterolemia      cholecalciferol, vitamin d3, (VITAMIN D) 1,000 unit tablet Take 1,000 Units by mouth daily.     Associated Diagnoses: Hypovitaminosis D         STOP taking these medications       clindamycin (CLEOCIN) 300 mg capsule Comments:   Reason for Stopping:            per medical continuation form      -Follow up in office Monday as scheduled      Signed:  Nehal Kerns  MSN, APRN, FNP-C, [facility]  Surgical Nurse Practitioner    9/5/2019  10:09 AM

## 2019-09-05 NOTE — PROGRESS NOTES
Bedside shift change report given to Nicole Tolentino (oncoming nurse) by Kim Melara RN (offgoing nurse). Report included the following information SBAR, Kardex, ED Summary, Procedure Summary, Intake/Output, MAR, Recent Results, Alarm Parameters  and Quality Measures.

## 2019-09-05 NOTE — PROGRESS NOTES
POST-D/C NOTE:    Discussion with surgical NP. NP contact with [employer]. Order being reviewed for expedited auth.        Kee Pandya MSW  Care Manager

## 2019-09-05 NOTE — PROGRESS NOTES
Surgery NP Progress Note    Admitted for Active Problems:    Abscess of abdominal wall (9/3/2019)      Pt discussed with Dr. Arielle May      Assessment:   Problem resolving. Wound continues to look clean with health wound bed. Plan/Recommendations/Medical Decision Making:     - Mobilize with nursing and OOB to chair for meals  - Continue diet  - Pain management- Continue current pain control methods.   - VTE Prophylaxis: Lovenox  - Dressing changes with Dakins Moist gauze daily.   - Transition to oral abx at discharge. Discharge Planning    Plan for patient to discharge to Home with Home Health for Wound/Incision Care    Anticipated discharge date 19    Incision/Wound Care Needs:  Home health to provide dressing changes every day per discharge instructions- Dressing will be daily with Dakins packing. Plan to reduce frequency on Monday and place VAC if approved by insurance. Discharge plan discussed with:  Patient, Care Manager and Primary Care Team Provider    Subjective:     Patient has no new complaints. Appropriate amount of local discomfort with dressing change. Pain control adequate. Does not want pre-med for pain. Objective:     Blood pressure 123/70, pulse 85, temperature 97.8 °F (36.6 °C), resp. rate 16, last menstrual period 1972, SpO2 99 %, not currently breastfeeding. Temp (24hrs), Av.1 °F (36.7 °C), Min:97.8 °F (36.6 °C), Max:98.5 °F (36.9 °C)      Recent Results (from the past 48 hour(s))   SAMPLES BEING HELD    Collection Time: 19  8:00 PM   Result Value Ref Range    SAMPLES BEING HELD 1 LAV, 1 PST     COMMENT        Add-on orders for these samples will be processed based on acceptable specimen integrity and analyte stability, which may vary by analyte.    METABOLIC PANEL, BASIC    Collection Time: 19  8:00 PM   Result Value Ref Range    Sodium 135 (L) 136 - 145 mmol/L    Potassium 4.8 3.5 - 5.1 mmol/L    Chloride 106 97 - 108 mmol/L    CO2 22 21 - 32 mmol/L    Anion gap 7 5 - 15 mmol/L    Glucose 276 (H) 65 - 100 mg/dL    BUN 19 6 - 20 MG/DL    Creatinine 1.27 (H) 0.55 - 1.02 MG/DL    BUN/Creatinine ratio 15 12 - 20      GFR est AA 49 (L) >60 ml/min/1.73m2    GFR est non-AA 40 (L) >60 ml/min/1.73m2    Calcium 8.9 8.5 - 10.1 MG/DL   CBC WITH AUTOMATED DIFF    Collection Time: 09/03/19  9:07 PM   Result Value Ref Range    WBC 11.3 (H) 3.6 - 11.0 K/uL    RBC 4.10 3.80 - 5.20 M/uL    HGB 10.9 (L) 11.5 - 16.0 g/dL    HCT 32.8 (L) 35.0 - 47.0 %    MCV 80.0 80.0 - 99.0 FL    MCH 26.6 26.0 - 34.0 PG    MCHC 33.2 30.0 - 36.5 g/dL    RDW 13.1 11.5 - 14.5 %    PLATELET 009 (H) 924 - 400 K/uL    MPV 9.9 8.9 - 12.9 FL    NRBC 0.0 0  WBC    ABSOLUTE NRBC 0.00 0.00 - 0.01 K/uL    NEUTROPHILS 81 (H) 32 - 75 %    LYMPHOCYTES 12 12 - 49 %    MONOCYTES 6 5 - 13 %    EOSINOPHILS 1 0 - 7 %    BASOPHILS 0 0 - 1 %    IMMATURE GRANULOCYTES 0 0.0 - 0.5 %    ABS. NEUTROPHILS 9.0 (H) 1.8 - 8.0 K/UL    ABS. LYMPHOCYTES 1.4 0.8 - 3.5 K/UL    ABS. MONOCYTES 0.7 0.0 - 1.0 K/UL    ABS. EOSINOPHILS 0.2 0.0 - 0.4 K/UL    ABS. BASOPHILS 0.0 0.0 - 0.1 K/UL    ABS. IMM.  GRANS. 0.0 0.00 - 0.04 K/UL    DF AUTOMATED     METABOLIC PANEL, BASIC    Collection Time: 09/04/19  5:43 AM   Result Value Ref Range    Sodium 142 136 - 145 mmol/L    Potassium 4.2 3.5 - 5.1 mmol/L    Chloride 110 (H) 97 - 108 mmol/L    CO2 25 21 - 32 mmol/L    Anion gap 7 5 - 15 mmol/L    Glucose 173 (H) 65 - 100 mg/dL    BUN 16 6 - 20 MG/DL    Creatinine 1.10 (H) 0.55 - 1.02 MG/DL    BUN/Creatinine ratio 15 12 - 20      GFR est AA 57 (L) >60 ml/min/1.73m2    GFR est non-AA 47 (L) >60 ml/min/1.73m2    Calcium 8.7 8.5 - 10.1 MG/DL   CBC WITH AUTOMATED DIFF    Collection Time: 09/04/19  9:52 AM   Result Value Ref Range    WBC 7.2 3.6 - 11.0 K/uL    RBC 3.93 3.80 - 5.20 M/uL    HGB 10.3 (L) 11.5 - 16.0 g/dL    HCT 31.8 (L) 35.0 - 47.0 %    MCV 80.9 80.0 - 99.0 FL    MCH 26.2 26.0 - 34.0 PG    MCHC 32.4 30.0 - 36.5 g/dL    RDW 13.0 11.5 - 14.5 %    PLATELET 092 (H) 384 - 400 K/uL    MPV 9.7 8.9 - 12.9 FL    NRBC 0.0 0  WBC    ABSOLUTE NRBC 0.00 0.00 - 0.01 K/uL    NEUTROPHILS 70 32 - 75 %    LYMPHOCYTES 19 12 - 49 %    MONOCYTES 8 5 - 13 %    EOSINOPHILS 3 0 - 7 %    BASOPHILS 0 0 - 1 %    IMMATURE GRANULOCYTES 0 0.0 - 0.5 %    ABS. NEUTROPHILS 5.0 1.8 - 8.0 K/UL    ABS. LYMPHOCYTES 1.3 0.8 - 3.5 K/UL    ABS. MONOCYTES 0.6 0.0 - 1.0 K/UL    ABS. EOSINOPHILS 0.2 0.0 - 0.4 K/UL    ABS. BASOPHILS 0.0 0.0 - 0.1 K/UL    ABS. IMM. GRANS. 0.0 0.00 - 0.04 K/UL    DF AUTOMATED     METABOLIC PANEL, BASIC    Collection Time: 09/05/19  3:22 AM   Result Value Ref Range    Sodium 140 136 - 145 mmol/L    Potassium 4.0 3.5 - 5.1 mmol/L    Chloride 110 (H) 97 - 108 mmol/L    CO2 26 21 - 32 mmol/L    Anion gap 4 (L) 5 - 15 mmol/L    Glucose 243 (H) 65 - 100 mg/dL    BUN 19 6 - 20 MG/DL    Creatinine 1.02 0.55 - 1.02 MG/DL    BUN/Creatinine ratio 19 12 - 20      GFR est AA >60 >60 ml/min/1.73m2    GFR est non-AA 52 (L) >60 ml/min/1.73m2    Calcium 8.8 8.5 - 10.1 MG/DL       Pt resting in bed. NAD   SCDs for mechanical DVT proph while in bed  Dressing changed by this writer and assessment documented in flowsheet. There is no height or weight on file to calculate BMI. Reference: BMI greater than 30 is classified as obesity and greater than 40 is classified as morbid obesity. There were no vitals filed for this visit.       Murali Brito NP   MSN, APRN, FNP-C, CWOCN-AP    09/05/19

## 2019-09-06 ENCOUNTER — HOME CARE VISIT (OUTPATIENT)
Dept: SCHEDULING | Facility: HOME HEALTH | Age: 84
End: 2019-09-06
Payer: MEDICARE

## 2019-09-06 ENCOUNTER — PATIENT OUTREACH (OUTPATIENT)
Dept: FAMILY MEDICINE CLINIC | Age: 84
End: 2019-09-06

## 2019-09-06 VITALS
SYSTOLIC BLOOD PRESSURE: 116 MMHG | OXYGEN SATURATION: 96 % | RESPIRATION RATE: 18 BRPM | TEMPERATURE: 98.4 F | HEART RATE: 96 BPM | DIASTOLIC BLOOD PRESSURE: 72 MMHG

## 2019-09-06 PROCEDURE — 3331090002 HH PPS REVENUE DEBIT

## 2019-09-06 PROCEDURE — G0299 HHS/HOSPICE OF RN EA 15 MIN: HCPCS

## 2019-09-06 PROCEDURE — 3331090001 HH PPS REVENUE CREDIT

## 2019-09-06 NOTE — PROGRESS NOTES
Hospital Discharge Follow-Up      Date/Time:  2019 12:32 PM    Patient was admitted to [facility] on 9/3/19 and discharged on 19 for  I and D abdominal wall abscess. The physician discharge summary was available at the time of outreach. Patient was contacted within 1 business days of discharge. Top Challenges reviewed with the provider   Home with po IV antibiotics  continues wound care with HH at home-I and D of abdominal wound- may get wound vac on 19    A1C 7.9 (19) elevated while inpt- may need recheck    H/H 10.3/31.8- may need follow up lab    NO ACP onfile           Method of communication with provider :chart routing, staff message, phone    Inpatient RRAT score: 27  Was this a readmission? no   Patient stated reason for the readmission: n/a    Nurse Navigator (NN) contacted the patient by telephone to perform post hospital discharge assessment. Verified name and  with patient as identifiers. Provided introduction to self, and explanation of the Nurse Navigator role. Reviewed discharge instructions and red flags with patient who verbalized understanding. Patient given an opportunity to ask questions and does not have any further questions or concerns at this time. The patient agrees to contact the PCP office for questions related to their healthcare. NN provided contact information for future reference. Disease Specific:   N/A    Summary of patient's top problems:  1. I and D of abdominal wound - received inpt antibiotics- home on po antibiotics for 7 days--Daily wound care from [facility]- may get wound vac placed on 19    2. DM2- last A1C 7.9 (19) blood glucose levels elevated while inpt- reports checks blood sugar 2 times daily-today 123 in am    3.  Has refused to attend PCP hospital follow up- patient very independent- still drives, lives alone and is assisted by family members    [facility] orders at discharge: resumption of care  [facility] company:   [employer] [facility]  Date of initial visit: 9/6/19  Durable Medical Equipment ordered/company: none  Durable Medical Equipment received: none    Barriers to care? ineffective coping, lack of knowledge about disease, PCP relationship    Advance Care Planning:   Does patient have an Advance Directive:  not on file; education provided     Medication(s):   New Medications at Discharge:   tramadol 50 mg    Changed Medications     Discontinued Medications at Discharge: clindamycin 300 mg    Medication reconciliation was performed with patient, who verbalizes understanding of administration of home medications. There were no barriers to obtaining medications identified at this time. Referral to Pharm D needed: no     Current Outpatient Medications   Medication Sig    cephALEXin (KEFLEX) 500 mg capsule Take 1 Cap by mouth four (4) times daily for 7 days.  traMADol (ULTRAM) 50 mg tablet Take 1 Tab by mouth daily as needed for Pain for up to 14 days. Prior to dressing change IF NEEDED    glyBURIDE (DIABETA) 5 mg tablet Take 1.5 Tabs by mouth Daily (before breakfast).  furosemide (LASIX) 20 mg tablet TAKE 1 TABLET EVERY DAY    ACCU-CHEK PARESH PLUS TEST STRP strip Use to check BS 2 times daily    latanoprost (XALATAN) 0.005 % ophthalmic solution Administer 1 Drop to both eyes nightly.  ibuprofen (ADVIL LIQUI-GEL) 200 mg cap Take 1 Tab by mouth two (2) times daily as needed.  GARLIC PO Take 9,140 mg by mouth daily. 1000mg tab    red yeast rice extract 600 mg Cap Take 600 mg by mouth daily.  cod liver oil Cap Take 1 Cap by mouth daily. Vitamin A 1125mcg  vitamin D 400 IU    omega-3 fatty acids-vitamin e (FISH OIL) 1,000 mg Cap Take 1 Cap by mouth daily.  cholecalciferol, vitamin d3, (VITAMIN D) 1,000 unit tablet Take 1,000 Units by mouth daily. No current facility-administered medications for this visit. There are no discontinued medications.     [facility] follow up appointment(s):   Future Appointments   Date Time Provider Alexandria Kang   9/7/2019 To Be Determined Yolis Osman, DEBORAH [address] [facility]   9/8/2019 To Be Determined Yolis OsmanDEBORAH [address] [facility]   9/9/2019 10:00 AM Luis Vu MD [address] [facility]   10/21/2019  8:00 AM Mere George MD [address]      Non-[facility] follow up appointment(s): Dr Pao Stephen 9/9/19 10 am    [facility]:  out of service area       Goals      Attends follow-up appointments as directed. 9/6/19 Patient is s/p I and D of abcess to abdominal wall. Patient has follow up appointment with Dr Peggy Nelson the surgeon on 9/9/19. Patient cancelled her PCP [facility] follow up appointment and refused to make another appointment. She stated she was seeing the surgeon and did not need to see her PCP. Patient does continue care with [facility] for daily wound care at this time and may have a wound vac placed on 9/9/19 or later. Plan patient to attend appointment with Dr Peggy Nelson. Patient to continue to participate with [facility] to complete plan of care goals. NN will monitor patients attendance at these appointments. NN will attempt to contact patient in ~ 7-10 days. DMB       Prevent complications post hospitalization. 9/6/19 Patient is s/p an inpatient I and D of an abdominal wall abscess. Patient has pmh significant for DM2, HTN, OA, HLD, and  mitral valve disease. Patient refused follow up appointment with Dr Yuli Boyd, PCP. Patient stated \" I have appointment with Dr Peggy Nelson on 9/9/19 and I don't need to see Dr Yuli Boyd. \" Patient has scheduled appointment with surgeon on 9/11/19 and a wound vac maybe placed at that time. Patient monitors blood glucose levels 2 times daily, keeps log and will take to physicians office. Patient reports her BS was 123 today in the am.  Patient reports she feels good today. Patient stated [facility] nurse has already been to the home and changed her dressing today.  Patient reports she is eating, drinking fluids, urinating and having BMs without issues. Patient reported her pain is controlled and she is not taking any medications for pain. Medication reconciliation was completed with the patient who reports she has all medications in the home and she is taking as prescribed. S/Sx of infection- increased redness, swelling, drainage, pain at the wound site or elevated temperature. Red flags, s/sx and action plan reviewed with patient. Patient was instructed that PCP and Specialist are on call 24/7 for questions or concerns. Patient lives out side of the service area for [facility]. NN contact information was provided to the patient. Plan patient to take medications as prescribed. Patient to monitor blood glucose levels 2 times daily, keep log and bring to PCP appointment. Patient to contact her PCP, NN,  or specialist with any questions or concerns. Patient to continue to participate with home health to achieve plan of care goals. NN will monitor patients adherence to plan of care. NN will attempt to contact patient in ~7-10 days.  DEBBIE

## 2019-09-06 NOTE — ACP (ADVANCE CARE PLANNING)
Non-Provider Advance Care Planning (ACP) Note    Date of ACP Conversation: 9/6/2019  Persons included in Conversation: patient  Length of ACP Conversation in minutes: <16 minutes (Non-Billable)    Conversation requested by:   Nurse Navigator    Authorized Decision Maker (if patient is incapable of making informed decisions): This person is: Other Legally Authorized Decision Maker (e.g. Next of Kin)        General ACP for ALL Patients with Decision Making Capacity:    Advance Directive Conversation with Patients who have not yet planned: Legal next of kin is patients daughter and son. Patient stated her daughter, Verónica Louise would be her medical agent. Review of Existing Advance Directive: (Select questions covered)  Patient reports she has a booklet and Advanced Directive forms but has not completed the forms.      Interventions Provided:  Referral to Provider for additional medical information/discussion

## 2019-09-07 ENCOUNTER — HOME CARE VISIT (OUTPATIENT)
Dept: SCHEDULING | Facility: HOME HEALTH | Age: 84
End: 2019-09-07
Payer: MEDICARE

## 2019-09-07 PROCEDURE — G0300 HHS/HOSPICE OF LPN EA 15 MIN: HCPCS

## 2019-09-07 PROCEDURE — 3331090001 HH PPS REVENUE CREDIT

## 2019-09-07 PROCEDURE — 3331090002 HH PPS REVENUE DEBIT

## 2019-09-08 ENCOUNTER — HOME CARE VISIT (OUTPATIENT)
Dept: SCHEDULING | Facility: HOME HEALTH | Age: 84
End: 2019-09-08
Payer: MEDICARE

## 2019-09-08 VITALS
RESPIRATION RATE: 18 BRPM | HEART RATE: 100 BPM | SYSTOLIC BLOOD PRESSURE: 132 MMHG | TEMPERATURE: 98.2 F | DIASTOLIC BLOOD PRESSURE: 80 MMHG | OXYGEN SATURATION: 96 %

## 2019-09-08 VITALS
RESPIRATION RATE: 20 BRPM | DIASTOLIC BLOOD PRESSURE: 80 MMHG | OXYGEN SATURATION: 96 % | TEMPERATURE: 99 F | HEART RATE: 86 BPM | SYSTOLIC BLOOD PRESSURE: 138 MMHG

## 2019-09-08 PROCEDURE — 3331090001 HH PPS REVENUE CREDIT

## 2019-09-08 PROCEDURE — 3331090002 HH PPS REVENUE DEBIT

## 2019-09-08 PROCEDURE — G0300 HHS/HOSPICE OF LPN EA 15 MIN: HCPCS

## 2019-09-09 ENCOUNTER — OFFICE VISIT (OUTPATIENT)
Dept: SURGERY | Age: 84
End: 2019-09-09

## 2019-09-09 VITALS
OXYGEN SATURATION: 96 % | DIASTOLIC BLOOD PRESSURE: 70 MMHG | SYSTOLIC BLOOD PRESSURE: 132 MMHG | HEART RATE: 90 BPM | TEMPERATURE: 98.9 F

## 2019-09-09 VITALS
HEART RATE: 96 BPM | WEIGHT: 184.7 LBS | RESPIRATION RATE: 20 BRPM | SYSTOLIC BLOOD PRESSURE: 131 MMHG | TEMPERATURE: 98.9 F | HEIGHT: 62 IN | OXYGEN SATURATION: 97 % | DIASTOLIC BLOOD PRESSURE: 78 MMHG | BODY MASS INDEX: 33.99 KG/M2

## 2019-09-09 DIAGNOSIS — L02.211 ABDOMINAL WALL ABSCESS: Primary | ICD-10-CM

## 2019-09-09 PROCEDURE — 3331090002 HH PPS REVENUE DEBIT

## 2019-09-09 PROCEDURE — 3331090001 HH PPS REVENUE CREDIT

## 2019-09-10 ENCOUNTER — HOME CARE VISIT (OUTPATIENT)
Dept: SCHEDULING | Facility: HOME HEALTH | Age: 84
End: 2019-09-10
Payer: MEDICARE

## 2019-09-10 VITALS
OXYGEN SATURATION: 97 % | SYSTOLIC BLOOD PRESSURE: 132 MMHG | DIASTOLIC BLOOD PRESSURE: 78 MMHG | HEART RATE: 93 BPM | TEMPERATURE: 98.4 F | RESPIRATION RATE: 20 BRPM

## 2019-09-10 PROCEDURE — 3331090001 HH PPS REVENUE CREDIT

## 2019-09-10 PROCEDURE — G0300 HHS/HOSPICE OF LPN EA 15 MIN: HCPCS

## 2019-09-10 PROCEDURE — 3331090002 HH PPS REVENUE DEBIT

## 2019-09-11 ENCOUNTER — HOME CARE VISIT (OUTPATIENT)
Dept: SCHEDULING | Facility: HOME HEALTH | Age: 84
End: 2019-09-11
Payer: MEDICARE

## 2019-09-11 VITALS
HEART RATE: 98 BPM | SYSTOLIC BLOOD PRESSURE: 120 MMHG | TEMPERATURE: 98.3 F | OXYGEN SATURATION: 98 % | RESPIRATION RATE: 20 BRPM | DIASTOLIC BLOOD PRESSURE: 68 MMHG

## 2019-09-11 PROCEDURE — 3331090001 HH PPS REVENUE CREDIT

## 2019-09-11 PROCEDURE — G0300 HHS/HOSPICE OF LPN EA 15 MIN: HCPCS

## 2019-09-11 PROCEDURE — 3331090002 HH PPS REVENUE DEBIT

## 2019-09-12 PROCEDURE — A4216 STERILE WATER/SALINE, 10 ML: HCPCS

## 2019-09-12 PROCEDURE — 3331090002 HH PPS REVENUE DEBIT

## 2019-09-12 PROCEDURE — 3331090001 HH PPS REVENUE CREDIT

## 2019-09-12 PROCEDURE — A9270 NON-COVERED ITEM OR SERVICE: HCPCS

## 2019-09-13 ENCOUNTER — HOME CARE VISIT (OUTPATIENT)
Dept: SCHEDULING | Facility: HOME HEALTH | Age: 84
End: 2019-09-13
Payer: MEDICARE

## 2019-09-13 VITALS
SYSTOLIC BLOOD PRESSURE: 118 MMHG | TEMPERATURE: 97.8 F | OXYGEN SATURATION: 99 % | DIASTOLIC BLOOD PRESSURE: 68 MMHG | RESPIRATION RATE: 18 BRPM | HEART RATE: 102 BPM

## 2019-09-13 PROCEDURE — 3331090002 HH PPS REVENUE DEBIT

## 2019-09-13 PROCEDURE — 3331090001 HH PPS REVENUE CREDIT

## 2019-09-14 PROCEDURE — 3331090002 HH PPS REVENUE DEBIT

## 2019-09-14 PROCEDURE — 3331090001 HH PPS REVENUE CREDIT

## 2019-09-15 PROCEDURE — 3331090002 HH PPS REVENUE DEBIT

## 2019-09-15 PROCEDURE — 3331090001 HH PPS REVENUE CREDIT

## 2019-09-16 ENCOUNTER — TELEPHONE (OUTPATIENT)
Dept: SURGERY | Age: 84
End: 2019-09-16

## 2019-09-16 ENCOUNTER — HOME CARE VISIT (OUTPATIENT)
Dept: SCHEDULING | Facility: HOME HEALTH | Age: 84
End: 2019-09-16
Payer: MEDICARE

## 2019-09-16 ENCOUNTER — OFFICE VISIT (OUTPATIENT)
Dept: SURGERY | Age: 84
End: 2019-09-16

## 2019-09-16 VITALS
WEIGHT: 178.9 LBS | DIASTOLIC BLOOD PRESSURE: 80 MMHG | OXYGEN SATURATION: 98 % | HEIGHT: 62 IN | HEART RATE: 80 BPM | SYSTOLIC BLOOD PRESSURE: 143 MMHG | BODY MASS INDEX: 32.92 KG/M2 | RESPIRATION RATE: 20 BRPM | TEMPERATURE: 98.2 F

## 2019-09-16 DIAGNOSIS — L02.211 ABDOMINAL WALL ABSCESS: Primary | ICD-10-CM

## 2019-09-16 PROCEDURE — 3331090002 HH PPS REVENUE DEBIT

## 2019-09-16 PROCEDURE — 3331090001 HH PPS REVENUE CREDIT

## 2019-09-17 PROCEDURE — 3331090001 HH PPS REVENUE CREDIT

## 2019-09-17 PROCEDURE — 3331090002 HH PPS REVENUE DEBIT

## 2019-09-18 ENCOUNTER — HOME CARE VISIT (OUTPATIENT)
Dept: SCHEDULING | Facility: HOME HEALTH | Age: 84
End: 2019-09-18
Payer: MEDICARE

## 2019-09-18 VITALS
OXYGEN SATURATION: 100 % | DIASTOLIC BLOOD PRESSURE: 76 MMHG | HEART RATE: 92 BPM | SYSTOLIC BLOOD PRESSURE: 124 MMHG | RESPIRATION RATE: 18 BRPM | TEMPERATURE: 98 F

## 2019-09-18 PROCEDURE — G0299 HHS/HOSPICE OF RN EA 15 MIN: HCPCS

## 2019-09-18 PROCEDURE — 3331090001 HH PPS REVENUE CREDIT

## 2019-09-18 PROCEDURE — 3331090002 HH PPS REVENUE DEBIT

## 2019-09-19 ENCOUNTER — HOME CARE VISIT (OUTPATIENT)
Dept: SCHEDULING | Facility: HOME HEALTH | Age: 84
End: 2019-09-19
Payer: MEDICARE

## 2019-09-19 VITALS
HEART RATE: 90 BPM | RESPIRATION RATE: 18 BRPM | OXYGEN SATURATION: 100 % | TEMPERATURE: 98.5 F | SYSTOLIC BLOOD PRESSURE: 132 MMHG | DIASTOLIC BLOOD PRESSURE: 76 MMHG

## 2019-09-19 PROCEDURE — 3331090001 HH PPS REVENUE CREDIT

## 2019-09-19 PROCEDURE — 3331090002 HH PPS REVENUE DEBIT

## 2019-09-19 PROCEDURE — G0299 HHS/HOSPICE OF RN EA 15 MIN: HCPCS

## 2019-09-20 PROCEDURE — 3331090001 HH PPS REVENUE CREDIT

## 2019-09-20 PROCEDURE — 3331090002 HH PPS REVENUE DEBIT

## 2019-09-21 PROCEDURE — 3331090001 HH PPS REVENUE CREDIT

## 2019-09-21 PROCEDURE — 3331090002 HH PPS REVENUE DEBIT

## 2019-09-22 PROCEDURE — 3331090002 HH PPS REVENUE DEBIT

## 2019-09-22 PROCEDURE — 3331090001 HH PPS REVENUE CREDIT

## 2019-09-23 ENCOUNTER — HOME CARE VISIT (OUTPATIENT)
Dept: SCHEDULING | Facility: HOME HEALTH | Age: 84
End: 2019-09-23
Payer: MEDICARE

## 2019-09-23 ENCOUNTER — OFFICE VISIT (OUTPATIENT)
Dept: SURGERY | Age: 84
End: 2019-09-23

## 2019-09-23 VITALS
DIASTOLIC BLOOD PRESSURE: 81 MMHG | WEIGHT: 180.7 LBS | BODY MASS INDEX: 33.25 KG/M2 | TEMPERATURE: 98.6 F | HEIGHT: 62 IN | SYSTOLIC BLOOD PRESSURE: 150 MMHG | HEART RATE: 111 BPM | OXYGEN SATURATION: 94 % | RESPIRATION RATE: 20 BRPM

## 2019-09-23 DIAGNOSIS — L02.211 ABDOMINAL WALL ABSCESS: Primary | ICD-10-CM

## 2019-09-23 PROCEDURE — 3331090001 HH PPS REVENUE CREDIT

## 2019-09-23 PROCEDURE — 3331090002 HH PPS REVENUE DEBIT

## 2019-09-24 PROCEDURE — 3331090002 HH PPS REVENUE DEBIT

## 2019-09-24 PROCEDURE — 3331090001 HH PPS REVENUE CREDIT

## 2019-09-25 ENCOUNTER — HOME CARE VISIT (OUTPATIENT)
Dept: SCHEDULING | Facility: HOME HEALTH | Age: 84
End: 2019-09-25
Payer: MEDICARE

## 2019-09-25 VITALS
DIASTOLIC BLOOD PRESSURE: 78 MMHG | OXYGEN SATURATION: 97 % | SYSTOLIC BLOOD PRESSURE: 140 MMHG | HEART RATE: 78 BPM | TEMPERATURE: 97.8 F | RESPIRATION RATE: 18 BRPM

## 2019-09-25 PROCEDURE — G0299 HHS/HOSPICE OF RN EA 15 MIN: HCPCS

## 2019-09-25 PROCEDURE — 3331090002 HH PPS REVENUE DEBIT

## 2019-09-25 PROCEDURE — 3331090001 HH PPS REVENUE CREDIT

## 2019-09-26 PROCEDURE — 3331090001 HH PPS REVENUE CREDIT

## 2019-09-26 PROCEDURE — 3331090002 HH PPS REVENUE DEBIT

## 2019-09-27 ENCOUNTER — HOME CARE VISIT (OUTPATIENT)
Dept: SCHEDULING | Facility: HOME HEALTH | Age: 84
End: 2019-09-27
Payer: MEDICARE

## 2019-09-27 PROCEDURE — 3331090001 HH PPS REVENUE CREDIT

## 2019-09-27 PROCEDURE — 3331090002 HH PPS REVENUE DEBIT

## 2019-09-28 PROCEDURE — 3331090002 HH PPS REVENUE DEBIT

## 2019-09-28 PROCEDURE — 3331090001 HH PPS REVENUE CREDIT

## 2019-09-29 PROCEDURE — 3331090001 HH PPS REVENUE CREDIT

## 2019-09-29 PROCEDURE — 3331090002 HH PPS REVENUE DEBIT

## 2019-09-30 ENCOUNTER — HOME CARE VISIT (OUTPATIENT)
Dept: SCHEDULING | Facility: HOME HEALTH | Age: 84
End: 2019-09-30
Payer: MEDICARE

## 2019-09-30 PROCEDURE — 3331090002 HH PPS REVENUE DEBIT

## 2019-09-30 PROCEDURE — 3331090001 HH PPS REVENUE CREDIT

## 2019-10-01 PROCEDURE — 3331090001 HH PPS REVENUE CREDIT

## 2019-10-01 PROCEDURE — 3331090002 HH PPS REVENUE DEBIT

## 2019-10-02 ENCOUNTER — PATIENT OUTREACH (OUTPATIENT)
Dept: FAMILY MEDICINE CLINIC | Age: 84
End: 2019-10-02

## 2019-10-02 ENCOUNTER — HOME CARE VISIT (OUTPATIENT)
Dept: SCHEDULING | Facility: HOME HEALTH | Age: 84
End: 2019-10-02
Payer: MEDICARE

## 2019-10-02 PROCEDURE — 3331090002 HH PPS REVENUE DEBIT

## 2019-10-02 PROCEDURE — 3331090001 HH PPS REVENUE CREDIT

## 2019-10-02 PROCEDURE — G0299 HHS/HOSPICE OF RN EA 15 MIN: HCPCS

## 2019-10-02 NOTE — PROGRESS NOTES
Goals      Attends follow-up appointments as directed. 10/2/19 Patient has attended weekly wound care visits with Dr Jesús Fink, surgeon. Patient has continued to work with [facility]. Patient has upcoming appointment with Dr Juan F Ochoa, on 10/21/19, patient is aware of appointment and stated she can arrange transportation. Plan patient to continue to follow up with Dr Alise Morris as scheduled. Patient to attend appointment with Dr Juan F Ochoa, Patient to arrange transportation. NN will monitor attendance at medical appointments. NN will attempt to contact patient in ~ 7-10 days. [facility]  9/6/19 Patient is s/p I and D of abcess to abdominal wall. Patient has follow up appointment with Dr Alise Morris the surgeon on 9/9/19. Patient cancelled her PCP CARL[facility] follow up appointment and refused to make another appointment. She stated she was seeing the surgeon and did not need to see her PCP. Patient does continue care with [facility] for daily wound care at this time and may have a wound vac placed on 9/9/19 or later. Plan patient to attend appointment with Dr Alise Morris. Patient to continue to participate with [facility] to complete plan of care goals. NN will monitor patients attendance at these appointments. NN will attempt to contact patient in ~ 7-10 days. DMB       Prevent complications post hospitalization. 10/2/19 Patient reports she is doing well. Patient reports her wound has healed. Patient was instructed to monitor site for any s/sx of recurrent infection- redness, swelling, pain, drainage, or fever greater than 100. Patient reports she is able to ambulate and is starting to do outside garden work. Medication reconciliation was completed. Patient reports she usually monitors blood glucose levels daily but has not checked her blood glucose level today. Patient reports she follows diabetic diet. Plan patient to continue to take medications as prescribed.  Patient to monitor healing wound site for changes- to report any s/sx to PCP or Dr Kim Hunt. Patient is currently continuing care with [facility] [place] [place]. [facility]  9/6/19 Patient is s/p an inpatient I and D of an abdominal wall abscess. Patient has pmh significant for DM2, HTN, OA, HLD, and  mitral valve disease. Patient refused follow up appointment with Dr Gabriella Israel, PCP. Patient stated \" I have appointment with Dr nAa Mendes on 9/9/19 and I don't need to see Dr Gabriella Israel. \" Patient has scheduled appointment with surgeon on 9/11/19 and a wound vac maybe placed at that time. Patient monitors blood glucose levels 2 times daily, keeps log and will take to physicians office. Patient reports her BS was 123 today in the am.  Patient reports she feels good today. Patient stated [place] nurse has already been to the home and changed her dressing today. Patient reports she is eating, drinking fluids, urinating and having BMs without issues. Patient reported her pain is controlled and she is not taking any medications for pain. Medication reconciliation was completed with the patient who reports she has all medications in the home and she is taking as prescribed. S/Sx of infection- increased redness, swelling, drainage, pain at the wound site or elevated temperature. Red flags, s/sx and action plan reviewed with patient. Patient was instructed that PCP and Specialist are on call 24/7 for questions or concerns. Patient lives out side of the service area for [facility]. NN contact information was provided to the patient. Plan patient to take medications as prescribed. Patient to monitor blood glucose levels 2 times daily, keep log and bring to PCP appointment. Patient to contact her PCP, NN,  or specialist with any questions or concerns. Patient to continue to participate with home health to achieve plan of care goals. NN will monitor patients adherence to plan of care. NN will attempt to contact patient in ~7-10 days.  EDGARB

## 2019-10-03 VITALS
DIASTOLIC BLOOD PRESSURE: 85 MMHG | OXYGEN SATURATION: 95 % | TEMPERATURE: 98.6 F | HEART RATE: 84 BPM | RESPIRATION RATE: 18 BRPM | SYSTOLIC BLOOD PRESSURE: 115 MMHG

## 2019-10-03 PROCEDURE — 3331090001 HH PPS REVENUE CREDIT

## 2019-10-03 PROCEDURE — 3331090002 HH PPS REVENUE DEBIT

## 2019-10-04 ENCOUNTER — HOME CARE VISIT (OUTPATIENT)
Dept: HOME HEALTH SERVICES | Facility: HOME HEALTH | Age: 84
End: 2019-10-04
Payer: MEDICARE

## 2019-10-04 PROCEDURE — 3331090002 HH PPS REVENUE DEBIT

## 2019-10-04 PROCEDURE — 3331090001 HH PPS REVENUE CREDIT

## 2019-10-05 PROCEDURE — 3331090002 HH PPS REVENUE DEBIT

## 2019-10-05 PROCEDURE — 3331090001 HH PPS REVENUE CREDIT

## 2019-10-06 PROCEDURE — 3331090002 HH PPS REVENUE DEBIT

## 2019-10-06 PROCEDURE — 3331090001 HH PPS REVENUE CREDIT

## 2019-10-07 ENCOUNTER — OFFICE VISIT (OUTPATIENT)
Dept: SURGERY | Age: 84
End: 2019-10-07

## 2019-10-07 ENCOUNTER — HOME CARE VISIT (OUTPATIENT)
Dept: HOME HEALTH SERVICES | Facility: HOME HEALTH | Age: 84
End: 2019-10-07
Payer: MEDICARE

## 2019-10-07 ENCOUNTER — HOME CARE VISIT (OUTPATIENT)
Dept: SCHEDULING | Facility: HOME HEALTH | Age: 84
End: 2019-10-07
Payer: MEDICARE

## 2019-10-07 VITALS
BODY MASS INDEX: 32.9 KG/M2 | OXYGEN SATURATION: 94 % | TEMPERATURE: 98.8 F | HEIGHT: 62 IN | SYSTOLIC BLOOD PRESSURE: 159 MMHG | HEART RATE: 97 BPM | WEIGHT: 178.8 LBS | DIASTOLIC BLOOD PRESSURE: 76 MMHG

## 2019-10-07 DIAGNOSIS — L02.211 ABDOMINAL WALL ABSCESS: Primary | ICD-10-CM

## 2019-10-07 PROCEDURE — 3331090002 HH PPS REVENUE DEBIT

## 2019-10-07 PROCEDURE — 3331090001 HH PPS REVENUE CREDIT

## 2019-10-07 NOTE — PROGRESS NOTES
Chief Complaint   Patient presents with    Follow-up     2 WK. F/U WOUND CHECK     1. Have you been to the ER, urgent care clinic since your last visit? Hospitalized since your last visit? NO    2. Have you seen or consulted any other health care providers outside of the [address] [place] [address] since your last visit? Include any pap smears or colon screening.  NO

## 2019-10-08 PROCEDURE — 3331090001 HH PPS REVENUE CREDIT

## 2019-10-08 PROCEDURE — 3331090002 HH PPS REVENUE DEBIT

## 2019-10-09 ENCOUNTER — HOME CARE VISIT (OUTPATIENT)
Dept: HOME HEALTH SERVICES | Facility: HOME HEALTH | Age: 84
End: 2019-10-09
Payer: MEDICARE

## 2019-10-09 VITALS
HEIGHT: 62 IN | DIASTOLIC BLOOD PRESSURE: 72 MMHG | RESPIRATION RATE: 20 BRPM | SYSTOLIC BLOOD PRESSURE: 120 MMHG | OXYGEN SATURATION: 97 % | BODY MASS INDEX: 32.94 KG/M2 | WEIGHT: 179 LBS | HEART RATE: 81 BPM | TEMPERATURE: 97.8 F

## 2019-10-09 PROCEDURE — 3331090002 HH PPS REVENUE DEBIT

## 2019-10-09 PROCEDURE — G0299 HHS/HOSPICE OF RN EA 15 MIN: HCPCS

## 2019-10-09 PROCEDURE — 3331090001 HH PPS REVENUE CREDIT

## 2019-10-10 PROCEDURE — 3331090001 HH PPS REVENUE CREDIT

## 2019-10-10 PROCEDURE — 3331090002 HH PPS REVENUE DEBIT

## 2019-10-11 PROCEDURE — 3331090002 HH PPS REVENUE DEBIT

## 2019-10-11 PROCEDURE — 3331090001 HH PPS REVENUE CREDIT

## 2019-10-12 PROCEDURE — 3331090002 HH PPS REVENUE DEBIT

## 2019-10-12 PROCEDURE — 3331090001 HH PPS REVENUE CREDIT

## 2019-10-13 PROCEDURE — 3331090001 HH PPS REVENUE CREDIT

## 2019-10-13 PROCEDURE — 3331090002 HH PPS REVENUE DEBIT

## 2019-10-14 PROCEDURE — 3331090001 HH PPS REVENUE CREDIT

## 2019-10-14 PROCEDURE — 3331090002 HH PPS REVENUE DEBIT

## 2019-10-15 PROCEDURE — 3331090001 HH PPS REVENUE CREDIT

## 2019-10-15 PROCEDURE — 3331090002 HH PPS REVENUE DEBIT

## 2019-10-21 ENCOUNTER — OFFICE VISIT (OUTPATIENT)
Dept: FAMILY MEDICINE CLINIC | Age: 84
End: 2019-10-21

## 2019-10-21 VITALS
DIASTOLIC BLOOD PRESSURE: 75 MMHG | SYSTOLIC BLOOD PRESSURE: 136 MMHG | WEIGHT: 179.4 LBS | BODY MASS INDEX: 33.01 KG/M2 | RESPIRATION RATE: 16 BRPM | HEIGHT: 62 IN | OXYGEN SATURATION: 98 % | TEMPERATURE: 97.5 F | HEART RATE: 88 BPM

## 2019-10-21 DIAGNOSIS — Z12.31 ENCOUNTER FOR SCREENING MAMMOGRAM FOR BREAST CANCER: ICD-10-CM

## 2019-10-21 DIAGNOSIS — E66.9 NON MORBID OBESITY: ICD-10-CM

## 2019-10-21 DIAGNOSIS — E78.2 MIXED HYPERLIPIDEMIA: ICD-10-CM

## 2019-10-21 DIAGNOSIS — Z51.81 ENCOUNTER FOR MEDICATION MONITORING: ICD-10-CM

## 2019-10-21 DIAGNOSIS — E11.21 TYPE 2 DIABETES WITH NEPHROPATHY (HCC): ICD-10-CM

## 2019-10-21 DIAGNOSIS — M15.9 PRIMARY OSTEOARTHRITIS INVOLVING MULTIPLE JOINTS: ICD-10-CM

## 2019-10-21 DIAGNOSIS — I10 ESSENTIAL HYPERTENSION: Primary | ICD-10-CM

## 2019-10-21 LAB
GLUCOSE POC: 152 MG/DL
HBA1C MFR BLD HPLC: 7.8 %

## 2019-10-21 RX ORDER — FUROSEMIDE 20 MG/1
20 TABLET ORAL DAILY
COMMUNITY
End: 2020-03-13 | Stop reason: SDUPTHER

## 2019-10-21 NOTE — PROGRESS NOTES
HISTORY OF PRESENT ILLNESS  Ruiz Barber is a 81 y.o. female. Follow up on chronic medical problems. Feeling well. Sleeping good. Appetite is good. Staying active. HTN follow up:  Compliant w/ meds, low salt diet, and exercise. Home bp monitoring 120-130s/70s. Pt has bp log. Swelling is overall improved with her compression stocking. No headache or dizziness. No chest pain, SOB or palpitations. DM type II follow up:  Compliant w/ meds, diabetic diet, and exercise. She has been better with watching diet. Obtains home glucose monitoring averaging 100-130s. Checks BS daily on most days and prn. Pt does have BS log at visit today. Denies any tingling sensation, polyuria and polydipsia. No blurred vision. No significant weight changes. Feeling well since last OV. Hypercholesterolemia follow up:  Compliant low fat, low cholesterol diet. Intolerant to stains and did not tolerate zetia or welchol. Exercising some. Osteoarthritis:  Patient has osteoarthritis. Takes an occasional Advil which helps the pain. Has had increased pain and stiffness in the neck that comes and goes. Has stiffness noted in the hands and knees. Symptoms onset: problem is longstanding. Rheumatological ROS: stable, mild-to-moderate joint symptoms intermittently, reasonably well controlled by PRN meds.    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 135 Tab 3    furosemide (LASIX) 20 mg tablet TAKE 1 TABLET EVERY DAY (Patient taking differently: TAKE 1 TABLET by mouth EVERY DAY) 90 Tab 3    ACCU-CHEK PARESH PLUS TEST STRP strip Use to check BS 2 times daily 300 Strip 3    latanoprost (XALATAN) 0.005 % ophthalmic solution Administer 1 Drop to both eyes nightly.  ibuprofen (ADVIL LIQUI-GEL) 200 mg cap Take 1 Tab by mouth two (2) times daily as needed for Pain.  GARLIC PO Take 1,022 mg by mouth daily. 1000mg tab      red yeast rice extract 600 mg Cap Take 600 mg by mouth daily.  cod liver oil Cap Take 1 Cap by mouth daily. Vitamin A 1125mcg  vitamin D 400 IU      cholecalciferol, vitamin d3, (VITAMIN D) 1,000 unit tablet Take 1,000 Units by mouth daily. Allergies   Allergen Reactions    Latex Contact Dermatitis    Aspirin Palpitations    Glipizide Shortness of Breath and Swelling    Bactrim Ds [Sulfamethoxazole-Trimethoprim] Other (comments)     Patient experienced pain in legs and buttock and muscle cramping.  Contrast Agent [Iodine] Hives and Itching     Itchy throat    Amaryl [Glimepiride] Other (comments)     \"nervous feeling\"    Avocado Rash     Pt states she bruises.     Feldene [Piroxicam] Diarrhea    Levofloxacin Unknown (comments)    Losartan Itching     Caused congestion per stated by pt    [place]rick Hives    Oxycodone Diarrhea and Nausea Only    Pcn [Penicillins] Rash    Pineapple Rash    Prednisone Nausea and Vomiting    Tylenol [Acetaminophen] Palpitations    Welchol [Colesevelam] Other (comments)     Pt states \"made throat feel raw\"    Zestril [Lisinopril] Cough         Past Medical History:   Diagnosis Date    Chronic edema     Diabetes [facility]     Family history of ischemic heart disease 4/12/2012    Hypercholesterolemia     Hypertension Past Surgical History:   Procedure Laterality Date    ENDOSCOPY, COLON, DIAGNOSTIC  12/2006    Dr. Kristy Rose [address]    [address]X [address]         Family History   Problem Relation Age of Onset   [de-identified] Stroke Mother     Hypertension Mother     Stroke Father     Hypertension Father     Hypertension Brother        Social History     Tobacco Use    Smoking status: Never Smoker    Smokeless tobacco: Never Used   Substance Use Topics    Alcohol use: No     Alcohol/week: 0.0 standard drinks          Lab Results   Component Value Date/Time    WBC 7.2 09/04/2019 09:52 AM    HGB 10.3 (L) 09/04/2019 09:52 AM    HCT 31.8 (L) 09/04/2019 09:52 AM    PLATELET 225 (H) 48/19/9353 09:52 AM    MCV 80.9 09/04/2019 09:52 AM     Lab Results   Component Value Date/Time    Cholesterol, total 266 (H) 07/17/2019 08:36 AM    HDL Cholesterol 49 07/17/2019 08:36 AM    LDL, calculated 195 (H) 07/17/2019 08:36 AM    Triglyceride 110 07/17/2019 08:36 AM    CHOL/HDL Ratio 5.6 (H) 12/28/2009 10:43 AM     Lab Results   Component Value Date/Time    Sodium 140 09/05/2019 03:22 AM    Potassium 4.0 09/05/2019 03:22 AM    Chloride 110 (H) 09/05/2019 03:22 AM    CO2 26 09/05/2019 03:22 AM    Anion gap 4 (L) 09/05/2019 03:22 AM    Glucose 243 (H) 09/05/2019 03:22 AM    BUN 19 09/05/2019 03:22 AM    Creatinine 1.02 09/05/2019 03:22 AM    BUN/Creatinine ratio 19 09/05/2019 03:22 AM    GFR est AA >60 09/05/2019 03:22 AM    GFR est non-AA 52 (L) 09/05/2019 03:22 AM    Calcium 8.8 09/05/2019 03:22 AM    Bilirubin, total 0.4 07/17/2019 08:36 AM    ALT (SGPT) 8 07/17/2019 08:36 AM    AST (SGOT) 13 07/17/2019 08:36 AM    Alk.  phosphatase 106 07/17/2019 08:36 AM    Protein, total 7.1 07/17/2019 08:36 AM    Albumin 4.1 07/17/2019 08:36 AM    Globulin 4.1 (H) 04/26/2017 12:10 PM    A-G Ratio 1.4 07/17/2019 08:36 AM      Lab Results   Component Value Date/Time    Hemoglobin A1c 10.4 (H) 01/28/2014 08:40 AM    Hemoglobin A1c (POC) 7.9 07/17/2019 08:36 AM         Review of Systems   Constitutional: Negative for malaise/fatigue. HENT: Negative for congestion. Eyes: Negative for blurred vision. Respiratory: Negative for cough and shortness of breath. Cardiovascular: Negative for chest pain, palpitations and leg swelling. Gastrointestinal: Negative for abdominal pain, constipation and heartburn. Genitourinary: Negative for dysuria, frequency and urgency. Musculoskeletal: Positive for joint pain. Negative for back pain. Neurological: Negative for dizziness, tingling and headaches. Endo/Heme/Allergies: Negative for environmental allergies. Psychiatric/Behavioral: Negative for depression. The patient does not have insomnia. Physical Exam   Constitutional: She appears well-developed and well-nourished. /75 (BP 1 Location: Left arm, BP Patient Position: Sitting)   Pulse 88   Temp 97.5 °F (36.4 °C) (Oral)   Resp 16   Ht 5' 2\" (1.575 m)   Wt 179 lb 6.4 oz (81.4 kg)   LMP 04/28/1972   SpO2 98%   BMI 32.81 kg/m²      HENT:   Right Ear: Tympanic membrane and ear canal normal.   Left Ear: Tympanic membrane and ear canal normal.   Nose: No mucosal edema or rhinorrhea. Mouth/Throat: Oropharynx is clear and moist and mucous membranes are normal.   Neck: Normal range of motion. Neck supple. No thyromegaly present. Cardiovascular: Normal rate, regular rhythm and normal heart sounds. Exam reveals no gallop. Pulmonary/Chest: Effort normal and breath sounds normal.   Abdominal: Soft. Normal appearance and bowel sounds are normal. She exhibits no mass. There is no tenderness. Musculoskeletal: Normal range of motion. She exhibits edema (trace). She exhibits no tenderness. Foot exam done . Normal sensation to PP, LT and vibration. Sensation decreased to microfilament testing. Pulses intact. No swelling. No skin lesions or sores noted. No tinea present. Lymphadenopathy:     She has no cervical adenopathy. Skin: Skin is warm and dry. Psychiatric: She has a normal mood and affect. Nursing note and vitals reviewed. ASSESSMENT and PLAN  Diagnoses and all orders for this visit:    1. Essential hypertension  Discussed sodium restriction, high k rich diet, maintaining ideal body weight and regular exercise program such as daily walking 30 min perday 4-5 times per week, as physiologic means to achieve blood pressure control.  Medication compliance advised. 2. Type 2 diabetes with nephropathy (HCC)  -     AMB POC HEMOGLOBIN A1C  -     AMB POC GLUCOSE, QUANTITATIVE, BLOOD    3. Mixed hyperlipidemia  We discussed her LDL being very high the last time and working on the diet. She has declined and been intolerant to taking the statin in the past.    -     LIPID PANEL    4. Primary osteoarthritis involving multiple joints    5. Encounter for medication monitoring  -     METABOLIC PANEL, BASIC    6. Non morbid obesity  I have reviewed/discussed the above normal BMI with the patient. I have recommended the following interventions: dietary management education, guidance, and counseling . 7. Encounter for screening mammogram for breast cancer  -     [facility] MAMMO BI SCREENING INCL CAD; Future      Follow-up and Dispositions    · Return in about 4 months (around 2/21/2020) for medicare wellness exam.       reviewed diet, exercise and weight control  cardiovascular risk and specific lipid/LDL goals reviewed  reviewed medications and side effects in detail  specific diabetic recommendations: low cholesterol diet, weight control and daily exercise discussed, home glucose monitoring emphasized, all medications, side effects and compliance discussed carefully, foot care discussed and Podiatry visits discussed, annual eye examinations at Ophthalmology discussed and glycohemoglobin and other lab monitoring discussed     I have discussed diagnosis listed in this note with pt and/or family.  I have discussed treatment plans and options and the risk/benefit analysis of those options, including safe use of medications and possible medication side effects. Through the use of shared decision making we have agreed to the above plan. The patient has received an after-visit summary and questions were answered concerning future plans and follow up. Advise pt of any urgent changes then to proceed to the ER.

## 2019-10-21 NOTE — PROGRESS NOTES
Chief Complaint   Patient presents with    Hypertension     3 month follow up    Cholesterol Problem     3 month follow up    Diabetes     3 month follow up       A1C 7/17/2019    Microalbumin 3/1/2019    Patient states she had an eye exam 1/21/2019 by Dr. Alexey Davenport. Patient states she has an appt next week. Mammogram: 10/8/2018        1. Have you been to the ER, urgent care clinic since your last visit? Hospitalized since your last visit? Yes, 9/3/2019 [facility] abscess on abdominal wall    2. Have you seen or consulted any other health care providers outside of the [facility] [place] since your last visit? Include any pap smears or colon screening.  No

## 2019-10-22 LAB
BUN SERPL-MCNC: 26 MG/DL (ref 8–27)
BUN/CREAT SERPL: 22 (ref 12–28)
CALCIUM SERPL-MCNC: 9.6 MG/DL (ref 8.7–10.3)
CHLORIDE SERPL-SCNC: 105 MMOL/L (ref 96–106)
CHOLEST SERPL-MCNC: 265 MG/DL (ref 100–199)
CO2 SERPL-SCNC: 24 MMOL/L (ref 20–29)
CREAT SERPL-MCNC: 1.19 MG/DL (ref 0.57–1)
GLUCOSE SERPL-MCNC: 162 MG/DL (ref 65–99)
HDLC SERPL-MCNC: 57 MG/DL
INTERPRETATION, 910389: NORMAL
INTERPRETATION: NORMAL
LDLC SERPL CALC-MCNC: 183 MG/DL (ref 0–99)
PDF IMAGE, 910387: NORMAL
POTASSIUM SERPL-SCNC: 4.3 MMOL/L (ref 3.5–5.2)
SODIUM SERPL-SCNC: 145 MMOL/L (ref 134–144)
TRIGL SERPL-MCNC: 123 MG/DL (ref 0–149)
VLDLC SERPL CALC-MCNC: 25 MG/DL (ref 5–40)

## 2019-10-22 NOTE — PROGRESS NOTES
To: Hector Connolly MD    From: Mo Leavitt MD    Thank you for referring Inez Herron. Encounter Date: 9/9/2019    Subjective:      Cinthia Aase is a 81 y.o. female presents for postop care following I&D. Dressing changes have been going well. Objective:     General:  alert, cooperative, no distress, appears stated age   Incision:  healing well, no erythema, good granulation tissue forming. Assessment:     S/p I&D of abd wall abscess and admission. Doing well postoperatively. Plan:     Applied new VAC dressing. Continue VAC as instructed. Follow-up in 1 week(s) for wound check.       Mo Leavitt MD

## 2019-10-23 NOTE — PROGRESS NOTES
To: Juan Almaraz MD    From: Anatoliy Moon MD    Thank you for referring Awa Krause. Encounter Date: 9/23/2019    Subjective:      Kat Slaughter is a 81 y.o. female presents for postop care following I&D and admission for IV abx. Dressing changes have been going well. Objective:     General:  alert, cooperative, no distress, appears stated age   Incision:  Nearly healed. Assessment:     S/p I&D of abd wall abscess. .  Doing well postoperatively. Plan:     Cover until scabbed over. Call for concerns.     Anatoliy Moon MD

## 2019-10-24 NOTE — PROGRESS NOTES
Encounter Date: 10/7/2019    Subjective:      Cynthia Valentino is a 81 y.o. female presents for postop care following I&D and admission for IV abx. Dressing changes have been going well. Objective:     General:  alert, cooperative, no distress, appears stated age   Incision:  Well healed. Assessment:     S/p I&D of abd wall abscess. .  Doing well postoperatively. Plan:     No further f/u needed. Told to call for any concerns.       Yaquelin Yang MD

## 2019-11-20 ENCOUNTER — HOSPITAL ENCOUNTER (OUTPATIENT)
Dept: MAMMOGRAPHY | Age: 84
Discharge: HOME | End: 2019-11-20
Attending: FAMILY MEDICINE
Payer: MEDICARE

## 2019-11-20 DIAGNOSIS — Z12.31 ENCOUNTER FOR SCREENING MAMMOGRAM FOR BREAST CANCER: ICD-10-CM

## 2019-11-20 PROCEDURE — 77067 SCR MAMMO BI INCL CAD: CPT

## 2020-02-03 NOTE — ADDENDUM NOTE
Return to WORK    February 3, 2020      Re:   Marcia Franco  [address]  [facility] [address]           This is to certify that Marcia Franco was seen in Urgent Care and can return to WORK on 2/2/2020. She should be excused for missing work on 2/1/2020.     RESTRICTIONS:     REMARKS:      SIGNATURE: ___________________________________________,   2/3/2020  Diana Hannon, NIC Hannon, NIC  [facility]  [id number]  [facility] [address]  [phone]   Addended by: Yvette Hall on: 7/17/2019 09:05 AM     Modules accepted: Orders

## 2020-02-05 ENCOUNTER — OFFICE VISIT (OUTPATIENT)
Dept: FAMILY MEDICINE CLINIC | Age: 85
End: 2020-02-05

## 2020-02-05 VITALS
BODY MASS INDEX: 33.01 KG/M2 | WEIGHT: 179.4 LBS | RESPIRATION RATE: 16 BRPM | OXYGEN SATURATION: 96 % | HEIGHT: 62 IN | HEART RATE: 84 BPM | TEMPERATURE: 97.6 F | DIASTOLIC BLOOD PRESSURE: 70 MMHG | SYSTOLIC BLOOD PRESSURE: 134 MMHG

## 2020-02-05 DIAGNOSIS — E78.2 MIXED HYPERLIPIDEMIA: ICD-10-CM

## 2020-02-05 DIAGNOSIS — M15.9 PRIMARY OSTEOARTHRITIS INVOLVING MULTIPLE JOINTS: ICD-10-CM

## 2020-02-05 DIAGNOSIS — E66.9 NON MORBID OBESITY: ICD-10-CM

## 2020-02-05 DIAGNOSIS — E11.21 TYPE 2 DIABETES WITH NEPHROPATHY (HCC): ICD-10-CM

## 2020-02-05 DIAGNOSIS — I10 ESSENTIAL HYPERTENSION: ICD-10-CM

## 2020-02-05 DIAGNOSIS — Z00.00 ROUTINE GENERAL MEDICAL EXAMINATION AT A HEALTH CARE FACILITY: Primary | ICD-10-CM

## 2020-02-05 DIAGNOSIS — Z51.81 ENCOUNTER FOR MEDICATION MONITORING: ICD-10-CM

## 2020-02-05 LAB
BACTERIA UA POCT, BACTPOCT: NORMAL
BILIRUB UR QL STRIP: NEGATIVE
CASTS UA POCT: NORMAL
CLUE CELLS, CLUEPOCT: NORMAL
CRYSTALS UA POCT, CRYSPOCT: NORMAL
EPITHELIAL CELLS POCT: NORMAL
GLUCOSE POC: 130 MG/DL
GLUCOSE UR-MCNC: NEGATIVE MG/DL
HBA1C MFR BLD HPLC: 7.6 %
HEMOCCULT STL QL: NEGATIVE
KETONES P FAST UR STRIP-MCNC: NEGATIVE MG/DL
MUCUS UA POCT, MUCPOCT: NORMAL
PH UR STRIP: 5.5 [PH] (ref 4.6–8)
PROT UR QL STRIP: NEGATIVE
RBC UA POCT, RBCPOCT: NORMAL
SP GR UR STRIP: 1.01 (ref 1–1.03)
TRICH UA POCT, TRICHPOC: NORMAL
UA UROBILINOGEN AMB POC: NORMAL (ref 0.2–1)
URINALYSIS CLARITY POC: CLEAR
URINALYSIS COLOR POC: YELLOW
URINE BLOOD POC: NEGATIVE
URINE CULT COMMENT, POCT: NORMAL
URINE LEUKOCYTES POC: NORMAL
URINE NITRITES POC: NEGATIVE
VALID INTERNAL CONTROL?: YES
WBC UA POCT, WBCPOCT: NORMAL
YEAST UA POCT, YEASTPOC: NORMAL

## 2020-02-05 RX ORDER — GLYBURIDE 5 MG/1
5 TABLET ORAL
COMMUNITY
End: 2020-02-05 | Stop reason: ALTCHOICE

## 2020-02-05 NOTE — PROGRESS NOTES
This is a Subsequent Medicare Annual Wellness Exam (AWV) (Performed 12 months after IPPE or effective date of Medicare Part B enrollment)    I have reviewed the patient's medical history in detail and updated the computerized patient record. 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    Family history of ischemic heart disease Z82.49    Mixed hyperlipidemia E78.2    Mitral valve disease I05.9    Obesity, unspecified E66.9    Generalized OA M15.9    Diabetes mellitus type 2, controlled ([address] E11.9    Essential hypertension I5    Encounter for medication monitoring Z51.81    Statin intolerance Z78.9    Type 2 diabetes with nephropathy (HCC) E11.21    Abscess of abdominal wall L02.211       Current Outpatient Medications   Medication Sig Dispense Refill    ACCU-CHEK PARESH PLUS TEST STRP strip USE TO CHECK BLOOD SUGAR  2 TIMES DAILY 200 Strip 3    furosemide (LASIX) 20 mg tablet Take 20 mg by mouth daily.  glyBURIDE (DIABETA) 5 mg tablet Take 1.5 Tabs by mouth Daily (before breakfast). 135 Tab 3    latanoprost (XALATAN) 0.005 % ophthalmic solution Administer 1 Drop to both eyes nightly.  ibuprofen (ADVIL LIQUI-GEL) 200 mg cap Take 1 Tab by mouth two (2) times daily as needed for Pain.  GARLIC PO Take 4,315 mg by mouth daily.  red yeast rice extract 600 mg Cap Take 600 mg by mouth daily.  cod liver oil Cap Take 1 Cap by mouth daily.  cholecalciferol, vitamin d3, (VITAMIN D) 1,000 unit tablet Take 1,000 Units by mouth daily. Allergies   Allergen Reactions    Latex Contact Dermatitis    Aspirin Palpitations    Glipizide Shortness of Breath and Swelling    Bactrim Ds [Sulfamethoxazole-Trimethoprim] Other (comments)     Patient experienced pain in legs and buttock and muscle cramping.     Contrast Agent [Iodine] Hives and Itching Itchy throat    Amaryl [Glimepiride] Other (comments)     \"nervous feeling\"    Avocado Rash     Pt states she bruises.  Feldene [Piroxicam] Diarrhea    Levofloxacin Unknown (comments)    Losartan Itching     Caused congestion per stated by pt    [place] Hives    Oxycodone Diarrhea and Nausea Only    Pcn [Penicillins] Rash    Pineapple Rash    Prednisone Nausea and Vomiting    Tylenol [Acetaminophen] Palpitations    Welchol [Colesevelam] Other (comments)     Pt states \"made throat feel raw\"    Zestril [Lisinopril] Cough       Past Medical History:   Diagnosis Date    Chronic edema     Diabetes ([facility]     Family history of ischemic heart disease 4/12/2012    Hypercholesterolemia     Hypertension        Past Surgical History:   Procedure Laterality Date    ABDOMEN SURGERY PROC UNLISTED  09/03/2019    abscess removed from abdominal wall    ENDOSCOPY, COLON, DIAGNOSTIC  12/2006    Dr. Karl Henley [address]    HX HYSTERECTOMY  1972       Family History   Problem Relation Age of Onset   [address] [facility] Stroke Mother     Hypertension Mother     Stroke Father     Hypertension Father     Hypertension Brother        Social History     Tobacco Use    Smoking status: Never Smoker    Smokeless tobacco: Never Used   Substance Use Topics    Alcohol use: No     Alcohol/week: 0.0 standard drinks         Depression Risk Factor Screening:     PHQ over the last two weeks 11/7/2017   Little interest or pleasure in doing things Not at all   Feeling down, depressed or hopeless Not at all   Total Score PHQ 2 0     Alcohol Risk Factor Screening: You do not drink alcohol or very rarely. Functional Ability and Level of Safety:   Hearing Loss  Hearing is good. Activities of Daily Living  The home contains: no safety equipment. Patient does total self care    Fall RiskFall Risk Assessment, last 12 mths 11/7/2017   Able to walk? Yes   Fall in past 12 months?  No     Functional Ability:   Does the patient exhibit a steady gait? yes    How long did it take the patient to get up and walk from a sitting position? seconds    Is the patient self reliant? (ie can do own laundry, meals, household chores)  yes   Does the patient handle his/her own medications? yes   Does the patient handle his/her own money? yes   Is the patients home safe (ie good lighting, handrails on stairs and bath, etc.)? yes   Did you notice or did patient express any hearing difficulties? no   Did you notice or did patient express any vision difficulties? no        Advance Care Planning:   Patient was offered the opportunity to discuss advance care planning:  yes    Does patient have an Advance Directive:  no   If no, did you provide information on Caring Connections? yes      Abuse Screen  Patient is not abused    Cognitive Screening   Evaluation of Cognitive Function:  Has your family/caregiver stated any concerns about your memory: no      Patient Care Team   Patient Care Team:  Misty Saravia MD as PCP - General    Assessment/Plan   Education and counseling provided:  Are appropriate based on today's review and evaluation  End-of-Life planning (with patient's consent)    ASSESSMENT and PLAN    Medicare Annual Wellness  Continue current treatment plan. Continue annual follow up. I have discussed diagnosis listed in this note with pt and/or family. I have discussed treatment plans and options and the risk/benefit analysis of those options, including safe use of medications and possible medication side effects. Through the use of shared decision making we have agreed to the above plan. The patient has received an after-visit summary and questions were answered concerning future plans and follow up. Advise pt of any urgent changes then to proceed to the ER.

## 2020-02-05 NOTE — PROGRESS NOTES
Pt referred by Dr. Coulter [facility] regarding medications. Concern of cancer risk with glyburide vs. Other sulfonylureas; reviewed the information with patient out there about the study that was done including it was done in rats, there was no significant difference but a trend, and also there were other differences in the groups that took glyburide vs. Other sulfonylureas that could explain the difference (differnce in metformin use and asa use for example). Either way, she can't tolerate the other sulfonylureas and does not want to take the glyburide, so will trial januvia using samples to see if she can tolerate it and to see how her sugars do. Sample medication provided to patient  A1c is 7.6% today,  fasting. She forgot her log but does check her sugars at home. Januvia 100 mg daily samples, consider reduce to 50 mg daily if indicated based on sugars in 2 weeks. Not a high risk of lows with medication and her renal function on the cusp of renal dose of 50 mg. She does not want to split the tablet and since low risk of lows will do the 100 mg daily for now. Will need to send order to pharmacy in one month if she goes with this medication; d/w her it comes in 50 mg tablets if go that route moving forward. Asked to call clinic if any sugars below 70 or she has symptoms. Samples logged, documented and signed out per policy. Will call in 2 weeks and follow up with PCP in 4 weeks. Patient verbalized understanding of information presented. Answered all of the patient's questions. AVS handed and reviewed with patient.   Esa Soliman, PHARMD, CDE

## 2020-02-05 NOTE — PATIENT INSTRUCTIONS
1) stop glyburide 2) start jauvia 100 mg daily, gave you 28 days of samples to try to see if you tolerate it 3) keep checking sugars 4) I will richie you in 2 weeks to hear about sugars and if we see any low sugars below 70 we will ask you to take 1/2 tablet of the januvia (if you are tolerating it) 5) call the clinic if you have any concerns or see low sugars, or you can call me on my work cell phone Jerson Sin, PharmD, CDE Diabetes Educator working with Dr. Erik Ignacio. [phone] 6) if you do well on this medication we will send an order to the pharmacy to see about coverage Thank you

## 2020-02-05 NOTE — PROGRESS NOTES
HISTORY OF PRESENT ILLNESS  Amber Campuzano is a 81 y.o. female. Follow up on chronic medical problems. Feeling well. Sleeping good. Appetite is good. Staying active. HTN follow up:  Compliant w/ meds, low salt diet, and exercise. Home bp monitoring 120-130s/70s. Pt has bp log. Swelling is overall improved with her compression stocking. No headache or dizziness. No chest pain, SOB or palpitations. DM type II follow up:  Compliant w/ meds, diabetic diet, and exercise. She has been better with watching diet. She wants to stop glyburide d/t hearing in the new that it is linked to causing cancer. Obtains home glucose monitoring averaging 100-130s. Checks BS daily on most days and prn. Pt does have BS log at visit today. Denies any tingling sensation, polyuria and polydipsia. No blurred vision. No significant weight changes. Feeling well since last OV. Hypercholesterolemia follow up:  Compliant low fat, low cholesterol diet. Intolerant to stains and did not tolerate zetia or welchol. Exercising some. Osteoarthritis:  Patient has osteoarthritis. Takes an occasional Advil which helps the pain. Has had increased pain and stiffness in the neck that comes and goes. Has stiffness noted in the hands and knees. Symptoms onset: problem is longstanding. Rheumatological ROS: stable, mild-to-moderate joint symptoms intermittently, reasonably well controlled by PRN meds.    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    Family history of ischemic heart disease Z82.49    Mixed hyperlipidemia E78.2    Mitral valve disease I05.9    Obesity, unspecified E66.9    Generalized OA M15.9    Diabetes mellitus type 2, controlled ([facility] [address] E11.9    Essential hypertension I10    Encounter for medication monitoring Z51.81    Statin intolerance Z78.9    Type 2 diabetes with nephropathy (HCC) E11.21    Abscess of abdominal wall L02.211       Current Outpatient Medications   Medication Sig Dispense Refill    ACCU-CHEK PARESH PLUS TEST STRP strip USE TO CHECK BLOOD SUGAR  2 TIMES DAILY 200 Strip 3    furosemide (LASIX) 20 mg tablet Take 20 mg by mouth daily.  glyBURIDE (DIABETA) 5 mg tablet Take 1.5 Tabs by mouth Daily (before breakfast). 135 Tab 3    latanoprost (XALATAN) 0.005 % ophthalmic solution Administer 1 Drop to both eyes nightly.  ibuprofen (ADVIL LIQUI-GEL) 200 mg cap Take 1 Tab by mouth two (2) times daily as needed for Pain.  GARLIC PO Take 2,234 mg by mouth daily.  red yeast rice extract 600 mg Cap Take 600 mg by mouth daily.  cod liver oil Cap Take 1 Cap by mouth daily.  cholecalciferol, vitamin d3, (VITAMIN D) 1,000 unit tablet Take 1,000 Units by mouth daily. Allergies   Allergen Reactions    Latex Contact Dermatitis    Aspirin Palpitations    Glipizide Shortness of Breath and Swelling    Bactrim Ds [Sulfamethoxazole-Trimethoprim] Other (comments)     Patient experienced pain in legs and buttock and muscle cramping.  Contrast Agent [Iodine] Hives and Itching     Itchy throat    Amaryl [Glimepiride] Other (comments)     \"nervous feeling\"    Avocado Rash     Pt states she bruises.     Feldene [Piroxicam] Diarrhea    Levofloxacin Unknown (comments)    Losartan Itching     Caused congestion per stated by pt    Power Hives    Oxycodone Diarrhea and Nausea Only    Pcn [Penicillins] Rash    Pineapple Rash    Prednisone Nausea and Vomiting    Tylenol [Acetaminophen] Palpitations    Welchol [Colesevelam] Other (comments)     Pt states \"made throat feel raw\"    Zestril [Lisinopril] Cough         Past Medical History:   Diagnosis Date    Chronic edema     Diabetes ([facility] [address]     Family history of ischemic heart disease 4/12/2012    Hypercholesterolemia     Hypertension          Past Surgical History:   Procedure Laterality Date    ABDOMEN SURGERY PROC UNLISTED  09/03/2019    abscess removed from abdominal wall    ENDOSCOPY, COLON, DIAGNOSTIC  12/2006    Dr. Edgard Clark HX [address]         Family History   Problem Relation Age of Onset    Stroke Mother     Hypertension Mother     Stroke Father     Hypertension Father     Hypertension Brother        Social History     Tobacco Use    Smoking status: Never Smoker    Smokeless tobacco: Never Used   Substance Use Topics    Alcohol use: No     Alcohol/week: 0.0 standard drinks          Lab Results   Component Value Date/Time    WBC 7.2 09/04/2019 09:52 AM    HGB 10.3 (L) 09/04/2019 09:52 AM    HCT 31.8 (L) 09/04/2019 09:52 AM    PLATELET 341 (H) 87/34/5530 09:52 AM    MCV 80.9 09/04/2019 09:52 AM     Lab Results   Component Value Date/Time    Cholesterol, total 265 (H) 10/21/2019 08:37 AM    HDL Cholesterol 57 10/21/2019 08:37 AM    LDL, calculated 183 (H) 10/21/2019 08:37 AM    Triglyceride 123 10/21/2019 08:37 AM    CHOL/HDL Ratio 5.6 (H) 12/28/2009 10:43 AM     Lab Results   Component Value Date/Time    Sodium 145 (H) 10/21/2019 08:37 AM    Potassium 4.3 10/21/2019 08:37 AM    Chloride 105 10/21/2019 08:37 AM    CO2 24 10/21/2019 08:37 AM    Anion gap 4 (L) 09/05/2019 03:22 AM    Glucose 162 (H) 10/21/2019 08:37 AM    BUN 26 10/21/2019 08:37 AM    Creatinine 1.19 (H) 10/21/2019 08:37 AM    BUN/Creatinine ratio 22 10/21/2019 08:37 AM    GFR est AA 49 (L) 10/21/2019 08:37 AM    GFR est non-AA 42 (L) 10/21/2019 08:37 AM    Calcium 9.6 10/21/2019 08:37 AM    Bilirubin, total 0.4 07/17/2019 08:36 AM    ALT (SGPT) 8 07/17/2019 08:36 AM    AST (SGOT) 13 07/17/2019 08:36 AM    Alk.  phosphatase 106 07/17/2019 08:36 AM    Protein, total 7.1 07/17/2019 08:36 AM    Albumin 4.1 07/17/2019 08:36 AM    Globulin 4.1 (H) 04/26/2017 12:10 PM    A-G Ratio 1.4 07/17/2019 08:36 AM      Lab Results   Component Value Date/Time Hemoglobin A1c 10.4 (H) 01/28/2014 08:40 AM    Hemoglobin A1c (POC) 7.8 10/21/2019 08:37 AM         Review of Systems   Constitutional: Negative for malaise/fatigue. HENT: Negative for congestion. Eyes: Negative for blurred vision. Respiratory: Negative for cough and shortness of breath. Cardiovascular: Negative for chest pain, palpitations and leg swelling. Gastrointestinal: Negative for abdominal pain, constipation and heartburn. Genitourinary: Negative for dysuria, frequency and urgency. Musculoskeletal: Positive for joint pain. Negative for back pain. Neurological: Negative for dizziness, tingling and headaches. Endo/Heme/Allergies: Negative for environmental allergies. Psychiatric/Behavioral: Negative for depression. The patient does not have insomnia. Physical Exam   Constitutional: She appears well-developed and well-nourished. /70   Pulse 84   Temp 97.6 °F (36.4 °C) (Oral)   Resp 16   Ht 5' 2\" (1.575 m)   Wt 179 lb 6.4 oz (81.4 kg)   LMP 04/28/1972   SpO2 96%   BMI 32.81 kg/m²        HENT:   Right Ear: Tympanic membrane and ear canal normal.   Left Ear: Tympanic membrane and ear canal normal.   Nose: No mucosal edema or rhinorrhea. Mouth/Throat: Oropharynx is clear and moist and mucous membranes are normal.   Neck: Normal range of motion. Neck supple. Carotid bruit is not present. No thyromegaly present. Cardiovascular: Normal rate, regular rhythm and normal heart sounds. Exam reveals no gallop. Pulmonary/Chest: Effort normal and breath sounds normal. Right breast exhibits no inverted nipple, no mass, no nipple discharge, no skin change and no tenderness. Left breast exhibits no inverted nipple, no mass, no nipple discharge, no skin change and no tenderness. Abdominal: Soft. Normal appearance and bowel sounds are normal. She exhibits no mass. There is no abdominal tenderness. Genitourinary: Rectum:      Guaiac result negative.      Musculoskeletal: Normal range of motion. General: Edema (trace) present. No tenderness. Comments: Foot exam done . Normal sensation to PP, LT and vibration. Sensation decreased to microfilament testing. Pulses intact. No swelling. No skin lesions or sores noted. No tinea present. Lymphadenopathy:     She has no cervical adenopathy. Skin: Skin is warm and dry. Psychiatric: She has a normal mood and affect. Nursing note and vitals reviewed. ASSESSMENT and PLAN  Diagnoses and all orders for this visit:    1. Routine general medical examination at a health care facility  -     AMB POC URINALYSIS DIP STICK AUTO W/ MICRO   -     AMB POC FECAL BLOOD, OCCULT, QL 1 CARD    2. Essential hypertension  Discussed sodium restriction, high k rich diet, maintaining ideal body weight and regular exercise program such as daily walking 30 min perday 4-5 times per week, as physiologic means to achieve blood pressure control.  Medication compliance advised. 3. Type 2 diabetes with nephropathy [facility])  She met with [facility] and went over medication options. A1c level is 7.6%  -     MICROALBUMIN, UR, RAND W/ MICROALB/CREAT RATIO  -     AMB POC HEMOGLOBIN A1C  -     AMB POC GLUCOSE, QUANTITATIVE, BLOOD  -     SITagliptin (JANUVIA) 100 mg tablet; Take 1 Tab by mouth daily. 4. Mixed hyperlipidemia  -     LIPID PANEL    5. Primary osteoarthritis involving multiple joints  Stable     6. Encounter for medication monitoring  -     CBC W/O DIFF  -     METABOLIC PANEL, COMPREHENSIVE    7. Non morbid obesity  I have reviewed/discussed the above normal BMI with the patient. I have recommended the following interventions: dietary management education, guidance, and counseling . Follow-up and Dispositions    · Return in about 4 weeks (around 3/4/2020).        reviewed diet, exercise and weight control  cardiovascular risk and specific lipid/LDL goals reviewed  reviewed medications and side effects in detail  specific diabetic recommendations: low cholesterol diet, weight control and daily exercise discussed, all medications, side effects and compliance discussed carefully, foot care discussed and Podiatry visits discussed, annual eye examinations at Ophthalmology discussed and glycohemoglobin and other lab monitoring discussed     I have discussed diagnosis listed in this note with pt and/or family. I have discussed treatment plans and options and the risk/benefit analysis of those options, including safe use of medications and possible medication side effects. Through the use of shared decision making we have agreed to the above plan. The patient has received an after-visit summary and questions were answered concerning future plans and follow up. Advise pt of any urgent changes then to proceed to the ER.

## 2020-02-05 NOTE — PROGRESS NOTES
Chief Complaint   Patient presents with   [facility] Annual Wellness Visit     1. Have you been to the ER, urgent care clinic since your last visit? Hospitalized since your last visit? No    2. Have you seen or consulted any other health care providers outside of the [address] [place] [address] since your last visit? Include any pap smears or colon screening. No    Verified patient with two types of identifiers. Verified pharmacy with patient. Verified medications with the patient.

## 2020-02-06 LAB
ALBUMIN SERPL-MCNC: 4.1 G/DL (ref 3.6–4.6)
ALBUMIN/CREAT UR: 63 MG/G CREAT (ref 0–29)
ALBUMIN/GLOB SERPL: 1.2 {RATIO} (ref 1.2–2.2)
ALP SERPL-CCNC: 111 IU/L (ref 39–117)
ALT SERPL-CCNC: 14 IU/L (ref 0–32)
AST SERPL-CCNC: 16 IU/L (ref 0–40)
BILIRUB SERPL-MCNC: 0.5 MG/DL (ref 0–1.2)
BUN SERPL-MCNC: 22 MG/DL (ref 8–27)
BUN/CREAT SERPL: 21 (ref 12–28)
CALCIUM SERPL-MCNC: 9.6 MG/DL (ref 8.7–10.3)
CHLORIDE SERPL-SCNC: 103 MMOL/L (ref 96–106)
CHOLEST SERPL-MCNC: 267 MG/DL (ref 100–199)
CO2 SERPL-SCNC: 20 MMOL/L (ref 20–29)
CREAT SERPL-MCNC: 1.07 MG/DL (ref 0.57–1)
CREAT UR-MCNC: 94.4 MG/DL
ERYTHROCYTE [DISTWIDTH] IN BLOOD BY AUTOMATED COUNT: 13.7 % (ref 11.7–15.4)
GLOBULIN SER CALC-MCNC: 3.3 G/DL (ref 1.5–4.5)
GLUCOSE SERPL-MCNC: 130 MG/DL (ref 65–99)
HCT VFR BLD AUTO: 39.7 % (ref 34–46.6)
HDLC SERPL-MCNC: 53 MG/DL
HGB BLD-MCNC: 13 G/DL (ref 11.1–15.9)
INTERPRETATION, 910389: NORMAL
INTERPRETATION: NORMAL
LDLC SERPL CALC-MCNC: 187 MG/DL (ref 0–99)
Lab: NORMAL
Lab: NORMAL
MCH RBC QN AUTO: 26.7 PG (ref 26.6–33)
MCHC RBC AUTO-ENTMCNC: 32.7 G/DL (ref 31.5–35.7)
MCV RBC AUTO: 82 FL (ref 79–97)
MICROALBUMIN UR-MCNC: 59.9 UG/ML
PDF IMAGE, 910387: NORMAL
PLATELET # BLD AUTO: 290 X10E3/UL (ref 150–450)
POTASSIUM SERPL-SCNC: 4.3 MMOL/L (ref 3.5–5.2)
PROT SERPL-MCNC: 7.4 G/DL (ref 6–8.5)
RBC # BLD AUTO: 4.87 X10E6/UL (ref 3.77–5.28)
SODIUM SERPL-SCNC: 141 MMOL/L (ref 134–144)
TRIGL SERPL-MCNC: 137 MG/DL (ref 0–149)
VLDLC SERPL CALC-MCNC: 27 MG/DL (ref 5–40)
WBC # BLD AUTO: 6.3 X10E3/UL (ref 3.4–10.8)

## 2020-02-19 ENCOUNTER — TELEPHONE (OUTPATIENT)
Dept: FAMILY MEDICINE CLINIC | Age: 85
End: 2020-02-19

## 2020-02-19 DIAGNOSIS — E11.21 TYPE 2 DIABETES WITH NEPHROPATHY (HCC): ICD-10-CM

## 2020-02-19 RX ORDER — GLYBURIDE 5 MG/1
5 TABLET ORAL
Qty: 90 TAB | Refills: 3
Start: 2020-02-19 | End: 2020-06-10 | Stop reason: SDUPTHER

## 2020-02-19 NOTE — TELEPHONE ENCOUNTER
Patient stopped taking Januvia because it made her very nauseated. She will go back to taking GlyBuride 5 mg daily.

## 2020-02-19 NOTE — TELEPHONE ENCOUNTER
Patient would like for Rachael Herrera to give her a call regarding her medication     SITagliptin (JANUVIA) 100 mg tablet she can be reached @ [phone]

## 2020-03-13 ENCOUNTER — OFFICE VISIT (OUTPATIENT)
Dept: FAMILY MEDICINE CLINIC | Age: 85
End: 2020-03-13

## 2020-03-13 VITALS
BODY MASS INDEX: 32.42 KG/M2 | HEIGHT: 62 IN | SYSTOLIC BLOOD PRESSURE: 126 MMHG | DIASTOLIC BLOOD PRESSURE: 69 MMHG | HEART RATE: 89 BPM | TEMPERATURE: 97.9 F | RESPIRATION RATE: 18 BRPM | WEIGHT: 176.2 LBS | OXYGEN SATURATION: 96 %

## 2020-03-13 DIAGNOSIS — Z51.81 ENCOUNTER FOR MEDICATION MONITORING: ICD-10-CM

## 2020-03-13 DIAGNOSIS — L03.011 PARONYCHIA OF FINGER OF RIGHT HAND: ICD-10-CM

## 2020-03-13 DIAGNOSIS — E66.9 NON MORBID OBESITY: ICD-10-CM

## 2020-03-13 DIAGNOSIS — E11.21 TYPE 2 DIABETES WITH NEPHROPATHY (HCC): Primary | ICD-10-CM

## 2020-03-13 DIAGNOSIS — I10 ESSENTIAL HYPERTENSION: ICD-10-CM

## 2020-03-13 DIAGNOSIS — E78.2 MIXED HYPERLIPIDEMIA: ICD-10-CM

## 2020-03-13 DIAGNOSIS — M15.9 PRIMARY OSTEOARTHRITIS INVOLVING MULTIPLE JOINTS: ICD-10-CM

## 2020-03-13 LAB — GLUCOSE POC: 121 MG/DL

## 2020-03-13 RX ORDER — FUROSEMIDE 20 MG/1
20 TABLET ORAL DAILY
Qty: 90 TAB | Refills: 3 | Status: SHIPPED | COMMUNITY
Start: 2020-03-13 | End: 2020-09-04 | Stop reason: SDUPTHER

## 2020-03-13 RX ORDER — BLOOD SUGAR DIAGNOSTIC
STRIP MISCELLANEOUS
Qty: 200 STRIP | Refills: 3 | Status: SHIPPED | OUTPATIENT
Start: 2020-03-13 | End: 2020-03-27 | Stop reason: SDUPTHER

## 2020-03-13 RX ORDER — ACETAMINOPHEN 500 MG
2000 TABLET ORAL DAILY
COMMUNITY

## 2020-03-13 RX ORDER — CEPHALEXIN 500 MG/1
500 CAPSULE ORAL 3 TIMES DAILY
Qty: 15 CAP | Refills: 0 | Status: SHIPPED | OUTPATIENT
Start: 2020-03-13 | End: 2020-03-18

## 2020-03-13 NOTE — PROGRESS NOTES
Chief Complaint   Patient presents with    Cholesterol Problem     follow up    Hypertension    Diabetes     A1C 2/5/2020    Microalbumin 2/5/2020    Patient states she had an eye exam 1/2020 by Dr. Juan M Diana. Patient states she has another appt 4/2020    Mammogram: 11/20/2019      1. Have you been to the ER, urgent care clinic since your last visit? Hospitalized since your last visit? No    2. Have you seen or consulted any other health care providers outside of the [address] [place] [address] since your last visit? Include any pap smears or colon screening.  No

## 2020-03-13 NOTE — PROGRESS NOTES
HISTORY OF PRESENT ILLNESS  Marikay Meigs is a 81 y.o. female. Follow up on chronic medical problems. Feeling well. Sleeping good. Appetite is good. Staying active. HTN follow up:  Compliant w/ meds, low salt diet, and exercise. Home bp monitoring 120-130s/70s. Pt has bp log. Swelling is overall improved with her compression stocking. No headache or dizziness. No chest pain, SOB or palpitations. DM type II follow up:  Compliant w/ meds, diabetic diet, and exercise. She did not tolerate the change to [place]. It made her stomach feel upset so she went back to taking the glyburide. She has been better with watching diet. Obtains home glucose monitoring averaging 100-130s. Checks BS daily on most days and prn. Pt does have BS log at visit today. Denies any tingling sensation, polyuria and polydipsia. No blurred vision. No significant weight changes. Feeling well since last OV. Hypercholesterolemia follow up:  Compliant low fat, low cholesterol diet. Intolerant to stains and did not tolerate zetia or welchol. Exercising some. Osteoarthritis:  Patient has osteoarthritis. Takes an occasional Advil which helps the pain. Has had increased pain and stiffness in the neck that comes and goes. Has stiffness noted in the hands and knees. Symptoms onset: problem is longstanding. Rheumatological ROS: stable, mild-to-moderate joint symptoms intermittently, reasonably well controlled by PRN meds.    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    Family history of ischemic heart disease Z82.49    Mixed hyperlipidemia E78.2    Mitral valve disease I05.9    Obesity, unspecified E66.9    Generalized OA M15.9    Diabetes mellitus type 2, controlled ([facility] [address] E11.9    Essential hypertension I10    Encounter for medication monitoring Z51.81    Statin intolerance Z78.9    Type 2 diabetes with nephropathy (HCC) E11.21    Abscess of abdominal wall L02.211       Current Outpatient Medications   Medication Sig Dispense Refill    cholecalciferol (VITAMIN D3) (2,000 UNITS /50 MCG) cap capsule Take 2,000 Units by mouth daily.  glyBURIDE (DIABETA) 5 mg tablet Take 1 Tab by mouth daily (after breakfast). 90 Tab 3    ACCU-CHEK PARESH PLUS TEST STRP strip USE TO CHECK BLOOD SUGAR  2 TIMES DAILY 200 Strip 3    furosemide (LASIX) 20 mg tablet Take 20 mg by mouth daily.  ibuprofen (ADVIL LIQUI-GEL) 200 mg cap Take 1 Tab by mouth two (2) times daily as needed for Pain.  GARLIC PO Take 5,895 mg by mouth daily.  red yeast rice extract 600 mg Cap Take 600 mg by mouth daily.  cod liver oil Cap Take 1 Cap by mouth daily.  cholecalciferol, vitamin d3, (VITAMIN D) 1,000 unit tablet Take 1,000 Units by mouth daily. Allergies   Allergen Reactions    Latex Contact Dermatitis    Aspirin Palpitations    Glipizide Shortness of Breath and Swelling    Bactrim Ds [Sulfamethoxazole-Trimethoprim] Other (comments)     Patient experienced pain in legs and buttock and muscle cramping.  Contrast Agent [Iodine] Hives and Itching     Itchy throat    Amaryl [Glimepiride] Other (comments)     \"nervous feeling\"    Avocado Rash     Pt states she bruises.     Feldene [Piroxicam] Diarrhea    Levofloxacin Unknown (comments)    Losartan Itching     Caused congestion per stated by pt    [place] Hives    Oxycodone Diarrhea and Nausea Only    Pcn [Penicillins] Rash    Pineapple Rash    Prednisone Nausea and Vomiting    Tylenol [Acetaminophen] Palpitations    Welchol [Colesevelam] Other (comments)     Pt states \"made throat feel raw\"    Zestril [Lisinopril] Cough         Past Medical History:   Diagnosis Date    Chronic edema     Diabetes ([facility] [address]     Family history of ischemic heart disease 4/12/2012    Hypercholesterolemia  Hypertension          Past Surgical History:   Procedure Laterality Date    ABDOMEN SURGERY PROC UNLISTED  09/03/2019    abscess removed from abdominal wall    ENDOSCOPY, COLON, DIAGNOSTIC  12/2006    Dr. Sosa Guthrie [address]    HX [address]         Family History   Problem Relation Age of Onset   [facility] Stroke Mother     Hypertension Mother     Stroke Father     Hypertension Father     Hypertension Brother        Social History     Tobacco Use    Smoking status: Never Smoker    Smokeless tobacco: Never Used   Substance Use Topics    Alcohol use: No     Alcohol/week: 0.0 standard drinks          Lab Results   Component Value Date/Time    WBC 6.3 02/05/2020 09:30 AM    HGB 13.0 02/05/2020 09:30 AM    HCT 39.7 02/05/2020 09:30 AM    PLATELET 497 14/38/9395 09:30 AM    MCV 82 02/05/2020 09:30 AM     Lab Results   Component Value Date/Time    Cholesterol, total 267 (H) 02/05/2020 09:30 AM    HDL Cholesterol 53 02/05/2020 09:30 AM    LDL, calculated 187 (H) 02/05/2020 09:30 AM    Triglyceride 137 02/05/2020 09:30 AM    CHOL/HDL Ratio 5.6 (H) 12/28/2009 10:43 AM     Lab Results   Component Value Date/Time    Sodium 141 02/05/2020 09:30 AM    Potassium 4.3 02/05/2020 09:30 AM    Chloride 103 02/05/2020 09:30 AM    CO2 20 02/05/2020 09:30 AM    Anion gap 4 (L) 09/05/2019 03:22 AM    Glucose 130 (H) 02/05/2020 09:30 AM    BUN 22 02/05/2020 09:30 AM    Creatinine 1.07 (H) 02/05/2020 09:30 AM    BUN/Creatinine ratio 21 02/05/2020 09:30 AM    GFR est AA 55 (L) 02/05/2020 09:30 AM    GFR est non-AA 48 (L) 02/05/2020 09:30 AM    Calcium 9.6 02/05/2020 09:30 AM    Bilirubin, total 0.5 02/05/2020 09:30 AM    ALT (SGPT) 14 02/05/2020 09:30 AM    AST (SGOT) 16 02/05/2020 09:30 AM    Alk.  phosphatase 111 02/05/2020 09:30 AM    Protein, total 7.4 02/05/2020 09:30 AM    Albumin 4.1 02/05/2020 09:30 AM    Globulin 4.1 (H) 04/26/2017 12:10 PM    A-G Ratio 1.2 02/05/2020 09:30 AM      Lab Results Component Value Date/Time    Hemoglobin A1c 10.4 (H) 01/28/2014 08:40 AM    Hemoglobin A1c (POC) 7.6 02/05/2020 09:32 AM         Review of Systems   Constitutional: Negative for malaise/fatigue. HENT: Negative for congestion. Eyes: Negative for blurred vision. Respiratory: Negative for cough and shortness of breath. Cardiovascular: Negative for chest pain, palpitations and leg swelling. Gastrointestinal: Negative for abdominal pain, constipation and heartburn. Genitourinary: Negative for dysuria, frequency and urgency. Musculoskeletal: Positive for joint pain. Negative for back pain. Skin:        Redness on the middle finger around the nail for the past several days. Neurological: Negative for dizziness, tingling and headaches. Endo/Heme/Allergies: Negative for environmental allergies. Psychiatric/Behavioral: Negative for depression. The patient does not have insomnia. Physical Exam   Constitutional: She appears well-developed and well-nourished. /69 (BP 1 Location: Left arm, BP Patient Position: Sitting)   Pulse 89   Temp 97.9 °F (36.6 °C) (Oral)   Resp 18   Ht 5' 2\" (1.575 m)   Wt 176 lb 3.2 oz (79.9 kg)   LMP 04/28/1972   SpO2 96%   BMI 32.23 kg/m²          HENT:   Right Ear: Tympanic membrane and ear canal normal.   Left Ear: Tympanic membrane and ear canal normal.   Nose: No mucosal edema or rhinorrhea. Mouth/Throat: Oropharynx is clear and moist and mucous membranes are normal.   Neck: Normal range of motion. Neck supple. Carotid bruit is not present. No thyromegaly present. Cardiovascular: Normal rate, regular rhythm and normal heart sounds. Exam reveals no gallop. Pulmonary/Chest: Effort normal and breath sounds normal. Right breast exhibits no inverted nipple, no mass, no nipple discharge, no skin change and no tenderness. Left breast exhibits no inverted nipple, no mass, no nipple discharge, no skin change and no tenderness. Abdominal: Soft.  Normal appearance and bowel sounds are normal. She exhibits no mass. There is no abdominal tenderness. Musculoskeletal: Normal range of motion. General: Edema (trace) present. No tenderness. Comments:      Lymphadenopathy:     She has no cervical adenopathy. Skin: Skin is warm and dry. Redness and swelling around the cuticle right middle finger   Psychiatric: She has a normal mood and affect. Nursing note and vitals reviewed. ASSESSMENT and PLAN  Diagnoses and all orders for this visit:    1. Type 2 diabetes with nephropathy (HCC)  a1c improved to 7.2%  -     Accu-Chek Ashley Plus test strp strip; USE TO CHECK BLOOD SUGAR  2 TIMES DAILY  -     METABOLIC PANEL, BASIC  -     AMB POC GLUCOSE, QUANTITATIVE, BLOOD    2. Essential hypertension  -    Refill furosemide (Lasix) 20 mg tablet; Take 1 Tab by mouth daily. 3. Mixed hyperlipidemia  Continue to monitor. Work on diet and exercise. 4. Primary osteoarthritis involving multiple joints  Stable     5. Encounter for medication monitoring    6. Non morbid obesity  I have reviewed/discussed the above normal BMI with the patient. I have recommended the following interventions: dietary management education, guidance, and counseling . 7. Paronychia of finger of right hand  -     cephALEXin (KEFLEX) 500 mg capsule; Take 1 Cap by mouth three (3) times daily for 5 days. For infection around the fingernail      Follow-up and Dispositions    · Return in about 3 months (around 6/13/2020).        reviewed diet, exercise and weight control  cardiovascular risk and specific lipid/LDL goals reviewed  reviewed medications and side effects in detail  specific diabetic recommendations: low cholesterol diet, weight control and daily exercise discussed, foot care discussed and Podiatry visits discussed, annual eye examinations at Ophthalmology discussed and glycohemoglobin and other lab monitoring discussed     I have discussed diagnosis listed in this note with pt and/or family. I have discussed treatment plans and options and the risk/benefit analysis of those options, including safe use of medications and possible medication side effects. Through the use of shared decision making we have agreed to the above plan. The patient has received an after-visit summary and questions were answered concerning future plans and follow up. Advise pt of any urgent changes then to proceed to the ER.

## 2020-03-13 NOTE — PATIENT INSTRUCTIONS
Paronychia: Care Instructions Your Care Instructions Paronychia (say \"jnga-cm-WZ-shanita-uh\") is an infection of the skin around a fingernail or toenail. It happens when germs enter through a break in the skin. The doctor may have made a small cut in the infected area to drain the pus. Most cases of paronychia improve in a few days. But watch your symptoms and follow your doctor's advice. Though rare, a mild case can turn into something more serious and infect your entire finger or toe. Also, it is possible for an infection to return. Follow-up care is a key part of your treatment and safety. Be sure to make and go to all appointments, and call your doctor if you are having problems. It's also a good idea to know your test results and keep a list of the medicines you take. How can you care for yourself at home? · If your doctor told you how to care for your infected nail, follow the doctor's instructions. If you did not get instructions, follow this general advice: 
? Wash the area with clean water 2 times a day. Don't use hydrogen peroxide or alcohol, which can slow healing. ? You may cover the area with a thin layer of petroleum jelly, such as Vaseline, and a nonstick bandage. ? Apply more petroleum jelly and replace the bandage as needed. · If your doctor prescribed antibiotics, take them as directed. Do not stop taking them just because you feel better. You need to take the full course of antibiotics. · Take an over-the-counter pain medicine, such as acetaminophen (Tylenol), ibuprofen (Advil, Motrin), or naproxen (Aleve). Read and follow all instructions on the label. · Do not take two or more pain medicines at the same time unless the doctor told you to. Many pain medicines have acetaminophen, which is Tylenol. Too much acetaminophen (Tylenol) can be harmful. · Prop up the toe or finger so that it is higher than the level of your heart. This will help with pain and swelling. · Apply heat. Put a warm water bottle, heating pad set on low, or warm cloth on your finger or toe. Do not go to sleep with a heating pad on your skin. · Soak the area in warm water twice a day for 15 minutes each time. After soaking, dry the area well and apply a thin layer of petroleum jelly, such as Vaseline. Put on a new bandage. When should you call for help? Call your doctor now or seek immediate medical care if: 
  · You have signs of new or worsening infection, such as: 
? Increased pain, swelling, warmth, or redness. ? Red streaks leading from the infected skin. ? Pus draining from the area. ? A fever.  
 Watch closely for changes in your health, and be sure to contact your doctor if: 
  · You do not get better as expected. Where can you learn more? Go to http://james-rachel.info/ Enter C435 in the search box to learn more about \"Paronychia: Care Instructions. \" Current as of: October 30, 2019Content Version: 12.4 © 0899-6446 [employer]. Care instructions adapted under license by [employer] (which disclaims liability or warranty for this information). If you have questions about a medical condition or this instruction, always ask your healthcare professional. [address] any warranty or liability for your use of this information.

## 2020-03-14 LAB
BUN SERPL-MCNC: 25 MG/DL (ref 8–27)
BUN/CREAT SERPL: 20 (ref 12–28)
CALCIUM SERPL-MCNC: 9.8 MG/DL (ref 8.7–10.3)
CHLORIDE SERPL-SCNC: 104 MMOL/L (ref 96–106)
CO2 SERPL-SCNC: 25 MMOL/L (ref 20–29)
CREAT SERPL-MCNC: 1.28 MG/DL (ref 0.57–1)
GLUCOSE SERPL-MCNC: 134 MG/DL (ref 65–99)
INTERPRETATION: NORMAL
Lab: NORMAL
POTASSIUM SERPL-SCNC: 4.7 MMOL/L (ref 3.5–5.2)
SODIUM SERPL-SCNC: 143 MMOL/L (ref 134–144)

## 2020-03-27 DIAGNOSIS — E11.21 TYPE 2 DIABETES WITH NEPHROPATHY (HCC): ICD-10-CM

## 2020-03-27 RX ORDER — BLOOD SUGAR DIAGNOSTIC
STRIP MISCELLANEOUS
Qty: 300 STRIP | Refills: 3 | Status: SHIPPED | OUTPATIENT
Start: 2020-03-27 | End: 2021-02-24

## 2020-03-27 NOTE — TELEPHONE ENCOUNTER
Patient wants a return call regarding getting the Accu-Chek Ashley Plus test strp strip . She wants this to go to [facility].  [phone]   Patient [phone]

## 2020-06-10 DIAGNOSIS — E11.21 TYPE 2 DIABETES WITH NEPHROPATHY (HCC): ICD-10-CM

## 2020-06-10 RX ORDER — GLYBURIDE 5 MG/1
5 TABLET ORAL
Qty: 90 TAB | Refills: 3 | Status: SHIPPED | OUTPATIENT
Start: 2020-06-10 | End: 2021-01-19 | Stop reason: SDUPTHER

## 2020-07-21 NOTE — TELEPHONE ENCOUNTER
Ms. Sheri Horowitz called to get a refill on Glyburide D 5mg. , she would like it sent to [facility].     Please return call to [phone]

## 2020-08-18 ENCOUNTER — OFFICE VISIT (OUTPATIENT)
Dept: FAMILY MEDICINE CLINIC | Age: 85
End: 2020-08-18
Payer: COMMERCIAL

## 2020-08-18 VITALS
HEART RATE: 82 BPM | RESPIRATION RATE: 18 BRPM | TEMPERATURE: 97.8 F | DIASTOLIC BLOOD PRESSURE: 78 MMHG | HEIGHT: 62 IN | BODY MASS INDEX: 32.87 KG/M2 | OXYGEN SATURATION: 96 % | WEIGHT: 178.6 LBS | SYSTOLIC BLOOD PRESSURE: 135 MMHG

## 2020-08-18 DIAGNOSIS — M51.36 DDD (DEGENERATIVE DISC DISEASE), LUMBAR: ICD-10-CM

## 2020-08-18 DIAGNOSIS — Z51.81 ENCOUNTER FOR MEDICATION MONITORING: ICD-10-CM

## 2020-08-18 DIAGNOSIS — I10 ESSENTIAL HYPERTENSION: Primary | ICD-10-CM

## 2020-08-18 DIAGNOSIS — E78.2 MIXED HYPERLIPIDEMIA: ICD-10-CM

## 2020-08-18 DIAGNOSIS — Z12.31 ENCOUNTER FOR SCREENING MAMMOGRAM FOR BREAST CANCER: ICD-10-CM

## 2020-08-18 DIAGNOSIS — E11.21 TYPE 2 DIABETES WITH NEPHROPATHY (HCC): ICD-10-CM

## 2020-08-18 DIAGNOSIS — M15.9 PRIMARY OSTEOARTHRITIS INVOLVING MULTIPLE JOINTS: ICD-10-CM

## 2020-08-18 DIAGNOSIS — E66.9 NON MORBID OBESITY: ICD-10-CM

## 2020-08-18 LAB
GLUCOSE POC: 115 MG/DL
HBA1C MFR BLD HPLC: 7.2 %

## 2020-08-18 PROCEDURE — 83036 HEMOGLOBIN GLYCOSYLATED A1C: CPT | Performed by: FAMILY MEDICINE

## 2020-08-18 PROCEDURE — 3051F HG A1C>EQUAL 7.0%<8.0%: CPT | Performed by: FAMILY MEDICINE

## 2020-08-18 PROCEDURE — 99214 OFFICE O/P EST MOD 30 MIN: CPT | Performed by: FAMILY MEDICINE

## 2020-08-18 PROCEDURE — 82947 ASSAY GLUCOSE BLOOD QUANT: CPT | Performed by: FAMILY MEDICINE

## 2020-08-18 RX ORDER — DICLOFENAC SODIUM 10 MG/G
GEL TOPICAL
Qty: 100 G | Refills: 0
Start: 2020-08-18

## 2020-08-18 NOTE — PROGRESS NOTES
HISTORY OF PRESENT ILLNESS  Freddy Forde is a 81 y.o. female. HPI   Follow up on chronic medical problems. Feeling well. Sleeping good. Appetite is good. Staying at home mostly d/t the pandemic. HTN follow up:  Compliant w/ meds, low salt diet, and exercise. Home bp monitoring 120-130s/70s. Pt has bp log. Swelling is overall improved with her compression stocking. No headache or dizziness. No chest pain, SOB or palpitations. DM type II follow up:  Compliant w/ meds, diabetic diet, and exercise. She has been better with watching diet. Obtains home glucose monitoring averaging 100-130s. Checks BS daily on most days and prn. Pt does have BS log at visit today. Denies any tingling sensation, polyuria and polydipsia. No blurred vision. No significant weight changes. Feeling well since last OV. Hypercholesterolemia follow up:  Compliant low fat, low cholesterol diet. Intolerant to stains and did not tolerate zetia or welchol. Exercising some. Osteoarthritis:  Patient has osteoarthritis. Takes an occasional Advil which helps the pain. Has had increased pain and stiffness in the back and hip that comes and goes. Increase back pain at night when in the bed. Her mattress is greater than 21years old. We discussed her looking at getting an new mattress to help with the back pain at night. Symptoms onset: problem is longstanding. Rheumatological ROS: stable, mild-to-moderate joint symptoms intermittently, reasonably well controlled by PRN meds.    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 90 Tab 3    Accu-Chek Ashley Plus test strp strip USE TO CHECK BLOOD SUGAR  2 TIMES DAILY DX E11.9 300 Strip 3    cholecalciferol (VITAMIN D3) (2,000 UNITS /50 MCG) cap capsule Take 2,000 Units by mouth daily.  furosemide (Lasix) 20 mg tablet Take 1 Tab by mouth daily. 90 Tab 3    ibuprofen (ADVIL LIQUI-GEL) 200 mg cap Take 1 Tab by mouth two (2) times daily as needed for Pain.  GARLIC PO Take 9,079 mg by mouth daily.  red yeast rice extract 600 mg Cap Take 600 mg by mouth daily.  cod liver oil Cap Take 1 Cap by mouth daily. Allergies   Allergen Reactions    Latex Contact Dermatitis    Aspirin Palpitations    Glipizide Shortness of Breath and Swelling    Bactrim Ds [Sulfamethoxazole-Trimethoprim] Other (comments)     Patient experienced pain in legs and buttock and muscle cramping.  Contrast Agent [Iodine] Hives and Itching     Itchy throat    Amaryl [Glimepiride] Other (comments)     \"nervous feeling\"    Avocado Rash     Pt states she bruises.     Feldene [Piroxicam] Diarrhea    Januvia [Sitagliptin] Other (comments)     Upset stomach    Levofloxacin Unknown (comments)    Losartan Itching     Caused congestion per stated by pt    Diablock Hives    Oxycodone Diarrhea and Nausea Only    Pcn [Penicillins] Rash    Pineapple Rash    Prednisone Nausea and Vomiting    Tylenol [Acetaminophen] Palpitations    Welchol [Colesevelam] Other (comments)     Pt states \"made throat feel raw\"    Zestril [Lisinopril] Cough       Past Medical History:   Diagnosis Date    Chronic edema     Diabetes ([facility] [address]     Family history of ischemic heart disease 4/12/2012  Hypercholesterolemia     Hypertension        Past Surgical History:   Procedure Laterality Date    ABDOMEN SURGERY PROC UNLISTED  09/03/2019    abscess removed from abdominal wall    ENDOSCOPY, COLON, DIAGNOSTIC  12/2006    Dr. Shelley Anne [address]    HX [address]       Family History   Problem Relation Age of Onset   Lanelle Delay Stroke Mother     Hypertension Mother     Stroke Father     Hypertension Father     Hypertension Brother        Social History     Tobacco Use    Smoking status: Never Smoker    Smokeless tobacco: Never Used   Substance Use Topics    Alcohol use: No     Alcohol/week: 0.0 standard drinks        Lab Results   Component Value Date/Time    WBC 6.3 02/05/2020 09:30 AM    HGB 13.0 02/05/2020 09:30 AM    HCT 39.7 02/05/2020 09:30 AM    PLATELET 236 46/32/6466 09:30 AM    MCV 82 02/05/2020 09:30 AM     Lab Results   Component Value Date/Time    Cholesterol, total 267 (H) 02/05/2020 09:30 AM    HDL Cholesterol 53 02/05/2020 09:30 AM    LDL, calculated 187 (H) 02/05/2020 09:30 AM    Triglyceride 137 02/05/2020 09:30 AM    CHOL/HDL Ratio 5.6 (H) 12/28/2009 10:43 AM       Lab Results   Component Value Date/Time    Sodium 143 03/13/2020 08:55 AM    Potassium 4.7 03/13/2020 08:55 AM    Chloride 104 03/13/2020 08:55 AM    CO2 25 03/13/2020 08:55 AM    Anion gap 4 (L) 09/05/2019 03:22 AM    Glucose 134 (H) 03/13/2020 08:55 AM    BUN 25 03/13/2020 08:55 AM    Creatinine 1.28 (H) 03/13/2020 08:55 AM    BUN/Creatinine ratio 20 03/13/2020 08:55 AM    GFR est AA 44 (L) 03/13/2020 08:55 AM    GFR est non-AA 38 (L) 03/13/2020 08:55 AM    Calcium 9.8 03/13/2020 08:55 AM    Bilirubin, total 0.5 02/05/2020 09:30 AM    ALT (SGPT) 14 02/05/2020 09:30 AM    Alk.  phosphatase 111 02/05/2020 09:30 AM    Protein, total 7.4 02/05/2020 09:30 AM    Albumin 4.1 02/05/2020 09:30 AM    Globulin 4.1 (H) 04/26/2017 12:10 PM    A-G Ratio 1.2 02/05/2020 09:30 AM      Lab Results   Component Value Date/Time    Hemoglobin A1c 10.4 (H) 01/28/2014 08:40 AM    Hemoglobin A1c (POC) 7.6 02/05/2020 09:32 AM         Review of Systems   Constitutional: Negative for malaise/fatigue. HENT: Negative for congestion. Eyes: Negative for blurred vision. Respiratory: Negative for cough and shortness of breath. Cardiovascular: Negative for chest pain, palpitations and leg swelling. Gastrointestinal: Negative for abdominal pain, constipation and heartburn. Genitourinary: Negative for dysuria, frequency and urgency. Neurological: Negative for dizziness, tingling and headaches. Endo/Heme/Allergies: Negative for environmental allergies. Psychiatric/Behavioral: Negative for depression. The patient does not have insomnia. Physical Exam  Vitals signs and nursing note reviewed. Constitutional:       Appearance: Normal appearance. She is well-developed. Comments: /78 (BP 1 Location: Left arm, BP Patient Position: Sitting)   Pulse 82   Temp 97.8 °F (36.6 °C) (Temporal)   Resp 18   Ht 5' 2\" (1.575 m)   Wt 178 lb 9.6 oz (81 kg)   LMP 04/28/1972   SpO2 96%   BMI 32.67 kg/m²    HENT:      Right Ear: Tympanic membrane and ear canal normal.      Left Ear: Tympanic membrane and ear canal normal.      Nose: No mucosal edema or rhinorrhea. Neck:      Musculoskeletal: Normal range of motion and neck supple. Thyroid: No thyromegaly. Cardiovascular:      Rate and Rhythm: Normal rate and regular rhythm. Heart sounds: Normal heart sounds. No gallop. Pulmonary:      Effort: Pulmonary effort is normal.      Breath sounds: Normal breath sounds. Abdominal:      General: Bowel sounds are normal.      Palpations: Abdomen is soft. There is no mass. Tenderness: There is no abdominal tenderness. Musculoskeletal: Normal range of motion. General: No swelling. Right hip: She exhibits tenderness and bony tenderness. Lumbar back: She exhibits tenderness and bony tenderness. Lymphadenopathy:      Cervical: No cervical adenopathy. Skin:     General: Skin is warm and dry. Neurological:      General: No focal deficit present. Mental Status: She is alert and oriented to person, place, and time. Psychiatric:         Mood and Affect: Mood normal.         ASSESSMENT and PLAN  Diagnoses and all orders for this visit:    1. Essential hypertension  Discussed sodium restriction, high k rich diet, maintaining ideal body weight and regular exercise program such as daily walking 30 min perday 4-5 times per week, as physiologic means to achieve blood pressure control.  Medication compliance advised. 2. Type 2 diabetes with nephropathy (HCC)  a1c at 7.2%  -     AMB POC HEMOGLOBIN A1C  -     AMB POC GLUCOSE, QUANTITATIVE, BLOOD    3. Mixed hyperlipidemia  -     LIPID PANEL    4. Primary osteoarthritis involving multiple joints  -     diclofenac (VOLTAREN) 1 % gel; Apply  to affected area four (4) times daily as needed for Pain. Can get OTC. Instructions for exercises given and reviewed with pt. Pt also to use heat to the area 3-4 times a day over the next several days until sx are improved. 5. DDD (degenerative disc disease), lumbar  -     diclofenac (VOLTAREN) 1 % gel; Apply  to affected area four (4) times daily as needed for Pain. Can get OTC. Instructions for exercises given and reviewed with pt. Pt also to use heat to the area 3-4 times a day over the next several days until sx are improved. Probably would improved with a new mattress. 6. Encounter for medication monitoring  -     METABOLIC PANEL, COMPREHENSIVE    7. Encounter for screening mammogram for breast cancer  -     [facility] MAMMO BI SCREENING INCL CAD; Future    8. Non morbid obesity  I have reviewed/discussed the above normal BMI with the patient. I have recommended the following interventions: dietary management education, guidance, and counseling .        Follow-up and Dispositions    · Return in about 6 months (around 2/18/2021) for medicare wellness exam.       reviewed diet, exercise and weight control  cardiovascular risk and specific lipid/LDL goals reviewed  reviewed medications and side effects in detail  specific diabetic recommendations: low cholesterol diet, weight control and daily exercise discussed and glycohemoglobin and other lab monitoring discussed     I have discussed diagnosis listed in this note with pt and/or family. I have discussed treatment plans and options and the risk/benefit analysis of those options, including safe use of medications and possible medication side effects. Through the use of shared decision making we have agreed to the above plan. The patient has received an after-visit summary and questions were answered concerning future plans and follow up. Advise pt of any urgent changes then to proceed to the ER.

## 2020-08-18 NOTE — PROGRESS NOTES
Chief Complaint   Patient presents with    Hypertension     follow up    Cholesterol Problem     follow up    Diabetes     follow up         A1C 2/5/2020    Microalbumin 2/5/2020    Patient states she had a telephone visit 4/2/2020 and saw Dr. Sarah Randle 2-3 weeks ago. Mammogram: 11/20/2019      1. Have you been to the ER, urgent care clinic since your last visit? Hospitalized since your last visit? No    2. Have you seen or consulted any other health care providers outside of the [address] [place] [address] since your last visit? Include any pap smears or colon screening.  No

## 2020-08-19 LAB
ALBUMIN SERPL-MCNC: 3.9 G/DL (ref 3.6–4.6)
ALBUMIN/GLOB SERPL: 1.4 {RATIO} (ref 1.2–2.2)
ALP SERPL-CCNC: 100 IU/L (ref 39–117)
ALT SERPL-CCNC: 13 IU/L (ref 0–32)
AST SERPL-CCNC: 13 IU/L (ref 0–40)
BILIRUB SERPL-MCNC: 0.5 MG/DL (ref 0–1.2)
BUN SERPL-MCNC: 22 MG/DL (ref 8–27)
BUN/CREAT SERPL: 18 (ref 12–28)
CALCIUM SERPL-MCNC: 9.4 MG/DL (ref 8.7–10.3)
CHLORIDE SERPL-SCNC: 107 MMOL/L (ref 96–106)
CHOLEST SERPL-MCNC: 234 MG/DL (ref 100–199)
CO2 SERPL-SCNC: 24 MMOL/L (ref 20–29)
CREAT SERPL-MCNC: 1.2 MG/DL (ref 0.57–1)
GLOBULIN SER CALC-MCNC: 2.8 G/DL (ref 1.5–4.5)
GLUCOSE SERPL-MCNC: 120 MG/DL (ref 65–99)
HDLC SERPL-MCNC: 45 MG/DL
INTERPRETATION, 910389: NORMAL
INTERPRETATION: NORMAL
LDLC SERPL CALC-MCNC: 163 MG/DL (ref 0–99)
PDF IMAGE, 910387: NORMAL
POTASSIUM SERPL-SCNC: 4.3 MMOL/L (ref 3.5–5.2)
PROT SERPL-MCNC: 6.7 G/DL (ref 6–8.5)
SODIUM SERPL-SCNC: 144 MMOL/L (ref 134–144)
TRIGL SERPL-MCNC: 128 MG/DL (ref 0–149)
VLDLC SERPL CALC-MCNC: 26 MG/DL (ref 5–40)

## 2020-09-04 DIAGNOSIS — I10 ESSENTIAL HYPERTENSION: ICD-10-CM

## 2020-09-04 RX ORDER — FUROSEMIDE 20 MG/1
20 TABLET ORAL DAILY
Qty: 90 TAB | Refills: 3 | Status: SHIPPED | OUTPATIENT
Start: 2020-09-04 | End: 2020-11-30 | Stop reason: SDUPTHER

## 2020-09-04 NOTE — TELEPHONE ENCOUNTER
Patient states that  need to call [facility] for her RX furosemide (LASIX) 20 mg tablet 90 day supply she can be reached @ [phone]

## 2020-11-20 ENCOUNTER — HOSPITAL ENCOUNTER (OUTPATIENT)
Dept: MAMMOGRAPHY | Age: 85
Discharge: HOME | End: 2020-11-20
Attending: FAMILY MEDICINE
Payer: MEDICARE

## 2020-11-20 DIAGNOSIS — Z12.31 ENCOUNTER FOR SCREENING MAMMOGRAM FOR BREAST CANCER: ICD-10-CM

## 2020-11-20 PROCEDURE — 77067 SCR MAMMO BI INCL CAD: CPT

## 2020-11-30 DIAGNOSIS — I10 ESSENTIAL HYPERTENSION: ICD-10-CM

## 2020-11-30 RX ORDER — FUROSEMIDE 20 MG/1
20 TABLET ORAL DAILY
Qty: 90 TAB | Refills: 3 | Status: SHIPPED | OUTPATIENT
Start: 2020-11-30 | End: 2021-08-20 | Stop reason: SDUPTHER

## 2021-01-19 DIAGNOSIS — E11.21 TYPE 2 DIABETES WITH NEPHROPATHY (HCC): ICD-10-CM

## 2021-01-19 RX ORDER — GLYBURIDE 5 MG/1
5 TABLET ORAL
Qty: 90 TAB | Refills: 3 | Status: SHIPPED | OUTPATIENT
Start: 2021-01-19 | End: 2021-08-20 | Stop reason: SDUPTHER

## 2021-01-19 NOTE — TELEPHONE ENCOUNTER
----- Message from Sussy Harry sent at 1/19/2021 12:48 PM EST -----  Regarding: Dr. Kelsey Elizondo / Leonid Mao: [phone]  Medication Refill    Caller (if not patient): N/A      Relationship of caller (if not patient): N/A      Best contact number(s):[phone]      Name of medication and dosage if known: \"Glyduride\" 5 mg       Is patient out of this medication (yes/no): No      Pharmacy name:[facility]    Pharmacy listed in chart? (yes/no):Yes  Pharmacy phone number: [phone]      Details to clarify the request: Pt informed this would be the first time this prescription is filled through [facility].       Evins Her

## 2021-02-10 ENCOUNTER — OFFICE VISIT (OUTPATIENT)
Dept: FAMILY MEDICINE CLINIC | Age: 86
End: 2021-02-10
Payer: MEDICARE

## 2021-02-10 VITALS
TEMPERATURE: 96.9 F | SYSTOLIC BLOOD PRESSURE: 138 MMHG | BODY MASS INDEX: 32.9 KG/M2 | HEIGHT: 62 IN | HEART RATE: 67 BPM | WEIGHT: 178.8 LBS | DIASTOLIC BLOOD PRESSURE: 68 MMHG | RESPIRATION RATE: 18 BRPM | OXYGEN SATURATION: 99 %

## 2021-02-10 DIAGNOSIS — M15.9 PRIMARY OSTEOARTHRITIS INVOLVING MULTIPLE JOINTS: ICD-10-CM

## 2021-02-10 DIAGNOSIS — I10 ESSENTIAL HYPERTENSION: ICD-10-CM

## 2021-02-10 DIAGNOSIS — E78.2 MIXED HYPERLIPIDEMIA: ICD-10-CM

## 2021-02-10 DIAGNOSIS — M51.36 DDD (DEGENERATIVE DISC DISEASE), LUMBAR: ICD-10-CM

## 2021-02-10 DIAGNOSIS — Z51.81 ENCOUNTER FOR MEDICATION MONITORING: ICD-10-CM

## 2021-02-10 DIAGNOSIS — E11.21 TYPE 2 DIABETES WITH NEPHROPATHY (HCC): ICD-10-CM

## 2021-02-10 DIAGNOSIS — Z00.00 MEDICARE ANNUAL WELLNESS VISIT, SUBSEQUENT: Primary | ICD-10-CM

## 2021-02-10 LAB
BILIRUB UR QL STRIP: NEGATIVE
GLUCOSE POC: 153 MG/DL
GLUCOSE UR-MCNC: NEGATIVE MG/DL
HBA1C MFR BLD HPLC: 7.8 %
HEMOCCULT STL QL: NEGATIVE
KETONES P FAST UR STRIP-MCNC: NEGATIVE MG/DL
PH UR STRIP: 5.5 [PH] (ref 4.6–8)
PROT UR QL STRIP: NEGATIVE
SP GR UR STRIP: 1.01 (ref 1–1.03)
UA UROBILINOGEN AMB POC: NORMAL (ref 0.2–1)
URINALYSIS CLARITY POC: CLEAR
URINALYSIS COLOR POC: YELLOW
URINE BLOOD POC: NEGATIVE
URINE LEUKOCYTES POC: NORMAL
URINE NITRITES POC: NEGATIVE
VALID INTERNAL CONTROL?: YES

## 2021-02-10 PROCEDURE — G8427 DOCREV CUR MEDS BY ELIG CLIN: HCPCS | Performed by: FAMILY MEDICINE

## 2021-02-10 PROCEDURE — G0439 PPPS, SUBSEQ VISIT: HCPCS | Performed by: FAMILY MEDICINE

## 2021-02-10 PROCEDURE — G8754 DIAS BP LESS 90: HCPCS | Performed by: FAMILY MEDICINE

## 2021-02-10 PROCEDURE — 1090F PRES/ABSN URINE INCON ASSESS: CPT | Performed by: FAMILY MEDICINE

## 2021-02-10 PROCEDURE — 1101F PT FALLS ASSESS-DOCD LE1/YR: CPT | Performed by: FAMILY MEDICINE

## 2021-02-10 PROCEDURE — G8752 SYS BP LESS 140: HCPCS | Performed by: FAMILY MEDICINE

## 2021-02-10 PROCEDURE — G8399 PT W/DXA RESULTS DOCUMENT: HCPCS | Performed by: FAMILY MEDICINE

## 2021-02-10 PROCEDURE — 83036 HEMOGLOBIN GLYCOSYLATED A1C: CPT | Performed by: FAMILY MEDICINE

## 2021-02-10 PROCEDURE — G8417 CALC BMI ABV UP PARAM F/U: HCPCS | Performed by: FAMILY MEDICINE

## 2021-02-10 PROCEDURE — 81001 URINALYSIS AUTO W/SCOPE: CPT | Performed by: FAMILY MEDICINE

## 2021-02-10 PROCEDURE — G8536 NO DOC ELDER MAL SCRN: HCPCS | Performed by: FAMILY MEDICINE

## 2021-02-10 PROCEDURE — 82272 OCCULT BLD FECES 1-3 TESTS: CPT | Performed by: FAMILY MEDICINE

## 2021-02-10 PROCEDURE — G8510 SCR DEP NEG, NO PLAN REQD: HCPCS | Performed by: FAMILY MEDICINE

## 2021-02-10 PROCEDURE — 82947 ASSAY GLUCOSE BLOOD QUANT: CPT | Performed by: FAMILY MEDICINE

## 2021-02-10 PROCEDURE — 3051F HG A1C>EQUAL 7.0%<8.0%: CPT | Performed by: FAMILY MEDICINE

## 2021-02-10 PROCEDURE — 99214 OFFICE O/P EST MOD 30 MIN: CPT | Performed by: FAMILY MEDICINE

## 2021-02-10 RX ORDER — DORZOLAMIDE HYDROCHLORIDE AND TIMOLOL MALEATE 20; 5 MG/ML; MG/ML
1 SOLUTION/ DROPS OPHTHALMIC 2 TIMES DAILY
COMMUNITY
Start: 2021-01-18 | End: 2021-10-26 | Stop reason: ALTCHOICE

## 2021-02-10 NOTE — PROGRESS NOTES
This is a Subsequent Medicare Annual Wellness Exam (AWV) (Performed 12 months after IPPE or effective date of Medicare Part B enrollment)    I have reviewed the patient's medical history in detail and updated the computerized patient record. 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 90 Tab 3    furosemide (Lasix) 20 mg tablet Take 1 Tab by mouth daily. 90 Tab 3    diclofenac (VOLTAREN) 1 % gel Apply  to affected area four (4) times daily as needed for Pain. 100 g 0    Accu-Chek Ashley Plus test strp strip USE TO CHECK BLOOD SUGAR  2 TIMES DAILY DX E11.9 300 Strip 3    cholecalciferol (VITAMIN D3) (2,000 UNITS /50 MCG) cap capsule Take 2,000 Units by mouth daily.  ibuprofen (ADVIL LIQUI-GEL) 200 mg cap Take 1 Tab by mouth two (2) times daily as needed for Pain.  GARLIC PO Take 8,629 mg by mouth daily.  red yeast rice extract 600 mg Cap Take 600 mg by mouth daily.  cod liver oil Cap Take 1 Cap by mouth daily. Allergies   Allergen Reactions    Latex Contact Dermatitis    Aspirin Palpitations    Glipizide Shortness of Breath and Swelling    Bactrim Ds [Sulfamethoxazole-Trimethoprim] Other (comments)     Patient experienced pain in legs and buttock and muscle cramping.     Contrast Agent [Iodine] Hives and Itching     Itchy throat    Amaryl [Glimepiride] Other (comments)     \"nervous feeling\"    Avocado Rash     Pt states she bruises.  Feldene [Piroxicam] Diarrhea    Januvia [Sitagliptin] Other (comments)     Upset stomach    Levofloxacin Unknown (comments)    Losartan Itching     Caused congestion per stated by pt    [place] Hives    Oxycodone Diarrhea and Nausea Only    Pcn [Penicillins] Rash    Pineapple Rash    Prednisone Nausea and Vomiting    Tylenol [Acetaminophen] Palpitations    Welchol [Colesevelam] Other (comments)     Pt states \"made throat feel raw\"    Zestril [Lisinopril] Cough       Past Medical History:   Diagnosis Date    Chronic edema     Diabetes ([facility] [address]     Family history of ischemic heart disease 4/12/2012    Hypercholesterolemia     Hypertension        Past Surgical History:   Procedure Laterality Date    ABDOMEN SURGERY PROC UNLISTED  09/03/2019    abscess removed from abdominal wall    ENDOSCOPY, COLON, DIAGNOSTIC  12/2006    Dr. Tito Pineda HX BREAST BIOPSY  1997    HX HYSTERECTOMY  1972       Family History   Problem Relation Age of Onset   Connye Sole Stroke Mother     Hypertension Mother     Stroke Father     Hypertension Father     Hypertension Brother        Social History     Tobacco Use    Smoking status: Never Smoker    Smokeless tobacco: Never Used   Substance Use Topics    Alcohol use: No     Alcohol/week: 0.0 standard drinks         Depression Risk Factor Screening:     PHQ over the last two weeks    Little interest or pleasure in doing things Not at all   Feeling down, depressed or hopeless Not at all   Total Score PHQ 2 0     Alcohol Risk Factor Screening: You do not drink alcohol or very rarely. Functional Ability and Level of Safety:   Hearing Loss  Hearing is good. Activities of Daily Living  The home contains: has safety equipment. Patient does total self care    Fall RiskFall Risk Assessment, last 12 mths    Able to walk?  Yes Fall in past 12 months? No     Functional Ability:   Does the patient exhibit a steady gait? yes    How long did it take the patient to get up and walk from a sitting position? seconds    Is the patient self reliant? (ie can do own laundry, meals, household chores)  yes   Does the patient handle his/her own medications? yes   Does the patient handle his/her own money? yes   Is the patients home safe (ie good lighting, handrails on stairs and bath, etc.)? yes   Did you notice or did patient express any hearing difficulties? no   Did you notice or did patient express any vision difficulties? no        Advance Care Planning:   Patient was offered the opportunity to discuss advance care planning:  yes    Does patient have an Advance Directive:  yes        Abuse Screen  Patient is not abused    Cognitive Screening   Evaluation of Cognitive Function:  Has your family/caregiver stated any concerns about your memory: no      Patient Care Team   Patient Care Team:  Joseph Contreras MD as PCP - General    Assessment/Plan   Education and counseling provided:  Are appropriate based on today's review and evaluation  End-of-Life planning (with patient's consent)    ASSESSMENT and PLAN    Medicare Annual Wellness  Continue current treatment plan. Continue annual follow up. I have discussed diagnosis listed in this note with pt and/or family. I have discussed treatment plans and options and the risk/benefit analysis of those options, including safe use of medications and possible medication side effects. Through the use of shared decision making we have agreed to the above plan. The patient has received an after-visit summary and questions were answered concerning future plans and follow up. Advise pt of any urgent changes then to proceed to the ER.

## 2021-02-10 NOTE — PROGRESS NOTES
HISTORY OF PRESENT ILLNESS  Maria Fernanda Saravia is a 81 y.o. female. HPI   Follow up on chronic medical problems. Feeling well. Sleeping good. Appetite is good. Staying at home mostly d/t the pandemic. HTN follow up:  Compliant w/ meds, low salt diet, and exercise. Home bp monitoring 120-130s/70s. Pt has bp log. Swelling is overall improved with her compression stocking. No headache or dizziness. No chest pain, SOB or palpitations. DM type II follow up:  Compliant w/ meds, diabetic diet, and exercise. She has been better with watching diet. Obtains home glucose monitoring averaging 100-130s. Checks BS daily on most days and prn. Pt does have BS log at visit today. Denies any tingling sensation, polyuria and polydipsia. No blurred vision. No significant weight changes. Feeling well since last OV. Hypercholesterolemia follow up:  Compliant low fat, low cholesterol diet. Intolerant to stains and did not tolerate zetia or welchol. Exercising some. Osteoarthritis:  Patient has osteoarthritis. Takes an occasional Advil which helps the pain. Has had increased pain and stiffness in the back and hip that comes and goes. Increase back pain at night when in the bed. Her mattress is greater than 21years old. We discussed her looking at getting an new mattress to help with the back pain at night. Symptoms onset: problem is longstanding. Rheumatological ROS: stable, mild-to-moderate joint symptoms intermittently, reasonably well controlled by PRN meds.    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 90 Tab 3    furosemide (Lasix) 20 mg tablet Take 1 Tab by mouth daily. 90 Tab 3    diclofenac (VOLTAREN) 1 % gel Apply  to affected area four (4) times daily as needed for Pain. 100 g 0    Accu-Chek Ashley Plus test strp strip USE TO CHECK BLOOD SUGAR  2 TIMES DAILY DX E11.9 300 Strip 3    cholecalciferol (VITAMIN D3) (2,000 UNITS /50 MCG) cap capsule Take 2,000 Units by mouth daily.  ibuprofen (ADVIL LIQUI-GEL) 200 mg cap Take 1 Tab by mouth two (2) times daily as needed for Pain.  GARLIC PO Take 8,618 mg by mouth daily.  red yeast rice extract 600 mg Cap Take 600 mg by mouth daily.  cod liver oil Cap Take 1 Cap by mouth daily. Allergies   Allergen Reactions    Latex Contact Dermatitis    Aspirin Palpitations    Glipizide Shortness of Breath and Swelling    Bactrim Ds [Sulfamethoxazole-Trimethoprim] Other (comments)     Patient experienced pain in legs and buttock and muscle cramping.  Contrast Agent [Iodine] Hives and Itching     Itchy throat    Amaryl [Glimepiride] Other (comments)     \"nervous feeling\"    Avocado Rash     Pt states she bruises.     Feldene [Piroxicam] Diarrhea    Januvia [Sitagliptin] Other (comments)     Upset stomach    Levofloxacin Unknown (comments)    Losartan Itching     Caused congestion per stated by pt    Derrick Hives    Oxycodone Diarrhea and Nausea Only    Pcn [Penicillins] Rash    Pineapple Rash    Prednisone Nausea and Vomiting    Tylenol [Acetaminophen] Palpitations    Welchol [Colesevelam] Other (comments)     Pt states \"made throat feel raw\"    Zestril [Lisinopril] Cough         Past Medical History: Diagnosis Date    Chronic edema     Diabetes [address] [address]     Family history of ischemic heart disease 4/12/2012    Hypercholesterolemia     Hypertension          Past Surgical History:   Procedure Laterality Date    ABDOMEN SURGERY PROC UNLISTED  09/03/2019    abscess removed from abdominal wall    ENDOSCOPY, COLON, DIAGNOSTIC  12/2006    Dr. Hayder Blackmon [address]    [address]X [address]         Family History   Problem Relation Age of Onset   Crystal Curl Stroke Mother     Hypertension Mother     Stroke Father     Hypertension Father     Hypertension Brother        Social History     Tobacco Use    Smoking status: Never Smoker    Smokeless tobacco: Never Used   Substance Use Topics    Alcohol use: No     Alcohol/week: 0.0 standard drinks        Lab Results   Component Value Date/Time    WBC 6.3 02/05/2020 09:30 AM    HGB 13.0 02/05/2020 09:30 AM    HCT 39.7 02/05/2020 09:30 AM    PLATELET 952 87/29/2585 09:30 AM    MCV 82 02/05/2020 09:30 AM     Lab Results   Component Value Date/Time    Cholesterol, total 234 (H) 08/18/2020 09:00 AM    HDL Cholesterol 45 08/18/2020 09:00 AM    LDL, calculated 163 (H) 08/18/2020 09:00 AM    Triglyceride 128 08/18/2020 09:00 AM    CHOL/HDL Ratio 5.6 (H) 12/28/2009 10:43 AM       Lab Results   Component Value Date/Time    Sodium 144 08/18/2020 09:00 AM    Potassium 4.3 08/18/2020 09:00 AM    Chloride 107 (H) 08/18/2020 09:00 AM    CO2 24 08/18/2020 09:00 AM    Anion gap 4 (L) 09/05/2019 03:22 AM    Glucose 120 (H) 08/18/2020 09:00 AM    BUN 22 08/18/2020 09:00 AM    Creatinine 1.20 (H) 08/18/2020 09:00 AM    BUN/Creatinine ratio 18 08/18/2020 09:00 AM    GFR est AA 48 (L) 08/18/2020 09:00 AM    GFR est non-AA 42 (L) 08/18/2020 09:00 AM    Calcium 9.4 08/18/2020 09:00 AM    Bilirubin, total 0.5 08/18/2020 09:00 AM    ALT (SGPT) 13 08/18/2020 09:00 AM    Alk.  phosphatase 100 08/18/2020 09:00 AM    Protein, total 6.7 08/18/2020 09:00 AM    Albumin 3.9 08/18/2020 09:00 AM Globulin 4.1 (H) 04/26/2017 12:10 PM    A-G Ratio 1.4 08/18/2020 09:00 AM      Lab Results   Component Value Date/Time    Hemoglobin A1c 10.4 (H) 01/28/2014 08:40 AM    Hemoglobin A1c (POC) 7.2 08/18/2020 09:20 AM         Review of Systems   Constitutional: Negative for malaise/fatigue. HENT: Negative for congestion. Eyes: Negative for blurred vision. Respiratory: Negative for cough and shortness of breath. Cardiovascular: Negative for chest pain, palpitations and leg swelling. Gastrointestinal: Negative for abdominal pain, constipation and heartburn. Genitourinary: Negative for dysuria, frequency and urgency. Neurological: Negative for dizziness, tingling and headaches. Endo/Heme/Allergies: Negative for environmental allergies. Psychiatric/Behavioral: Negative for depression. The patient does not have insomnia. Physical Exam  Vitals signs and nursing note reviewed. Constitutional:       Appearance: Normal appearance. She is well-developed. Comments: /68 (BP 1 Location: Left upper arm, BP Patient Position: Sitting)   Pulse 67   Temp 96.9 °F (36.1 °C) (Temporal)   Resp 18   Ht 5' 1.5\" (1.562 m)   Wt 178 lb 12.8 oz (81.1 kg)   LMP 04/28/1972   SpO2 99%   BMI 33.24 kg/m²    HENT:      Right Ear: Tympanic membrane and ear canal normal.      Left Ear: Tympanic membrane and ear canal normal.      Nose: No mucosal edema. Neck:      Musculoskeletal: Normal range of motion and neck supple. Thyroid: No thyromegaly. Vascular: No carotid bruit. Cardiovascular:      Rate and Rhythm: Normal rate and regular rhythm. Pulses: Normal pulses. Heart sounds: Normal heart sounds. No gallop. Pulmonary:      Effort: Pulmonary effort is normal.      Breath sounds: Normal breath sounds. Chest:      Breasts:         Right: Normal.         Left: Normal.   Abdominal:      General: Bowel sounds are normal.      Palpations: Abdomen is soft. There is no mass. Tenderness: There is no abdominal tenderness. Genitourinary:     Rectum: Guaiac result negative. Musculoskeletal: Normal range of motion. General: No swelling. Right lower leg: Edema (mild) present. Left lower leg: Edema (mild) present. Comments: Foot exam done . Normal sensation to PP, LT and vibration. Sensation intact to microfilament testing. Pulses intact. No swelling. No skin lesions or sores noted. No tinea present. Lymphadenopathy:      Cervical: No cervical adenopathy. Upper Body:      Right upper body: No supraclavicular, axillary or pectoral adenopathy. Left upper body: No supraclavicular, axillary or pectoral adenopathy. Skin:     General: Skin is warm and dry. Neurological:      General: No focal deficit present. Mental Status: She is alert and oriented to person, place, and time. Psychiatric:         Mood and Affect: Mood normal.       ASSESSMENT and PLAN  Diagnoses and all orders for this visit:    1. Medicare annual wellness visit, subsequent  -     AMB POC FECAL BLOOD, OCCULT, QL 1 CARD    2. Essential hypertension  Discussed sodium restriction, high k rich diet, maintaining ideal body weight and regular exercise program such as daily walking 30 min perday 4-5 times per week, as physiologic means to achieve blood pressure control. Medication compliance advised. 3. Type 2 diabetes with nephropathy (HCC)  a1c is up to 7.8%. Discussed tightening up on the diet. Increased physical activity as she is able to. -     AMB POC HEMOGLOBIN A1C  -     AMB POC GLUCOSE, QUANTITATIVE, BLOOD  -     MICROALBUMIN, UR, RAND W/ MICROALB/CREAT RATIO  -     AMB POC URINALYSIS DIP STICK AUTO W/ MICRO    4. Mixed hyperlipidemia  -     LIPID PANEL    5. Primary osteoarthritis involving multiple joints//  6. DDD (degenerative disc disease), lumbar  Overall stable     7.  Encounter for medication monitoring  -     METABOLIC PANEL, COMPREHENSIVE  -     CBC W/O DIFF      Follow-up and Dispositions    · Return in about 5 months (around 7/10/2021). reviewed diet, exercise and weight control  cardiovascular risk and specific lipid/LDL goals reviewed  reviewed medications and side effects in detail    I have discussed diagnosis listed in this note with pt and/or family. I have discussed treatment plans and options and the risk/benefit analysis of those options, including safe use of medications and possible medication side effects. Through the use of shared decision making we have agreed to the above plan. The patient has received an after-visit summary and questions were answered concerning future plans and follow up. Advise pt of any urgent changes then to proceed to the ER.

## 2021-02-10 NOTE — PROGRESS NOTES
Chief Complaint   Patient presents with    Annual Wellness Visit     Medicare Wellness         A1C 8/18/2020    Microalbumin 2/5/2020    Patient states she had an eye exam 1/18/2021 by Dr. Jania De La Rosa. Patient signed medical release. Mammogram: 11/20/2020      1. Have you been to the ER, urgent care clinic since your last visit? Hospitalized since your last visit? No    2. Have you seen or consulted any other health care providers outside of the [address] [place] [address] since your last visit? Include any pap smears or colon screening.  No

## 2021-02-12 LAB
ALBUMIN SERPL-MCNC: 4.1 G/DL (ref 3.6–4.6)
ALBUMIN/CREAT UR: 66 MG/G CREAT (ref 0–29)
ALBUMIN/GLOB SERPL: 1.3 {RATIO} (ref 1.2–2.2)
ALP SERPL-CCNC: 114 IU/L (ref 39–117)
ALT SERPL-CCNC: 17 IU/L (ref 0–32)
AST SERPL-CCNC: 19 IU/L (ref 0–40)
BILIRUB SERPL-MCNC: 0.3 MG/DL (ref 0–1.2)
BUN SERPL-MCNC: 24 MG/DL (ref 8–27)
BUN/CREAT SERPL: 21 (ref 12–28)
CALCIUM SERPL-MCNC: 9.5 MG/DL (ref 8.7–10.3)
CHLORIDE SERPL-SCNC: 106 MMOL/L (ref 96–106)
CHOLEST SERPL-MCNC: 240 MG/DL (ref 100–199)
CO2 SERPL-SCNC: 23 MMOL/L (ref 20–29)
CREAT SERPL-MCNC: 1.14 MG/DL (ref 0.57–1)
CREAT UR-MCNC: 76 MG/DL
ERYTHROCYTE [DISTWIDTH] IN BLOOD BY AUTOMATED COUNT: 13.3 % (ref 11.7–15.4)
GLOBULIN SER CALC-MCNC: 3.1 G/DL (ref 1.5–4.5)
GLUCOSE SERPL-MCNC: 165 MG/DL (ref 65–99)
HCT VFR BLD AUTO: 39.1 % (ref 34–46.6)
HDLC SERPL-MCNC: 44 MG/DL
HGB BLD-MCNC: 12.9 G/DL (ref 11.1–15.9)
INTERPRETATION, 910389: NORMAL
INTERPRETATION: NORMAL
LDLC SERPL CALC-MCNC: 164 MG/DL (ref 0–99)
Lab: NORMAL
MCH RBC QN AUTO: 27 PG (ref 26.6–33)
MCHC RBC AUTO-ENTMCNC: 33 G/DL (ref 31.5–35.7)
MCV RBC AUTO: 82 FL (ref 79–97)
MICROALBUMIN UR-MCNC: 49.8 UG/ML
PDF IMAGE, 910387: NORMAL
PLATELET # BLD AUTO: 243 X10E3/UL (ref 150–450)
POTASSIUM SERPL-SCNC: 4.3 MMOL/L (ref 3.5–5.2)
PROT SERPL-MCNC: 7.2 G/DL (ref 6–8.5)
RBC # BLD AUTO: 4.77 X10E6/UL (ref 3.77–5.28)
SODIUM SERPL-SCNC: 143 MMOL/L (ref 134–144)
TRIGL SERPL-MCNC: 174 MG/DL (ref 0–149)
VLDLC SERPL CALC-MCNC: 32 MG/DL (ref 5–40)
WBC # BLD AUTO: 6.5 X10E3/UL (ref 3.4–10.8)

## 2021-02-24 DIAGNOSIS — E11.21 TYPE 2 DIABETES WITH NEPHROPATHY (HCC): ICD-10-CM

## 2021-02-24 RX ORDER — BLOOD SUGAR DIAGNOSTIC
STRIP MISCELLANEOUS
Qty: 200 STRIP | Refills: 5 | Status: SHIPPED | OUTPATIENT
Start: 2021-02-24 | End: 2021-07-20 | Stop reason: SDUPTHER

## 2021-06-09 DIAGNOSIS — E11.21 TYPE 2 DIABETES WITH NEPHROPATHY (HCC): ICD-10-CM

## 2021-06-09 RX ORDER — BLOOD SUGAR DIAGNOSTIC
STRIP MISCELLANEOUS
Qty: 300 STRIP | Refills: 3 | Status: SHIPPED | OUTPATIENT
Start: 2021-06-09 | End: 2021-08-20 | Stop reason: SDUPTHER

## 2021-07-20 ENCOUNTER — OFFICE VISIT (OUTPATIENT)
Dept: FAMILY MEDICINE CLINIC | Age: 86
End: 2021-07-20
Payer: MEDICARE

## 2021-07-20 VITALS
DIASTOLIC BLOOD PRESSURE: 77 MMHG | BODY MASS INDEX: 32.76 KG/M2 | OXYGEN SATURATION: 99 % | SYSTOLIC BLOOD PRESSURE: 123 MMHG | HEART RATE: 76 BPM | TEMPERATURE: 98.4 F | HEIGHT: 62 IN | WEIGHT: 178 LBS | RESPIRATION RATE: 18 BRPM

## 2021-07-20 DIAGNOSIS — M51.36 DDD (DEGENERATIVE DISC DISEASE), LUMBAR: ICD-10-CM

## 2021-07-20 DIAGNOSIS — L70.0 BLACKHEAD: ICD-10-CM

## 2021-07-20 DIAGNOSIS — Z51.81 ENCOUNTER FOR MEDICATION MONITORING: ICD-10-CM

## 2021-07-20 DIAGNOSIS — E11.21 TYPE 2 DIABETES WITH NEPHROPATHY (HCC): ICD-10-CM

## 2021-07-20 DIAGNOSIS — I49.8 PERIODIC HEART FLUTTER: ICD-10-CM

## 2021-07-20 DIAGNOSIS — E78.2 MIXED HYPERLIPIDEMIA: ICD-10-CM

## 2021-07-20 DIAGNOSIS — R06.02 SOB (SHORTNESS OF BREATH) ON EXERTION: ICD-10-CM

## 2021-07-20 DIAGNOSIS — I10 ESSENTIAL HYPERTENSION: Primary | ICD-10-CM

## 2021-07-20 DIAGNOSIS — M15.9 PRIMARY OSTEOARTHRITIS INVOLVING MULTIPLE JOINTS: ICD-10-CM

## 2021-07-20 LAB
ANION GAP SERPL CALC-SCNC: 6 MMOL/L (ref 5–15)
BUN SERPL-MCNC: 21 MG/DL (ref 6–20)
BUN/CREAT SERPL: 20 (ref 12–20)
CALCIUM SERPL-MCNC: 9.3 MG/DL (ref 8.5–10.1)
CHLORIDE SERPL-SCNC: 110 MMOL/L (ref 97–108)
CHOLEST SERPL-MCNC: 252 MG/DL
CO2 SERPL-SCNC: 26 MMOL/L (ref 21–32)
CREAT SERPL-MCNC: 1.05 MG/DL (ref 0.55–1.02)
EST. AVERAGE GLUCOSE BLD GHB EST-MCNC: 154 MG/DL
GLUCOSE SERPL-MCNC: 118 MG/DL (ref 65–100)
HBA1C MFR BLD: 7 % (ref 4–5.6)
HDLC SERPL-MCNC: 59 MG/DL
HDLC SERPL: 4.3 {RATIO} (ref 0–5)
LDLC SERPL CALC-MCNC: 172 MG/DL (ref 0–100)
MAGNESIUM SERPL-MCNC: 2 MG/DL (ref 1.6–2.4)
POTASSIUM SERPL-SCNC: 4 MMOL/L (ref 3.5–5.1)
SODIUM SERPL-SCNC: 142 MMOL/L (ref 136–145)
TRIGL SERPL-MCNC: 105 MG/DL (ref ?–150)
TSH SERPL DL<=0.05 MIU/L-ACNC: 1.21 UIU/ML (ref 0.36–3.74)
VLDLC SERPL CALC-MCNC: 21 MG/DL

## 2021-07-20 PROCEDURE — 93000 ELECTROCARDIOGRAM COMPLETE: CPT | Performed by: FAMILY MEDICINE

## 2021-07-20 PROCEDURE — 99214 OFFICE O/P EST MOD 30 MIN: CPT | Performed by: FAMILY MEDICINE

## 2021-07-20 PROCEDURE — G8536 NO DOC ELDER MAL SCRN: HCPCS | Performed by: FAMILY MEDICINE

## 2021-07-20 PROCEDURE — G8752 SYS BP LESS 140: HCPCS | Performed by: FAMILY MEDICINE

## 2021-07-20 PROCEDURE — G8417 CALC BMI ABV UP PARAM F/U: HCPCS | Performed by: FAMILY MEDICINE

## 2021-07-20 PROCEDURE — G8754 DIAS BP LESS 90: HCPCS | Performed by: FAMILY MEDICINE

## 2021-07-20 PROCEDURE — G8510 SCR DEP NEG, NO PLAN REQD: HCPCS | Performed by: FAMILY MEDICINE

## 2021-07-20 PROCEDURE — 1090F PRES/ABSN URINE INCON ASSESS: CPT | Performed by: FAMILY MEDICINE

## 2021-07-20 PROCEDURE — 1101F PT FALLS ASSESS-DOCD LE1/YR: CPT | Performed by: FAMILY MEDICINE

## 2021-07-20 PROCEDURE — G8427 DOCREV CUR MEDS BY ELIG CLIN: HCPCS | Performed by: FAMILY MEDICINE

## 2021-07-20 PROCEDURE — 3051F HG A1C>EQUAL 7.0%<8.0%: CPT | Performed by: FAMILY MEDICINE

## 2021-07-20 PROCEDURE — G8399 PT W/DXA RESULTS DOCUMENT: HCPCS | Performed by: FAMILY MEDICINE

## 2021-07-20 NOTE — PROGRESS NOTES
HISTORY OF PRESENT ILLNESS  Toney Louise is a 81 y.o. female. HPI   Follow up on chronic medical problems. Sleeping good. Appetite is good. Staying at home mostly d/t the pandemic. HTN follow up:  Compliant w/ meds, low salt diet, and exercise. Home bp monitoring 120-130s/70s. Pt has bp log. Swelling is overall improved with her compression stocking. No headache or dizziness. No chest pain but has had increased SOB with activity over the past month. She has to sit down and rest to \"catch her breath\". Also has noticed periodica heart flutter. Denies any chest pain or chest tightness. Denies any excessive caffeine intake. Has been compliant with her medications. DM type II follow up:  Compliant w/ meds, diabetic diet. She has been better with watching diet. Obtains home glucose monitoring averaging 100-130s. Checks BS daily on most days and prn. Pt does have BS log at visit today. Denies any tingling sensation, polyuria and polydipsia. No blurred vision. No significant weight changes. Feeling well since last OV. Hypercholesterolemia follow up:  Compliant low fat, low cholesterol diet. Intolerant to stains and did not tolerate zetia or welchol. Has not been able to get in much exercise over the past few months. Osteoarthritis:  Patient has osteoarthritis. Takes an occasional Advil which helps the pain. Has had increased pain and stiffness in the back and hip that comes and goes. Increase back pain at night when in the bed. Her mattress is greater than 21years old. She is going to get a new mattress but just has not gotten around to this purchase. Symptoms onset: problem is longstanding. Rheumatological ROS: stable, mild-to-moderate joint symptoms intermittently, reasonably well controlled by PRN meds.    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    Family history of ischemic heart disease Z82.49    Mixed hyperlipidemia E78.2    Mitral valve disease I05.9    Obesity, unspecified E66.9    Generalized OA M15.9    Diabetes mellitus type 2, controlled (HCC) E11.9    Essential hypertension I10    Encounter for medication monitoring Z51.81    Statin intolerance Z78.9    Type 2 diabetes with nephropathy (HCC) E11.21    Abscess of abdominal wall L02.211       Current Outpatient Medications   Medication Sig Dispense Refill    Accu-Chek Ashlye Plus test strp strip USE TO CHECK BLOOD SUGAR  2 TIMES DAILY DX E11.9 300 Strip 3    Accu-Chek Ashley Plus test strp strip USE TO CHECK BLOOD SUGAR  2 TIMES DAILY 200 Strip 5    dorzolamide-timoloL (COSOPT) 22.3-6.8 mg/mL ophthalmic solution Administer 1 Drop to both eyes two (2) times a day.  glyBURIDE (DIABETA) 5 mg tablet Take 1 Tab by mouth daily (after breakfast). 90 Tab 3    furosemide (Lasix) 20 mg tablet Take 1 Tab by mouth daily. 90 Tab 3    diclofenac (VOLTAREN) 1 % gel Apply  to affected area four (4) times daily as needed for Pain. 100 g 0    cholecalciferol (VITAMIN D3) (2,000 UNITS /50 MCG) cap capsule Take 2,000 Units by mouth daily.  ibuprofen (ADVIL LIQUI-GEL) 200 mg cap Take 1 Tab by mouth two (2) times daily as needed for Pain.  GARLIC PO Take 9,666 mg by mouth daily.  red yeast rice extract 600 mg Cap Take 600 mg by mouth daily.  cod liver oil Cap Take 1 Cap by mouth daily. Allergies   Allergen Reactions    Latex Contact Dermatitis    Aspirin Palpitations    Glipizide Shortness of Breath and Swelling    Bactrim Ds [Sulfamethoxazole-Trimethoprim] Other (comments)     Patient experienced pain in legs and buttock and muscle cramping.  Contrast Agent [Iodine] Hives and Itching     Itchy throat    Amaryl [Glimepiride] Other (comments)     \"nervous feeling\"    Avocado Rash     Pt states she bruises.     Feldene [Piroxicam] Diarrhea    Januvia [Sitagliptin] Other (comments)     Upset stomach    Levofloxacin Unknown (comments)    Losartan Itching     Caused congestion per stated by pt    Landen Hives    Oxycodone Diarrhea and Nausea Only    Pcn [Penicillins] Rash    Pineapple Rash    Prednisone Nausea and Vomiting    Tylenol [Acetaminophen] Palpitations    Welchol [Colesevelam] Other (comments)     Pt states \"made throat feel raw\"    Zestril [Lisinopril] Cough       Past Medical History:   Diagnosis Date    Chronic edema     Diabetes ([address]     Family history of ischemic heart disease 4/12/2012    Hypercholesterolemia     Hypertension        Past Surgical History:   Procedure Laterality Date    ENDOSCOPY, COLON, DIAGNOSTIC  12/2006    Dr. Ed Carl [address]     [address] UNLISTED  09/03/2019    abscess removed from abdominal wall       Family History   Problem Relation Age of Onset   Mahan Stroke Mother     Hypertension Mother     Stroke Father     Hypertension Father     Hypertension Brother        Social History     Tobacco Use    Smoking status: Never Smoker    Smokeless tobacco: Never Used   Substance Use Topics    Alcohol use: No     Alcohol/week: 0.0 standard drinks        Lab Results   Component Value Date/Time    WBC 6.5 02/10/2021 09:28 AM    HGB 12.9 02/10/2021 09:28 AM    HCT 39.1 02/10/2021 09:28 AM    PLATELET 068 73/99/5208 09:28 AM    MCV 82 02/10/2021 09:28 AM     Lab Results   Component Value Date/Time    Cholesterol, total 240 (H) 02/10/2021 09:28 AM    HDL Cholesterol 44 02/10/2021 09:28 AM    LDL, calculated 164 (H) 02/10/2021 09:28 AM    LDL, calculated 163 (H) 08/18/2020 09:00 AM    Triglyceride 174 (H) 02/10/2021 09:28 AM    CHOL/HDL Ratio 5.6 (H) 12/28/2009 10:43 AM        Lab Results   Component Value Date/Time    Sodium 143 02/10/2021 09:28 AM    Potassium 4.3 02/10/2021 09:28 AM    Chloride 106 02/10/2021 09:28 AM    CO2 23 02/10/2021 09:28 AM    Anion gap 4 (L) 09/05/2019 03:22 AM    Glucose 165 (H) 02/10/2021 09:28 AM    BUN 24 02/10/2021 09:28 AM    Creatinine 1.14 (H) 02/10/2021 09:28 AM    BUN/Creatinine ratio 21 02/10/2021 09:28 AM    GFR est AA 51 (L) 02/10/2021 09:28 AM    GFR est non-AA 44 (L) 02/10/2021 09:28 AM    Calcium 9.5 02/10/2021 09:28 AM    Bilirubin, total 0.3 02/10/2021 09:28 AM    ALT (SGPT) 17 02/10/2021 09:28 AM    Alk. phosphatase 114 02/10/2021 09:28 AM    Protein, total 7.2 02/10/2021 09:28 AM    Albumin 4.1 02/10/2021 09:28 AM    Globulin 4.1 (H) 04/26/2017 12:10 PM    A-G Ratio 1.3 02/10/2021 09:28 AM      Lab Results   Component Value Date/Time    Hemoglobin A1c 10.4 (H) 01/28/2014 08:40 AM    Hemoglobin A1c (POC) 7.8 02/10/2021 09:28 AM         Review of Systems   Constitutional: Negative for malaise/fatigue. HENT: Negative for congestion. Eyes: Negative for blurred vision. Respiratory: Negative for cough and shortness of breath. Cardiovascular: Negative for chest pain, palpitations and leg swelling. Gastrointestinal: Negative for abdominal pain, constipation and heartburn. Genitourinary: Negative for dysuria, frequency and urgency. Musculoskeletal: Negative for back pain and joint pain. Skin:        Has 2 blackheads under each axilla that seems to be getting larger and irritated. Neurological: Negative for dizziness, tingling and headaches. Endo/Heme/Allergies: Negative for environmental allergies. Psychiatric/Behavioral: Negative for depression. The patient does not have insomnia. Physical Exam  Vitals and nursing note reviewed. Constitutional:       Appearance: Normal appearance. She is well-developed.       Comments: /77 (BP 1 Location: Left arm, BP Patient Position: Sitting)   Pulse 76   Temp 98.4 °F (36.9 °C) (Oral)   Resp 18   Ht 5' 1.5\" (1.562 m)   Wt 178 lb (80.7 kg)   LMP 04/28/1972   SpO2 99%   BMI 33.09 kg/m²      HENT:      Right Ear: Tympanic membrane and ear canal normal.      Left Ear: Tympanic membrane and ear canal normal.      Nose: No mucosal edema. Neck:      Thyroid: No thyromegaly. Vascular: No carotid bruit. Cardiovascular:      Rate and Rhythm: Normal rate and regular rhythm. Pulses: Normal pulses. Heart sounds: Normal heart sounds. No gallop. Pulmonary:      Effort: Pulmonary effort is normal.      Breath sounds: Normal breath sounds. Chest:      Breasts:         Right: Normal.         Left: Normal.   Abdominal:      General: Bowel sounds are normal.      Palpations: Abdomen is soft. There is no mass. Tenderness: There is no abdominal tenderness. Musculoskeletal:         General: No swelling. Normal range of motion. Cervical back: Normal range of motion and neck supple. Right lower leg: Edema (mild) present. Left lower leg: Edema (mild) present. Comments: Has on her compression stockings. Lymphadenopathy:      Cervical: No cervical adenopathy. Upper Body:      Right upper body: No supraclavicular, axillary or pectoral adenopathy. Left upper body: No supraclavicular, axillary or pectoral adenopathy. Skin:     General: Skin is warm and dry. Comments: Has 2 large blackhead one under each axilla. The right side is larger than the one under the left arm. Neurological:      General: No focal deficit present. Mental Status: She is alert and oriented to person, place, and time. Psychiatric:         Mood and Affect: Mood normal.         ASSESSMENT and PLAN  Diagnoses and all orders for this visit:    1. Essential hypertension  Stable at goal.      2. Type 2 diabetes with nephropathy ([address]  Continue to monitor. Work on diet and exercise.  -     HEMOGLOBIN A1C WITH EAG; Future    3. Mixed hyperlipidemia  Continue to monitor. Work on diet and exercise.  -     LIPID PANEL; Future    4. Primary osteoarthritis involving multiple joints//  5.  DDD (degenerative disc disease), lumbar  Stable     6. Periodic heart flutter  -     REFERRAL TO CARDIOLOGY  -     AMB POC EKG ROUTINE W/ 12 LEADS, INTER & REP  -     -Short MA syndrome    -Nonspecific QRS widening.    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 -Diffuse ST depression  -Nondiagnostic -possible digitalis effect, -consider subendocardial injury/ischemia.    -No acute change noted from prior tracing from 2017.   -     TSH 3RD GENERATION; Future  -     MAGNESIUM; Future    7. SOB (shortness of breath) on exertion  -     REFERRAL TO CARDIOLOGY  -     AMB POC EKG ROUTINE W/ 12 LEADS, INTER & REP    8. [place]  -     REFERRAL TO DERMATOLOGY    9. Encounter for medication monitoring  -     METABOLIC PANEL, BASIC; Future    reviewed diet, exercise and weight control  cardiovascular risk and specific lipid/LDL goals reviewed  reviewed medications and side effects in detail  specific diabetic recommendations: low cholesterol diet, weight control and daily exercise discussed, all medications, side effects and compliance discussed carefully, foot care discussed and Podiatry visits discussed, annual eye examinations at Ophthalmology discussed and glycohemoglobin and other lab monitoring discussed     I have discussed diagnosis listed in this note with pt and/or family. I have discussed treatment plans and options and the risk/benefit analysis of those options, including safe use of medications and possible medication side effects. Through the use of shared decision making we have agreed to the above plan. The patient has received an after-visit summary and questions were answered concerning future plans and follow up. Advise pt of any urgent changes then to proceed to the ER.

## 2021-07-20 NOTE — PROGRESS NOTES
Chief Complaint   Patient presents with    Hypertension     follow up    Cholesterol Problem     follow uo    Diabetes     follow up    Shoulder Pain     patient still c/o left shoulder pain             Microalbumin 2/10/2021    Patient states she had an eye exam 1/18/2021 by Dr. Hiro Raines. Patient signed medical release. Mammogram: 11/20/2020      1. Have you been to the ER, urgent care clinic since your last visit? Hospitalized since your last visit? No    2. Have you seen or consulted any other health care providers outside of the [address] [place] [address] since your last visit? Include any pap smears or colon screening.  No

## 2021-07-26 NOTE — PROGRESS NOTES
[facility] @ [address], [place]  Subjective/HPI:     Chanelle Young is a 81 y.o. female with a history of type 2 diabetes, hypertension, hyperlipidemia with statin intolerance has been referred by primary care for fluttering and palpitations. Patient reports she notices intermittent fluttering and palpitations more so triggered with walking or exertional activities, there is association with dyspnea on exertion. She states the symptoms had started after her second Covid vaccine in May. There are no associated alleviating or exacerbating factors. No significant caffeine intake nor over-the-counter stimulants. She denies exertional chest pain. No abnormal fatigue or weakness. Has chronic lower extremity edema presents with compression stockings. Denies any wounds or sore on her feet or legs. She denies orthopnea or paroxysmal nocturnal dyspnea. No excessive sodium intake. Cardiac risk factors: Postmenopausal female with type 2 diabetes hypertension hyperlipidemia and family history of heart disease    7/20/2021 labs: TSH normal, magnesium 2.0, CMP normal.      9/2017 NST   Impression:   Myocardial perfusion imaging is normal. Overall left ventricular systolic function was normal. Compared to the study from 05/24/2011, the current study reveals no significant change.      These test results indicate low likelihood for the presence of angiographically significant coronary artery disease.           2010 ECHO    PCP Provider  Kaylee Mace MD  Past Medical History:   Diagnosis Date    Chronic edema     Diabetes ([address]     Family history of ischemic heart disease 4/12/2012    Hypercholesterolemia     Hypertension       Past Surgical History:   Procedure Laterality Date    ENDOSCOPY, COLON, DIAGNOSTIC  12/2006    Dr. Brii Marx    [facility]  09/03/2019 abscess removed from abdominal wall     Allergies   Allergen Reactions    Latex Contact Dermatitis    Aspirin Palpitations    Glipizide Shortness of Breath and Swelling    Bactrim Ds [Sulfamethoxazole-Trimethoprim] Other (comments)     Patient experienced pain in legs and buttock and muscle cramping.  Contrast Agent [Iodine] Hives and Itching     Itchy throat    Amaryl [Glimepiride] Other (comments)     \"nervous feeling\"    Avocado Rash     Pt states she bruises.  Feldene [Piroxicam] Diarrhea    Januvia [Sitagliptin] Other (comments)     Upset stomach    Levofloxacin Unknown (comments)    Losartan Itching     Caused congestion per stated by pt    Derrick Hives    Oxycodone Diarrhea and Nausea Only    Pcn [Penicillins] Rash    Pineapple Rash    Prednisone Nausea and Vomiting    Tylenol [Acetaminophen] Palpitations    Welchol [Colesevelam] Other (comments)     Pt states \"made throat feel raw\"    Zestril [Lisinopril] Cough      Family History   Problem Relation Age of Onset    Stroke Mother     Hypertension Mother     Stroke Father     Hypertension Father     Hypertension Brother       Current Outpatient Medications   Medication Sig    Accu-Chek Ashley Plus test strp strip USE TO CHECK BLOOD SUGAR  2 TIMES DAILY DX E11.9    dorzolamide-timoloL (COSOPT) 22.3-6.8 mg/mL ophthalmic solution Administer 1 Drop to both eyes two (2) times a day.  glyBURIDE (DIABETA) 5 mg tablet Take 1 Tab by mouth daily (after breakfast).  furosemide (Lasix) 20 mg tablet Take 1 Tab by mouth daily.  diclofenac (VOLTAREN) 1 % gel Apply  to affected area four (4) times daily as needed for Pain.  cholecalciferol (VITAMIN D3) (2,000 UNITS /50 MCG) cap capsule Take 2,000 Units by mouth daily.  ibuprofen (ADVIL LIQUI-GEL) 200 mg cap Take 1 Tab by mouth two (2) times daily as needed for Pain.  GARLIC PO Take 6,763 mg by mouth daily.  red yeast rice extract 600 mg Cap Take 600 mg by mouth daily.     cod liver oil Cap Take 1 Cap by mouth daily. No current facility-administered medications for this visit. There were no vitals filed for this visit. Social History     Socioeconomic History    Marital status:      Spouse name: Not on file    Number of children: Not on file    Years of education: Not on file    Highest education level: Not on file   Occupational History    Not on file   Tobacco Use    Smoking status: Never Smoker    Smokeless tobacco: Never Used   Vaping Use    Vaping Use: Never used   Substance and Sexual Activity    Alcohol use: No     Alcohol/week: 0.0 standard drinks    Drug use: No    Sexual activity: Not Currently   Other Topics Concern    Not on file   Social History Narrative    Not on file     Social Determinants of Health     Financial Resource Strain:     Difficulty of Paying Living Expenses:    Food Insecurity:     Worried About Running Out of Food in the Last Year:     Ran Out of Food in the Last Year:    Transportation Needs:     Lack of Transportation (Medical):  Lack of Transportation (Non-Medical):    Physical Activity:     Days of Exercise per Week:     Minutes of Exercise per Session:    Stress:     Feeling of Stress :    Social Connections:     Frequency of Communication with Friends and Family:     Frequency of Social Gatherings with Friends and Family:     Attends Oriental orthodox Services:     Active Member of Clubs or Organizations:     Attends Club or Organization Meetings:     Marital Status:    Intimate Partner Violence:     Fear of Current or Ex-Partner:     Emotionally Abused:     Physically Abused:     Sexually Abused:        I have reviewed the nurses notes, vitals, problem list, allergy list, medical history, family, social history and medications.     Review of Symptoms  11 systems reviewed, negative other than as stated in the HPI      Physical Exam:      General: Well developed, in no acute distress, cooperative and alert  HEENT: No carotid bruits, no JVD, trach is midline. Neck Supple, PERRL, EOM intact. Heart:  Normal S1/S2 negative S3 or S4. Regular, no murmur, gallop or rub. Respiratory: Clear bilaterally x 4, no wheezing or rales  Abdomen:   Soft, non-tender, no masses, bowel sounds are active. Extremities:  No edema, normal cap refill, no cyanosis, atraumatic. Neuro: A&Ox3, speech clear, gait with widened stance. Skin: Skin color is normal. No rashes . Non diaphoretic  Vascular: 2+ pulses symmetric in all extremities    Cardiographics    EC21   NSR, short [place] interval         Cardiology Labs:  Lab Results   Component Value Date/Time    Cholesterol, total 252 (H) 2021 03:48 PM    HDL Cholesterol 59 2021 03:48 PM    LDL, calculated 172 (H) 2021 03:48 PM    Triglyceride 105 2021 03:48 PM    CHOL/HDL Ratio 4.3 2021 03:48 PM       Lab Results   Component Value Date/Time    Sodium 142 2021 03:48 PM    Potassium 4.0 2021 03:48 PM    Chloride 110 (H) 2021 03:48 PM    CO2 26 2021 03:48 PM    Anion gap 6 2021 03:48 PM    Glucose 118 (H) 2021 03:48 PM    BUN 21 (H) 2021 03:48 PM    Creatinine 1.05 (H) 2021 03:48 PM    BUN/Creatinine ratio 20 2021 03:48 PM    GFR est AA >60 2021 03:48 PM    GFR est non-AA 50 (L) 2021 03:48 PM    Calcium 9.3 2021 03:48 PM    Bilirubin, total 0.3 02/10/2021 09:28 AM    Alk. phosphatase 114 02/10/2021 09:28 AM    Protein, total 7.2 02/10/2021 09:28 AM    Albumin 4.1 02/10/2021 09:28 AM    Globulin 4.1 (H) 2017 12:10 PM    A-G Ratio 1.3 02/10/2021 09:28 AM    ALT (SGPT) 17 02/10/2021 09:28 AM           Assessment:     Assessment:     Diagnoses and all orders for this visit:    1. Essential hypertension    2. Controlled type 2 diabetes mellitus without complication, without long-term current use of insulin ([facility] [address] [address]    3. Mixed hyperlipidemia    4.  Statin intolerance        ICD-10-CM ICD-9-CM    1. Essential hypertension  I10 401.9    2. Controlled type 2 diabetes mellitus without complication, without long-term current use of insulin (HCC)  E11.9 250.00    3. Mixed hyperlipidemia  E78.2 272.2    4. Statin intolerance  Z78.9 995.27      No orders of the defined types were placed in this encounter. Plan:     1. Palpitations: TSH normal, normal electrolytes. Normal sinus rhythm with short WA interval ( ? [place] Sanchez syndrome, no delta wave, QRS is normal) . 5-day Holter monitor ordered. 2.  Hypertension: Normotensive 111/69  3. Hyperlipidemia: Statin intolerant, intolerant to WelChol,  , will discuss Zetia at follow-up. 4.  Dyspnea on exertion: Multiple risk factors for CAD will evaluate with Lexiscan nuclear stress test and 2D echocardiogram.  Follow-up when testing complete. Kolby Kelly NP      Please note that this dictation was completed with Jobaline, the computer voice recognition software. Quite often unanticipated grammatical, syntax, homophones, and other interpretive errors are inadvertently transcribed by the computer software. Please disregard these errors. Please excuse any errors that have escaped final proofreading. Thank you.

## 2021-07-27 ENCOUNTER — OFFICE VISIT (OUTPATIENT)
Dept: CARDIOLOGY CLINIC | Age: 86
End: 2021-07-27
Payer: MEDICARE

## 2021-07-27 VITALS
HEART RATE: 89 BPM | WEIGHT: 176 LBS | BODY MASS INDEX: 33.23 KG/M2 | HEIGHT: 61 IN | SYSTOLIC BLOOD PRESSURE: 111 MMHG | RESPIRATION RATE: 16 BRPM | DIASTOLIC BLOOD PRESSURE: 69 MMHG | OXYGEN SATURATION: 98 %

## 2021-07-27 DIAGNOSIS — Z78.9 STATIN INTOLERANCE: ICD-10-CM

## 2021-07-27 DIAGNOSIS — E11.9 CONTROLLED TYPE 2 DIABETES MELLITUS WITHOUT COMPLICATION, WITHOUT LONG-TERM CURRENT USE OF INSULIN (HCC): ICD-10-CM

## 2021-07-27 DIAGNOSIS — I10 ESSENTIAL HYPERTENSION: ICD-10-CM

## 2021-07-27 DIAGNOSIS — R00.2 INTERMITTENT PALPITATIONS: ICD-10-CM

## 2021-07-27 DIAGNOSIS — R06.09 DOE (DYSPNEA ON EXERTION): Primary | ICD-10-CM

## 2021-07-27 DIAGNOSIS — E78.2 MIXED HYPERLIPIDEMIA: ICD-10-CM

## 2021-07-27 PROCEDURE — G8752 SYS BP LESS 140: HCPCS | Performed by: NURSE PRACTITIONER

## 2021-07-27 PROCEDURE — G8536 NO DOC ELDER MAL SCRN: HCPCS | Performed by: NURSE PRACTITIONER

## 2021-07-27 PROCEDURE — G8417 CALC BMI ABV UP PARAM F/U: HCPCS | Performed by: NURSE PRACTITIONER

## 2021-07-27 PROCEDURE — 3051F HG A1C>EQUAL 7.0%<8.0%: CPT | Performed by: NURSE PRACTITIONER

## 2021-07-27 PROCEDURE — 99204 OFFICE O/P NEW MOD 45 MIN: CPT | Performed by: NURSE PRACTITIONER

## 2021-07-27 PROCEDURE — G8432 DEP SCR NOT DOC, RNG: HCPCS | Performed by: NURSE PRACTITIONER

## 2021-07-27 PROCEDURE — 1101F PT FALLS ASSESS-DOCD LE1/YR: CPT | Performed by: NURSE PRACTITIONER

## 2021-07-27 PROCEDURE — G8754 DIAS BP LESS 90: HCPCS | Performed by: NURSE PRACTITIONER

## 2021-07-27 PROCEDURE — 1090F PRES/ABSN URINE INCON ASSESS: CPT | Performed by: NURSE PRACTITIONER

## 2021-07-27 PROCEDURE — G8399 PT W/DXA RESULTS DOCUMENT: HCPCS | Performed by: NURSE PRACTITIONER

## 2021-07-27 PROCEDURE — G8427 DOCREV CUR MEDS BY ELIG CLIN: HCPCS | Performed by: NURSE PRACTITIONER

## 2021-07-27 NOTE — PROGRESS NOTES
1. Have you been to the ER, urgent care clinic since your last visit? Hospitalized since your last visit? No    2. Have you seen or consulted any other health care providers outside of the [address] [place] [address] since your last visit? Include any pap smears or colon screening.  No      Chief Complaint   Patient presents with    Referral / Consult     Referred by PCP, previously saw Dr. Navin Patel in 2017; Pt c/o SOB w/activity outside, bilat ankle swelling-wears compression stockings

## 2021-08-20 DIAGNOSIS — E11.21 TYPE 2 DIABETES WITH NEPHROPATHY (HCC): ICD-10-CM

## 2021-08-20 DIAGNOSIS — I10 ESSENTIAL HYPERTENSION: ICD-10-CM

## 2021-08-20 RX ORDER — FUROSEMIDE 20 MG/1
20 TABLET ORAL DAILY
Qty: 90 TABLET | Refills: 3 | Status: SHIPPED | OUTPATIENT
Start: 2021-08-20 | End: 2022-08-02

## 2021-08-20 RX ORDER — BLOOD SUGAR DIAGNOSTIC
STRIP MISCELLANEOUS
Qty: 300 STRIP | Refills: 3 | Status: SHIPPED | OUTPATIENT
Start: 2021-08-20 | End: 2022-09-14

## 2021-08-20 RX ORDER — GLYBURIDE 5 MG/1
5 TABLET ORAL
Qty: 90 TABLET | Refills: 3 | Status: SHIPPED | OUTPATIENT
Start: 2021-08-20 | End: 2021-09-03 | Stop reason: SDUPTHER

## 2021-08-20 NOTE — TELEPHONE ENCOUNTER
----- Message from Anjana Net sent at 8/20/2021  3:22 PM EDT -----  Regarding: Dr. Lenny Christianson (if not patient):self      Relationship of caller (if not patient):self      Best contact number(s):[phone]      Name of medication and dosage if known:\"furosemide (Lasix) 20 mg tablet\" \"glyBURIDE (DIABETA) 5 mg tablet\" and \"Accu-Chek Ashley Plus test strp strip\"      Is patient out of this medication (yes/no):no      Pharmacy name:[facility]     Pharmacy listed in chart? (yes/no):yes  Pharmacy phone number:yes      Details to clarify the request:Pt advised wanting her refill of these medication sent to [facility] from now on.       [employer]

## 2021-08-31 ENCOUNTER — HOSPITAL ENCOUNTER (OUTPATIENT)
Dept: NUCLEAR MEDICINE | Age: 86
Discharge: HOME | End: 2021-08-31
Payer: MEDICARE

## 2021-08-31 ENCOUNTER — HOSPITAL ENCOUNTER (OUTPATIENT)
Dept: NON INVASIVE DIAGNOSTICS | Age: 86
Discharge: HOME | End: 2021-08-31
Payer: MEDICARE

## 2021-08-31 ENCOUNTER — HOSPITAL ENCOUNTER (OUTPATIENT)
Dept: VASCULAR SURGERY | Age: 86
Discharge: HOME | End: 2021-08-31
Payer: MEDICARE

## 2021-08-31 VITALS
SYSTOLIC BLOOD PRESSURE: 135 MMHG | BODY MASS INDEX: 34.41 KG/M2 | DIASTOLIC BLOOD PRESSURE: 77 MMHG | WEIGHT: 187 LBS | HEIGHT: 62 IN

## 2021-08-31 DIAGNOSIS — E78.2 MIXED HYPERLIPIDEMIA: ICD-10-CM

## 2021-08-31 DIAGNOSIS — Z78.9 STATIN INTOLERANCE: ICD-10-CM

## 2021-08-31 DIAGNOSIS — R00.2 INTERMITTENT PALPITATIONS: ICD-10-CM

## 2021-08-31 DIAGNOSIS — I10 ESSENTIAL HYPERTENSION: ICD-10-CM

## 2021-08-31 DIAGNOSIS — E11.9 CONTROLLED TYPE 2 DIABETES MELLITUS WITHOUT COMPLICATION, WITHOUT LONG-TERM CURRENT USE OF INSULIN (HCC): ICD-10-CM

## 2021-08-31 DIAGNOSIS — R06.09 DOE (DYSPNEA ON EXERTION): ICD-10-CM

## 2021-08-31 LAB
ECHO AO ROOT DIAM: 2.25 CM
ECHO AV AREA PLAN: 0.82 CM2
ECHO EST RA PRESSURE: 5 MMHG
ECHO LA AREA 4C: 10.75 CM2
ECHO LA MAJOR AXIS: 2.82 CM
ECHO LA MINOR AXIS: 1.52 CM
ECHO LA VOL 4C: 19.2 ML (ref 22–52)
ECHO LA VOLUME INDEX A4C: 10.32 ML/M2 (ref 16–28)
ECHO LV EDV A4C: 47.72 ML
ECHO LV EDV INDEX A4C: 25.7 ML/M2
ECHO LV EJECTION FRACTION A4C: 64 PERCENT
ECHO LV ESV A4C: 17.26 ML
ECHO LV ESV INDEX A4C: 9.3 ML/M2
ECHO LV INTERNAL DIMENSION DIASTOLIC: 3.17 CM (ref 3.9–5.3)
ECHO LV INTERNAL DIMENSION SYSTOLIC: 2.29 CM
ECHO LV IVSD: 1.09 CM (ref 0.6–0.9)
ECHO LV MASS 2D: 146.7 G (ref 67–162)
ECHO LV MASS INDEX 2D: 78.9 G/M2 (ref 43–95)
ECHO LV POSTERIOR WALL DIASTOLIC: 1.66 CM (ref 0.6–0.9)
ECHO LVOT DIAM: 2.34 CM
ECHO LVOT PEAK GRADIENT: 2 MMHG
ECHO LVOT PEAK VELOCITY: 70.66 CM/S
ECHO MV A VELOCITY: 121.03 CM/S
ECHO MV AREA PHT: 3.91 CM2
ECHO MV AREA PHT: 4.07 CM2
ECHO MV AREA PLAN: 0.97 CM2
ECHO MV E DECELERATION TIME (DT): 193.93 MS
ECHO MV E VELOCITY: 80.29 CM/S
ECHO MV E/A RATIO: 0.66
ECHO MV MAX VELOCITY: 137.39 CM/S
ECHO MV MEAN GRADIENT: 2.38 MMHG
ECHO MV PEAK GRADIENT: 7.55 MMHG
ECHO MV PRESSURE HALF TIME (PHT): 54 MS
ECHO MV PRESSURE HALF TIME (PHT): 56.24 MS
ECHO MV VTI: 25.05 CM
ECHO RA AREA 4C: 9.54 CM2
ECHO RIGHT VENTRICULAR SYSTOLIC PRESSURE (RVSP): 30.08 MMHG
ECHO TV REGURGITANT MAX VELOCITY: 250.33 CM/S
ECHO TV REGURGITANT MAX VELOCITY: 444.11 CM/S
ECHO TV REGURGITANT PEAK GRADIENT: 25.08 MMHG
STRESS BASELINE DIAS BP: 77 MMHG
STRESS BASELINE HR: 69 BPM
STRESS BASELINE SYS BP: 135 MMHG
STRESS ESTIMATED WORKLOAD: 1 METS
STRESS EXERCISE DUR MIN: NORMAL
STRESS O2 SAT PEAK: 95 %
STRESS O2 SAT REST: 98 %
STRESS PEAK DIAS BP: 67 MMHG
STRESS PEAK SYS BP: 136 MMHG
STRESS PERCENT HR ACHIEVED: 81 %
STRESS POST PEAK HR: 109 BPM
STRESS RATE PRESSURE PRODUCT: NORMAL BPM*MMHG
STRESS ST DEPRESSION: 0 MM
STRESS ST ELEVATION: 0 MM
STRESS STAGE 1 BP: NORMAL MMHG
STRESS STAGE 1 DURATION: NORMAL MIN:SEC
STRESS STAGE 1 HR: 71 BPM
STRESS STAGE 2 DURATION: NORMAL MIN:SEC
STRESS STAGE 2 HR: 88 BPM
STRESS STAGE 3 BP: NORMAL MMHG
STRESS STAGE 3 DURATION: NORMAL MIN:SEC
STRESS STAGE 3 HR: 103 BPM
STRESS STAGE 4 BP: NORMAL MMHG
STRESS STAGE 4 DURATION: NORMAL MIN:SEC
STRESS STAGE 4 HR: 85 BPM
STRESS TARGET HR: 135 BPM

## 2021-08-31 PROCEDURE — 93225 XTRNL ECG REC<48 HRS REC: CPT

## 2021-08-31 PROCEDURE — 74011250636 HC RX REV CODE- 250/636: Performed by: NURSE PRACTITIONER

## 2021-08-31 PROCEDURE — 93306 TTE W/DOPPLER COMPLETE: CPT

## 2021-08-31 PROCEDURE — A9500 TC99M SESTAMIBI: HCPCS

## 2021-08-31 PROCEDURE — 93306 TTE W/DOPPLER COMPLETE: CPT | Performed by: SPECIALIST

## 2021-08-31 RX ORDER — TETRAKIS(2-METHOXYISOBUTYLISOCYANIDE)COPPER(I) TETRAFLUOROBORATE 1 MG/ML
10 INJECTION, POWDER, LYOPHILIZED, FOR SOLUTION INTRAVENOUS
Status: DISCONTINUED | OUTPATIENT
Start: 2021-08-31 | End: 2021-08-31

## 2021-08-31 RX ORDER — TETRAKIS(2-METHOXYISOBUTYLISOCYANIDE)COPPER(I) TETRAFLUOROBORATE 1 MG/ML
30 INJECTION, POWDER, LYOPHILIZED, FOR SOLUTION INTRAVENOUS
Status: COMPLETED | OUTPATIENT
Start: 2021-08-31 | End: 2021-08-31

## 2021-08-31 RX ORDER — TETRAKIS(2-METHOXYISOBUTYLISOCYANIDE)COPPER(I) TETRAFLUOROBORATE 1 MG/ML
10 INJECTION, POWDER, LYOPHILIZED, FOR SOLUTION INTRAVENOUS
Status: COMPLETED | OUTPATIENT
Start: 2021-08-31 | End: 2021-08-31

## 2021-08-31 RX ORDER — SODIUM CHLORIDE 0.9 % (FLUSH) 0.9 %
10 SYRINGE (ML) INJECTION AS NEEDED
Status: DISPENSED | OUTPATIENT
Start: 2021-08-31 | End: 2021-08-31

## 2021-08-31 RX ADMIN — REGADENOSON 0.4 MG: 0.08 INJECTION, SOLUTION INTRAVENOUS at 12:58

## 2021-08-31 RX ADMIN — Medication 10 ML: at 12:43

## 2021-08-31 RX ADMIN — Medication 10 ML: at 12:59

## 2021-08-31 RX ADMIN — TETRAKIS(2-METHOXYISOBUTYLISOCYANIDE)COPPER(I) TETRAFLUOROBORATE 30 MILLICURIE: 1 INJECTION, POWDER, LYOPHILIZED, FOR SOLUTION INTRAVENOUS at 13:00

## 2021-08-31 RX ADMIN — TETRAKIS(2-METHOXYISOBUTYLISOCYANIDE)COPPER(I) TETRAFLUOROBORATE 10 MILLICURIE: 1 INJECTION, POWDER, LYOPHILIZED, FOR SOLUTION INTRAVENOUS at 11:10

## 2021-09-01 ENCOUNTER — TELEPHONE (OUTPATIENT)
Dept: CARDIOLOGY CLINIC | Age: 86
End: 2021-09-01

## 2021-09-01 NOTE — PROGRESS NOTES
Michael Rutledge: Please call patient echocardiogram of her heart looks normal.  Stress test is normal as well we will send results in next message.

## 2021-09-01 NOTE — PROGRESS NOTES
5 day biotel monitor placed after instruction to pt monitor number  [phone] noting to return after 5 days to [facility]

## 2021-09-01 NOTE — TELEPHONE ENCOUNTER
----- Message from Evaristo Bernardo NP sent at 8/31/2021  8:03 PM EDT -----  [place]: Please call patient echocardiogram of her heart looks normal.  Stress test is normal as well we will send results in next message.

## 2021-09-01 NOTE — TELEPHONE ENCOUNTER
I called and spoke with patient about this. Two patient identifiers verified. She verbalized understanding.

## 2021-09-03 DIAGNOSIS — E11.21 TYPE 2 DIABETES WITH NEPHROPATHY (HCC): ICD-10-CM

## 2021-09-03 RX ORDER — GLYBURIDE 5 MG/1
5 TABLET ORAL
Qty: 90 TABLET | Refills: 3 | Status: SHIPPED | OUTPATIENT
Start: 2021-09-03 | End: 2021-10-26 | Stop reason: SDUPTHER

## 2021-09-03 NOTE — TELEPHONE ENCOUNTER
----- Message from Meng Villalobos sent at 9/3/2021 11:34 AM EDT -----  Regarding: Dr. Aggarwal : [phone]  Medication Refill    Caller (if not patient): N/A      Relationship of caller (if not patient): N/A      Best contact number(s): [phone]      Name of medication and dosage if known: \"Glyduride\" 5 mg      Is patient out of this medication (yes/no): No; has enough to last her until next week.       Pharmacy name: [id number] listed in chart? (yes/no): N/A  Pharmacy phone number: N/A      Details to clarify the request: N/A      Meng Villalobos

## 2021-09-08 PROCEDURE — 93227 XTRNL ECG REC<48 HR R&I: CPT | Performed by: SPECIALIST

## 2021-09-09 ENCOUNTER — TELEPHONE (OUTPATIENT)
Dept: CARDIOLOGY CLINIC | Age: 86
End: 2021-09-09

## 2021-09-09 NOTE — PROGRESS NOTES
Please call patient, no significant arrhythmias on Holter monitor. She does have some premature ventricular contractions. If she is remaining with symptoms i.e. keeping heartbeats flutters palpitations we can trial a very low-dose beta-blocker at nighttime, metoprolol XL 25 mg tablet half a tablet at bedtime. If she would like to go this route please send me the prescription to sign and will need to see her in follow-up in a month to do an EKG.

## 2021-09-09 NOTE — TELEPHONE ENCOUNTER
----- Message from Vandana Manuel NP sent at 9/9/2021  1:01 PM EDT -----  Please call patient, no significant arrhythmias on Holter monitor. She does have some premature ventricular contractions. If she is remaining with symptoms i.e. keeping heartbeats flutters palpitations we can trial a very low-dose beta-blocker at nighttime, metoprolol XL 25 mg tablet half a tablet at bedtime. If she would like to go this route please send me the prescription to sign and will need to see her in follow-up in a month to do an EKG.

## 2021-09-13 NOTE — TELEPHONE ENCOUNTER
Second attempt to call patient regarding result of holter monitor, no answer. I left a vm to callback.

## 2021-09-16 RX ORDER — METOPROLOL SUCCINATE 25 MG/1
TABLET, EXTENDED RELEASE ORAL
Qty: 30 TABLET | Refills: 0 | Status: SHIPPED | OUTPATIENT
Start: 2021-09-16 | End: 2021-10-11 | Stop reason: SDUPTHER

## 2021-09-16 NOTE — TELEPHONE ENCOUNTER
Patient's two identifiers verified. I called and spoke with the patient this. She agreed in taking Metoprolol and will follow up in 1 month. Harsha Samuels      ----- Message from Damian Soulier, NP sent at 9/9/2021  1:01 PM EDT -----  Please call patient, no significant arrhythmias on Holter monitor. She does have some premature ventricular contractions. If she is remaining with symptoms i.e. keeping heartbeats flutters palpitations we can trial a very low-dose beta-blocker at nighttime, metoprolol XL 25 mg tablet half a tablet at bedtime. If she would like to go this route please send me the prescription to sign and will need to see her in follow-up in a month to do an EKG.

## 2021-09-22 ENCOUNTER — TELEPHONE (OUTPATIENT)
Dept: PHARMACY | Age: 86
End: 2021-09-22

## 2021-09-22 NOTE — TELEPHONE ENCOUNTER
Dr. Elena Marrero,    Patient is out of glyburide and pharmacy has not filled it for her due to it needing a PA. Contacted pharmacy who states it appears they have not sent this PA form to you yet. Requested the PA form be faxed to the office. Was given reference PA #: [id number]    Please let me know if there are any further questions! Thank you,  Vale Sylvester, PharmD, [facility], [facility] [address] Pharmacist  Department, toll free: [phone], option 2  =======================================================    CLINICAL PHARMACY: ADHERENCE REVIEW  Identified care gap per Jh; fills at [facility] and [facility] adherence    Last Visit: 7/27/21    Patient identified as LIS = 1, therefore patient may be able to receive a 90-day supply for the same cost as a 30-day supply    ASSESSMENT  DIABETES ADHERENCE    Per Insurance Records through 9/19/21 (2020 [place] = n/a%; YTD [place] = n/a%; Potential Fail Date: 9/26/21): Glyburide 5 mg tablets last filled on 7/27/21 for 30 day supply. Next refill due: 8/26/21    Note glimepiride and glipizide on allergy list. Per chart review, has been on glyburide since at least 2010. Patient states she was unable to tolerate metformin in past due to diarrhea. Most recent A1c 7.0% 7/20/21. Should continue to monitor closely for hypoglycemia with patient taking high-risk-med in the elderly. Per [facility], patient new to [employer] 6/1/21. Per [facility] pharmacy, claim for glyburide last processed 7/27/21. Representative states that this medication needs a prior authorization for a second fill, but from what she can tell, no communication regarding this has been sent to the provider.   Requested the PA information be sent to Deonte Donahue MD.    Lab Results   Component Value Date/Time    Hemoglobin A1c 7.0 (H) 07/20/2021 03:48 PM    Hemoglobin A1c 10.4 (H) 01/28/2014 08:40 AM    Hemoglobin A1c 8.4 (H) 09/08/2013 04:09 AM Hemoglobin A1c (POC) 7.8 02/10/2021 09:28 AM    Hemoglobin A1c (POC) 7.2 08/18/2020 09:20 AM    Hemoglobin A1c (POC) 7.6 02/05/2020 09:32 AM     NOTE A1c <9%    PLAN  Reached patient to review. She states she has been on glyburide for a long time, and recently the pharmacy has sent her a letter saying she can not get it because they are unable to contact her provider. She states she has been out for a couple weeks. As above, per [facility], information has not actually been sent to the provider for the PA but is being sent today. Will notify PCP as well.     Future Appointments   Date Time Provider Alexandria Kang   10/11/2021  1:40 PM Douglas Chicas NP [facility] BS AMB   10/26/2021  8:20 AM Mina Koenig MD [facility] BS AMB       [facility], PharmD, [facility], [address]  Toll free: [phone], option 2    For Pharmacy [id number] [place] [address] Road in place: No   Recommendation Provided To: Pharmacy: 1   Gap Closed?: No   Intervention Accepted By: Pharmacy: 1   Time Spent (min): 30

## 2021-09-24 NOTE — TELEPHONE ENCOUNTER
Incoming call received from the patient - states that she has been waiting on a new medication that Dr. Wendy Gaona (cardiologist) was going to prescribed, but cannot reach [address] [place] [address]. Reviewed chart - appears metoprolol was sent to [address] [place] [address] on 9/16/21. [address] and store several times - phone line keeps disconnecting. Unable to speak with anyone at [facility]. Called patient back and informed her that I was unable to reach [address] [place] [address] as well - maybe their phones are down? Advised patient to go to [facility] or have someone go to the pharmacy and ask about the metoprolol. Patient states that she will ask her daughter to go there today/this weekend. Will sign off.      Ivelisse Chapman, PharmD, [facility] // Department, toll free [phone], option 1

## 2021-10-10 PROBLEM — I49.3 PVC'S (PREMATURE VENTRICULAR CONTRACTIONS): Status: ACTIVE | Noted: 2021-10-10

## 2021-10-10 NOTE — PROGRESS NOTES
[facility] @ [address], [place]  Subjective/HPI:     Dalia Zamudio is a 81 y.o. female history of type 2 diabetes, hypertension, hyperlipidemia with statin intolerance  is here for routine f/u. Referred for palpitations work up demonstrated mild Tr on ECHO with normal EF%, no ischemia on NST, holter monitor findings PVC's and 4 episodes on NSVT longest was 3 beats, was started on low dose betablocker and is here for EKG/Follow up. Patient reports since starting metoprolol she feels decrease in palpitations. Denies lightheadedness or dizziness. 7/27/21  1. Palpitations: TSH normal, normal electrolytes. Normal sinus rhythm with short AR interval ( ? [place] syndrome, no delta wave, QRS is normal) . 5-day Holter monitor ordered. 2.  Hypertension: Normotensive 111/69  3. Hyperlipidemia: Statin intolerant, intolerant to WelChol,  , will discuss Zetia at follow-up. 4.  Dyspnea on exertion: Multiple risk factors for CAD will evaluate with Lexiscan nuclear stress test and 2D echocardiogram.  Follow-up when testing complete. Holter:9/2021  Interpretation Summary    Sarita Amador was in Sinus. The average heart rate, excluding ectopy, was 88 BPM with a minimum of 56 BPM at 23:14 D3 and a maximum of 133 BPM at 17:09 D1. Heart beats, including ectopy, totaled [id number] beats. VENTRICULAR ECTOPICS totaled 19 averaging 0.2 per hour with 17 single, 2 paired, 0 trigeminy and 0 R on T.  SUPRAVENTRICULAR ECTOPICS totaled 98 averaging 1.0 per hour ,with 85 single and 0 paired beats. SUPRAVENTRICULAR TACHYCARDIA occurred 4 times. The fastest run was at 122 BPM and occurred at 05:43 D5 with 3 beats. The longest run was 4 beats at 05:43 D5 at a rate of 110 BPM.  Diary was submitted, symptoms/activities noted. No triggered events noted.  Some diary entries/triggered events are not represented on report due to  occurring before or after readable rhythm. Nuclear Stress Test 8/31/21  IMPRESSION:  1. 1. There is moderate thinning of the anterior wall at rest and stress most likely attenuation artifact. There is no definite evidence of ischemia. 2. LVEF equals 68%. Left ventricular wall motion and thickening is normal    8/31/21 ECHO  Summary    · LV: Estimated LVEF is 55 - 60%. Visually measured ejection fraction. Normal cavity size and systolic function (ejection fraction normal). Mild concentric hypertrophy. Wall motion: normal.  · MV: Mitral valve thickening. Mitral valve leaflet calcification. Mild mitral annular calcification. Mild mitral valve regurgitation is present. · TV: Mild to moderate tricuspid valve regurgitation is present. Pulmonary hypertension not suggested by Doppler findings. Echo Findings    Left Ventricle Normal cavity size and systolic function (ejection fraction normal). Mild concentric hypertrophy. Wall motion: normal. The estimated EF is 55 - 60%. Visually measured ejection fraction. Left Atrium Normal cavity size. Right Ventricle Normal cavity size and global systolic function. Right Atrium Normal cavity size. Aortic Valve Normal valve structure, no stenosis and no regurgitation. Mitral Valve No stenosis. Mitral valve thickening. Leaflet calcification. Mild mitral annular calcification. Mild regurgitation. Tricuspid Valve Normal valve structure and no stenosis. Mild to moderate regurgitation. Pulmonary hypertension not suggested by Doppler findings. Pulmonic Valve Pulmonic valve not well visualized, but normal doppler findings. Aorta Normal aortic root. Pericardium No evidence of pericardial effusion.        PCP Provider  Roma Petersen MD  Past Medical History:   Diagnosis Date    Chronic edema     Diabetes ([address]     Family history of ischemic heart disease 4/12/2012    Hypercholesterolemia     Hypertension     PVC's (premature ventricular contractions) 10/10/2021      Past Surgical History:   Procedure Laterality Date    ENDOSCOPY, COLON, DIAGNOSTIC  12/2006    Dr. Que Stephens     [facility] UNLISTED  09/03/2019    abscess removed from abdominal wall     Allergies   Allergen Reactions    Latex Contact Dermatitis    Aspirin Palpitations    Glipizide Shortness of Breath and Swelling    Bactrim Ds [Sulfamethoxazole-Trimethoprim] Other (comments)     Patient experienced pain in legs and buttock and muscle cramping.  Contrast Agent [Iodine] Hives and Itching     Itchy throat    Amaryl [Glimepiride] Other (comments)     \"nervous feeling\"    Avocado Rash     Pt states she bruises.  Feldene [Piroxicam] Diarrhea    Januvia [Sitagliptin] Other (comments)     Upset stomach    Levofloxacin Unknown (comments)    Losartan Itching     Caused congestion per stated by pt    [place] Hives    Oxycodone Diarrhea and Nausea Only    Pcn [Penicillins] Rash    Pineapple Rash    Prednisone Nausea and Vomiting    Tylenol [Acetaminophen] Palpitations    Welchol [Colesevelam] Other (comments)     Pt states \"made throat feel raw\"    Zestril [Lisinopril] Cough      Family History   Problem Relation Age of Onset    Stroke Mother     Hypertension Mother     Stroke Father     Hypertension Father     Hypertension Brother       Current Outpatient Medications   Medication Sig    metoprolol succinate (TOPROL-XL) 25 mg XL tablet 0.5 tablet at bedtime.  glyBURIDE (DIABETA) 5 mg tablet Take 1 Tablet by mouth daily (after breakfast).  furosemide (Lasix) 20 mg tablet Take 1 Tablet by mouth daily.  Accu-Chek Ashley Plus test strp strip USE TO CHECK BLOOD SUGAR  2 TIMES DAILY DX E11.9    dorzolamide-timoloL (COSOPT) 22.3-6.8 mg/mL ophthalmic solution Administer 1 Drop to both eyes two (2) times a day.  diclofenac (VOLTAREN) 1 % gel Apply  to affected area four (4) times daily as needed for Pain.     cholecalciferol (VITAMIN D3) (2,000 UNITS /50 MCG) cap capsule Take 2,000 Units by mouth daily.  ibuprofen (ADVIL LIQUI-GEL) 200 mg cap Take 1 Tab by mouth two (2) times daily as needed for Pain.  GARLIC PO Take 7,462 mg by mouth daily.  red yeast rice extract 600 mg Cap Take 600 mg by mouth daily.  cod liver oil Cap Take 1 Cap by mouth daily. No current facility-administered medications for this visit. There were no vitals filed for this visit. Social History     Socioeconomic History    Marital status:      Spouse name: Not on file    Number of children: Not on file    Years of education: Not on file    Highest education level: Not on file   Occupational History    Not on file   Tobacco Use    Smoking status: Never Smoker    Smokeless tobacco: Never Used   Vaping Use    Vaping Use: Never used   Substance and Sexual Activity    Alcohol use: No     Alcohol/week: 0.0 standard drinks    Drug use: No    Sexual activity: Not Currently   Other Topics Concern    Not on file   Social History Narrative    Not on file     Social Determinants of Health     Financial Resource Strain:     Difficulty of Paying Living Expenses:    Food Insecurity:     Worried About Running Out of Food in the Last Year:     Ran Out of Food in the Last Year:    Transportation Needs:     Lack of Transportation (Medical):      Lack of Transportation (Non-Medical):    Physical Activity:     Days of Exercise per Week:     Minutes of Exercise per Session:    Stress:     Feeling of Stress :    Social Connections:     Frequency of Communication with Friends and Family:     Frequency of Social Gatherings with Friends and Family:     Attends Rastafari Services:     Active Member of Clubs or Organizations:     Attends Club or Organization Meetings:     Marital Status:    Intimate Partner Violence:     Fear of Current or Ex-Partner:     Emotionally Abused:     Physically Abused:     Sexually Abused:        I have reviewed the nurses notes, vitals, problem list, allergy list, medical history, family, social history and medications. Review of Symptoms  11 systems reviewed, negative other than as stated in the HPI      Physical Exam:      General: Well developed, in no acute distress, cooperative and alert  HEENT: No carotid bruits, no JVD, trach is midline. Neck Supple, PERRL, EOM intact. Heart:  Normal S1/S2 negative S3 or S4. Regular, no murmur, gallop or rub. Respiratory: Clear bilaterally x 4, no wheezing or rales  Abdomen:   Soft, non-tender, no masses, bowel sounds are active. Extremities:  No edema, normal cap refill, no cyanosis, atraumatic. Neuro: A&Ox3, speech clear, gait stable. Skin: Skin color is normal. No rashes or lesions. Non diaphoretic  Vascular: 2+ pulses symmetric in all extremities    Cardiographics    ECG: Sinus rhythm CT interval 80 ms        Cardiology Labs:  Lab Results   Component Value Date/Time    Cholesterol, total 252 (H) 07/20/2021 03:48 PM    HDL Cholesterol 59 07/20/2021 03:48 PM    LDL, calculated 172 (H) 07/20/2021 03:48 PM    Triglyceride 105 07/20/2021 03:48 PM    CHOL/HDL Ratio 4.3 07/20/2021 03:48 PM       Lab Results   Component Value Date/Time    Sodium 142 07/20/2021 03:48 PM    Potassium 4.0 07/20/2021 03:48 PM    Chloride 110 (H) 07/20/2021 03:48 PM    CO2 26 07/20/2021 03:48 PM    Anion gap 6 07/20/2021 03:48 PM    Glucose 118 (H) 07/20/2021 03:48 PM    BUN 21 (H) 07/20/2021 03:48 PM    Creatinine 1.05 (H) 07/20/2021 03:48 PM    BUN/Creatinine ratio 20 07/20/2021 03:48 PM    GFR est AA >60 07/20/2021 03:48 PM    GFR est non-AA 50 (L) 07/20/2021 03:48 PM    Calcium 9.3 07/20/2021 03:48 PM    Bilirubin, total 0.3 02/10/2021 09:28 AM    Alk.  phosphatase 114 02/10/2021 09:28 AM    Protein, total 7.2 02/10/2021 09:28 AM    Albumin 4.1 02/10/2021 09:28 AM    Globulin 4.1 (H) 04/26/2017 12:10 PM    A-G Ratio 1.3 02/10/2021 09:28 AM    ALT (SGPT) 17 02/10/2021 09:28 AM           Assessment: Assessment:     Diagnoses and all orders for this visit:    1. Essential hypertension    2. Nonrheumatic tricuspid valve disorder    3. Mixed hyperlipidemia    4. Statin intolerance        ICD-10-CM ICD-9-CM    1. Essential hypertension  I10 401.9    2. Nonrheumatic tricuspid valve disorder  I36.9 424.2    3. Mixed hyperlipidemia  E78.2 272.2    4. Statin intolerance  Z78.9 995.27      No orders of the defined types were placed in this encounter. Plan:     1. Palpitations: TSH normal, normal electrolytes. Holter findings PVC's and 4 episodes of NSVT max 3 beats started on betablocker. Frequency and intensity of palpitations have markedly improved continue Toprol-XL 12.5 mg  2. Hypertension:  Normotensive 120/72  3. Hyperlipidemia: Statin intolerant, intolerant to WelChol,  , discussed Zetia with patient willing to trial.  4.  Dyspnea on exertion: Multiple risk factors for CAD, no ischemia on NST, ECHO showing normal Ef%, mild/mod TR, no pHTN. Follow-up in 6 months    Vipul Cisneros NP      Please note that this dictation was completed with [employer], the computer voice recognition software. Quite often unanticipated grammatical, syntax, homophones, and other interpretive errors are inadvertently transcribed by the computer software. Please disregard these errors. Please excuse any errors that have escaped final proofreading. Thank you.

## 2021-10-11 ENCOUNTER — OFFICE VISIT (OUTPATIENT)
Dept: CARDIOLOGY CLINIC | Age: 86
End: 2021-10-11
Payer: MEDICARE

## 2021-10-11 VITALS
OXYGEN SATURATION: 92 % | DIASTOLIC BLOOD PRESSURE: 72 MMHG | SYSTOLIC BLOOD PRESSURE: 120 MMHG | RESPIRATION RATE: 19 BRPM | HEIGHT: 62 IN | BODY MASS INDEX: 34.41 KG/M2 | WEIGHT: 187 LBS

## 2021-10-11 DIAGNOSIS — I36.9 NONRHEUMATIC TRICUSPID VALVE DISORDER: ICD-10-CM

## 2021-10-11 DIAGNOSIS — I10 ESSENTIAL HYPERTENSION: Primary | ICD-10-CM

## 2021-10-11 DIAGNOSIS — E78.2 MIXED HYPERLIPIDEMIA: ICD-10-CM

## 2021-10-11 DIAGNOSIS — Z78.9 STATIN INTOLERANCE: ICD-10-CM

## 2021-10-11 PROCEDURE — 93000 ELECTROCARDIOGRAM COMPLETE: CPT | Performed by: NURSE PRACTITIONER

## 2021-10-11 PROCEDURE — G8432 DEP SCR NOT DOC, RNG: HCPCS | Performed by: NURSE PRACTITIONER

## 2021-10-11 PROCEDURE — G8427 DOCREV CUR MEDS BY ELIG CLIN: HCPCS | Performed by: NURSE PRACTITIONER

## 2021-10-11 PROCEDURE — 99214 OFFICE O/P EST MOD 30 MIN: CPT | Performed by: NURSE PRACTITIONER

## 2021-10-11 PROCEDURE — G8752 SYS BP LESS 140: HCPCS | Performed by: NURSE PRACTITIONER

## 2021-10-11 PROCEDURE — G8536 NO DOC ELDER MAL SCRN: HCPCS | Performed by: NURSE PRACTITIONER

## 2021-10-11 PROCEDURE — G8754 DIAS BP LESS 90: HCPCS | Performed by: NURSE PRACTITIONER

## 2021-10-11 PROCEDURE — 1090F PRES/ABSN URINE INCON ASSESS: CPT | Performed by: NURSE PRACTITIONER

## 2021-10-11 PROCEDURE — 1101F PT FALLS ASSESS-DOCD LE1/YR: CPT | Performed by: NURSE PRACTITIONER

## 2021-10-11 PROCEDURE — G8417 CALC BMI ABV UP PARAM F/U: HCPCS | Performed by: NURSE PRACTITIONER

## 2021-10-11 PROCEDURE — G8399 PT W/DXA RESULTS DOCUMENT: HCPCS | Performed by: NURSE PRACTITIONER

## 2021-10-11 RX ORDER — EZETIMIBE 10 MG/1
10 TABLET ORAL DAILY
Qty: 90 TABLET | Refills: 1 | Status: SHIPPED | OUTPATIENT
Start: 2021-10-11 | End: 2022-04-11 | Stop reason: SDUPTHER

## 2021-10-11 RX ORDER — METOPROLOL SUCCINATE 25 MG/1
TABLET, EXTENDED RELEASE ORAL
Qty: 45 TABLET | Refills: 3 | Status: SHIPPED | OUTPATIENT
Start: 2021-10-11 | End: 2022-02-25 | Stop reason: SDUPTHER

## 2021-10-11 NOTE — Clinical Note
Abhijeet Lord, patient reported that her glucose is running a bit higher than normal has hit 170 with her Accu-Cheks since her glyburide was changed what sounds like a different . She wanted me to let you know.

## 2021-10-11 NOTE — PROGRESS NOTES
1. Have you been to the ER, urgent care clinic since your last visit? Hospitalized since your last visit? No    2. Have you seen or consulted any other health care providers outside of the [address] [place] [address] since your last visit? Include any pap smears or colon screening. No      Chief Complaint   Patient presents with    Follow-up    Leg Swelling     mild. Patient stated she had cyst removed on 10/07/21 on both armpits.

## 2021-10-26 ENCOUNTER — OFFICE VISIT (OUTPATIENT)
Dept: FAMILY MEDICINE CLINIC | Age: 86
End: 2021-10-26
Payer: MEDICARE

## 2021-10-26 VITALS
TEMPERATURE: 97.6 F | DIASTOLIC BLOOD PRESSURE: 58 MMHG | RESPIRATION RATE: 18 BRPM | BODY MASS INDEX: 32.35 KG/M2 | HEART RATE: 54 BPM | WEIGHT: 175.8 LBS | SYSTOLIC BLOOD PRESSURE: 130 MMHG | OXYGEN SATURATION: 96 % | HEIGHT: 62 IN

## 2021-10-26 DIAGNOSIS — M15.9 PRIMARY OSTEOARTHRITIS INVOLVING MULTIPLE JOINTS: ICD-10-CM

## 2021-10-26 DIAGNOSIS — E11.21 TYPE 2 DIABETES WITH NEPHROPATHY (HCC): ICD-10-CM

## 2021-10-26 DIAGNOSIS — I10 ESSENTIAL HYPERTENSION: ICD-10-CM

## 2021-10-26 DIAGNOSIS — Z12.31 ENCOUNTER FOR SCREENING MAMMOGRAM FOR BREAST CANCER: ICD-10-CM

## 2021-10-26 DIAGNOSIS — Z00.00 MEDICARE ANNUAL WELLNESS VISIT, SUBSEQUENT: Primary | ICD-10-CM

## 2021-10-26 DIAGNOSIS — E78.2 MIXED HYPERLIPIDEMIA: ICD-10-CM

## 2021-10-26 DIAGNOSIS — M51.36 DDD (DEGENERATIVE DISC DISEASE), LUMBAR: ICD-10-CM

## 2021-10-26 DIAGNOSIS — Z51.81 ENCOUNTER FOR MEDICATION MONITORING: ICD-10-CM

## 2021-10-26 DIAGNOSIS — Z23 NEEDS FLU SHOT: ICD-10-CM

## 2021-10-26 LAB
ANION GAP SERPL CALC-SCNC: 6 MMOL/L (ref 5–15)
BUN SERPL-MCNC: 24 MG/DL (ref 6–20)
BUN/CREAT SERPL: 20 (ref 12–20)
CALCIUM SERPL-MCNC: 9.8 MG/DL (ref 8.5–10.1)
CHLORIDE SERPL-SCNC: 107 MMOL/L (ref 97–108)
CHOLEST SERPL-MCNC: 207 MG/DL
CO2 SERPL-SCNC: 27 MMOL/L (ref 21–32)
CREAT SERPL-MCNC: 1.2 MG/DL (ref 0.55–1.02)
ERYTHROCYTE [DISTWIDTH] IN BLOOD BY AUTOMATED COUNT: 13.8 % (ref 11.5–14.5)
GLUCOSE POC: 106 MG/DL
GLUCOSE SERPL-MCNC: 114 MG/DL (ref 65–100)
HBA1C MFR BLD HPLC: 7.2 %
HCT VFR BLD AUTO: 39.7 % (ref 35–47)
HDLC SERPL-MCNC: 60 MG/DL
HDLC SERPL: 3.5 {RATIO} (ref 0–5)
HGB BLD-MCNC: 12.6 G/DL (ref 11.5–16)
LDLC SERPL CALC-MCNC: 122 MG/DL (ref 0–100)
MCH RBC QN AUTO: 26.9 PG (ref 26–34)
MCHC RBC AUTO-ENTMCNC: 31.7 G/DL (ref 30–36.5)
MCV RBC AUTO: 84.8 FL (ref 80–99)
NRBC # BLD: 0 K/UL (ref 0–0.01)
NRBC BLD-RTO: 0 PER 100 WBC
PLATELET # BLD AUTO: 278 K/UL (ref 150–400)
PMV BLD AUTO: 10.5 FL (ref 8.9–12.9)
POTASSIUM SERPL-SCNC: 4.3 MMOL/L (ref 3.5–5.1)
RBC # BLD AUTO: 4.68 M/UL (ref 3.8–5.2)
SODIUM SERPL-SCNC: 140 MMOL/L (ref 136–145)
TRIGL SERPL-MCNC: 125 MG/DL (ref ?–150)
VLDLC SERPL CALC-MCNC: 25 MG/DL
WBC # BLD AUTO: 6.6 K/UL (ref 3.6–11)

## 2021-10-26 PROCEDURE — G8752 SYS BP LESS 140: HCPCS | Performed by: FAMILY MEDICINE

## 2021-10-26 PROCEDURE — G8427 DOCREV CUR MEDS BY ELIG CLIN: HCPCS | Performed by: FAMILY MEDICINE

## 2021-10-26 PROCEDURE — 1090F PRES/ABSN URINE INCON ASSESS: CPT | Performed by: FAMILY MEDICINE

## 2021-10-26 PROCEDURE — 83036 HEMOGLOBIN GLYCOSYLATED A1C: CPT | Performed by: FAMILY MEDICINE

## 2021-10-26 PROCEDURE — G8417 CALC BMI ABV UP PARAM F/U: HCPCS | Performed by: FAMILY MEDICINE

## 2021-10-26 PROCEDURE — G8399 PT W/DXA RESULTS DOCUMENT: HCPCS | Performed by: FAMILY MEDICINE

## 2021-10-26 PROCEDURE — 1101F PT FALLS ASSESS-DOCD LE1/YR: CPT | Performed by: FAMILY MEDICINE

## 2021-10-26 PROCEDURE — 90694 VACC AIIV4 NO PRSRV 0.5ML IM: CPT | Performed by: FAMILY MEDICINE

## 2021-10-26 PROCEDURE — G8536 NO DOC ELDER MAL SCRN: HCPCS | Performed by: FAMILY MEDICINE

## 2021-10-26 PROCEDURE — G0439 PPPS, SUBSEQ VISIT: HCPCS | Performed by: FAMILY MEDICINE

## 2021-10-26 PROCEDURE — 3051F HG A1C>EQUAL 7.0%<8.0%: CPT | Performed by: FAMILY MEDICINE

## 2021-10-26 PROCEDURE — G0008 ADMIN INFLUENZA VIRUS VAC: HCPCS | Performed by: FAMILY MEDICINE

## 2021-10-26 PROCEDURE — 99214 OFFICE O/P EST MOD 30 MIN: CPT | Performed by: FAMILY MEDICINE

## 2021-10-26 PROCEDURE — G8754 DIAS BP LESS 90: HCPCS | Performed by: FAMILY MEDICINE

## 2021-10-26 PROCEDURE — G8510 SCR DEP NEG, NO PLAN REQD: HCPCS | Performed by: FAMILY MEDICINE

## 2021-10-26 PROCEDURE — 82947 ASSAY GLUCOSE BLOOD QUANT: CPT | Performed by: FAMILY MEDICINE

## 2021-10-26 RX ORDER — GLYBURIDE 5 MG/1
5 TABLET ORAL
Qty: 90 TABLET | Refills: 3 | Status: SHIPPED | OUTPATIENT
Start: 2021-10-26 | End: 2022-02-25 | Stop reason: SDUPTHER

## 2021-10-26 NOTE — PROGRESS NOTES
Chief Complaint   Patient presents with   [facility] Visit     Medicare Wellness             Microalbumin 2/10/2021    Eye exam 1/18/2021 by Dr. Rocky Garcia. Patient has an appt 12/7/2021. Mammogram: 11/20/2020    Verbal order received by Dr. Jeaneth Chawla dose flu vaccine IM. Pt received high dose flu vaccine IM in left deltoid without any difficulty. 1. Have you been to the ER, urgent care clinic since your last visit? Hospitalized since your last visit? No    2. Have you seen or consulted any other health care providers outside of the [address] [place] [address] since your last visit? Include any pap smears or colon screening.  No

## 2021-10-26 NOTE — PROGRESS NOTES
HISTORY OF PRESENT ILLNESS  Ceci Hawkins is a 81 y.o. female. HPI   Follow up on chronic medical problems. Remains active and independent. Continue to drive. Sleeping good. Appetite is good. Staying at home mostly d/t the pandemic. HTN follow up:  Compliant w/ meds, low salt diet, and exercise. Home bp monitoring 120-130s/70s. Pt has bp log. Swelling is overall improved with her compression stocking. No headache or dizziness. No chest pain and SOB at her usual baseline. Had cardiac eval and overall was stable. Has been compliant with her medications. DM type II follow up:  Compliant w/ meds, diabetic diet. She has been better with watching diet. Obtains home glucose monitoring averaging 100-130s. Checks BS daily on most days and prn. Pt does have BS log at visit today. Denies any tingling sensation, polyuria and polydipsia. No blurred vision. No significant weight changes. Feeling well since last OV. She is on glyburide d/t intolerance to glimepiride and glipizide. She is not able to afford other options. She has been very stable on her current regimen. Hypercholesterolemia follow up:  Compliant low fat, low cholesterol diet. Intolerant to stains and did not tolerate zetia or welchol. Has not been able to get in much exercise over the past few months. Osteoarthritis:  Patient has osteoarthritis. Takes an occasional Advil which helps the pain. Has had increased pain and stiffness in the back and hip that comes and goes. Symptoms onset: problem is longstanding. Rheumatological ROS: stable, mild-to-moderate joint symptoms intermittently, reasonably well controlled by PRN meds.    Response to treatment plan: stable     Patient Active Problem List   Diagnosis Code    Osteoarthritis M19.90    Hypovitaminosis D E55.9    Nonrheumatic tricuspid valve disorder I36.9    Family history of ischemic heart disease Z82.49    Mixed hyperlipidemia E78.2    Mitral valve disease I05.9    Obesity, unspecified E66.9    Generalized OA M15.9    Diabetes mellitus type 2, controlled ([facility] [address] E11.9    Essential hypertension I10    Encounter for medication monitoring Z51.81    Statin intolerance Z78.9    Type 2 diabetes with nephropathy (HCC) E11.21    Abscess of abdominal wall L02. 211    PVC's (premature ventricular contractions) I49.3       Current Outpatient Medications   Medication Sig Dispense Refill    metoprolol succinate (TOPROL-XL) 25 mg XL tablet 0.5 tablet at bedtime. 45 Tablet 3    ezetimibe (ZETIA) 10 mg tablet Take 1 Tablet by mouth daily. 90 Tablet 1    glyBURIDE (DIABETA) 5 mg tablet Take 1 Tablet by mouth daily (after breakfast). 90 Tablet 3    furosemide (Lasix) 20 mg tablet Take 1 Tablet by mouth daily. 90 Tablet 3    Accu-Chek Ashley Plus test strp strip USE TO CHECK BLOOD SUGAR  2 TIMES DAILY DX E11.9 300 Strip 3    dorzolamide-timoloL (COSOPT) 22.3-6.8 mg/mL ophthalmic solution Administer 1 Drop to both eyes two (2) times a day.  diclofenac (VOLTAREN) 1 % gel Apply  to affected area four (4) times daily as needed for Pain. 100 g 0    cholecalciferol (VITAMIN D3) (2,000 UNITS /50 MCG) cap capsule Take 2,000 Units by mouth daily.  ibuprofen (ADVIL LIQUI-GEL) 200 mg cap Take 1 Tab by mouth two (2) times daily as needed for Pain.  GARLIC PO Take 2,142 mg by mouth daily.  red yeast rice extract 600 mg Cap Take 600 mg by mouth daily.  cod liver oil Cap Take 1 Cap by mouth daily. Allergies   Allergen Reactions    Latex Contact Dermatitis    Aspirin Palpitations    Glipizide Shortness of Breath and Swelling    Bactrim Ds [Sulfamethoxazole-Trimethoprim] Other (comments)     Patient experienced pain in legs and buttock and muscle cramping.  Contrast Agent [Iodine] Hives and Itching     Itchy throat    Amaryl [Glimepiride] Other (comments)     \"nervous feeling\"    Avocado Rash     Pt states she bruises.     Feldene [Piroxicam] Diarrhea    Januvia [Sitagliptin] Other (comments)     Upset stomach    Levofloxacin Unknown (comments)    Losartan Itching     Caused congestion per stated by pt    [place] Hives    Oxycodone Diarrhea and Nausea Only    Pcn [Penicillins] Rash    Pineapple Rash    Prednisone Nausea and Vomiting    Tylenol [Acetaminophen] Palpitations    Welchol [Colesevelam] Other (comments)     Pt states \"made throat feel raw\"    Zestril [Lisinopril] Cough         Past Medical History:   Diagnosis Date    Chronic edema     Diabetes ([address]     Family history of ischemic heart disease 4/12/2012    Hypercholesterolemia     Hypertension     PVC's (premature ventricular contractions) 10/10/2021         Past Surgical History:   Procedure Laterality Date    ENDOSCOPY, COLON, DIAGNOSTIC  12/2006    Dr. Aziza Lyles HX BREAST BIOPSY  1997    HX HYSTERECTOMY  1972    [address]  09/03/2019    abscess removed from abdominal wall         Family History   Problem Relation Age of Onset    Stroke Mother     Hypertension Mother     Stroke Father     Hypertension Father     Hypertension Brother        Social History     Tobacco Use    Smoking status: Never Smoker    Smokeless tobacco: Never Used   Substance Use Topics    Alcohol use: No     Alcohol/week: 0.0 standard drinks        Lab Results   Component Value Date/Time    WBC 6.5 02/10/2021 09:28 AM    HGB 12.9 02/10/2021 09:28 AM    HCT 39.1 02/10/2021 09:28 AM    PLATELET 924 44/86/7722 09:28 AM    MCV 82 02/10/2021 09:28 AM     Lab Results   Component Value Date/Time    Cholesterol, total 252 (H) 07/20/2021 03:48 PM    HDL Cholesterol 59 07/20/2021 03:48 PM    LDL, calculated 172 (H) 07/20/2021 03:48 PM    Triglyceride 105 07/20/2021 03:48 PM    CHOL/HDL Ratio 4.3 07/20/2021 03:48 PM        Lab Results   Component Value Date/Time    Sodium 142 07/20/2021 03:48 PM    Potassium 4.0 07/20/2021 03:48 PM    Chloride 110 (H) 07/20/2021 03:48 PM    CO2 26 07/20/2021 03:48 PM    Anion gap 6 07/20/2021 03:48 PM    Glucose 118 (H) 07/20/2021 03:48 PM    BUN 21 (H) 07/20/2021 03:48 PM    Creatinine 1.05 (H) 07/20/2021 03:48 PM    BUN/Creatinine ratio 20 07/20/2021 03:48 PM    GFR est AA >60 07/20/2021 03:48 PM    GFR est non-AA 50 (L) 07/20/2021 03:48 PM    Calcium 9.3 07/20/2021 03:48 PM    Bilirubin, total 0.3 02/10/2021 09:28 AM    ALT (SGPT) 17 02/10/2021 09:28 AM    Alk. phosphatase 114 02/10/2021 09:28 AM    Protein, total 7.2 02/10/2021 09:28 AM    Albumin 4.1 02/10/2021 09:28 AM    Globulin 4.1 (H) 04/26/2017 12:10 PM    A-G Ratio 1.3 02/10/2021 09:28 AM      Lab Results   Component Value Date/Time    Hemoglobin A1c 7.0 (H) 07/20/2021 03:48 PM    Hemoglobin A1c (POC) 7.8 02/10/2021 09:28 AM         Review of Systems   Constitutional: Negative for malaise/fatigue. HENT: Negative for congestion. Eyes: Negative for blurred vision. Respiratory: Negative for cough and shortness of breath. Cardiovascular: Negative for chest pain and palpitations. Gastrointestinal: Negative for abdominal pain, constipation and heartburn. Genitourinary: Negative for dysuria, frequency and urgency. Neurological: Negative for dizziness, tingling and headaches. Endo/Heme/Allergies: Negative for environmental allergies. Psychiatric/Behavioral: Negative for depression. The patient does not have insomnia. Physical Exam  Vitals and nursing note reviewed. Constitutional:       Appearance: Normal appearance. She is well-developed. Comments: /77 (BP 1 Location: Left arm, BP Patient Position: Sitting)   Pulse 76   Temp 98.4 °F (36.9 °C) (Oral)   Resp 18   Ht 5' 1.5\" (1.562 m)   Wt 178 lb (80.7 kg)   LMP 04/28/1972   SpO2 99%   BMI 33.09 kg/m²      HENT:      Right Ear: Tympanic membrane and ear canal normal.      Left Ear: Tympanic membrane and ear canal normal.      Nose: No mucosal edema. Neck:      Thyroid: No thyromegaly.       Vascular: No carotid bruit. Cardiovascular:      Rate and Rhythm: Normal rate and regular rhythm. Pulses: Normal pulses. Heart sounds: Normal heart sounds. No gallop. Pulmonary:      Effort: Pulmonary effort is normal.      Breath sounds: Normal breath sounds. Chest:      Breasts:         Right: Normal.         Left: Normal.   Abdominal:      General: Bowel sounds are normal.      Palpations: Abdomen is soft. There is no mass. Tenderness: There is no abdominal tenderness. Musculoskeletal:         General: No swelling. Normal range of motion. Cervical back: Normal range of motion and neck supple. Right lower leg: Edema (mild) present. Left lower leg: Edema (mild) present. Comments: Has on her compression stockings. Lymphadenopathy:      Cervical: No cervical adenopathy. Upper Body:      Right upper body: No supraclavicular, axillary or pectoral adenopathy. Left upper body: No supraclavicular, axillary or pectoral adenopathy. Skin:     General: Skin is warm and dry. Neurological:      General: No focal deficit present. Mental Status: She is alert and oriented to person, place, and time. Psychiatric:         Mood and Affect: Mood normal.         ASSESSMENT and PLAN  Diagnoses and all orders for this visit:    1. Medicare annual wellness visit, subsequent    2. Essential hypertension  Stable     3. Type 2 diabetes with nephropathy (HCC)  a1c stable at 7.2%. Continue to monitor. Work on diet and exercise. -     AMB POC HEMOGLOBIN A1C  -     AMB POC GLUCOSE, QUANTITATIVE, BLOOD  -     glyBURIDE (DIABETA) 5 mg tablet; Take 1 Tablet by mouth daily (after breakfast). 4. Mixed hyperlipidemia  Continue to monitor. Work on diet and exercise.  -     LIPID PANEL; Future    5. Primary osteoarthritis involving multiple joints//  6. DDD (degenerative disc disease), lumbar  Overall stable     7.  Encounter for medication monitoring  -     METABOLIC PANEL, BASIC; Future  - CBC W/O DIFF; Future    8. Encounter for screening mammogram for breast cancer  -     [facility] MAMMO BI SCREENING INCL CAD; Future      Follow-up and Dispositions    · Return in about 4 months (around 2/26/2022). current treatment plan is effective, no change in therapy  reviewed diet, exercise and weight control  cardiovascular risk and specific lipid/LDL goals reviewed  reviewed medications and side effects in detail  specific diabetic recommendations: low cholesterol diet, weight control and daily exercise discussed, foot care discussed and Podiatry visits discussed, annual eye examinations at Ophthalmology discussed and glycohemoglobin and other lab monitoring discussed     I have discussed diagnosis listed in this note with pt and/or family. I have discussed treatment plans and options and the risk/benefit analysis of those options, including safe use of medications and possible medication side effects. Through the use of shared decision making we have agreed to the above plan. The patient has received an after-visit summary and questions were answered concerning future plans and follow up. Advise pt of any urgent changes then to proceed to the ER.

## 2022-01-12 ENCOUNTER — HOSPITAL ENCOUNTER (OUTPATIENT)
Dept: MAMMOGRAPHY | Age: 87
Discharge: HOME | End: 2022-01-12
Attending: FAMILY MEDICINE
Payer: MEDICARE

## 2022-01-12 DIAGNOSIS — Z12.31 ENCOUNTER FOR SCREENING MAMMOGRAM FOR BREAST CANCER: ICD-10-CM

## 2022-01-12 PROCEDURE — 77067 SCR MAMMO BI INCL CAD: CPT

## 2022-02-25 ENCOUNTER — OFFICE VISIT (OUTPATIENT)
Dept: FAMILY MEDICINE CLINIC | Age: 87
End: 2022-02-25
Payer: MEDICARE

## 2022-02-25 VITALS
HEART RATE: 82 BPM | HEIGHT: 62 IN | DIASTOLIC BLOOD PRESSURE: 75 MMHG | OXYGEN SATURATION: 99 % | SYSTOLIC BLOOD PRESSURE: 138 MMHG | TEMPERATURE: 97.5 F | WEIGHT: 174.6 LBS | BODY MASS INDEX: 32.13 KG/M2 | RESPIRATION RATE: 18 BRPM

## 2022-02-25 DIAGNOSIS — E78.2 MIXED HYPERLIPIDEMIA: ICD-10-CM

## 2022-02-25 DIAGNOSIS — M15.9 PRIMARY OSTEOARTHRITIS INVOLVING MULTIPLE JOINTS: ICD-10-CM

## 2022-02-25 DIAGNOSIS — E66.9 NON MORBID OBESITY: ICD-10-CM

## 2022-02-25 DIAGNOSIS — M51.36 DDD (DEGENERATIVE DISC DISEASE), LUMBAR: ICD-10-CM

## 2022-02-25 DIAGNOSIS — I10 ESSENTIAL HYPERTENSION: Primary | ICD-10-CM

## 2022-02-25 DIAGNOSIS — E11.21 TYPE 2 DIABETES WITH NEPHROPATHY (HCC): ICD-10-CM

## 2022-02-25 DIAGNOSIS — I49.3 PVC'S (PREMATURE VENTRICULAR CONTRACTIONS): ICD-10-CM

## 2022-02-25 DIAGNOSIS — Z51.81 ENCOUNTER FOR MEDICATION MONITORING: ICD-10-CM

## 2022-02-25 LAB
ALBUMIN SERPL-MCNC: 3.3 G/DL (ref 3.5–5)
ALBUMIN/GLOB SERPL: 0.9 {RATIO} (ref 1.1–2.2)
ALP SERPL-CCNC: 104 U/L (ref 45–117)
ALT SERPL-CCNC: 23 U/L (ref 12–78)
ANION GAP SERPL CALC-SCNC: 4 MMOL/L (ref 5–15)
APPEARANCE UR: CLEAR
AST SERPL-CCNC: 14 U/L (ref 15–37)
BACTERIA URNS QL MICRO: NEGATIVE /HPF
BILIRUB SERPL-MCNC: 0.4 MG/DL (ref 0.2–1)
BILIRUB UR QL: NEGATIVE
BUN SERPL-MCNC: 29 MG/DL (ref 6–20)
BUN/CREAT SERPL: 26 (ref 12–20)
CALCIUM SERPL-MCNC: 9.5 MG/DL (ref 8.5–10.1)
CHLORIDE SERPL-SCNC: 109 MMOL/L (ref 97–108)
CO2 SERPL-SCNC: 27 MMOL/L (ref 21–32)
COLOR UR: NORMAL
COMMENT, HOLDF: NORMAL
CREAT SERPL-MCNC: 1.1 MG/DL (ref 0.55–1.02)
CREAT UR-MCNC: 89.7 MG/DL
EPITH CASTS URNS QL MICRO: NORMAL /LPF
GLOBULIN SER CALC-MCNC: 3.7 G/DL (ref 2–4)
GLUCOSE POC: 88 MG/DL
GLUCOSE SERPL-MCNC: 99 MG/DL (ref 65–100)
GLUCOSE UR STRIP.AUTO-MCNC: NEGATIVE MG/DL
HBA1C MFR BLD HPLC: 7.2 %
HGB UR QL STRIP: NEGATIVE
KETONES UR QL STRIP.AUTO: NEGATIVE MG/DL
LEUKOCYTE ESTERASE UR QL STRIP.AUTO: NEGATIVE
MICROALBUMIN UR-MCNC: 5.6 MG/DL
MICROALBUMIN/CREAT UR-RTO: 62 MG/G (ref 0–30)
NITRITE UR QL STRIP.AUTO: NEGATIVE
OTHER,OTHU: NORMAL
PH UR STRIP: 6 [PH] (ref 5–8)
POTASSIUM SERPL-SCNC: 4.2 MMOL/L (ref 3.5–5.1)
PROT SERPL-MCNC: 7 G/DL (ref 6.4–8.2)
PROT UR STRIP-MCNC: NEGATIVE MG/DL
RBC #/AREA URNS HPF: NORMAL /HPF (ref 0–5)
SAMPLES BEING HELD,HOLD: NORMAL
SODIUM SERPL-SCNC: 140 MMOL/L (ref 136–145)
SP GR UR REFRACTOMETRY: 1.01 (ref 1–1.03)
UA: UC IF INDICATED,UAUC: NORMAL
UROBILINOGEN UR QL STRIP.AUTO: 0.2 EU/DL (ref 0.2–1)
WBC URNS QL MICRO: NORMAL /HPF (ref 0–4)

## 2022-02-25 PROCEDURE — G8510 SCR DEP NEG, NO PLAN REQD: HCPCS | Performed by: FAMILY MEDICINE

## 2022-02-25 PROCEDURE — 1090F PRES/ABSN URINE INCON ASSESS: CPT | Performed by: FAMILY MEDICINE

## 2022-02-25 PROCEDURE — G8536 NO DOC ELDER MAL SCRN: HCPCS | Performed by: FAMILY MEDICINE

## 2022-02-25 PROCEDURE — 99214 OFFICE O/P EST MOD 30 MIN: CPT | Performed by: FAMILY MEDICINE

## 2022-02-25 PROCEDURE — 83036 HEMOGLOBIN GLYCOSYLATED A1C: CPT | Performed by: FAMILY MEDICINE

## 2022-02-25 PROCEDURE — 82947 ASSAY GLUCOSE BLOOD QUANT: CPT | Performed by: FAMILY MEDICINE

## 2022-02-25 PROCEDURE — G8417 CALC BMI ABV UP PARAM F/U: HCPCS | Performed by: FAMILY MEDICINE

## 2022-02-25 PROCEDURE — G8427 DOCREV CUR MEDS BY ELIG CLIN: HCPCS | Performed by: FAMILY MEDICINE

## 2022-02-25 PROCEDURE — 1101F PT FALLS ASSESS-DOCD LE1/YR: CPT | Performed by: FAMILY MEDICINE

## 2022-02-25 PROCEDURE — 3051F HG A1C>EQUAL 7.0%<8.0%: CPT | Performed by: FAMILY MEDICINE

## 2022-02-25 RX ORDER — LATANOPROST 50 UG/ML
1 SOLUTION/ DROPS OPHTHALMIC
COMMUNITY
Start: 2021-12-07

## 2022-02-25 RX ORDER — GLYBURIDE 5 MG/1
2.5 TABLET ORAL
Qty: 90 TABLET | Refills: 3
Start: 2022-02-25

## 2022-02-25 RX ORDER — METOPROLOL SUCCINATE 25 MG/1
TABLET, EXTENDED RELEASE ORAL
Qty: 45 TABLET | Refills: 3 | Status: SHIPPED | OUTPATIENT
Start: 2022-02-25 | End: 2022-02-25 | Stop reason: SDUPTHER

## 2022-02-25 RX ORDER — METOPROLOL SUCCINATE 25 MG/1
TABLET, EXTENDED RELEASE ORAL
Qty: 15 TABLET | Refills: 0 | Status: SHIPPED | OUTPATIENT
Start: 2022-02-25 | End: 2022-04-11 | Stop reason: SDUPTHER

## 2022-02-25 NOTE — PROGRESS NOTES
HISTORY OF PRESENT ILLNESS  Lexie Espino is a 81 y.o. female. HPI   Follow up on chronic medical problems. Remains active and independent. Continues to drive. Sleeping good. Appetite is good. Staying at home mostly d/t the pandemic. HTN follow up:  Compliant w/ meds, low salt diet, and exercise. 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 On low dose beta blocker. No further palpitations. Home bp monitoring 120-130s/70s. Pt has bp log. Swelling is overall improved with her compression stocking. No headache or dizziness. No chest pain and SOB at her usual baseline. Had cardiac eval and overall was stable. Has been compliant with her medications. DM type II follow up:  Compliant w/ meds, diabetic diet. She has been better with watching diet. Obtains home glucose monitoring averaging 100-130s. Checks BS daily on most days and prn. Pt does have BS log at visit today. Denies any tingling sensation, polyuria and polydipsia. No blurred vision. No significant weight changes. Feeling well since last OV. She is on glyburide d/t intolerance to glimepiride and glipizide. She is not able to afford other options. She has been very stable on her current regimen. Hypercholesterolemia follow up:  Compliant low fat, low cholesterol diet. Intolerant to stains. Seems to be tolerating Zetia this time. Has not been able to get in much exercise over the past few months. Osteoarthritis:  Patient has osteoarthritis. Takes an occasional Advil which helps the pain. Has had increased pain and stiffness in the back and hip that comes and goes. Symptoms onset: problem is longstanding. Rheumatological ROS: stable, mild-to-moderate joint symptoms intermittently, reasonably well controlled by PRN meds.    Response to treatment plan: stable      Patient Active Problem List   Diagnosis Code    Osteoarthritis M19.90    Hypovitaminosis D E55.9    Nonrheumatic tricuspid valve disorder I36.9    Family history of ischemic heart disease Z82.49    Mixed hyperlipidemia E78.2    Mitral valve disease I05.9    Obesity, unspecified E66.9    Generalized OA M15.9    Diabetes mellitus type 2, controlled ([facility] [address] E11.9    Essential hypertension I10    Encounter for medication monitoring Z51.81    Statin intolerance Z78.9    Type 2 diabetes with nephropathy (HCC) E11.21    Abscess of abdominal wall L02. 80    PVC's (premature ventricular contractions) I49.3       Current Outpatient Medications   Medication Sig Dispense Refill    glyBURIDE (DIABETA) 5 mg tablet Take 1 Tablet by mouth daily (after breakfast). 90 Tablet 3    metoprolol succinate (TOPROL-XL) 25 mg XL tablet 0.5 tablet at bedtime. 45 Tablet 3    ezetimibe (ZETIA) 10 mg tablet Take 1 Tablet by mouth daily. 90 Tablet 1    furosemide (Lasix) 20 mg tablet Take 1 Tablet by mouth daily. 90 Tablet 3    Accu-Chek Ashley Plus test strp strip USE TO CHECK BLOOD SUGAR  2 TIMES DAILY DX E11.9 300 Strip 3    diclofenac (VOLTAREN) 1 % gel Apply  to affected area four (4) times daily as needed for Pain. 100 g 0    cholecalciferol (VITAMIN D3) (2,000 UNITS /50 MCG) cap capsule Take 2,000 Units by mouth daily.  ibuprofen (ADVIL LIQUI-GEL) 200 mg cap Take 1 Tab by mouth two (2) times daily as needed for Pain.  GARLIC PO Take 6,403 mg by mouth daily.  red yeast rice extract 600 mg Cap Take 600 mg by mouth daily.  cod liver oil Cap Take 1 Cap by mouth daily. Allergies   Allergen Reactions    Latex Contact Dermatitis    Aspirin Palpitations    Glipizide Shortness of Breath and Swelling    Bactrim Ds [Sulfamethoxazole-Trimethoprim] Other (comments)     Patient experienced pain in legs and buttock and muscle cramping.  Contrast Agent [Iodine] Hives and Itching     Itchy throat    Amaryl [Glimepiride] Other (comments)     \"nervous feeling\"    Avocado Rash     Pt states she bruises.     Feldene [Piroxicam] Diarrhea    Januvia [Sitagliptin] Other (comments)     Upset stomach    Levofloxacin Unknown (comments)    Losartan Itching     Caused congestion per stated by pt    Derrick Hives    Oxycodone Diarrhea and Nausea Only    Pcn [Penicillins] Rash    Pineapple Rash    Prednisone Nausea and Vomiting    Tylenol [Acetaminophen] Palpitations    Welchol [Colesevelam] Other (comments)     Pt states \"made throat feel raw\"    Zestril [Lisinopril] Cough       Past Medical History:   Diagnosis Date    Chronic edema     Diabetes ([address]     Family history of ischemic heart disease 4/12/2012    Hypercholesterolemia     Hypertension     PVC's (premature ventricular contractions) 10/10/2021       Past Surgical History:   Procedure Laterality Date    ENDOSCOPY, COLON, DIAGNOSTIC  12/2006    Dr. Arlene Sheridan     HX BREAST BIOPSY Bilateral 1997    benign    HX [address] [facility]  09/03/2019    abscess removed from abdominal wall       Family History   Problem Relation Age of Onset    Stroke Mother     Hypertension Mother     Stroke Father     Hypertension Father     Hypertension Brother        Social History     Tobacco Use    Smoking status: Never Smoker    Smokeless tobacco: Never Used   Substance Use Topics    Alcohol use: No     Alcohol/week: 0.0 standard drinks        Lab Results   Component Value Date/Time    WBC 6.6 10/26/2021 10:09 AM    HGB 12.6 10/26/2021 10:09 AM    HCT 39.7 10/26/2021 10:09 AM    PLATELET 308 81/00/8853 10:09 AM    MCV 84.8 10/26/2021 10:09 AM     Lab Results   Component Value Date/Time    Cholesterol, total 207 (H) 10/26/2021 10:09 AM    HDL Cholesterol 60 10/26/2021 10:09 AM    LDL, calculated 122 (H) 10/26/2021 10:09 AM    Triglyceride 125 10/26/2021 10:09 AM    CHOL/HDL Ratio 3.5 10/26/2021 10:09 AM     Lab Results   Component Value Date/Time    TSH 1.21 07/20/2021 03:48 PM      Lab Results   Component Value Date/Time    Sodium 140 10/26/2021 10:09 AM Potassium 4.3 10/26/2021 10:09 AM    Chloride 107 10/26/2021 10:09 AM    CO2 27 10/26/2021 10:09 AM    Anion gap 6 10/26/2021 10:09 AM    Glucose 114 (H) 10/26/2021 10:09 AM    BUN 24 (H) 10/26/2021 10:09 AM    Creatinine 1.20 (H) 10/26/2021 10:09 AM    BUN/Creatinine ratio 20 10/26/2021 10:09 AM    GFR est AA 52 (L) 10/26/2021 10:09 AM    GFR est non-AA 43 (L) 10/26/2021 10:09 AM    Calcium 9.8 10/26/2021 10:09 AM    Bilirubin, total 0.3 02/10/2021 09:28 AM    ALT (SGPT) 17 02/10/2021 09:28 AM    Alk. phosphatase 114 02/10/2021 09:28 AM    Protein, total 7.2 02/10/2021 09:28 AM    Albumin 4.1 02/10/2021 09:28 AM    Globulin 4.1 (H) 04/26/2017 12:10 PM    A-G Ratio 1.3 02/10/2021 09:28 AM      Lab Results   Component Value Date/Time    Hemoglobin A1c 7.0 (H) 07/20/2021 03:48 PM    Hemoglobin A1c (POC) 7.2 10/26/2021 09:20 AM         Review of Systems   Constitutional: Negative for malaise/fatigue. HENT: Negative for congestion. Eyes: Negative for blurred vision. Respiratory: Negative for cough and shortness of breath. Cardiovascular: Negative for chest pain, palpitations and leg swelling. Gastrointestinal: Negative for abdominal pain, constipation and heartburn. Genitourinary: Negative for dysuria, frequency and urgency. Neurological: Negative for dizziness, tingling and headaches. Endo/Heme/Allergies: Negative for environmental allergies. Psychiatric/Behavioral: Negative for depression. The patient does not have insomnia. Physical Exam  Vitals and nursing note reviewed. Constitutional:       Appearance: Normal appearance. She is well-developed.       Comments: /75 (BP 1 Location: Left arm, BP Patient Position: Sitting)   Pulse 82   Temp 97.5 °F (36.4 °C) (Oral)   Resp 18   Ht 5' 1.5\" (1.562 m)   Wt 174 lb 9.6 oz (79.2 kg)   LMP 04/28/1972   SpO2 99%   BMI 32.46 kg/m²        HENT:      Right Ear: Tympanic membrane and ear canal normal.      Left Ear: Tympanic membrane and ear canal normal.      Nose: No mucosal edema. Neck:      Thyroid: No thyromegaly. Vascular: No carotid bruit. Cardiovascular:      Rate and Rhythm: Normal rate and regular rhythm. Pulses: Normal pulses. Heart sounds: Normal heart sounds. No gallop. Pulmonary:      Effort: Pulmonary effort is normal.      Breath sounds: Normal breath sounds. Chest:   Breasts:      Right: Normal. No axillary adenopathy or supraclavicular adenopathy. Left: Normal. No axillary adenopathy or supraclavicular adenopathy. Abdominal:      General: Bowel sounds are normal.      Palpations: Abdomen is soft. There is no mass. Tenderness: There is no abdominal tenderness. Musculoskeletal:         General: No swelling. Normal range of motion. Cervical back: Normal range of motion and neck supple. Right lower leg: Edema (mild) present. Left lower leg: Edema (mild) present. Comments: Has on her compression stockings. Lymphadenopathy:      Cervical: No cervical adenopathy. Upper Body:      Right upper body: No supraclavicular, axillary or pectoral adenopathy. Left upper body: No supraclavicular, axillary or pectoral adenopathy. Skin:     General: Skin is warm and dry. Neurological:      General: No focal deficit present. Mental Status: She is alert and oriented to person, place, and time. Psychiatric:         Mood and Affect: Mood normal.         ASSESSMENT and PLAN  Diagnoses and all orders for this visit:    1. Essential hypertension  Stable     2. Type 2 diabetes with nephropathy (HCC)  a1c stable at 7.2% but with some of her lower BS will reduced the glyburide.   -     URINALYSIS W/ REFLEX CULTURE; Future  -     MICROALBUMIN, UR, RAND W/ MICROALB/CREAT RATIO; Future  -     AMB POC HEMOGLOBIN A1C  -     AMB POC GLUCOSE, QUANTITATIVE, BLOOD  -     Reduce glyBURIDE (DIABETA) 5 mg tablet; Take 0.5 Tablets by mouth daily (after breakfast).     3. Mixed hyperlipidemia  Stable     4. PVC's (premature ventricular contractions)  -     Refill metoprolol succinate (TOPROL-XL) 25 mg XL tablet; 0.5 tablet at bedtime. 5. Primary osteoarthritis involving multiple joints//  6. DDD (degenerative disc disease), lumbar  Stable     7. Encounter for medication monitoring  -     METABOLIC PANEL, COMPREHENSIVE; Future    8. Non morbid obesity  I have reviewed/discussed the above normal BMI with the patient. I have recommended the following interventions: dietary management education, guidance, and counseling . Follow-up and Dispositions    · Return in about 4 months (around 6/25/2022). reviewed diet, exercise and weight control  cardiovascular risk and specific lipid/LDL goals reviewed  reviewed medications and side effects in detail  specific diabetic recommendations: low cholesterol diet, weight control and daily exercise discussed and long term diabetic complications discussed     I have discussed diagnosis listed in this note with pt and/or family. I have discussed treatment plans and options and the risk/benefit analysis of those options, including safe use of medications and possible medication side effects. Through the use of shared decision making we have agreed to the above plan. The patient has received an after-visit summary and questions were answered concerning future plans and follow up. Advise pt of any urgent changes then to proceed to the ER.

## 2022-03-18 PROBLEM — E11.21 TYPE 2 DIABETES WITH NEPHROPATHY (HCC): Status: ACTIVE | Noted: 2018-04-17

## 2022-03-19 PROBLEM — L02.211 ABSCESS OF ABDOMINAL WALL: Status: ACTIVE | Noted: 2019-09-03

## 2022-03-19 PROBLEM — I49.3 PVC'S (PREMATURE VENTRICULAR CONTRACTIONS): Status: ACTIVE | Noted: 2021-10-10

## 2022-04-09 NOTE — PROGRESS NOTES
[facility] @ [address], [place]  Subjective/HPI:     Janny [address] is a 81 y.o. female history of type 2 diabetes, hypertension, hyperlipidemia with statin intolerance  is here for routine f/u. Referred for palpitations work up on 8/2021  demonstrated mild Tr on ECHO with normal EF%, no ischemia on NST, holter monitor findings PVC's and 4 episodes of NSVT longest was 3 beats, was started on low dose betablocker; she is here for follow up visit. Patient reports an occasional palpitation nonsustaining last 1 to 2 seconds without associated lightheadedness or dizziness overall she states she has felt better with the low-dose beta-blocker. Main complaint today is arthritis in her right shoulder. Holter:9/2021  Interpretation Summary    Carmelita Mcconnell was in Sinus. The average heart rate, excluding ectopy, was 88 BPM with a minimum of 56 BPM at 23:14 D3 and a maximum of 133 BPM at 17:09 D1. Heart beats, including ectopy, totaled 084587 beats. VENTRICULAR ECTOPICS totaled 19 averaging 0.2 per hour with 17 single, 2 paired, 0 trigeminy and 0 R on T.  SUPRAVENTRICULAR ECTOPICS totaled 98 averaging 1.0 per hour ,with 85 single and 0 paired beats. SUPRAVENTRICULAR TACHYCARDIA occurred 4 times. The fastest run was at 122 BPM and occurred at 05:43 D5 with 3 beats. The longest run was 4 beats at 05:43 D5 at a rate of 110 BPM.  Diary was submitted, symptoms/activities noted. No triggered events noted. Some diary entries/triggered events are not represented on report due to  occurring before or after readable rhythm. Nuclear Stress Test 8/31/21  IMPRESSION:  1. 1. There is moderate thinning of the anterior wall at rest and stress most likely attenuation artifact. There is no definite evidence of ischemia. 2. LVEF equals 68%. Left ventricular wall motion and thickening is normal    8/31/21 ECHO  Summary    · LV: Estimated LVEF is 55 - 60%.  Visually measured ejection fraction. Normal cavity size and systolic function (ejection fraction normal). Mild concentric hypertrophy. Wall motion: normal.  · MV: Mitral valve thickening. Mitral valve leaflet calcification. Mild mitral annular calcification. Mild mitral valve regurgitation is present. · TV: Mild to moderate tricuspid valve regurgitation is present. Pulmonary hypertension not suggested by Doppler findings. Echo Findings    Left Ventricle Normal cavity size and systolic function (ejection fraction normal). Mild concentric hypertrophy. Wall motion: normal. The estimated EF is 55 - 60%. Visually measured ejection fraction. Left Atrium Normal cavity size. Right Ventricle Normal cavity size and global systolic function. Right Atrium Normal cavity size. Aortic Valve Normal valve structure, no stenosis and no regurgitation. Mitral Valve No stenosis. Mitral valve thickening. Leaflet calcification. Mild mitral annular calcification. Mild regurgitation. Tricuspid Valve Normal valve structure and no stenosis. Mild to moderate regurgitation. Pulmonary hypertension not suggested by Doppler findings. Pulmonic Valve Pulmonic valve not well visualized, but normal doppler findings. Aorta Normal aortic root. Pericardium No evidence of pericardial effusion.        PCP Provider  Janelle Briggs MD  Past Medical History:   Diagnosis Date    Chronic edema     Diabetes ([address]     Family history of ischemic heart disease 4/12/2012    Hypercholesterolemia     Hypertension     PVC's (premature ventricular contractions) 10/10/2021      Past Surgical History:   Procedure Laterality Date    ENDOSCOPY, COLON, DIAGNOSTIC  12/2006    Dr. Parisa Goldberg HX BREAST BIOPSY Bilateral 1997    benign     [facility]  09/03/2019    abscess removed from abdominal wall     Allergies   Allergen Reactions    Latex Contact Dermatitis    Aspirin Palpitations    Glipizide Shortness of Breath and Swelling    Bactrim Ds [Sulfamethoxazole-Trimethoprim] Other (comments)     Patient experienced pain in legs and buttock and muscle cramping.  Contrast Agent [Iodine] Hives and Itching     Itchy throat    Amaryl [Glimepiride] Other (comments)     \"nervous feeling\"    Avocado Rash     Pt states she bruises.  Feldene [Piroxicam] Diarrhea    Januvia [Sitagliptin] Other (comments)     Upset stomach    Levofloxacin Unknown (comments)    Losartan Itching     Caused congestion per stated by pt    [place] Hives    Oxycodone Diarrhea and Nausea Only    Pcn [Penicillins] Rash    Pineapple Rash    Prednisone Nausea and Vomiting    Tylenol [Acetaminophen] Palpitations    Welchol [Colesevelam] Other (comments)     Pt states \"made throat feel raw\"    Zestril [Lisinopril] Cough      Family History   Problem Relation Age of Onset    Stroke Mother     Hypertension Mother     Stroke Father     Hypertension Father     Hypertension Brother       Current Outpatient Medications   Medication Sig    latanoprost (XALATAN) 0.005 % ophthalmic solution Administer 1 Drop to both eyes nightly.  metoprolol succinate (TOPROL-XL) 25 mg XL tablet 0.5 tablet at bedtime.  glyBURIDE (DIABETA) 5 mg tablet Take 0.5 Tablets by mouth daily (after breakfast).  ezetimibe (ZETIA) 10 mg tablet Take 1 Tablet by mouth daily.  furosemide (Lasix) 20 mg tablet Take 1 Tablet by mouth daily.  Accu-Chek Ashley Plus test strp strip USE TO CHECK BLOOD SUGAR  2 TIMES DAILY DX E11.9    diclofenac (VOLTAREN) 1 % gel Apply  to affected area four (4) times daily as needed for Pain.  cholecalciferol (VITAMIN D3) (2,000 UNITS /50 MCG) cap capsule Take 2,000 Units by mouth daily.  ibuprofen (ADVIL LIQUI-GEL) 200 mg cap Take 1 Tab by mouth two (2) times daily as needed for Pain.  GARLIC PO Take 6,129 mg by mouth daily.  red yeast rice extract 600 mg Cap Take 600 mg by mouth daily.     cod liver oil Cap Take 1 Cap by mouth daily. No current facility-administered medications for this visit. There were no vitals filed for this visit. Social History     Socioeconomic History    Marital status:      Spouse name: Not on file    Number of children: Not on file    Years of education: Not on file    Highest education level: Not on file   Occupational History    Not on file   Tobacco Use    Smoking status: Never Smoker    Smokeless tobacco: Never Used   Vaping Use    Vaping Use: Never used   Substance and Sexual Activity    Alcohol use: No     Alcohol/week: 0.0 standard drinks    Drug use: No    Sexual activity: Not Currently   Other Topics Concern    Not on file   Social History Narrative    Not on file     Social Determinants of Health     Financial Resource Strain:     Difficulty of Paying Living Expenses: Not on file   Food Insecurity:     Worried About Running Out of Food in the Last Year: Not on file    Adeline of Food in the Last Year: Not on file   Transportation Needs:     Lack of Transportation (Medical): Not on file    Lack of Transportation (Non-Medical):  Not on file   Physical Activity:     Days of Exercise per Week: Not on file    Minutes of Exercise per Session: Not on file   Stress:     Feeling of Stress : Not on file   Social Connections:     Frequency of Communication with Friends and Family: Not on file    Frequency of Social Gatherings with Friends and Family: Not on file    Attends Jehovah's witness Services: Not on file    Active Member of Clubs or Organizations: Not on file    Attends Club or Organization Meetings: Not on file    Marital Status: Not on file   Intimate Partner Violence:     Fear of Current or Ex-Partner: Not on file    Emotionally Abused: Not on file    Physically Abused: Not on file    Sexually Abused: Not on file   Housing Stability:     Unable to Pay for Housing in the Last Year: Not on file    Number of [place] in the Last Year: Not on file    Unstable Housing in the Last Year: Not on file       I have reviewed the nurses notes, vitals, problem list, allergy list, medical history, family, social history and medications. Review of Symptoms  11 systems reviewed, negative other than as stated in the HPI      Physical Exam:      General: Well developed, in no acute distress, cooperative and alert  HEENT: No carotid bruits, no JVD, trach is midline. Neck Supple, PERRL, EOM intact. Heart:  Normal S1/S2 negative S3 or S4. Regular, no murmur, gallop or rub. Respiratory: Clear bilaterally x 4, no wheezing or rales  Abdomen:   Soft, non-tender, no masses, bowel sounds are active. Extremities:  No edema, normal cap refill, no cyanosis, atraumatic. Neuro: A&Ox3, speech clear, gait stable. Skin: Skin color is normal. No rashes or lesions. Non diaphoretic  Vascular: 2+ pulses symmetric in all extremities    Cardiographics    ECG:         Cardiology Labs:  Lab Results   Component Value Date/Time    Cholesterol, total 207 (H) 10/26/2021 10:09 AM    HDL Cholesterol 60 10/26/2021 10:09 AM    LDL, calculated 122 (H) 10/26/2021 10:09 AM    Triglyceride 125 10/26/2021 10:09 AM    CHOL/HDL Ratio 3.5 10/26/2021 10:09 AM       Lab Results   Component Value Date/Time    Sodium 140 02/25/2022 08:41 AM    Potassium 4.2 02/25/2022 08:41 AM    Chloride 109 (H) 02/25/2022 08:41 AM    CO2 27 02/25/2022 08:41 AM    Anion gap 4 (L) 02/25/2022 08:41 AM    Glucose 99 02/25/2022 08:41 AM    BUN 29 (H) 02/25/2022 08:41 AM    Creatinine 1.10 (H) 02/25/2022 08:41 AM    BUN/Creatinine ratio 26 (H) 02/25/2022 08:41 AM    GFR est AA 57 (L) 02/25/2022 08:41 AM    GFR est non-AA 47 (L) 02/25/2022 08:41 AM    Calcium 9.5 02/25/2022 08:41 AM    Bilirubin, total 0.4 02/25/2022 08:41 AM    Alk.  phosphatase 104 02/25/2022 08:41 AM    Protein, total 7.0 02/25/2022 08:41 AM    Albumin 3.3 (L) 02/25/2022 08:41 AM    Globulin 3.7 02/25/2022 08:41 AM    A-G Ratio 0.9 (L) 02/25/2022 08:41 AM    ALT (SGPT) 23 02/25/2022 08:41 AM           Assessment:     Assessment:     Diagnoses and all orders for this visit:    1. Essential hypertension    2. Mitral valve disease    3. Nonrheumatic tricuspid valve disorder        ICD-10-CM ICD-9-CM    1. Essential hypertension  I10 401.9    2. Mitral valve disease  I05.9 394.9    3. Nonrheumatic tricuspid valve disorder  I36.9 424.2      No orders of the defined types were placed in this encounter. Plan:     1. Palpitations: TSH normal, normal electrolytes. Holter findings PVC's and 4 episodes of NSVT max 3 beats, was started on Toprol 12.5mg in previous visit has done well without worrisome palpitations. 2.  Hypertension:  Normotensive 106/63  3. Hyperlipidemia: Statin intolerant, intolerant to WelChol,  , has done well with Zetia will continue. Stable from cardiac standpoint follow-up in [place] office in 6 months. Delfino Jay NP      Please note that this dictation was completed with [employer], the computer voice recognition software. Quite often unanticipated grammatical, syntax, homophones, and other interpretive errors are inadvertently transcribed by the computer software. Please disregard these errors. Please excuse any errors that have escaped final proofreading. Thank you.

## 2022-04-11 ENCOUNTER — OFFICE VISIT (OUTPATIENT)
Dept: CARDIOLOGY CLINIC | Age: 87
End: 2022-04-11
Payer: MEDICARE

## 2022-04-11 VITALS
BODY MASS INDEX: 32.02 KG/M2 | RESPIRATION RATE: 18 BRPM | WEIGHT: 174 LBS | DIASTOLIC BLOOD PRESSURE: 63 MMHG | SYSTOLIC BLOOD PRESSURE: 106 MMHG | HEART RATE: 84 BPM | OXYGEN SATURATION: 98 % | HEIGHT: 62 IN

## 2022-04-11 DIAGNOSIS — I49.3 PVC'S (PREMATURE VENTRICULAR CONTRACTIONS): ICD-10-CM

## 2022-04-11 DIAGNOSIS — I36.9 NONRHEUMATIC TRICUSPID VALVE DISORDER: ICD-10-CM

## 2022-04-11 DIAGNOSIS — I10 ESSENTIAL HYPERTENSION: Primary | ICD-10-CM

## 2022-04-11 DIAGNOSIS — I05.9 MITRAL VALVE DISEASE: ICD-10-CM

## 2022-04-11 PROCEDURE — 1090F PRES/ABSN URINE INCON ASSESS: CPT | Performed by: NURSE PRACTITIONER

## 2022-04-11 PROCEDURE — 99214 OFFICE O/P EST MOD 30 MIN: CPT | Performed by: NURSE PRACTITIONER

## 2022-04-11 PROCEDURE — G8536 NO DOC ELDER MAL SCRN: HCPCS | Performed by: NURSE PRACTITIONER

## 2022-04-11 PROCEDURE — G8432 DEP SCR NOT DOC, RNG: HCPCS | Performed by: NURSE PRACTITIONER

## 2022-04-11 PROCEDURE — G8427 DOCREV CUR MEDS BY ELIG CLIN: HCPCS | Performed by: NURSE PRACTITIONER

## 2022-04-11 PROCEDURE — G8417 CALC BMI ABV UP PARAM F/U: HCPCS | Performed by: NURSE PRACTITIONER

## 2022-04-11 PROCEDURE — 1101F PT FALLS ASSESS-DOCD LE1/YR: CPT | Performed by: NURSE PRACTITIONER

## 2022-04-11 RX ORDER — METOPROLOL SUCCINATE 25 MG/1
TABLET, EXTENDED RELEASE ORAL
Qty: 45 TABLET | Refills: 3 | Status: SHIPPED | OUTPATIENT
Start: 2022-04-11

## 2022-04-11 RX ORDER — EZETIMIBE 10 MG/1
10 TABLET ORAL DAILY
Qty: 90 TABLET | Refills: 3 | Status: SHIPPED | OUTPATIENT
Start: 2022-04-11 | End: 2022-06-22 | Stop reason: SDUPTHER

## 2022-04-11 NOTE — PROGRESS NOTES
1. Have you been to the ER, urgent care clinic since your last visit? Hospitalized since your last visit? No    2. Have you seen or consulted any other health care providers outside of the [address] [place] [address] since your last visit? Include any pap smears or colon screening. No    Chief Complaint   Patient presents with    Follow-up     6 mo appt.     Ankle swelling     occasional,mild, bilateral

## 2022-06-22 ENCOUNTER — OFFICE VISIT (OUTPATIENT)
Dept: FAMILY MEDICINE CLINIC | Age: 87
End: 2022-06-22
Payer: MEDICARE

## 2022-06-22 VITALS
SYSTOLIC BLOOD PRESSURE: 113 MMHG | TEMPERATURE: 97.9 F | DIASTOLIC BLOOD PRESSURE: 58 MMHG | OXYGEN SATURATION: 97 % | BODY MASS INDEX: 32.13 KG/M2 | HEART RATE: 82 BPM | HEIGHT: 62 IN | RESPIRATION RATE: 14 BRPM | WEIGHT: 174.6 LBS

## 2022-06-22 DIAGNOSIS — Z51.81 ENCOUNTER FOR MEDICATION MONITORING: ICD-10-CM

## 2022-06-22 DIAGNOSIS — E78.2 MIXED HYPERLIPIDEMIA: ICD-10-CM

## 2022-06-22 DIAGNOSIS — E11.21 TYPE 2 DIABETES WITH NEPHROPATHY (HCC): ICD-10-CM

## 2022-06-22 DIAGNOSIS — I10 ESSENTIAL HYPERTENSION: Primary | ICD-10-CM

## 2022-06-22 DIAGNOSIS — E66.9 NON MORBID OBESITY: ICD-10-CM

## 2022-06-22 DIAGNOSIS — M15.9 PRIMARY OSTEOARTHRITIS INVOLVING MULTIPLE JOINTS: ICD-10-CM

## 2022-06-22 LAB
ANION GAP SERPL CALC-SCNC: 4 MMOL/L (ref 5–15)
BUN SERPL-MCNC: 26 MG/DL (ref 6–20)
BUN/CREAT SERPL: 22 (ref 12–20)
CALCIUM SERPL-MCNC: 9.5 MG/DL (ref 8.5–10.1)
CHLORIDE SERPL-SCNC: 109 MMOL/L (ref 97–108)
CHOLEST SERPL-MCNC: 190 MG/DL
CO2 SERPL-SCNC: 27 MMOL/L (ref 21–32)
CREAT SERPL-MCNC: 1.18 MG/DL (ref 0.55–1.02)
ERYTHROCYTE [DISTWIDTH] IN BLOOD BY AUTOMATED COUNT: 14.6 % (ref 11.5–14.5)
GLUCOSE POC: 117 MG/DL
GLUCOSE SERPL-MCNC: 126 MG/DL (ref 65–100)
HBA1C MFR BLD HPLC: 7.1 %
HCT VFR BLD AUTO: 35.9 % (ref 35–47)
HDLC SERPL-MCNC: 48 MG/DL
HDLC SERPL: 4 {RATIO} (ref 0–5)
HGB BLD-MCNC: 12.2 G/DL (ref 11.5–16)
LDLC SERPL CALC-MCNC: 120.6 MG/DL (ref 0–100)
MCH RBC QN AUTO: 29 PG (ref 26–34)
MCHC RBC AUTO-ENTMCNC: 34 G/DL (ref 30–36.5)
MCV RBC AUTO: 85.5 FL (ref 80–99)
NRBC # BLD: 0 K/UL (ref 0–0.01)
NRBC BLD-RTO: 0 PER 100 WBC
PLATELET # BLD AUTO: 262 K/UL (ref 150–400)
PMV BLD AUTO: 10.4 FL (ref 8.9–12.9)
POTASSIUM SERPL-SCNC: 4.3 MMOL/L (ref 3.5–5.1)
RBC # BLD AUTO: 4.2 M/UL (ref 3.8–5.2)
SODIUM SERPL-SCNC: 140 MMOL/L (ref 136–145)
TRIGL SERPL-MCNC: 107 MG/DL (ref ?–150)
VLDLC SERPL CALC-MCNC: 21.4 MG/DL
WBC # BLD AUTO: 5.5 K/UL (ref 3.6–11)

## 2022-06-22 PROCEDURE — 82947 ASSAY GLUCOSE BLOOD QUANT: CPT | Performed by: FAMILY MEDICINE

## 2022-06-22 PROCEDURE — 83036 HEMOGLOBIN GLYCOSYLATED A1C: CPT | Performed by: FAMILY MEDICINE

## 2022-06-22 PROCEDURE — 1101F PT FALLS ASSESS-DOCD LE1/YR: CPT | Performed by: FAMILY MEDICINE

## 2022-06-22 PROCEDURE — G8427 DOCREV CUR MEDS BY ELIG CLIN: HCPCS | Performed by: FAMILY MEDICINE

## 2022-06-22 PROCEDURE — 99214 OFFICE O/P EST MOD 30 MIN: CPT | Performed by: FAMILY MEDICINE

## 2022-06-22 PROCEDURE — 1123F ACP DISCUSS/DSCN MKR DOCD: CPT | Performed by: FAMILY MEDICINE

## 2022-06-22 PROCEDURE — G8417 CALC BMI ABV UP PARAM F/U: HCPCS | Performed by: FAMILY MEDICINE

## 2022-06-22 PROCEDURE — 1090F PRES/ABSN URINE INCON ASSESS: CPT | Performed by: FAMILY MEDICINE

## 2022-06-22 PROCEDURE — G8432 DEP SCR NOT DOC, RNG: HCPCS | Performed by: FAMILY MEDICINE

## 2022-06-22 PROCEDURE — 3051F HG A1C>EQUAL 7.0%<8.0%: CPT | Performed by: FAMILY MEDICINE

## 2022-06-22 PROCEDURE — G8536 NO DOC ELDER MAL SCRN: HCPCS | Performed by: FAMILY MEDICINE

## 2022-06-22 RX ORDER — EZETIMIBE 10 MG/1
10 TABLET ORAL DAILY
Qty: 90 TABLET | Refills: 3 | Status: SHIPPED | OUTPATIENT
Start: 2022-06-22 | End: 2022-10-24 | Stop reason: SINTOL

## 2022-06-22 NOTE — PROGRESS NOTES
Chief Complaint   Patient presents with    Hypertension     4m fu     Diabetes     4m fu        1. \"Have you been to the ER, urgent care clinic since your last visit? Hospitalized since your last visit? \" No    2. \"Have you seen or consulted any other health care providers outside of the [address] [place] [address] since your last visit? \" No     3. For patients aged 39-70: Has the patient had a colonoscopy / FIT/ Cologuard? NA - based on age      If the patient is female:    4. For patients aged 41-77: Has the patient had a mammogram within the past 2 years? NA - based on age or sex      11. For patients aged 21-65: Has the patient had a pap smear?  NA - based on age or sex    Health Maintenance Due   Topic Date Due    Foot Exam Q1  02/10/2022

## 2022-06-22 NOTE — PROGRESS NOTES
HISTORY OF PRESENT ILLNESS  Emmanuel Waters is a 81 y.o. female. HPI   Follow up on chronic medical problems. Remains active and independent. Continues to drive. Sleeping good. Appetite is good. Staying at home mostly d/t the pandemic. HTN follow up:  Compliant w/ meds, low salt diet, and exercise. 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 On low dose beta blocker. No further palpitations. Home bp monitoring 120-130s/70s. Pt has bp log. Swelling is overall improved with her compression stocking. No headache or dizziness. No chest pain and SOB at her usual baseline. Had cardiac eval and overall was stable. Has been compliant with her medications. DM type II follow up:  Compliant w/ meds, diabetic diet. She has been better with watching diet. Obtains home glucose monitoring averaging 100-130s. Checks BS daily on most days and prn. Pt does have BS log at visit today. Denies any tingling sensation, polyuria and polydipsia. No blurred vision. No significant weight changes. Feeling well since last OV. She is on glyburide d/t intolerance to glimepiride and glipizide. She is not able to afford other options. She has been very stable on her current regimen. Hypercholesterolemia follow up:  Compliant low fat, low cholesterol diet. Intolerant to stains. Seems to be tolerating Zetia this time. Has not been able to get in much exercise over the past few months. Osteoarthritis:  Patient has osteoarthritis. Takes an occasional Advil which helps the pain. Has had increased pain and stiffness in the back and hip that comes and goes. Symptoms onset: problem is longstanding. Rheumatological ROS: stable, mild-to-moderate joint symptoms intermittently, reasonably well controlled by PRN meds.    Response to treatment plan: stable      Patient Active Problem List   Diagnosis Code    Osteoarthritis M19.90    Hypovitaminosis D E55.9    Nonrheumatic tricuspid valve disorder I36.9  Family history of ischemic heart disease Z82.49    Mixed hyperlipidemia E78.2    Mitral valve disease I05.9    Obesity, unspecified E66.9    Generalized OA M15.9    Diabetes mellitus type 2, controlled ([facility] [address] E11.9    Essential hypertension I10    Encounter for medication monitoring Z51.81    Statin intolerance Z78.9    Type 2 diabetes with nephropathy (HCC) E11.21    Abscess of abdominal wall L02. 80    PVC's (premature ventricular contractions) I49.3       Current Outpatient Medications   Medication Sig Dispense Refill    ezetimibe (ZETIA) 10 mg tablet Take 1 Tablet by mouth daily. 90 Tablet 3    metoprolol succinate (TOPROL-XL) 25 mg XL tablet 0.5 tablet at bedtime. 45 Tablet 3    latanoprost (XALATAN) 0.005 % ophthalmic solution Administer 1 Drop to both eyes nightly.  glyBURIDE (DIABETA) 5 mg tablet Take 0.5 Tablets by mouth daily (after breakfast). 90 Tablet 3    furosemide (Lasix) 20 mg tablet Take 1 Tablet by mouth daily. 90 Tablet 3    Accu-Chek Ashley Plus test strp strip USE TO CHECK BLOOD SUGAR  2 TIMES DAILY DX E11.9 300 Strip 3    diclofenac (VOLTAREN) 1 % gel Apply  to affected area four (4) times daily as needed for Pain. 100 g 0    cholecalciferol (VITAMIN D3) (2,000 UNITS /50 MCG) cap capsule Take 2,000 Units by mouth daily.  GARLIC PO Take 691 mg by mouth daily.  red yeast rice extract 600 mg Cap Take 600 mg by mouth daily.  cod liver oil Cap Take 1 Cap by mouth daily. Allergies   Allergen Reactions    Latex Contact Dermatitis    Aspirin Palpitations    Glipizide Shortness of Breath and Swelling    Bactrim Ds [Sulfamethoxazole-Trimethoprim] Other (comments)     Patient experienced pain in legs and buttock and muscle cramping.  Contrast Agent [Iodine] Hives and Itching     Itchy throat    Amaryl [Glimepiride] Other (comments)     \"nervous feeling\"    Avocado Rash     Pt states she bruises.     Feldene [Piroxicam] Diarrhea    Januvia [Sitagliptin] Other (comments)     Upset stomach    Levofloxacin Unknown (comments)    Losartan Itching     Caused congestion per stated by pt    [place] Hives    Oxycodone Diarrhea and Nausea Only    Pcn [Penicillins] Rash    Pineapple Rash    Prednisone Nausea and Vomiting    Tylenol [Acetaminophen] Palpitations    Welchol [Colesevelam] Other (comments)     Pt states \"made throat feel raw\"    Zestril [Lisinopril] Cough         Past Medical History:   Diagnosis Date    Chronic edema     Diabetes ([address]     Family history of ischemic heart disease 4/12/2012    Hypercholesterolemia     Hypertension     PVC's (premature ventricular contractions) 10/10/2021         Past Surgical History:   Procedure Laterality Date    ENDOSCOPY, COLON, DIAGNOSTIC  12/2006    Dr. Faith Ramos     HX BREAST BIOPSY Bilateral 1997    benign    HX HYSTERECTOMY  1972    [address]  09/03/2019    abscess removed from abdominal wall         Family History   Problem Relation Age of Onset    Stroke Mother     Hypertension Mother     Stroke Father     Hypertension Father     Hypertension Brother        Social History     Tobacco Use    Smoking status: Never Smoker    Smokeless tobacco: Never Used   Substance Use Topics    Alcohol use: No     Alcohol/week: 0.0 standard drinks        Lab Results   Component Value Date/Time    WBC 6.6 10/26/2021 10:09 AM    HGB 12.6 10/26/2021 10:09 AM    HCT 39.7 10/26/2021 10:09 AM    PLATELET 203 64/31/4535 10:09 AM    MCV 84.8 10/26/2021 10:09 AM     Lab Results   Component Value Date/Time    Cholesterol, total 207 (H) 10/26/2021 10:09 AM    HDL Cholesterol 60 10/26/2021 10:09 AM    LDL, calculated 122 (H) 10/26/2021 10:09 AM    Triglyceride 125 10/26/2021 10:09 AM    CHOL/HDL Ratio 3.5 10/26/2021 10:09 AM     Lab Results   Component Value Date/Time    TSH 1.21 07/20/2021 03:48 PM      Lab Results   Component Value Date/Time    Sodium 140 02/25/2022 08:41 AM    Potassium 4.2 02/25/2022 08:41 AM    Chloride 109 (H) 02/25/2022 08:41 AM    CO2 27 02/25/2022 08:41 AM    Anion gap 4 (L) 02/25/2022 08:41 AM    Glucose 99 02/25/2022 08:41 AM    BUN 29 (H) 02/25/2022 08:41 AM    Creatinine 1.10 (H) 02/25/2022 08:41 AM    BUN/Creatinine ratio 26 (H) 02/25/2022 08:41 AM    GFR est AA 57 (L) 02/25/2022 08:41 AM    GFR est non-AA 47 (L) 02/25/2022 08:41 AM    Calcium 9.5 02/25/2022 08:41 AM    Bilirubin, total 0.4 02/25/2022 08:41 AM    ALT (SGPT) 23 02/25/2022 08:41 AM    Alk. phosphatase 104 02/25/2022 08:41 AM    Protein, total 7.0 02/25/2022 08:41 AM    Albumin 3.3 (L) 02/25/2022 08:41 AM    Globulin 3.7 02/25/2022 08:41 AM    A-G Ratio 0.9 (L) 02/25/2022 08:41 AM      Lab Results   Component Value Date/Time    Hemoglobin A1c 7.0 (H) 07/20/2021 03:48 PM    Hemoglobin A1c (POC) 7.2 02/25/2022 08:40 AM         Review of Systems   Constitutional: Negative for malaise/fatigue. HENT: Negative for congestion. Eyes: Negative for blurred vision. Respiratory: Negative for cough and shortness of breath. Cardiovascular: Negative for chest pain, palpitations and leg swelling. Gastrointestinal: Negative for abdominal pain, constipation and heartburn. Genitourinary: Negative for dysuria, frequency and urgency. Neurological: Negative for dizziness, tingling and headaches. Endo/Heme/Allergies: Negative for environmental allergies. Psychiatric/Behavioral: Negative for depression. The patient does not have insomnia. Physical Exam  Vitals and nursing note reviewed. Constitutional:       Appearance: Normal appearance. She is well-developed. Comments: BP (!) 113/58   Pulse 82   Temp 97.9 °F (36.6 °C) (Oral)   Resp 14   Ht 5' 2\" (1.575 m)   Wt 174 lb 9.6 oz (79.2 kg)   LMP 04/28/1972   SpO2 97%   BMI 31.93 kg/m²          HENT:      Right Ear: Tympanic membrane and ear canal normal.      Left Ear: Tympanic membrane and ear canal normal.      Nose: No mucosal edema. Neck:      Thyroid: No thyromegaly. Vascular: No carotid bruit. Cardiovascular:      Rate and Rhythm: Normal rate and regular rhythm. Pulses: Normal pulses. Heart sounds: Normal heart sounds. No gallop. Pulmonary:      Effort: Pulmonary effort is normal.      Breath sounds: Normal breath sounds. Chest:   Breasts:      Right: Normal. No axillary adenopathy or supraclavicular adenopathy. Left: Normal. No axillary adenopathy or supraclavicular adenopathy. Abdominal:      General: Bowel sounds are normal.      Palpations: Abdomen is soft. There is no mass. Tenderness: There is no abdominal tenderness. Musculoskeletal:         General: No swelling. Normal range of motion. Cervical back: Normal range of motion and neck supple. Right lower leg: Edema (mild) present. Left lower leg: Edema (mild) present. Comments: Has on her compression stockings. Lymphadenopathy:      Cervical: No cervical adenopathy. Upper Body:      Right upper body: No supraclavicular, axillary or pectoral adenopathy. Left upper body: No supraclavicular, axillary or pectoral adenopathy. Skin:     General: Skin is warm and dry. Neurological:      General: No focal deficit present. Mental Status: She is alert and oriented to person, place, and time. Psychiatric:         Mood and Affect: Mood normal.         ASSESSMENT and PLAN  Diagnoses and all orders for this visit:    1. Essential hypertension  Stable     2. Type 2 diabetes with nephropathy (HCC)  a1c stable at 7.1%. Continue to monitor. Work on diet and exercise. -     AMB POC HEMOGLOBIN A1C  -     AMB POC GLUCOSE, QUANTITATIVE, BLOOD  -     REFERRAL TO PODIATRY        -     ezetimibe (ZETIA) 10 mg tablet; Take 1 Tablet by mouth daily. 3. Mixed hyperlipidemia  -     LIPID PANEL; Future    4. Primary osteoarthritis involving multiple joints  Stable     5.  Encounter for medication monitoring  -     METABOLIC PANEL, BASIC; Future  -     CBC W/O DIFF; Future    6. Non morbid obesity  I have reviewed/discussed the above normal BMI with the patient. I have recommended the following interventions: dietary management education, guidance, and counseling       Follow-up and Dispositions    · Return in about 4 months (around 10/22/2022). reviewed diet, exercise and weight control  cardiovascular risk and specific lipid/LDL goals reviewed  reviewed medications and side effects in detail  specific diabetic recommendations: low cholesterol diet, weight control and daily exercise discussed, all medications, side effects and compliance discussed carefully, foot care discussed and Podiatry visits discussed, annual eye examinations at Ophthalmology discussed and glycohemoglobin and other lab monitoring discussed      I have discussed diagnosis listed in this note with pt and/or family. I have discussed treatment plans and options and the risk/benefit analysis of those options, including safe use of medications and possible medication side effects. Through the use of shared decision making we have agreed to the above plan. The patient has received an after-visit summary and questions were answered concerning future plans and follow up. Advise pt of any urgent changes then to proceed to the ER.

## 2022-08-02 DIAGNOSIS — I10 ESSENTIAL HYPERTENSION: ICD-10-CM

## 2022-08-02 RX ORDER — FUROSEMIDE 20 MG/1
20 TABLET ORAL DAILY
Qty: 90 TABLET | Refills: 0 | Status: SHIPPED | OUTPATIENT
Start: 2022-08-02

## 2022-09-14 DIAGNOSIS — E11.21 TYPE 2 DIABETES WITH NEPHROPATHY (HCC): ICD-10-CM

## 2022-09-14 RX ORDER — BLOOD SUGAR DIAGNOSTIC
STRIP MISCELLANEOUS
Qty: 200 STRIP | Refills: 3 | Status: SHIPPED | OUTPATIENT
Start: 2022-09-14

## 2022-10-17 ENCOUNTER — TELEPHONE (OUTPATIENT)
Dept: PHARMACY | Age: 87
End: 2022-10-17

## 2022-10-17 NOTE — TELEPHONE ENCOUNTER
[facility]– [place] [place]: ADHERENCE REVIEW  Identified care gap per [employer]: fills at [facility]: Diabetes adherence    Last Visit: 06/822/2022    Patient identified as LIS = 1, therefore patient may be able to receive a 90-day supply for the same cost as a 30-day supply      [facility] [address] Records claims through 10/10/2022 (2021 [place] = n/a%; YTD Naseem Vargas = Filled only once; Potential Fail Date: 68 ):   Glyburide 5mg last filled on 06/10/2022 for 90 day supply. Next refill due: 09/08/2022    Per  [facility] Portal:   Glyburide 5mg last filled on 06/10/2022 for 90 day supply. Per [facility]:   Glyburide 5mg last picked up on 06/10/2022 for 90 day supply. 1 refills remaining. Billed through [facility] Value Date/Time    Hemoglobin A1c 7.0 (H) 07/20/2021 03:48 PM    Hemoglobin A1c 10.4 (H) 01/28/2014 08:40 AM    Hemoglobin A1c 8.4 (H) 09/08/2013 04:09 AM    Hemoglobin A1c (POC) 7.1 06/22/2022 08:15 AM    Hemoglobin A1c (POC) 7.2 02/25/2022 08:40 AM    Hemoglobin A1c (POC) 7.2 10/26/2021 09:20 AM     NOTE A1c not yet completed this calendar year    PLAN  The following are interventions that have been identified:  none    No patient out reach planned at this time.         Future Appointments   Date Time Provider Alexandria Kang   10/24/2022  8:20 AM Emil Marshall MD [address]  Direct: [phone]  Department, toll free:[phone], Option 2    For Pharmacy Admin Tracking Only    CPA in place: No  Gap Closed?: Yes  Time Spent (min): 10

## 2022-10-24 ENCOUNTER — OFFICE VISIT (OUTPATIENT)
Dept: FAMILY MEDICINE CLINIC | Age: 87
End: 2022-10-24
Payer: MEDICARE

## 2022-10-24 VITALS
DIASTOLIC BLOOD PRESSURE: 66 MMHG | TEMPERATURE: 98.6 F | HEART RATE: 74 BPM | SYSTOLIC BLOOD PRESSURE: 133 MMHG | HEIGHT: 62 IN | OXYGEN SATURATION: 97 % | WEIGHT: 173 LBS | RESPIRATION RATE: 12 BRPM | BODY MASS INDEX: 31.83 KG/M2

## 2022-10-24 DIAGNOSIS — E78.2 MIXED HYPERLIPIDEMIA: ICD-10-CM

## 2022-10-24 DIAGNOSIS — Z51.81 ENCOUNTER FOR MEDICATION MONITORING: ICD-10-CM

## 2022-10-24 DIAGNOSIS — M15.9 PRIMARY OSTEOARTHRITIS INVOLVING MULTIPLE JOINTS: ICD-10-CM

## 2022-10-24 DIAGNOSIS — E11.21 TYPE 2 DIABETES WITH NEPHROPATHY (HCC): ICD-10-CM

## 2022-10-24 DIAGNOSIS — Z00.00 MEDICARE ANNUAL WELLNESS VISIT, SUBSEQUENT: Primary | ICD-10-CM

## 2022-10-24 DIAGNOSIS — Z23 NEEDS FLU SHOT: ICD-10-CM

## 2022-10-24 DIAGNOSIS — I10 ESSENTIAL HYPERTENSION: ICD-10-CM

## 2022-10-24 LAB
GLUCOSE POC: 108 MG/DL
HBA1C MFR BLD HPLC: 6.7 %

## 2022-10-24 PROCEDURE — 1123F ACP DISCUSS/DSCN MKR DOCD: CPT | Performed by: FAMILY MEDICINE

## 2022-10-24 PROCEDURE — 90694 VACC AIIV4 NO PRSRV 0.5ML IM: CPT | Performed by: FAMILY MEDICINE

## 2022-10-24 PROCEDURE — 3051F HG A1C>EQUAL 7.0%<8.0%: CPT | Performed by: FAMILY MEDICINE

## 2022-10-24 PROCEDURE — 83036 HEMOGLOBIN GLYCOSYLATED A1C: CPT | Performed by: FAMILY MEDICINE

## 2022-10-24 PROCEDURE — G8427 DOCREV CUR MEDS BY ELIG CLIN: HCPCS | Performed by: FAMILY MEDICINE

## 2022-10-24 PROCEDURE — G0008 ADMIN INFLUENZA VIRUS VAC: HCPCS | Performed by: FAMILY MEDICINE

## 2022-10-24 PROCEDURE — G8536 NO DOC ELDER MAL SCRN: HCPCS | Performed by: FAMILY MEDICINE

## 2022-10-24 PROCEDURE — G8417 CALC BMI ABV UP PARAM F/U: HCPCS | Performed by: FAMILY MEDICINE

## 2022-10-24 PROCEDURE — 82947 ASSAY GLUCOSE BLOOD QUANT: CPT | Performed by: FAMILY MEDICINE

## 2022-10-24 PROCEDURE — 1101F PT FALLS ASSESS-DOCD LE1/YR: CPT | Performed by: FAMILY MEDICINE

## 2022-10-24 PROCEDURE — 1090F PRES/ABSN URINE INCON ASSESS: CPT | Performed by: FAMILY MEDICINE

## 2022-10-24 PROCEDURE — G0439 PPPS, SUBSEQ VISIT: HCPCS | Performed by: FAMILY MEDICINE

## 2022-10-24 PROCEDURE — 99214 OFFICE O/P EST MOD 30 MIN: CPT | Performed by: FAMILY MEDICINE

## 2022-10-24 PROCEDURE — G8510 SCR DEP NEG, NO PLAN REQD: HCPCS | Performed by: FAMILY MEDICINE

## 2022-10-24 NOTE — PATIENT INSTRUCTIONS
Vaccine Information Statement    Influenza (Flu) Vaccine (Inactivated or Recombinant): What You Need to Know    Many vaccine information statements are available in [place] and other languages. See www.immunize.org/vis. Hojas de información sobre vacunas están disponibles en español y en muchos otros idiomas. Visite www.immunize.org/vis. 1. Why get vaccinated? Influenza vaccine can prevent influenza (flu). Flu is a contagious disease that spreads around the [facility] every year, usually between October and May. Anyone can get the flu, but it is more dangerous for some people. Infants and young children, people 72 years and older, pregnant people, and people with certain health conditions or a weakened immune system are at greatest risk of flu complications. Pneumonia, bronchitis, sinus infections, and ear infections are examples of flu-related complications. If you have a medical condition, such as heart disease, cancer, or diabetes, flu can make it worse. Flu can cause fever and chills, sore throat, muscle aches, fatigue, cough, headache, and runny or stuffy nose. Some people may have vomiting and diarrhea, though this is more common in children than adults. In an average year, thousands of people in the [facility] die from flu, and many more are hospitalized. Flu vaccine prevents millions of illnesses and flu-related visits to the doctor each year. 2. Influenza vaccines     CDC recommends everyone 6 months and older get vaccinated every flu season. Children 6 months through 6years of age may need 2 doses during a single flu season. Everyone else needs only 1 dose each flu season. It takes about 2 weeks for protection to develop after vaccination. There are many flu viruses, and they are always changing. Each year a new flu vaccine is made to protect against the influenza viruses believed to be likely to cause disease in the upcoming flu season.  Even when the vaccine doesnt exactly match these viruses, it may still provide some protection. Influenza vaccine does not cause flu. Influenza vaccine may be given at the same time as other vaccines. 3. Talk with your health care provider    Tell your vaccination provider if the person getting the vaccine:  Has had an allergic reaction after a previous dose of influenza vaccine, or has any severe, life-threatening allergies   Has ever had Guillain-Barré Syndrome (also called GBS)    In some cases, your health care provider may decide to postpone influenza vaccination until a future visit. Influenza vaccine can be administered at any time during pregnancy. People who are or will be pregnant during influenza season should receive inactivated influenza vaccine. People with minor illnesses, such as a cold, may be vaccinated. People who are moderately or severely ill should usually wait until they recover before getting influenza vaccine. Your health care provider can give you more information. 4. Risks of a vaccine reaction    Soreness, redness, and swelling where the shot is given, fever, muscle aches, and headache can happen after influenza vaccination. There may be a very small increased risk of Guillain-Barré Syndrome (GBS) after inactivated influenza vaccine (the flu shot). Megha Stewart children who get the flu shot along with pneumococcal vaccine (PCV13) and/or DTaP vaccine at the same time might be slightly more likely to have a seizure caused by fever. Tell your health care provider if a child who is getting flu vaccine has ever had a seizure. People sometimes faint after medical procedures, including vaccination. Tell your provider if you feel dizzy or have vision changes or ringing in the ears. As with any medicine, there is a very remote chance of a vaccine causing a severe allergic reaction, other serious injury, or death. 5. What if there is a serious problem?     An allergic reaction could occur after the vaccinated person leaves the clinic. If you see signs of a severe allergic reaction (hives, swelling of the face and throat, difficulty breathing, a fast heartbeat, dizziness, or weakness), call 9-1-1 and get the person to the nearest hospital.    For other signs that concern you, call your health care provider. Adverse reactions should be reported to the Vaccine Adverse Event Reporting System (VAERS). Your health care provider will usually file this report, or you can do it yourself. Visit the VAERS website at www.vaers. [facility].gov or call 9-122.421.6071. VAERS is only for reporting reactions, and VAERS staff members do not give medical advice. 6. The National Vaccine Injury Compensation Program    The [facility] Vaccine Injury Compensation Program (VICP) is a federal program that was created to compensate people who may have been injured by certain vaccines. Claims regarding alleged injury or death due to vaccination have a time limit for filing, which may be as short as two years. Visit the VICP website at www.[facility].gov/vaccinecompensation or call 1-982.285.2874 to learn about the program and about filing a claim. 7. How can I learn more? Ask your health care provider. Call your local or state health department. Visit the website of the Food and Drug Administration (FDA) for vaccine package inserts and additional information at www.fda.gov/vaccines-blood-biologics/vaccines. Contact the Centers for Disease Control and Prevention (CDC): Call 3-599.704.7437 (1-800-CDC-INFO) or  Visit CDCs influenza website at www.cdc.gov/flu. Vaccine Information Statement   Inactivated Influenza Vaccine   8/6/2021  [address] [id number]   Department of Health and Human Services  Centers for Disease Control and Prevention    Office Use Only

## 2022-10-24 NOTE — PROGRESS NOTES
This is a Subsequent Medicare Annual Wellness Exam (AWV) (Performed 12 months after IPPE or effective date of Medicare Part B enrollment)    I have reviewed the patient's medical history in detail and updated the computerized patient record. History     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    Family history of ischemic heart disease Z82.49    Mixed hyperlipidemia E78.2    Mitral valve disease I05.9    Obesity, unspecified E66.9    Generalized OA M15.9    Diabetes mellitus type 2, controlled ([address] E11.9    Essential hypertension I10    Encounter for medication monitoring Z51.81    Statin intolerance Z78.9    Type 2 diabetes with nephropathy (HCC) E11.21    Abscess of abdominal wall L02.211    PVC's (premature ventricular contractions) I49.3       Current Outpatient Medications   Medication Sig Dispense Refill    Accu-Chek Ashley Plus test strp strip USE TO CHECK BLOOD SUGAR  TWICE DAILY 200 Strip 3    furosemide (LASIX) 20 mg tablet TAKE 1 TABLET BY MOUTH  DAILY 90 Tablet 0    ezetimibe (ZETIA) 10 mg tablet Take 1 Tablet by mouth daily. 90 Tablet 3    metoprolol succinate (TOPROL-XL) 25 mg XL tablet 0.5 tablet at bedtime. 45 Tablet 3    latanoprost (XALATAN) 0.005 % ophthalmic solution Administer 1 Drop to both eyes nightly. glyBURIDE (DIABETA) 5 mg tablet Take 0.5 Tablets by mouth daily (after breakfast). 90 Tablet 3    diclofenac (VOLTAREN) 1 % gel Apply  to affected area four (4) times daily as needed for Pain. 100 g 0    cholecalciferol (VITAMIN D3) (2,000 UNITS /50 MCG) cap capsule Take 2,000 Units by mouth daily. GARLIC PO Take 318 mg by mouth daily. red yeast rice extract 600 mg Cap Take 600 mg by mouth daily. cod liver oil Cap Take 1 Cap by mouth daily.          Allergies   Allergen Reactions    Latex Contact Dermatitis    Aspirin Palpitations    Glipizide Shortness of Breath and Swelling    Bactrim Ds [Sulfamethoxazole-Trimethoprim] Other (comments)     Patient experienced pain in legs and buttock and muscle cramping. Contrast Agent [Iodine] Hives and Itching     Itchy throat    Amaryl [Glimepiride] Other (comments)     \"nervous feeling\"    Avocado Rash     Pt states she bruises. Feldene [Piroxicam] Diarrhea    Januvia [Sitagliptin] Other (comments)     Upset stomach    Levofloxacin Unknown (comments)    Losartan Itching     Caused congestion per stated by pt    Theba Hives    Oxycodone Diarrhea and Nausea Only    Pcn [Penicillins] Rash    Pineapple Rash    Prednisone Nausea and Vomiting    Tylenol [Acetaminophen] Palpitations    Welchol [Colesevelam] Other (comments)     Pt states \"made throat feel raw\"    Zestril [Lisinopril] Cough         Past Medical History:   Diagnosis Date    Chronic edema     Diabetes ([address]     Family history of ischemic heart disease 4/12/2012    Hypercholesterolemia     Hypertension     PVC's (premature ventricular contractions) 10/10/2021         Past Surgical History:   Procedure Laterality Date    ENDOSCOPY, COLON, DIAGNOSTIC  12/2006    Dr. Eileen Miranda BREAST BIOPSY Bilateral 1997    benign    HX [place] [facility]  09/03/2019    abscess removed from abdominal wall         Family History   Problem Relation Age of Onset    Stroke Mother     Hypertension Mother     Stroke Father     Hypertension Father     Hypertension Brother        Social History     Tobacco Use    Smoking status: Never    Smokeless tobacco: Never   Substance Use Topics    Alcohol use: No     Alcohol/week: 0.0 standard drinks         Depression Risk Factor Screening:     PHQ over the last two weeks    Little interest or pleasure in doing things Not at all   Feeling down, depressed or hopeless Not at all   Total Score PHQ 2 0     Alcohol Risk Factor Screening: You do not drink alcohol or very rarely.     Functional Ability and Level of Safety: Hearing Loss  Hearing is good. Activities of Daily Living  The home contains: has safety equipment. Patient does total self care    Fall RiskFall Risk Assessment, last 12 mths    Able to walk? Yes   Fall in past 12 months? No     Functional Ability:   Does the patient exhibit a steady gait? yes with cane   How long did it take the patient to get up and walk from a sitting position? seconds    Is the patient self reliant? (ie can do own laundry, meals, household chores)  yes   Does the patient handle his/her own medications? yes   Does the patient handle his/her own money? yes   Is the patients home safe (ie good lighting, handrails on stairs and bath, etc.)? yes   Did you notice or did patient express any hearing difficulties? no   Did you notice or did patient express any vision difficulties? no        Advance Care Planning:   Patient was offered the opportunity to discuss advance care planning:  yes    Does patient have an Advance Directive:  yes        Abuse Screen  Patient is not abused    Cognitive Screening   Evaluation of Cognitive Function:  Has your family/caregiver stated any concerns about your memory: no      Patient Care Team   Patient Care Team:  Anastasiia Dykes MD as PCP - General    Assessment/Plan   Education and counseling provided:  Are appropriate based on today's review and evaluation  End-of-Life planning (with patient's consent)    ASSESSMENT and PLAN    Medicare Annual Wellness  Continue current treatment plan. Continue annual follow up. I have discussed diagnosis listed in this note with pt and/or family. I have discussed treatment plans and options and the risk/benefit analysis of those options, including safe use of medications and possible medication side effects. Through the use of shared decision making we have agreed to the above plan. The patient has received an after-visit summary and questions were answered concerning future plans and follow up.   Advise pt of any urgent changes then to proceed to the ER.

## 2022-10-24 NOTE — PROGRESS NOTES
HISTORY OF PRESENT ILLNESS  Karla Jean is a 81 y.o. female. HPI  Follow up on chronic medical problems. Remains active and independent. Continues to drive. Sleeping good. Appetite is good. HTN follow up:  Compliant w/ meds, low salt diet, and exercise. 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 On low dose beta blocker. No further palpitations. Home bp monitoring 120-130s/70s. Pt has bp log. Swelling is overall improved with her compression stocking. No headache or dizziness. No chest pain and SOB at her usual baseline. Had cardiac eval and overall was stable. Has been compliant with her medications. DM type II follow up:  Compliant w/ meds, diabetic diet. She has been better with watching diet. Obtains home glucose monitoring averaging 100-130s. Checks BS daily on most days and prn. Pt does have BS log at visit today. Denies any tingling sensation, polyuria and polydipsia. No blurred vision. No significant weight changes. Feeling well since last OV. She is on glyburide d/t intolerance to glimepiride and glipizide. She is not able to afford other options. She has been very stable on her current regimen. Hypercholesterolemia follow up:  Compliant low fat, low cholesterol diet. Intolerant to stains. Stopped Zetia d/t it causing nausea. Has not been able to get in much exercise over the past few months. Osteoarthritis:  Patient has osteoarthritis. Takes an occasional Advil which helps the pain. Has had increased pain and stiffness in the back and hip that comes and goes. Symptoms onset: problem is longstanding. Rheumatological ROS: stable, mild-to-moderate joint symptoms intermittently, reasonably well controlled by PRN meds.    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    Family history of ischemic heart disease Z82.49 Mixed hyperlipidemia E78.2    Mitral valve disease I05.9    Obesity, unspecified E66.9    Generalized OA M15.9    Diabetes mellitus type 2, controlled ([facility]) E11.9    Essential hypertension I10    Encounter for medication monitoring Z51.81    Statin intolerance Z78.9    Type 2 diabetes with nephropathy (HCC) E11.21    Abscess of abdominal wall L02.211    PVC's (premature ventricular contractions) I49.3       Current Outpatient Medications   Medication Sig Dispense Refill    Accu-Chek Ashley Plus test strp strip USE TO CHECK BLOOD SUGAR  TWICE DAILY 200 Strip 3    furosemide (LASIX) 20 mg tablet TAKE 1 TABLET BY MOUTH  DAILY 90 Tablet 0    ezetimibe (ZETIA) 10 mg tablet Take 1 Tablet by mouth daily. 90 Tablet 3    metoprolol succinate (TOPROL-XL) 25 mg XL tablet 0.5 tablet at bedtime. 45 Tablet 3    latanoprost (XALATAN) 0.005 % ophthalmic solution Administer 1 Drop to both eyes nightly. glyBURIDE (DIABETA) 5 mg tablet Take 0.5 Tablets by mouth daily (after breakfast). 90 Tablet 3    diclofenac (VOLTAREN) 1 % gel Apply  to affected area four (4) times daily as needed for Pain. 100 g 0    cholecalciferol (VITAMIN D3) (2,000 UNITS /50 MCG) cap capsule Take 2,000 Units by mouth daily. GARLIC PO Take 135 mg by mouth daily. red yeast rice extract 600 mg Cap Take 600 mg by mouth daily. cod liver oil Cap Take 1 Cap by mouth daily. Allergies   Allergen Reactions    Latex Contact Dermatitis    Aspirin Palpitations    Glipizide Shortness of Breath and Swelling    Bactrim Ds [Sulfamethoxazole-Trimethoprim] Other (comments)     Patient experienced pain in legs and buttock and muscle cramping. Contrast Agent [Iodine] Hives and Itching     Itchy throat    Amaryl [Glimepiride] Other (comments)     \"nervous feeling\"    Avocado Rash     Pt states she bruises.     Feldene [Piroxicam] Diarrhea    Januvia [Sitagliptin] Other (comments)     Upset stomach    Levofloxacin Unknown (comments)    Losartan Itching Caused congestion per stated by pt    Derrick Hives    Oxycodone Diarrhea and Nausea Only    Pcn [Penicillins] Rash    Pineapple Rash    Prednisone Nausea and Vomiting    Tylenol [Acetaminophen] Palpitations    Welchol [Colesevelam] Other (comments)     Pt states \"made throat feel raw\"    Zestril [Lisinopril] Cough         Past Medical History:   Diagnosis Date    Chronic edema     Diabetes ([address]     Family history of ischemic heart disease 4/12/2012    Hypercholesterolemia     Hypertension     PVC's (premature ventricular contractions) 10/10/2021         Past Surgical History:   Procedure Laterality Date    ENDOSCOPY, COLON, DIAGNOSTIC  12/2006    Dr. Sal Funez BREAST BIOPSY Bilateral 1997    benign    HX [place] [address] UNLISTED  09/03/2019    abscess removed from abdominal wall         Family History   Problem Relation Age of Onset    Stroke Mother     Hypertension Mother     Stroke Father     Hypertension Father     Hypertension Brother        Social History     Tobacco Use    Smoking status: Never    Smokeless tobacco: Never   Substance Use Topics    Alcohol use: No     Alcohol/week: 0.0 standard drinks        Lab Results   Component Value Date/Time    WBC 5.5 06/22/2022 09:32 AM    HGB 12.2 06/22/2022 09:32 AM    HCT 35.9 06/22/2022 09:32 AM    PLATELET 055 55/24/2123 09:32 AM    MCV 85.5 06/22/2022 09:32 AM     Lab Results   Component Value Date/Time    Cholesterol, total 190 06/22/2022 09:32 AM    HDL Cholesterol 48 06/22/2022 09:32 AM    LDL, calculated 120.6 (H) 06/22/2022 09:32 AM    Triglyceride 107 06/22/2022 09:32 AM    CHOL/HDL Ratio 4.0 06/22/2022 09:32 AM     Lab Results   Component Value Date/Time    TSH 1.21 07/20/2021 03:48 PM      Lab Results   Component Value Date/Time    Sodium 140 06/22/2022 09:32 AM    Potassium 4.3 06/22/2022 09:32 AM    Chloride 109 (H) 06/22/2022 09:32 AM    CO2 27 06/22/2022 09:32 AM    Anion gap 4 (L) 06/22/2022 09:32 AM    Glucose 126 (H) 06/22/2022 09:32 AM    BUN 26 (H) 06/22/2022 09:32 AM    Creatinine 1.18 (H) 06/22/2022 09:32 AM    BUN/Creatinine ratio 22 (H) 06/22/2022 09:32 AM    GFR est AA 53 (L) 06/22/2022 09:32 AM    GFR est non-AA 43 (L) 06/22/2022 09:32 AM    Calcium 9.5 06/22/2022 09:32 AM    Bilirubin, total 0.4 02/25/2022 08:41 AM    ALT (SGPT) 23 02/25/2022 08:41 AM    Alk. phosphatase 104 02/25/2022 08:41 AM    Protein, total 7.0 02/25/2022 08:41 AM    Albumin 3.3 (L) 02/25/2022 08:41 AM    Globulin 3.7 02/25/2022 08:41 AM    A-G Ratio 0.9 (L) 02/25/2022 08:41 AM      Lab Results   Component Value Date/Time    Hemoglobin A1c 7.0 (H) 07/20/2021 03:48 PM    Hemoglobin A1c (POC) 7.1 06/22/2022 08:15 AM         Review of Systems   Constitutional:  Negative for malaise/fatigue. HENT:  Negative for congestion. Eyes:  Negative for blurred vision. Respiratory:  Negative for cough and shortness of breath. Cardiovascular:  Negative for chest pain, palpitations and leg swelling. Gastrointestinal:  Negative for abdominal pain, constipation and heartburn. Genitourinary:  Negative for dysuria, frequency and urgency. Musculoskeletal:  Negative for back pain and joint pain. Neurological:  Negative for dizziness, tingling and headaches. Endo/Heme/Allergies:  Negative for environmental allergies. Psychiatric/Behavioral:  Negative for depression. The patient does not have insomnia. Physical Exam  Vitals and nursing note reviewed. Constitutional:       Appearance: Normal appearance. She is well-developed. Comments: /66   Pulse 74   Temp 98.6 °F (37 °C)   Resp 12   Ht 5' 2\" (1.575 m)   Wt 173 lb (78.5 kg)   LMP 04/28/1972   SpO2 97%   BMI 31.64 kg/m²    HENT:      Right Ear: Tympanic membrane and ear canal normal.      Left Ear: Tympanic membrane and ear canal normal.   Neck:      Thyroid: No thyromegaly. Cardiovascular:      Rate and Rhythm: Normal rate and regular rhythm.       Heart sounds: Normal heart sounds. Pulmonary:      Effort: Pulmonary effort is normal.      Breath sounds: Normal breath sounds. Abdominal:      General: Bowel sounds are normal.      Palpations: Abdomen is soft. There is no mass. Tenderness: There is no abdominal tenderness. Musculoskeletal:         General: Normal range of motion. Cervical back: Normal range of motion and neck supple. Right lower leg: No edema. Left lower leg: No edema. Lymphadenopathy:      Cervical: No cervical adenopathy. Skin:     General: Skin is warm and dry. Neurological:      General: No focal deficit present. Mental Status: She is alert and oriented to person, place, and time. Psychiatric:         Mood and Affect: Mood normal.     ASSESSMENT and PLAN  Diagnoses and all orders for this visit:    1. Medicare annual wellness visit, subsequent    2. Essential hypertension  Stable     Discussed sodium restriction, high k rich diet, maintaining ideal body weight and regular exercise program such as daily walking 30 min perday 4-5 times per week, as physiologic means to achieve blood pressure control. Medication compliance advised. 3. Type 2 diabetes with nephropathy (HCC)  A1c stable at 6.7%. Continue to monitor. Work on diet and exercise. -     AMB POC HEMOGLOBIN A1C  -     AMB POC GLUCOSE, QUANTITATIVE, BLOOD    4. Mixed hyperlipidemia  Overall stable     5. Primary osteoarthritis involving multiple joints  Stable     6. Encounter for medication monitoring  -     METABOLIC PANEL, BASIC; Future      Follow-up and Dispositions    Return in about 4 months (around 2/24/2023).        reviewed diet, exercise and weight control  cardiovascular risk and specific lipid/LDL goals reviewed  reviewed medications and side effects in detail  specific diabetic recommendations: low cholesterol diet, weight control and daily exercise discussed, foot care discussed and Podiatry visits discussed, annual eye examinations at Ophthalmology discussed, and glycohemoglobin and other lab monitoring discussed    I have discussed diagnosis listed in this note with pt and/or family. I have discussed treatment plans and options and the risk/benefit analysis of those options, including safe use of medications and possible medication side effects. Through the use of shared decision making we have agreed to the above plan. The patient has received an after-visit summary and questions were answered concerning future plans and follow up. Advise pt of any urgent changes then to proceed to the ER.

## 2022-10-24 NOTE — PROGRESS NOTES
Patient identified by 2 identifiers. Chief Complaint   Patient presents with    Follow-up    Diabetes     1. Have you been to the ER, urgent care clinic since your last visit? Hospitalized since your last visit? No    2. Have you seen or consulted any other health care providers outside of the [address] [place] [address] since your last visit? Include any pap smears or colon screening. No    Verbal Order with Readback given by Valentín Brian MD for Influenza. Given in left Deltoid without difficulty.

## 2022-10-25 LAB
ANION GAP SERPL CALC-SCNC: 7 MMOL/L (ref 5–15)
BUN SERPL-MCNC: 26 MG/DL (ref 6–20)
BUN/CREAT SERPL: 22 (ref 12–20)
CALCIUM SERPL-MCNC: 9.1 MG/DL (ref 8.5–10.1)
CHLORIDE SERPL-SCNC: 110 MMOL/L (ref 97–108)
CO2 SERPL-SCNC: 28 MMOL/L (ref 21–32)
CREAT SERPL-MCNC: 1.2 MG/DL (ref 0.55–1.02)
GLUCOSE SERPL-MCNC: 113 MG/DL (ref 65–100)
POTASSIUM SERPL-SCNC: 4.3 MMOL/L (ref 3.5–5.1)
SODIUM SERPL-SCNC: 145 MMOL/L (ref 136–145)

## 2022-11-07 DIAGNOSIS — I10 ESSENTIAL HYPERTENSION: ICD-10-CM

## 2022-11-08 RX ORDER — FUROSEMIDE 20 MG/1
20 TABLET ORAL DAILY
Qty: 90 TABLET | Refills: 3 | Status: SHIPPED | OUTPATIENT
Start: 2022-11-08

## 2022-12-12 DIAGNOSIS — E11.21 TYPE 2 DIABETES WITH NEPHROPATHY (HCC): ICD-10-CM

## 2022-12-12 RX ORDER — GLYBURIDE 5 MG/1
TABLET ORAL
Qty: 90 TABLET | Refills: 3 | Status: SHIPPED | OUTPATIENT
Start: 2022-12-12

## 2022-12-22 ENCOUNTER — TRANSCRIBE ORDER (OUTPATIENT)
Dept: SCHEDULING | Age: 87
End: 2022-12-22

## 2022-12-22 DIAGNOSIS — Z12.31 SCREENING MAMMOGRAM FOR HIGH-RISK PATIENT: Primary | ICD-10-CM

## 2023-01-07 ENCOUNTER — APPOINTMENT (OUTPATIENT)
Dept: CT IMAGING | Age: 88
DRG: 065 | End: 2023-01-07
Attending: STUDENT IN AN ORGANIZED HEALTH CARE EDUCATION/TRAINING PROGRAM
Payer: MEDICARE

## 2023-01-07 ENCOUNTER — APPOINTMENT (OUTPATIENT)
Dept: GENERAL RADIOLOGY | Age: 88
DRG: 065 | End: 2023-01-07
Attending: EMERGENCY MEDICINE
Payer: MEDICARE

## 2023-01-07 ENCOUNTER — HOSPITAL ENCOUNTER (INPATIENT)
Age: 88
LOS: 10 days | Discharge: NURSING, REHAB OR LONG-TERM CARE | DRG: 065 | End: 2023-01-17
Attending: EMERGENCY MEDICINE | Admitting: INTERNAL MEDICINE
Payer: MEDICARE

## 2023-01-07 ENCOUNTER — APPOINTMENT (OUTPATIENT)
Dept: MRI IMAGING | Age: 88
DRG: 065 | End: 2023-01-07
Attending: EMERGENCY MEDICINE
Payer: MEDICARE

## 2023-01-07 DIAGNOSIS — E78.5 DYSLIPIDEMIA: ICD-10-CM

## 2023-01-07 DIAGNOSIS — I10 ESSENTIAL HYPERTENSION: ICD-10-CM

## 2023-01-07 DIAGNOSIS — I63.9 CEREBROVASCULAR ACCIDENT (CVA), UNSPECIFIED MECHANISM (HCC): ICD-10-CM

## 2023-01-07 DIAGNOSIS — I62.9: ICD-10-CM

## 2023-01-07 DIAGNOSIS — I63.9 CEREBROVASCULAR ACCIDENT (CVA) OF LEFT PONTINE STRUCTURE (HCC): Primary | ICD-10-CM

## 2023-01-07 DIAGNOSIS — I65.23 BILATERAL CAROTID ARTERY STENOSIS: ICD-10-CM

## 2023-01-07 LAB
ALBUMIN SERPL-MCNC: 3.3 G/DL (ref 3.5–5)
ALBUMIN/GLOB SERPL: 0.9 (ref 1.1–2.2)
ALP SERPL-CCNC: 103 U/L (ref 45–117)
ALT SERPL-CCNC: 19 U/L (ref 12–78)
ANION GAP SERPL CALC-SCNC: 7 MMOL/L (ref 5–15)
APPEARANCE UR: CLEAR
AST SERPL-CCNC: 18 U/L (ref 15–37)
BACTERIA URNS QL MICRO: NEGATIVE /HPF
BASOPHILS # BLD: 0.1 K/UL (ref 0–0.1)
BASOPHILS NFR BLD: 1 % (ref 0–1)
BILIRUB SERPL-MCNC: 0.4 MG/DL (ref 0.2–1)
BILIRUB UR QL: NEGATIVE
BUN SERPL-MCNC: 28 MG/DL (ref 6–20)
BUN/CREAT SERPL: 23 (ref 12–20)
CALCIUM SERPL-MCNC: 9 MG/DL (ref 8.5–10.1)
CHLORIDE SERPL-SCNC: 106 MMOL/L (ref 97–108)
CO2 SERPL-SCNC: 27 MMOL/L (ref 21–32)
COLOR UR: ABNORMAL
CREAT SERPL-MCNC: 1.21 MG/DL (ref 0.55–1.02)
DIFFERENTIAL METHOD BLD: ABNORMAL
EOSINOPHIL # BLD: 0.1 K/UL (ref 0–0.4)
EOSINOPHIL NFR BLD: 1 % (ref 0–7)
EPITH CASTS URNS QL MICRO: ABNORMAL /LPF
ERYTHROCYTE [DISTWIDTH] IN BLOOD BY AUTOMATED COUNT: 13.4 % (ref 11.5–14.5)
GLOBULIN SER CALC-MCNC: 3.7 G/DL (ref 2–4)
GLUCOSE BLD STRIP.AUTO-MCNC: 118 MG/DL (ref 65–117)
GLUCOSE BLD STRIP.AUTO-MCNC: 159 MG/DL (ref 65–117)
GLUCOSE BLD STRIP.AUTO-MCNC: 250 MG/DL (ref 65–117)
GLUCOSE SERPL-MCNC: 195 MG/DL (ref 65–100)
GLUCOSE UR STRIP.AUTO-MCNC: NEGATIVE MG/DL
HCT VFR BLD AUTO: 36.5 % (ref 35–47)
HGB BLD-MCNC: 12.2 G/DL (ref 11.5–16)
HGB UR QL STRIP: NEGATIVE
IMM GRANULOCYTES # BLD AUTO: 0 K/UL (ref 0–0.04)
IMM GRANULOCYTES NFR BLD AUTO: 0 % (ref 0–0.5)
KETONES UR QL STRIP.AUTO: NEGATIVE MG/DL
LEUKOCYTE ESTERASE UR QL STRIP.AUTO: NEGATIVE
LYMPHOCYTES # BLD: 1.4 K/UL (ref 0.8–3.5)
LYMPHOCYTES NFR BLD: 15 % (ref 12–49)
MCH RBC QN AUTO: 27.2 PG (ref 26–34)
MCHC RBC AUTO-ENTMCNC: 33.4 G/DL (ref 30–36.5)
MCV RBC AUTO: 81.5 FL (ref 80–99)
MONOCYTES # BLD: 0.4 K/UL (ref 0–1)
MONOCYTES NFR BLD: 4 % (ref 5–13)
NEUTS SEG # BLD: 7.2 K/UL (ref 1.8–8)
NEUTS SEG NFR BLD: 79 % (ref 32–75)
NITRITE UR QL STRIP.AUTO: NEGATIVE
NRBC # BLD: 0 K/UL (ref 0–0.01)
NRBC BLD-RTO: 0 PER 100 WBC
PH UR STRIP: 7 (ref 5–8)
PLATELET # BLD AUTO: 244 K/UL (ref 150–400)
PMV BLD AUTO: 9.9 FL (ref 8.9–12.9)
POTASSIUM SERPL-SCNC: 4.3 MMOL/L (ref 3.5–5.1)
PROT SERPL-MCNC: 7 G/DL (ref 6.4–8.2)
PROT UR STRIP-MCNC: 30 MG/DL
RBC # BLD AUTO: 4.48 M/UL (ref 3.8–5.2)
RBC #/AREA URNS HPF: ABNORMAL /HPF (ref 0–5)
SERVICE CMNT-IMP: ABNORMAL
SODIUM SERPL-SCNC: 140 MMOL/L (ref 136–145)
SP GR UR REFRACTOMETRY: 1.01
UA: UC IF INDICATED,UAUC: ABNORMAL
UROBILINOGEN UR QL STRIP.AUTO: 0.2 EU/DL (ref 0.2–1)
WBC # BLD AUTO: 9.1 K/UL (ref 3.6–11)
WBC URNS QL MICRO: ABNORMAL /HPF (ref 0–4)

## 2023-01-07 PROCEDURE — 70450 CT HEAD/BRAIN W/O DYE: CPT

## 2023-01-07 PROCEDURE — 82962 GLUCOSE BLOOD TEST: CPT

## 2023-01-07 PROCEDURE — 70551 MRI BRAIN STEM W/O DYE: CPT

## 2023-01-07 PROCEDURE — 36415 COLL VENOUS BLD VENIPUNCTURE: CPT

## 2023-01-07 PROCEDURE — 74011636637 HC RX REV CODE- 636/637: Performed by: INTERNAL MEDICINE

## 2023-01-07 PROCEDURE — 85025 COMPLETE CBC W/AUTO DIFF WBC: CPT

## 2023-01-07 PROCEDURE — 81001 URINALYSIS AUTO W/SCOPE: CPT

## 2023-01-07 PROCEDURE — 65270000046 HC RM TELEMETRY

## 2023-01-07 PROCEDURE — 74011250637 HC RX REV CODE- 250/637: Performed by: INTERNAL MEDICINE

## 2023-01-07 PROCEDURE — 93005 ELECTROCARDIOGRAM TRACING: CPT

## 2023-01-07 PROCEDURE — 80053 COMPREHEN METABOLIC PANEL: CPT

## 2023-01-07 PROCEDURE — 71045 X-RAY EXAM CHEST 1 VIEW: CPT

## 2023-01-07 PROCEDURE — 99285 EMERGENCY DEPT VISIT HI MDM: CPT

## 2023-01-07 RX ORDER — LATANOPROST 50 UG/ML
1 SOLUTION/ DROPS OPHTHALMIC
Status: DISCONTINUED | OUTPATIENT
Start: 2023-01-07 | End: 2023-01-17 | Stop reason: HOSPADM

## 2023-01-07 RX ORDER — CLOPIDOGREL BISULFATE 75 MG/1
75 TABLET ORAL DAILY
Status: DISCONTINUED | OUTPATIENT
Start: 2023-01-08 | End: 2023-01-17 | Stop reason: HOSPADM

## 2023-01-07 RX ORDER — LABETALOL HYDROCHLORIDE 5 MG/ML
5 INJECTION, SOLUTION INTRAVENOUS
Status: DISCONTINUED | OUTPATIENT
Start: 2023-01-07 | End: 2023-01-07

## 2023-01-07 RX ORDER — IBUPROFEN 200 MG
4 TABLET ORAL AS NEEDED
Status: DISCONTINUED | OUTPATIENT
Start: 2023-01-07 | End: 2023-01-17 | Stop reason: HOSPADM

## 2023-01-07 RX ORDER — METOPROLOL SUCCINATE 25 MG/1
12.5 TABLET, EXTENDED RELEASE ORAL DAILY
Status: DISCONTINUED | OUTPATIENT
Start: 2023-01-08 | End: 2023-01-09

## 2023-01-07 RX ORDER — LANOLIN ALCOHOL/MO/W.PET/CERES
3 CREAM (GRAM) TOPICAL
Status: DISCONTINUED | OUTPATIENT
Start: 2023-01-07 | End: 2023-01-17 | Stop reason: HOSPADM

## 2023-01-07 RX ORDER — INSULIN LISPRO 100 [IU]/ML
INJECTION, SOLUTION INTRAVENOUS; SUBCUTANEOUS
Status: DISCONTINUED | OUTPATIENT
Start: 2023-01-07 | End: 2023-01-17 | Stop reason: HOSPADM

## 2023-01-07 RX ORDER — ATORVASTATIN CALCIUM 40 MG/1
40 TABLET, FILM COATED ORAL
Status: DISCONTINUED | OUTPATIENT
Start: 2023-01-07 | End: 2023-01-17 | Stop reason: HOSPADM

## 2023-01-07 RX ORDER — HYDRALAZINE HYDROCHLORIDE 20 MG/ML
10 INJECTION INTRAMUSCULAR; INTRAVENOUS
Status: DISCONTINUED | OUTPATIENT
Start: 2023-01-07 | End: 2023-01-07

## 2023-01-07 RX ORDER — HYDRALAZINE HYDROCHLORIDE 20 MG/ML
20 INJECTION INTRAMUSCULAR; INTRAVENOUS
Status: DISCONTINUED | OUTPATIENT
Start: 2023-01-07 | End: 2023-01-17 | Stop reason: HOSPADM

## 2023-01-07 RX ADMIN — Medication 2 UNITS: at 22:43

## 2023-01-07 RX ADMIN — ATORVASTATIN CALCIUM 40 MG: 40 TABLET, FILM COATED ORAL at 22:44

## 2023-01-07 NOTE — H&P
Hospitalist Admission Note    NAME: Imtiaz Melendrez   :  1935   MRN:  [id number]     Date/Time:  2023 2:18 PM    Patient PCP: Karishma Mcwilliams MD  ______________________________________________________________________  Given the patient's current clinical presentation, I have a high level of concern for decompensation if discharged from the emergency department. Complex decision making was performed, which includes reviewing the patient's available past medical records, laboratory results, and x-ray films. My assessment of this patient's clinical condition and my plan of care is as follows. 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 CT imaging and CTA was deferred on admission due to no significant clinical concern for LVO and given the unclear time course. Check cva labs to include: tsh, a1c, lipid  Check carotid duplex  Check limited echo  Pt is allergic to ASA (she has palpitations). Given chronic hemorrhage seen on MRI, will defer to neurology timing of when to start plavix  Cont' atorvastatin ordered  PT/OT to eval  Neurology consultation    T2DM  Cont' SSI  Hold glyburide    HTN  Resume BB    Code Status: Full  DVT Prophylaxis: scd   GI Prophylaxis: not indicated  Baseline: independent       Subjective:   CHIEF COMPLAINT: weakness    HISTORY OF PRESENT ILLNESS:     Ta Campuzano is a 81 y.o.  female with PMHx significant for t2dm, hld, htn, presents to the ER for evaluation of generalized weakness started yesterday, ~1-2 days ago. Pt reports her speech is a bit slower and garbled than usual. She also has some blurry vision and thought it was due her glasses. Pt then woke up this AM ~3am feeling sick with nausea so she called her son for help. EMS called. Denies any confusion, gait difficulty, numbness and tingling. No dysphagia. In the er, pt got an MRI head showed subacute infarct in the left maninder.    Small chronic hemorrhage in the right occipital lobe. We were asked to admit for work up and evaluation of the above problems. Past Medical History:   Diagnosis Date    Chronic edema     Diabetes ([address]     Family history of ischemic heart disease 4/12/2012    Hypercholesterolemia     Hypertension     PVC's (premature ventricular contractions) 10/10/2021        Past Surgical History:   Procedure Laterality Date    ENDOSCOPY, COLON, DIAGNOSTIC  12/2006    Dr. Elma Ardonp BREAST BIOPSY Bilateral 1997    benign    HX [address] OMENTUM  09/03/2019    abscess removed from abdominal wall       Social History     Tobacco Use    Smoking status: Never    Smokeless tobacco: Never   Substance Use Topics    Alcohol use: No     Alcohol/week: 0.0 standard drinks        Family History   Problem Relation Age of Onset    Stroke Mother     Hypertension Mother     Stroke Father     Hypertension Father     Hypertension Brother      Allergies   Allergen Reactions    Latex Contact Dermatitis    Aspirin Palpitations    Glipizide Shortness of Breath and Swelling    Bactrim Ds [Sulfamethoxazole-Trimethoprim] Other (comments)     Patient experienced pain in legs and buttock and muscle cramping. Contrast Agent [Iodine] Hives and Itching     Itchy throat    Amaryl [Glimepiride] Other (comments)     \"nervous feeling\"    Avocado Rash     Pt states she bruises.     Feldene [Piroxicam] Diarrhea    Januvia [Sitagliptin] Other (comments)     Upset stomach    Levofloxacin Unknown (comments)    Losartan Itching     Caused congestion per stated by pt    Derrick Hives    Oxycodone Diarrhea and Nausea Only    Pcn [Penicillins] Rash    Pineapple Rash    Prednisone Nausea and Vomiting    Tylenol [Acetaminophen] Palpitations    Welchol [Colesevelam] Other (comments)     Pt states \"made throat feel raw\"    Zestril [Lisinopril] Cough    Zetia [Ezetimibe] Nausea Only        Prior to Admission medications Medication Sig Start Date End Date Taking? Authorizing Provider   furosemide (LASIX) 20 mg tablet TAKE 1 TABLET BY MOUTH  DAILY 11/8/22   Lucas Santana MD   glyBURIDE (DIABETA) 5 mg tablet TAKE 1 TABLET BY MOUTH  DAILY AFTER BREAKFAST 12/12/22   Eva Koenig MD   Accu-Chek Ashley Plus test strp strip USE TO CHECK BLOOD SUGAR  TWICE DAILY 9/14/22   Lucas Santana MD   metoprolol succinate (TOPROL-XL) 25 mg XL tablet 0.5 tablet at bedtime. 4/11/22   Xavier Nobles NP   latanoprost (XALATAN) 0.005 % ophthalmic solution Administer 1 Drop to both eyes nightly. 12/7/21   Provider, Historical   diclofenac (VOLTAREN) 1 % gel Apply  to affected area four (4) times daily as needed for Pain. 8/18/20   Lucas Santana MD   cholecalciferol (VITAMIN D3) (2,000 UNITS /50 MCG) cap capsule Take 2,000 Units by mouth daily. Provider, Historical   GARLIC PO Take 827 mg by mouth daily. Provider, Historical   red yeast rice extract 600 mg Cap Take 600 mg by mouth daily. Provider, Historical   cod liver oil Cap Take 1 Cap by mouth daily. Provider, Historical       REVIEW OF SYSTEMS:     I am not able to complete the review of systems because:    The patient is intubated and sedated    The patient has altered mental status due to his acute medical problems    The patient has baseline aphasia from prior stroke(s)    The patient has baseline dementia and is not reliable historian    The patient is in acute medical distress and unable to provide information           Total of 12 systems reviewed as follows:       POSITIVE= BOLD text  Negative = text not BOLD  General:  fever, chills, sweats, generalized weakness, weight loss/gain,      loss of appetite   Eyes:    blurred vision, eye pain, loss of vision, double vision  ENT:    rhinorrhea, pharyngitis   Respiratory:   cough, sputum production, SOB, GUTIERREZ, wheezing, pleuritic pain   Cardiology:   chest pain, palpitations, orthopnea, PND, edema, syncope   Gastrointestinal:  abdominal pain , N/V, diarrhea, dysphagia, constipation, bleeding   Genitourinary:  frequency, urgency, dysuria, hematuria, incontinence   Muskuloskeletal :  arthralgia, myalgia, back pain  Hematology:  easy bruising, nose or gum bleeding, lymphadenopathy   Dermatological: rash, ulceration, pruritis, color change / jaundice  Endocrine:   hot flashes or polydipsia   Neurological:  headache, dizziness, confusion, focal weakness, paresthesia,     Speech difficulties, memory loss, gait difficulty  Psychological: Feelings of anxiety, depression, agitation    Objective:   VITALS:    Visit Vitals  /60   Pulse 62   Temp 98.2 °F (36.8 °C)   Resp 15   Ht 5' 2\" (1.575 m)   Wt 77.1 kg (170 lb)   SpO2 100%   BMI 31.09 kg/m²       PHYSICAL EXAM:    General:    Alert, cooperative, no distress, appears stated age. HEENT: Atraumatic, anicteric sclerae, pink conjunctivae     No oral ulcers, mucosa moist, throat clear  Neck:  Supple, symmetrical,  thyroid: non tender  Lungs:   CTA b/l. No wheezing or Rhonchi. No rales. Chest wall:  No tenderness. No accessory muscle use. Heart:   Regular  rhythm,  No  Murmur. No edema  Abdomen:   Soft, NT. ND  BS+  Extremities: No cyanosis. No clubbing,      Skin turgor normal, Radial dial pulse 2+. Capillary refill normal  Skin:     Not pale. Not Jaundiced  No rashes   Psych:  Not depressed. Not anxious or agitated. Neurologic: No facial asymmetry. No aphasia or slurred speech. Symmetrical strength, Sensation grossly intact.  AAOx4.     _______________________________________________________________________  Care Plan discussed with:    Comments   Patient x    Family  x son   RN x    Care Manager                    Consultant:  x ED physician   _______________________________________________________________________  Expected  Disposition:   Home with Family    HH/PT/OT/RN x   SNF/LTC    [facility] ________________________________________________________________________  TOTAL TIME:  70 Minutes    Critical Care Provided     Minutes non procedure based      Comments    x Reviewed previous records   >50% of visit spent in counseling and coordination of care x Discussion with patient and/or family and questions answered       ________________________________________________________________________  Signed: Precious Hawthorne MD    Procedures: see electronic medical records for all procedures/Xrays and details which were not copied into this note but were reviewed prior to creation of Plan. LAB DATA REVIEWED:    Recent Results (from the past 24 hour(s))   CBC WITH AUTOMATED DIFF    Collection Time: 01/07/23  6:50 AM   Result Value Ref Range    WBC 9.1 3.6 - 11.0 K/uL    RBC 4.48 3.80 - 5.20 M/uL    HGB 12.2 11.5 - 16.0 g/dL    HCT 36.5 35.0 - 47.0 %    MCV 81.5 80.0 - 99.0 FL    MCH 27.2 26.0 - 34.0 PG    MCHC 33.4 30.0 - 36.5 g/dL    RDW 13.4 11.5 - 14.5 %    PLATELET 827 125 - 731 K/uL    MPV 9.9 8.9 - 12.9 FL    NRBC 0.0 0  WBC    ABSOLUTE NRBC 0.00 0.00 - 0.01 K/uL    NEUTROPHILS 79 (H) 32 - 75 %    LYMPHOCYTES 15 12 - 49 %    MONOCYTES 4 (L) 5 - 13 %    EOSINOPHILS 1 0 - 7 %    BASOPHILS 1 0 - 1 %    IMMATURE GRANULOCYTES 0 0.0 - 0.5 %    ABS. NEUTROPHILS 7.2 1.8 - 8.0 K/UL    ABS. LYMPHOCYTES 1.4 0.8 - 3.5 K/UL    ABS. MONOCYTES 0.4 0.0 - 1.0 K/UL    ABS. EOSINOPHILS 0.1 0.0 - 0.4 K/UL    ABS. BASOPHILS 0.1 0.0 - 0.1 K/UL    ABS. IMM.  GRANS. 0.0 0.00 - 0.04 K/UL    DF AUTOMATED     METABOLIC PANEL, COMPREHENSIVE    Collection Time: 01/07/23  6:50 AM   Result Value Ref Range    Sodium 140 136 - 145 mmol/L    Potassium 4.3 3.5 - 5.1 mmol/L    Chloride 106 97 - 108 mmol/L    CO2 27 21 - 32 mmol/L    Anion gap 7 5 - 15 mmol/L    Glucose 195 (H) 65 - 100 mg/dL    BUN 28 (H) 6 - 20 MG/DL    Creatinine 1.21 (H) 0.55 - 1.02 MG/DL    BUN/Creatinine ratio 23 (H) 12 - 20      eGFR 44 (L) >60 ml/min/1.73m2 Calcium 9.0 8.5 - 10.1 MG/DL    Bilirubin, total 0.4 0.2 - 1.0 MG/DL    ALT (SGPT) 19 12 - 78 U/L    AST (SGOT) 18 15 - 37 U/L    Alk.  phosphatase 103 45 - 117 U/L    Protein, total 7.0 6.4 - 8.2 g/dL    Albumin 3.3 (L) 3.5 - 5.0 g/dL    Globulin 3.7 2.0 - 4.0 g/dL    A-G Ratio 0.9 (L) 1.1 - 2.2     URINALYSIS W/ REFLEX CULTURE    Collection Time: 01/07/23  9:45 AM    Specimen: Urine   Result Value Ref Range    Color YELLOW/STRAW      Appearance CLEAR CLEAR      Specific gravity 1.013      pH (UA) 7.0 5.0 - 8.0      Protein 30 (A) NEG mg/dL    Glucose Negative NEG mg/dL    Ketone Negative NEG mg/dL    Bilirubin Negative NEG      Blood Negative NEG      Urobilinogen 0.2 0.2 - 1.0 EU/dL    Nitrites Negative NEG      Leukocyte Esterase Negative NEG      WBC 0-4 0 - 4 /hpf    RBC 0-5 0 - 5 /hpf    Epithelial cells FEW FEW /lpf    Bacteria Negative NEG /hpf    UA:UC IF INDICATED CULTURE NOT INDICATED BY UA RESULT CNI     EKG, 12 LEAD, INITIAL    Collection Time: 01/07/23 10:04 AM   Result Value Ref Range    Ventricular Rate 60 BPM    Atrial Rate 60 BPM    P-R Interval 126 ms    QRS Duration 82 ms    Q-T Interval 440 ms    QTC Calculation (Bezet) 440 ms    Calculated P Axis 50 degrees    Calculated R Axis 41 degrees    Calculated T Axis 50 degrees    Diagnosis       Normal sinus rhythm  Normal ECG  No previous ECGs available

## 2023-01-07 NOTE — PROGRESS NOTES
-Please complete MRI History and Safety Screening Form   - Patient cannot be scanned until this form is completed, including signatures, and reviewed in MRI to ensure patient is SAFE and eligible for MRI. - CALL MRI when this has been successfully completed at [phone].

## 2023-01-07 NOTE — ED PROVIDER NOTES
[facility] EMERGENCY DEPT  EMERGENCY DEPARTMENT ENCOUNTER       Pt Name: Christianne Alexander  MRN: [id number]  [place] 1935  Date of evaluation: 1/7/2023  Provider: Florence Mckinnon MD   PCP: Simon Peralta MD  Note Started: 9:22 AM 1/7/23     CHIEF COMPLAINT       Chief Complaint   Patient presents with    Lethargy     Generalized weakness since 0300. Negative on stroke scale. Vomited once with ems. No pain noted anywhere        HISTORY OF PRESENT ILLNESS: 1 or more elements      History From: patient, daughter, History limited by: none     Christianne Alexander is a 81 y.o. female who presents complaining of generalized weakness and inability to walk around or get up off the toilet which is very unusual for her. Symptoms started yesterday, approximately 18 hours ago. Moderate severity. No particular focal or lateralized symptoms. Daughter thinks that her speech is slower and more garbled than usual.     Nursing Notes were all reviewed and agreed with or any disagreements were addressed in the HPI. REVIEW OF SYSTEMS        Positives and Pertinent negatives as per HPI.     PAST HISTORY     Past Medical History:  Past Medical History:   Diagnosis Date    Chronic edema     Diabetes ([address]     Family history of ischemic heart disease 4/12/2012    Hypercholesterolemia     Hypertension     PVC's (premature ventricular contractions) 10/10/2021       Past Surgical History:  Past Surgical History:   Procedure Laterality Date    ENDOSCOPY, COLON, DIAGNOSTIC  12/2006    Dr. Leilani Mohamud BREAST BIOPSY Bilateral 1997    benign    HX [address] OMENTUM  09/03/2019    abscess removed from abdominal wall       Family History:  Family History   Problem Relation Age of Onset    Stroke Mother     Hypertension Mother     Stroke Father     Hypertension Father     Hypertension Brother        Social History:  Social History     Tobacco Use    Smoking status: Never Smokeless tobacco: Never   Vaping Use    Vaping Use: Never used   Substance Use Topics    Alcohol use: No     Alcohol/week: 0.0 standard drinks    Drug use: No       Allergies: Allergies   Allergen Reactions    Latex Contact Dermatitis    Aspirin Palpitations    Glipizide Shortness of Breath and Swelling    Bactrim Ds [Sulfamethoxazole-Trimethoprim] Other (comments)     Patient experienced pain in legs and buttock and muscle cramping. Contrast Agent [Iodine] Hives and Itching     Itchy throat    Amaryl [Glimepiride] Other (comments)     \"nervous feeling\"    Avocado Rash     Pt states she bruises. Feldene [Piroxicam] Diarrhea    Januvia [Sitagliptin] Other (comments)     Upset stomach    Levofloxacin Unknown (comments)    Losartan Itching     Caused congestion per stated by pt    Derrick Hives    Oxycodone Diarrhea and Nausea Only    Pcn [Penicillins] Rash    Pineapple Rash    Prednisone Nausea and Vomiting    Tylenol [Acetaminophen] Palpitations    Welchol [Colesevelam] Other (comments)     Pt states \"made throat feel raw\"    Zestril [Lisinopril] Cough    Zetia [Ezetimibe] Nausea Only       CURRENT MEDICATIONS      Previous Medications    ACCU-CHEK PARESH PLUS TEST STRP STRIP    USE TO CHECK BLOOD SUGAR  TWICE DAILY    CHOLECALCIFEROL (VITAMIN D3) (2,000 UNITS /50 MCG) CAP CAPSULE    Take 2,000 Units by mouth daily. COD LIVER OIL CAP    Take 1 Cap by mouth daily. DICLOFENAC (VOLTAREN) 1 % GEL    Apply  to affected area four (4) times daily as needed for Pain. FUROSEMIDE (LASIX) 20 MG TABLET    TAKE 1 TABLET BY MOUTH  DAILY    GARLIC PO    Take 844 mg by mouth daily. GLYBURIDE (DIABETA) 5 MG TABLET    TAKE 1 TABLET BY MOUTH  DAILY AFTER BREAKFAST    LATANOPROST (XALATAN) 0.005 % OPHTHALMIC SOLUTION    Administer 1 Drop to both eyes nightly. METOPROLOL SUCCINATE (TOPROL-XL) 25 MG XL TABLET    0.5 tablet at bedtime. RED YEAST RICE EXTRACT 600 MG CAP    Take 600 mg by mouth daily.        SCREENINGS No data recorded         PHYSICAL EXAM      ED Triage Vitals [01/07/23 0638]   ED Encounter Vitals Group      /66      Pulse (Heart Rate) 63      Resp Rate 16      Temp 98.2 °F (36.8 °C)      Temp src       O2 Sat (%) 100 %      Weight 170 lb      Height 5' 2\"        Physical Exam  Vitals reviewed. Constitutional:       General: She is not in acute distress. Appearance: She is not ill-appearing. Cardiovascular:      Rate and Rhythm: Normal rate and regular rhythm. Pulmonary:      Effort: Pulmonary effort is normal. No tachypnea. Breath sounds: Normal breath sounds. No wheezing. Abdominal:      Palpations: Abdomen is soft. Tenderness: There is no abdominal tenderness. Musculoskeletal:         General: Normal range of motion. Skin:     General: Skin is warm and dry. Neurological:      General: No focal deficit present. Mental Status: She is alert and oriented to person, place, and time. GCS: GCS eye subscore is 4. GCS verbal subscore is 5. GCS motor subscore is 6. Cranial Nerves: Cranial nerves 2-12 are intact. No cranial nerve deficit, dysarthria or facial asymmetry. Motor: Motor function is intact. No abnormal muscle tone. Coordination: Coordination abnormal. Finger-Nose-Finger Test abnormal.      Comments: Abnormal finger-nose testing, right worse than left   Psychiatric:         Speech: Speech normal. Speech is not slurred.          Cognition and Memory: Cognition and memory normal.        DIAGNOSTIC RESULTS   LABS:     Recent Results (from the past 12 hour(s))   CBC WITH AUTOMATED DIFF    Collection Time: 01/07/23  6:50 AM   Result Value Ref Range    WBC 9.1 3.6 - 11.0 K/uL    RBC 4.48 3.80 - 5.20 M/uL    HGB 12.2 11.5 - 16.0 g/dL    HCT 36.5 35.0 - 47.0 %    MCV 81.5 80.0 - 99.0 FL    MCH 27.2 26.0 - 34.0 PG    MCHC 33.4 30.0 - 36.5 g/dL    RDW 13.4 11.5 - 14.5 %    PLATELET 082 938 - 398 K/uL    MPV 9.9 8.9 - 12.9 FL    NRBC 0.0 0  WBC    ABSOLUTE NRBC 0.00 0.00 - 0.01 K/uL    NEUTROPHILS 79 (H) 32 - 75 %    LYMPHOCYTES 15 12 - 49 %    MONOCYTES 4 (L) 5 - 13 %    EOSINOPHILS 1 0 - 7 %    BASOPHILS 1 0 - 1 %    IMMATURE GRANULOCYTES 0 0.0 - 0.5 %    ABS. NEUTROPHILS 7.2 1.8 - 8.0 K/UL    ABS. LYMPHOCYTES 1.4 0.8 - 3.5 K/UL    ABS. MONOCYTES 0.4 0.0 - 1.0 K/UL    ABS. EOSINOPHILS 0.1 0.0 - 0.4 K/UL    ABS. BASOPHILS 0.1 0.0 - 0.1 K/UL    ABS. IMM. GRANS. 0.0 0.00 - 0.04 K/UL    DF AUTOMATED     METABOLIC PANEL, COMPREHENSIVE    Collection Time: 01/07/23  6:50 AM   Result Value Ref Range    Sodium 140 136 - 145 mmol/L    Potassium 4.3 3.5 - 5.1 mmol/L    Chloride 106 97 - 108 mmol/L    CO2 27 21 - 32 mmol/L    Anion gap 7 5 - 15 mmol/L    Glucose 195 (H) 65 - 100 mg/dL    BUN 28 (H) 6 - 20 MG/DL    Creatinine 1.21 (H) 0.55 - 1.02 MG/DL    BUN/Creatinine ratio 23 (H) 12 - 20      eGFR 44 (L) >60 ml/min/1.73m2    Calcium 9.0 8.5 - 10.1 MG/DL    Bilirubin, total 0.4 0.2 - 1.0 MG/DL    ALT (SGPT) 19 12 - 78 U/L    AST (SGOT) 18 15 - 37 U/L    Alk. phosphatase 103 45 - 117 U/L    Protein, total 7.0 6.4 - 8.2 g/dL    Albumin 3.3 (L) 3.5 - 5.0 g/dL    Globulin 3.7 2.0 - 4.0 g/dL    A-G Ratio 0.9 (L) 1.1 - 2.2          EKG: If performed, independent interpretation documented below in the MDM section     RADIOLOGY:  Non-plain film images such as CT, Ultrasound and MRI are read by the radiologist. Plain radiographic images are visualized and preliminarily interpreted by the ED Provider with the findings documented in the MDM section. Interpretation per the Radiologist below, if available at the time of this note:     No results found.       PROCEDURES   Unless otherwise noted below, none  EKG    Date/Time: 1/7/2023 9:26 AM  Performed by: Tamiko Coleman MD  Authorized by: Tamiko Coleman MD     ECG reviewed by ED Physician in the absence of a cardiologist: yes    Interpretation:     Interpretation: non-specific    Rate:     ECG rate assessment: normal    Rhythm:     Rhythm: sinus rhythm    Ectopy:     Ectopy: none    QRS:     QRS axis:  Normal  ST segments:     ST segments:  Normal  T waves:     T waves: normal    Critical Care  Performed by: Phillip Kwon MD  Authorized by: Phillip Kwon MD     Critical care provider statement:     Critical care time (minutes):  40    Critical care time was exclusive of:  Separately billable procedures and treating other patients    Critical care was necessary to treat or prevent imminent or life-threatening deterioration of the following conditions:  CNS failure or compromise    Critical care was time spent personally by me on the following activities:  Ordering and review of laboratory studies, ordering and review of radiographic studies, pulse oximetry, re-evaluation of patient's condition, examination of patient, evaluation of patient's response to treatment, ordering and performing treatments and interventions and review of old charts     [place] and DIFFERENTIAL DIAGNOSIS/MDM   Vitals:    Vitals:    01/07/23 5307 01/07/23 0723 01/07/23 0738   BP: 131/66 (!) 141/57    Pulse: 63  77   Resp: 16  19   Temp: 98.2 °F (36.8 °C)     SpO2: 100%  99%   Weight: 77.1 kg (170 lb)     Height: 5' 2\" (1.575 m)          Patient was given the following medications:  Medications - No data to display    Medical Decision Making  81year-old female complaining of generalized weakness and difficulty getting up or walking around or standing up off of the toilet since last night. She reports she was able to drive as recently as yesterday. Her daughter reports that her speech is different than usual.  She is not anticoagulated. No headache. On examination she has notable dysmetria, difficulty with finger-to-nose testing especially using the right upper extremity. No lower extremity weakness. Perhaps mild dysarthria. NIH stroke scale 3.     No significant clinical concern for LVO, and given the unclear time course, no indication for stroke code in the emergency department. I elected to defer CT imaging or CTA, and obtained a brain MRI. 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 She also has some mild intraparenchymal hemorrhage which appears chronic, not acute. Patient is allergic to aspirin. Her blood pressure is well controlled. No indication for medications in the emergency department. Admit to medicine for further work-up of her subacute CVA. Amount and/or Complexity of Data Reviewed  Independent Historian:      Details: Patient's daughter provided most of the history, patient provided few details  Labs: ordered. Radiology: ordered. Decision-making details documented in ED Course. ECG/medicine tests: ordered. Decision-making details documented in ED Course. Risk  Decision regarding hospitalization. Critical Care  Total time providing critical care: 30-74 minutes               FINAL IMPRESSION   No diagnosis found. DISPOSITION/PLAN   Rajni Hale's  results have been reviewed with her. She has been counseled regarding her diagnosis, treatment, and plan. She verbally conveys understanding and agreement of the signs, symptoms, diagnosis, treatment and prognosis and additionally agrees to follow up as discussed. She also agrees with the care-plan and conveys that all of her questions have been answered. I have also provided discharge instructions for her that include: educational information regarding their diagnosis and treatment, and list of reasons why they would want to return to the ED prior to their follow-up appointment, should her condition change.      CLINICAL IMPRESSION         PATIENT REFERRED TO:  Follow-up Information    None           DISCHARGE MEDICATIONS:  Current Discharge Medication List            DISCONTINUED MEDICATIONS:  Current Discharge Medication List          I am the Primary Clinician of Record. Onesimo Cardoso MD (electronically signed)    (Please note that parts of this dictation were completed with voice recognition software. Quite often unanticipated grammatical, syntax, homophones, and other interpretive errors are inadvertently transcribed by the computer software. Please disregards these errors.  Please excuse any errors that have escaped final proofreading.)

## 2023-01-07 NOTE — ED NOTES
Verbal shift change report given to Ernie Manley RN (oncoming nurse) by Henok Melton RN  (offgoing nurse). Report included the following information SBAR, Kardex, ED Summary, MAR, and Recent Results.

## 2023-01-07 NOTE — ED NOTES
Pt presents to ER from home w/ complaints of general weakness and vomiting since approximately 0300. She describes difficulty walking related to weakness, denies dizziness, CP, SOB. Assisted to position of comfort, call bell within reach.

## 2023-01-08 LAB
ANION GAP SERPL CALC-SCNC: 5 MMOL/L (ref 5–15)
BUN SERPL-MCNC: 27 MG/DL (ref 6–20)
BUN/CREAT SERPL: 23 (ref 12–20)
CALCIUM SERPL-MCNC: 9.3 MG/DL (ref 8.5–10.1)
CHLORIDE SERPL-SCNC: 108 MMOL/L (ref 97–108)
CHOLEST SERPL-MCNC: 225 MG/DL
CO2 SERPL-SCNC: 28 MMOL/L (ref 21–32)
CREAT SERPL-MCNC: 1.19 MG/DL (ref 0.55–1.02)
EST. AVERAGE GLUCOSE BLD GHB EST-MCNC: 148 MG/DL
GLUCOSE BLD STRIP.AUTO-MCNC: 139 MG/DL (ref 65–117)
GLUCOSE BLD STRIP.AUTO-MCNC: 147 MG/DL (ref 65–117)
GLUCOSE BLD STRIP.AUTO-MCNC: 246 MG/DL (ref 65–117)
GLUCOSE BLD STRIP.AUTO-MCNC: 248 MG/DL (ref 65–117)
GLUCOSE SERPL-MCNC: 131 MG/DL (ref 65–100)
HBA1C MFR BLD: 6.8 % (ref 4–5.6)
HDLC SERPL-MCNC: 55 MG/DL
HDLC SERPL: 4.1 (ref 0–5)
LDLC SERPL CALC-MCNC: 144.2 MG/DL (ref 0–100)
POTASSIUM SERPL-SCNC: 4.3 MMOL/L (ref 3.5–5.1)
SERVICE CMNT-IMP: ABNORMAL
SODIUM SERPL-SCNC: 141 MMOL/L (ref 136–145)
TRIGL SERPL-MCNC: 129 MG/DL (ref ?–150)
TSH SERPL DL<=0.05 MIU/L-ACNC: 1.51 UIU/ML (ref 0.36–3.74)
VLDLC SERPL CALC-MCNC: 25.8 MG/DL

## 2023-01-08 PROCEDURE — 97165 OT EVAL LOW COMPLEX 30 MIN: CPT | Performed by: OCCUPATIONAL THERAPIST

## 2023-01-08 PROCEDURE — 74011250637 HC RX REV CODE- 250/637: Performed by: INTERNAL MEDICINE

## 2023-01-08 PROCEDURE — 82962 GLUCOSE BLOOD TEST: CPT

## 2023-01-08 PROCEDURE — 80061 LIPID PANEL: CPT

## 2023-01-08 PROCEDURE — 74011636637 HC RX REV CODE- 636/637: Performed by: INTERNAL MEDICINE

## 2023-01-08 PROCEDURE — 84443 ASSAY THYROID STIM HORMONE: CPT

## 2023-01-08 PROCEDURE — 97112 NEUROMUSCULAR REEDUCATION: CPT | Performed by: OCCUPATIONAL THERAPIST

## 2023-01-08 PROCEDURE — 99223 1ST HOSP IP/OBS HIGH 75: CPT | Performed by: PSYCHIATRY & NEUROLOGY

## 2023-01-08 PROCEDURE — 65270000046 HC RM TELEMETRY

## 2023-01-08 PROCEDURE — 83036 HEMOGLOBIN GLYCOSYLATED A1C: CPT

## 2023-01-08 PROCEDURE — 97161 PT EVAL LOW COMPLEX 20 MIN: CPT

## 2023-01-08 PROCEDURE — 97116 GAIT TRAINING THERAPY: CPT

## 2023-01-08 PROCEDURE — 36415 COLL VENOUS BLD VENIPUNCTURE: CPT

## 2023-01-08 PROCEDURE — 74011250637 HC RX REV CODE- 250/637: Performed by: STUDENT IN AN ORGANIZED HEALTH CARE EDUCATION/TRAINING PROGRAM

## 2023-01-08 PROCEDURE — 80048 BASIC METABOLIC PNL TOTAL CA: CPT

## 2023-01-08 RX ADMIN — Medication 2 UNITS: at 10:13

## 2023-01-08 RX ADMIN — CLOPIDOGREL BISULFATE 75 MG: 75 TABLET ORAL at 10:13

## 2023-01-08 RX ADMIN — ATORVASTATIN CALCIUM 40 MG: 40 TABLET, FILM COATED ORAL at 22:13

## 2023-01-08 RX ADMIN — Medication 2 UNITS: at 13:15

## 2023-01-08 RX ADMIN — METOPROLOL SUCCINATE 12.5 MG: 25 TABLET, EXTENDED RELEASE ORAL at 10:13

## 2023-01-08 NOTE — PROGRESS NOTES
Transition of Care Plan:    RUR: 13%  Disposition: IPR - 1) [facility], 2) [facility] (referrals pending)   *Will need insurance auth  If SNF or IPR: Date FOC offered: 1/8/23  Date [address] received: 1/8/23  Date authorization started with reference number: TBD  Date authorization received and expires: TBD  Accepting facility: TBD  Follow up appointments: PCP & specialists as indicated  DME needed: TBD - has cane, shower seat  Transportation at Discharge: Medicaid BLS  [address] or means to access home: N/a       IM Medicare Letter: Needed at d/c  Is patient a  and connected with the [place]? N/a              If yes, was [employer] transfer form completed and [facility] notified? N/a  Caregiver Contact: Chago Muñoz DTR/Micky ([phone])  Discharge Caregiver contacted prior to discharge? At bedside  Care Conference needed?:  No                 Reason for Admission:  CVA                   RUR Score: 13%                   Plan for utilizing home health:  PT/OT recommending IPR. FOC offered. Azael Freeman & Ayad jo, referrals submitted via Allscripts. PCP: First and Last name:  Naveed Enriquez MD     Name of Practice:    Are you a current patient: Yes/No: Yes   Approximate date of last visit: 10/24/22   Can you participate in a virtual visit with your PCP: No                    Current Advanced Directive/Advance Care Plan: Full Code  [facility] (ACP) Conversation    Date of Conversation: 1/8/23  Conducted with: Patient with Decision Making Capacity    Healthcare Decision Maker:     Primary Decision Maker: Alfredito Riley - Daughter - [phone]    Secondary Decision Maker: Shelley Henderson - [phone]  Click here to complete [address] including selection of the Healthcare Decision Maker Relationship (ie \"Primary\")    Today we documented Decision Maker(s) consistent with ACP documents on file.     Content/Action Overview: Has ACP document(s) on file - reflects the patient's care preferences  Reviewed DNR/DNI and patient elects Full Code (Attempt Resuscitation)  Length of Voluntary ACP Conversation in minutes:  <16 minutes (Non-Billable)  Dariel [facility]             Transition of Care Plan:    - IPR  - insurance auth  - 2nd IM Letter  - BLS transport  - Follow-up care appointments    80 YO  Female admitted on 1/7/23 for CVA. Lives alone in [facility] house ([address]) in [place], [place]. Lives on 1st floor. Has DTR, son, grandson, and granddaughter that provide support. Hx of HH [facility]). Denies hx of SNF, IPR. Has cane, shower chair at home. Reports prior to admission, independent with mobility (sometimes used cane), ADLs/IADLs to include driving. Preferred Rx is [facility] ([address] [facility]). Has AARP Medicare Complete & Medicaid of [place]. Demographic info verified, assessment completed at bedside with pt and DTR. Discussed PT/OT recommendations for IPR. Pt/DTR agreeable. List provided to review for [facility] options. DTR reviewed list. FOC offered. Referrals submitted to [facility] (1st choice) and [facility] (2nd choice). Referrals submitted via [employer], pending at this time. Will need insurance auth and medical transport. CM will continue to remain available to assist with d/c planning needs    Care Management Interventions  PCP Verified by CM:  Yes  Palliative Care Criteria Met (RRAT>21 & CHF Dx)?: No  Mode of Transport at Discharge: BLS  Transition of Care Consult (CM Consult): Discharge Planning  Discharge Durable Medical Equipment: No  Health Maintenance Reviewed: Yes  Physical Therapy Consult: Yes  Occupational Therapy Consult: Yes  Speech Therapy Consult: Yes  Support Systems: Child(juan)  Confirm Follow Up Transport: Family  The Plan for Transition of Care is Related to the Following Treatment Goals : IPR  The Patient and/or Patient Representative was Provided with a Choice of Provider and Agrees with the Discharge Plan?: Yes  Name of the Patient Representative Who was Provided with a Choice of Provider and Agrees with the Discharge Plan: Jose Rivera (pt)Lorena (DTR)  Freedom of Choice List was Provided with Basic Dialogue that Supports the Patient's Individualized Plan of Care/Goals, Treatment Preferences and Shares the Quality Data Associated with the Providers?: Yes  Discharge Location  Patient Expects to be Discharged to[de-identified] Rehab hospital/unit acute    [facility]3][place], [address] [facility]  [phone]

## 2023-01-08 NOTE — ROUTINE PROCESS
End of Shift Note    Bedside shift change report given to Milady  (oncoming nurse) by Ruiz Borja RN (offgoing nurse). Report included the following information SBAR, Kardex, Intake/Output, and MAR    Shift worked:  7p-7a   Shift summary and any significant changes:     New admit       Concerns for physician to address:  New admit   Zone phone for oncoming shift:   [phone]     Patient Information  Justin Crandall  81 y.o.  1/7/2023  7:17 AM by Arnaldo Lu MD. Justin Crandall was admitted from Home    Problem List  Patient Active Problem List    Diagnosis Date Noted    CVA (cerebral vascular accident) ([address] 01/07/2023    PVC's (premature ventricular contractions) 10/10/2021    Abscess of abdominal wall 09/03/2019    Type 2 diabetes with nephropathy ([address] 04/17/2018    Statin intolerance 05/06/2016    Essential hypertension 01/06/2016    Encounter for medication monitoring 01/06/2016    Diabetes mellitus type 2, controlled ([address] 10/16/2015    Generalized OA 05/28/2014    Nonrheumatic tricuspid valve disorder 04/12/2012    Family history of ischemic heart disease 04/12/2012    Mixed hyperlipidemia 04/12/2012    Mitral valve disease 04/12/2012    Obesity, unspecified 04/12/2012    Osteoarthritis 05/04/2010    Hypovitaminosis D 05/04/2010     Past Medical History:   Diagnosis Date    Chronic edema     Diabetes ([address]     Family history of ischemic heart disease 4/12/2012    Hypercholesterolemia     Hypertension     PVC's (premature ventricular contractions) 10/10/2021       Core Measures:  CVA: Yes Yes      Activity:  Activity Level:  Up with Assistance      Cardiac:   Cardiac Monitoring: Yes      Cardiac Rhythm: Sinus Rhythm    Access:   Current line(s): PIV       Genitourinary:   Urinary status: voiding      Respiratory:   O2 Device: None (Room air)         GI:  Last Bowel Movement Date: 01/07/23  Current diet:  ADULT DIET Regular         Pain Management:   Patient states pain is manageable on current regimen: N/A    Skin:  Lopez Score: 17  Interventions: increase time out of bed, PT/OT consult, and nutritional support     Patient Safety:  Fall Score:  Total Score: 3  Interventions: bed/chair alarm, gripper socks, pt to call before getting OOB, and stay with me (per policy)  High Fall Risk: Yes  @Rollbelt  @dexterity to release roll belt  Yes/No ( must document dexterity  here by stating Yes or No here, otherwise this is a restraint and must follow restraint documentation policy.)    DVT prophylaxis:  DVT prophylaxis Med- Not applicable  DVT prophylaxis SCD or DENNYS- Yes     Wounds: (If Applicable)  Wounds- No  Location     Active Consults:  IP CONSULT TO HOSPITALIST  IP CONSULT TO NEUROLOGY    Length of Stay:  Expected LOS: - - -  Actual LOS: 1  Discharge Plan:  Rehab when ready      Simona Mathis RN

## 2023-01-08 NOTE — PROGRESS NOTES
Problem: Pressure Injury - Risk of  Goal: *Prevention of pressure injury  Description: Document Lopez Scale and appropriate interventions in the flowsheet.   Outcome: Progressing Towards Goal  Note: Pressure Injury Interventions:  Sensory Interventions: Assess changes in LOC, Check visual cues for pain, Discuss PT/OT consult with provider, Float heels, Keep linens dry and wrinkle-free, Minimize linen layers    Moisture Interventions: Absorbent underpads, Minimize layers    Activity Interventions: Increase time out of bed, Pressure redistribution bed/mattress(bed type), PT/OT evaluation    Mobility Interventions: Assess need for specialty bed, Float heels, HOB 30 degrees or less, Pressure redistribution bed/mattress (bed type), PT/OT evaluation    Nutrition Interventions: Document food/fluid/supplement intake    Friction and Shear Interventions: HOB 30 degrees or less, Lift sheet, Minimize layers, Feet elevated on foot rest                Problem: Patient Education: Go to Patient Education Activity  Goal: Patient/Family Education  Outcome: Progressing Towards Goal     Problem: Patient Education: Go to Patient Education Activity  Goal: Patient/Family Education  Outcome: Progressing Towards Goal     Problem: TIA/CVA Stroke: 0-24 hours  Goal: Off Pathway (Use only if patient is Off Pathway)  Outcome: Progressing Towards Goal  Goal: Activity/Safety  Outcome: Progressing Towards Goal  Goal: Consults, if ordered  Outcome: Progressing Towards Goal  Goal: Diagnostic Test/Procedures  Outcome: Progressing Towards Goal  Goal: Nutrition/Diet  Outcome: Progressing Towards Goal  Goal: Discharge Planning  Outcome: Progressing Towards Goal  Goal: Medications  Outcome: Progressing Towards Goal  Goal: Respiratory  Outcome: Progressing Towards Goal  Goal: Treatments/Interventions/Procedures  Outcome: Progressing Towards Goal  Goal: Minimize risk of bleeding post-thrombolytic infusion  Outcome: Progressing Towards Goal  Goal: Monitor for complications post-thrombolytic infusion  Outcome: Progressing Towards Goal  Goal: Psychosocial  Outcome: Progressing Towards Goal  Goal: *Hemodynamically stable  Outcome: Progressing Towards Goal  Goal: *Neurologically stable  Description: Absence of additional neurological deficits    Outcome: Progressing Towards Goal  Goal: *Verbalizes anxiety and depression are reduced or absent  Outcome: Progressing Towards Goal  Goal: *Absence of Signs of Aspiration on Current Diet  Outcome: Progressing Towards Goal  Goal: *Absence of deep venous thrombosis signs and symptoms(Stroke Metric)  Outcome: Progressing Towards Goal  Goal: *Ability to perform ADLs and demonstrates progressive mobility and function  Outcome: Progressing Towards Goal  Goal: *Stroke education started(Stroke Metric)  Outcome: Progressing Towards Goal  Goal: *Dysphagia screen performed(Stroke Metric)  Outcome: Progressing Towards Goal  Goal: *Rehab consulted(Stroke Metric)  Outcome: Progressing Towards Goal     Problem: TIA/CVA Stroke: Day 2 Until Discharge  Goal: Off Pathway (Use only if patient is Off Pathway)  Outcome: Progressing Towards Goal  Goal: Activity/Safety  Outcome: Progressing Towards Goal  Goal: Diagnostic Test/Procedures  Outcome: Progressing Towards Goal  Goal: Nutrition/Diet  Outcome: Progressing Towards Goal  Goal: Discharge Planning  Outcome: Progressing Towards Goal  Goal: Medications  Outcome: Progressing Towards Goal  Goal: Respiratory  Outcome: Progressing Towards Goal  Goal: Treatments/Interventions/Procedures  Outcome: Progressing Towards Goal  Goal: Psychosocial  Outcome: Progressing Towards Goal  Goal: *Verbalizes anxiety and depression are reduced or absent  Outcome: Progressing Towards Goal  Goal: *Absence of aspiration  Outcome: Progressing Towards Goal  Goal: *Absence of deep venous thrombosis signs and symptoms(Stroke Metric)  Outcome: Progressing Towards Goal  Goal: *Optimal pain control at patient's stated goal  Outcome: Progressing Towards Goal  Goal: *Tolerating diet  Outcome: Progressing Towards Goal  Goal: *Ability to perform ADLs and demonstrates progressive mobility and function  Outcome: Progressing Towards Goal  Goal: *Stroke education continued(Stroke Metric)  Outcome: Progressing Towards Goal     Problem: Ischemic Stroke: Discharge Outcomes  Goal: *Verbalizes anxiety and depression are reduced or absent  Outcome: Progressing Towards Goal  Goal: *Verbalize understanding of risk factor modification(Stroke Metric)  Outcome: Progressing Towards Goal  Goal: *Hemodynamically stable  Outcome: Progressing Towards Goal  Goal: *Absence of aspiration pneumonia  Outcome: Progressing Towards Goal  Goal: *Aware of needed dietary changes  Outcome: Progressing Towards Goal  Goal: *Verbalize understanding of prescribed medications including anti-coagulants, anti-lipid, and/or anti-platelets(Stroke Metric)  Outcome: Progressing Towards Goal  Goal: *Tolerating diet  Outcome: Progressing Towards Goal  Goal: *Aware of follow-up diagnostics related to anticoagulants  Outcome: Progressing Towards Goal  Goal: *Ability to perform ADLs and demonstrates progressive mobility and function  Outcome: Progressing Towards Goal  Goal: *Absence of DVT(Stroke Metric)  Outcome: Progressing Towards Goal  Goal: *Absence of aspiration  Outcome: Progressing Towards Goal  Goal: *Optimal pain control at patient's stated goal  Outcome: Progressing Towards Goal  Goal: *Home safety concerns addressed  Outcome: Progressing Towards Goal  Goal: *Describes available resources and support systems  Outcome: Progressing Towards Goal  Goal: *Verbalizes understanding of activation of EMS(911) for stroke symptoms(Stroke Metric)  Outcome: Progressing Towards Goal  Goal: *Understands and describes signs and symptoms to report to providers(Stroke Metric)  Outcome: Progressing Towards Goal  Goal: *Neurolgocially stable (absence of additional neurological deficits)  Outcome: Progressing Towards Goal  Goal: *Verbalizes importance of follow-up with primary care physician(Stroke Metric)  Outcome: Progressing Towards Goal  Goal: *Smoking cessation discussed,if applicable(Stroke Metric)  Outcome: Progressing Towards Goal  Goal: *Depression screening completed(Stroke Metric)  Outcome: Progressing Towards Goal     Problem: Falls - Risk of  Goal: *Absence of Falls  Description: Document Trip Fall Risk and appropriate interventions in the flowsheet. Outcome: Progressing Towards Goal     Problem: Patient Education: Go to Patient Education Activity  Goal: Patient/Family Education  Outcome: Progressing Towards Goal     Problem: Diabetes Self-Management  Goal: *Disease process and treatment process  Description: Define diabetes and identify own type of diabetes; list 3 options for treating diabetes. Outcome: Progressing Towards Goal  Goal: *Incorporating nutritional management into lifestyle  Description: Describe effect of type, amount and timing of food on blood glucose; list 3 methods for planning meals. Outcome: Progressing Towards Goal  Goal: *Incorporating physical activity into lifestyle  Description: State effect of exercise on blood glucose levels. Outcome: Progressing Towards Goal  Goal: *Developing strategies to promote health/change behavior  Description: Define the ABC's of diabetes; identify appropriate screenings, schedule and personal plan for screenings. Outcome: Progressing Towards Goal  Goal: *Using medications safely  Description: State effect of diabetes medications on diabetes; name diabetes medication taking, action and side effects. Outcome: Progressing Towards Goal  Goal: *Monitoring blood glucose, interpreting and using results  Description: Identify recommended blood glucose targets  and personal targets.   Outcome: Progressing Towards Goal  Goal: *Prevention, detection, treatment of acute complications  Description: List symptoms of hyper- and hypoglycemia; describe how to treat low blood sugar and actions for lowering  high blood glucose level. Outcome: Progressing Towards Goal  Goal: *Prevention, detection and treatment of chronic complications  Description: Define the natural course of diabetes and describe the relationship of blood glucose levels to long term complications of diabetes.   Outcome: Progressing Towards Goal  Goal: *Developing strategies to address psychosocial issues  Description: Describe feelings about living with diabetes; identify support needed and support network  Outcome: Progressing Towards Goal  Goal: *Insulin pump training  Outcome: Progressing Towards Goal  Goal: *Sick day guidelines  Outcome: Progressing Towards Goal  Goal: *Patient Specific Goal (EDIT GOAL, INSERT TEXT)  Outcome: Progressing Towards Goal

## 2023-01-08 NOTE — PROGRESS NOTES
Orders received, chart reviewed and patient evaluated by physical therapy. Pending progression with skilled acute physical therapy, recommend:  Therapy 3 hours per day 5-7 days per week    Recommend x2 person standpivot transfer for all OOB mobility, including to bedside commode for toileting needs and bedside chair for meals. Full evaluation to follow.

## 2023-01-08 NOTE — PROGRESS NOTES
Problem: Self Care Deficits Care Plan (Adult)  Goal: *Acute Goals and Plan of Care (Insert Text)  Description: FUNCTIONAL STATUS PRIOR TO ADMISSION: Patient was independent and active without use of DME. Increased assist with mobility and ADL's recently    HOME SUPPORT: The patient lived alone with family, sister, granddaughter to provide assistance. Occupational Therapy Goals  Initiated 1/8/2023  1. Patient will maintain static sitting balance at midline seated edge of bed with bilateral UE's supported on bed within 7 day(s). 2.  Patient will position right hand hand on bed with palm down for support without cue within 7 day(s)  3. Patient will use right hand as dependent stabilizer during functional tasks with 1-2 cues within 7 day(s). 4.  Patient will stand with bilateral UE support at elbows > or = 2 minutes with moderate assist of 1 within 7 day(s)  5. Patient will squat pivot transfer to the left with moderate assist of 1 within 7 day(s)  6. Patient will squat pivot transfer to the right with maximal assist of 1 within 7 day(s). Outcome: Not Met       OCCUPATIONAL THERAPY NEURO-EVALUATION  Patient: Navin Roberson (4 y.o. female)  Date: 1/8/2023  Primary Diagnosis: CVA (cerebral vascular accident) [facility]) [I63.9]       Precautions:   Fall    ASSESSMENT  Based on the objective data described below, the patient presents with significant impairment right dominant UE which is non-functional at this time, decreased right LE strength, sensation, decreased balance, decreased attention to right dominant UE. Hx of arthritis and noted increased tone digit flexion and extension. Supportive granddaughter present. Assist of 2 for all transfers this date. Current Level of Function Impacting Discharge (ADLs/self-care): total assist LE ADL, UE ADLs, mod to max assist of 2 for stand pivot transfer    Functional Outcome Measure:   The patient scored Total A-D  Total A-D (Motor Function): 9/66 on the Fugl-Townsend Assessment which is indicative of severe impairment in upper extremity functional status. Other factors to consider for discharge: supportive family     Patient will benefit from skilled therapy intervention to address the above noted impairments. PLAN :  Recommendations and Planned Interventions: self care training, functional mobility training, therapeutic exercise, balance training, visual/perceptual training, therapeutic activities, cognitive retraining, endurance activities, neuromuscular re-education, patient education, home safety training, and family training/education    Frequency/Duration: Patient will be followed by occupational therapy 5 times a week to address goals. Recommendation for discharge: (in order for the patient to meet his/her long term goals)  Therapy 3 hours per day 5-7 days per week    This discharge recommendation:  Has been made in collaboration with the attending provider and/or case management    IF patient discharges home will need the following DME:        SUBJECTIVE:   Patient stated I wanted to go home.  stated when educated on need and benefit of rehab    OBJECTIVE DATA SUMMARY:   HISTORY:   Past Medical History:   Diagnosis Date    Chronic edema     Diabetes ([facility] [address]     Family history of ischemic heart disease 4/12/2012    Hypercholesterolemia     Hypertension     PVC's (premature ventricular contractions) 10/10/2021     Past Surgical History:   Procedure Laterality Date    ENDOSCOPY, COLON, DIAGNOSTIC  12/2006    Dr. Mellissa Telles BREAST BIOPSY Bilateral 1997    benign    HX [address] OMENTUM  09/03/2019    abscess removed from abdominal wall     Expanded or extensive additional review of patient history:     Home Situation  Home Environment: Private residence  # Steps to Enter: 5  One/Two Story Residence: Two story, live on 1st floor  Living Alone: Yes  Support Systems: Other Family Member(s) (grandson, granddaughter)  Patient Expects to be Discharged to[de-identified] Home  Current DME Used/Available at Home: Cane, straight    Hand dominance: Right    EXAMINATION OF PERFORMANCE DEFICITS:  Cognitive/Behavioral Status:  Neurologic State: Alert  Orientation Level: Oriented X4  Cognition: Decreased command following; Appropriate for age attention/concentration  Perception: Cues to attend to right side of body;Cues to maintain midline in sitting;Cues to maintain midline in standing  Perseveration: No perseveration noted  Safety/Judgement: Lack of insight into deficits; Decreased awareness of need for assistance;Decreased awareness of need for safety; Fall prevention;Home safety    Skin: intact    Edema: right hand mild    Hearing: Auditory  Auditory Impairment: Hard of hearing, right side    Vision/Perceptual:                                Corrective Lenses: Glasses    Range of Motion:  AROM: Grossly decreased, non-functional (right dominant UE)  PROM: Generally decreased, functional (excluding right hand)   Right hand: minimal digit flexion and extension noted, noted tone in both directions                   Strength:  Strength: Grossly decreased, non-functional                Coordination:  Coordination: Grossly decreased, non-functional  Fine Motor Skills-Upper: Right Impaired;Left Intact    Gross Motor Skills-Upper: Right Impaired;Left Intact    Tone & Sensation:  Tone: Abnormal  Sensation: Impaired (right UE and LE)                      Balance:  Sitting: Impaired  Sitting - Static: Poor (constant support); Fair (occasional)  Sitting - Dynamic: Poor (constant support)  Standing: Impaired; With support  Standing - Static: Poor;Constant support  Standing - Dynamic : Poor;Constant support    Functional Mobility and Transfers for ADLs:  Bed Mobility:  Supine to Sit: Maximum assistance;Assist x2; Additional time;Bed Modified  Scooting: Maximum assistance;Assist x2    Transfers:  Sit to Stand:  Moderate assistance;Assist x2  Stand to Sit: Maximum assistance;Assist x2  Bed to Chair: Assist x2; Moderate assistance  Bathroom Mobility: Dependent/total assistance  Toilet Transfer : Moderate assistance;Assist x2 (stand pivot to bedside commode (infer))    ADL Assessment:  Feeding: Setup;Supervision; Additional time; Other (comment) (using left non-dominant hand)    Oral Facial Hygiene/Grooming: Moderate assistance    Bathing: Maximum assistance         Upper Body Dressing: Total assistance    Lower Body Dressing: Total assistance    Toileting: Total assistance                ADL Intervention and task modifications:        Educated on role of OT, benefit of increased time out of bed, use of call bell for assist and need for assist all transfers            Cognitive Retraining  Safety/Judgement: Lack of insight into deficits; Decreased awareness of need for assistance;Decreased awareness of need for safety; Fall prevention;Home safety    Neuromuscular Reeducation:  Educated patient and her granddaughter on principles of Neuroplasticity focused on salience, repetition, time, frequency    Educated on techniques to increase attention to right hand and need to position in neutral supported position or as her left hand is, ie: sitting edge of bed and palm up, max assist and cues to increase attention and place palm on bed    Educated on positioning LE's in bridge and holding for increased right LE strength, required max assist to bridge right LE and max assist to maintain foot position on bed    Instructed in  digit flexion and extension stretch to perform throughout day    Instructed in use of right dominant hand as dependent stabilizer during eating and positioning of right hand on tray/plate, educated on benefit of incorporating right hand into all tasks    Functional Measure:  Fugl-Townsend Assessment of Motor Recovery after Stroke:        Reflex Activity  Flexors/Biceps/Fingers: Can be elicited  Extensors/Triceps: Can be elicited  Reflex Subtotal: 4    Volitional Movement Within Synergies  Shoulder Retraction: None  Shoulder Elevation: None  Shoulder Abduction (90 degrees): None  Shoulder External Rotation: None  Elbow Flexion: None  Forearm Supination: None  Shoulder Adduction/Internal Rotation: None  Elbow Extension: None  Forearm Pronation: None  Subtotal: 0    Volitional Movement Mixing Synergies  Hand to Lumbar Spine: Partial  Shoulder Flexion (0-90 degrees): Partial  Pronation-Supination: Partial  Subtotal: 3    Volitional Movement With Little or No Synergy  Shoulder Abduction (0-90 degrees): None  Shoulder Flexion ( degrees): None  Pronation/Supination: None  Subtotal : 0    Normal Reflex Activity  Biceps, Triceps, Finger Flexors: Full  Subtotal : 2    Upper Extremity Total   Upper Extremity Total: 9    Wrist  Stability at 15 Degree Dorsiflexion: None  Repeated Dorsiflexion/ Volar Flexion: None  Stability at 15 Degree Dorsiflexion: None  Repeated Dorsiflexion/ Volar Flexion: None  Circumduction: None  Wrist Total: 0    Hand  Mass Flexion: None  Mass Extension: None  Grasp A: None  Grasp B: None  Grasp C: None  Grasp D: None  Grasp E: None  Hand Total: 0    Coordination/Speed  Tremor: Marked  Dysmetria: Marked  Time: >5s  Coordination/Speed Total : 0    Total A-D  Total A-D (Motor Function): 9/66     This is a reliable/valid measure of arm function after a neurological event. It has established value to characterize functional status and for measuring spontaneous and therapy-induced recovery; tests proximal and distal motor functions. Fugl-Townsend Assessment - UE scores recorded between five and 30 days post neurologic event can be used to predict UE recovery at six months post neurologic event. Severe = 0-21 points   Moderately Severe = 22-33 points   Moderate = 34-47 points   Mild = 48-66 points  AUREA Joseph, BRANDON Chauhan, & MULU Mccray (1992). Measurement of motor recovery after stroke:  Outcome assessment and sample size requirements. Stroke, 23, pp. 6083-9540.   ------------------------------------------------------------------------------------------------------------------------------------------------------------------  MCID:  Stroke:   Mainor Fisher et al, 2001; n = 171; mean age 79 (6) years; assessed within 16 (12) days of stroke, Acute Stroke)  FMA Motor Scores from Admission to Discharge   10 point increase in FMA Upper Extremity = 1.5 change in discharge FIM   10 point increase in FMA Lower Extremity = 1.9 change in discharge FIM  MDC:   Stroke:   Hali Arambula et al, 2008, n = 14, mean age = 59.9 (14.6) years, assessed on average 14 (6.5) months post stroke, Chronic Stroke)   FMA = 5.2 points for the Upper Extremity portion of the assessment     Occupational Therapy Evaluation Charge Determination   History Examination Decision-Making   LOW Complexity : Brief history review  HIGH Complexity : 5 or more performance deficits relating to physical, cognitive , or psychosocial skils that result in activity limitations and / or participation restrictions MEDIUM Complexity : Patient may present with comorbidities that affect occupational performnce.  Miniml to moderate modification of tasks or assistance (eg, physical or verbal ) with assesment(s) is necessary to enable patient to complete evaluation       Based on the above components, the patient evaluation is determined to be of the following complexity level: LOW   Pain Rating:  No complaint of pain    Activity Tolerance:   Good    After treatment patient left in no apparent distress:    Sitting in chair, Call bell within reach, Bed / chair alarm activated, and Caregiver / family present    COMMUNICATION/EDUCATION:   The patients plan of care was discussed with: Physical therapist, Registered nurse, and Neurologist .     Patient was educated regarding her deficit(s) of right dominant UE and LE weakness, decreased balance, decreased sensation, increased tone as this relates to her diagnosis of CVA. She demonstrated Fair understanding as evidenced by verbalizing. This patients plan of care is appropriate for delegation to [facility].     Thank you for this referral.  Bharath Daugherty, OTR/L  Time Calculation: 31 mins

## 2023-01-08 NOTE — PROGRESS NOTES
Responding to RRT/Code Stroke. Per nurse report included generalized weakness NIH 0. Upon assessment nurse reports right sided weakness in arm and leg which pt. Reports as new Dr. Gloria Hassan notified and Code Stroke measures initiated. Upon arrival, pt. Alert & oriented speech slightly slurred and somewhat garble. Difficulty in moving right arm and right leg drift. FSBS 118. Transported to CT. Scan. Dr Samreen Grant called for update reporting right arm and leg weakness. With speech impairement on her assessment. 1905 Pt in [facility] Dr. Lori Becerra arrives and assess pt. Note MRI results. 291 [facility] neurology assessment in progress. 1926 Assessment completed and pt. Transported back to [facility].  Teleneurology to contact Dr. Lori Becerra [phone]

## 2023-01-08 NOTE — CONSULTS
Neurology Note    Patient ID:  Italo Orozco  [id number]  48 y.o.  1935      Date of Consultation:  January 8, 2023    Referring Physician: Dr. Ramandeep Martinez    Reason for Consultation:  weakness      Assessment and Plan:    The patient is a pleasant 71-year-old female with acute onset of speech difficulties and right-sided weakness. Her brain MRI does reveal an acute left pontine stroke. Acute stroke:    Risk factors for stroke include hypertension, diabetes, dyslipidemia  Brain MRI does reveal evidence of an acute left pontine stroke and old chronic microhemorrhages. The patient does have an aspirin allergy. I would recommend the patient be on Plavix 75 mg daily. I did discuss this with the patient and her granddaughter at the bedside. Unclear if chronic microhemorrhages were related to prior hypertensive ischemic events. We will need to watch her neurological examination closely however does have multiple risk factors for ischemic stroke. diabetes. Updated hemoglobin A1c is pending. Last hemoglobin A1c from October 2022 was 6.7  Dyslipidemia: Updated lipid panel is pending. Prior LDL from June 2022 was 120. Patient needs intensive statin therapy  Hypertension: Aggressive blood pressure control for the first 24 hours then goal blood pressure less than 140/90  The patient is unable to obtain a CTA due to contrast allergy. A carotid Doppler study will be obtained. Echocardiogram has been ordered  The patient will need aggressive physical, occupational, and speech therapy. I provided stroke education today to the patient and her granddaughter in regards to risk factors for stroke and lifestyle modifications to help minimize the risk of future stroke.   This included medication compliance, regular follow up with primary care physician,  and healthy lifestyle habits (nutrition/exercise)    Neurology will continue to follow along closely in this complicated patient with acute stroke and risk for deterioration with new medication being started. Subjective: weakness and a change in her speech. History of Present Illness:   Navin Roberson is a 81 y.o. female with a history of diabetes, dyslipidemia, hypertension who presented to the emergency department with generalized weakness and slurred speech. There also was blurry vision and nausea. The patient reports that she began to notice that her right leg was not working as well as her left. She also noticed right upper extremity weakness. She felt that her speech had changed and was not any as crisp and clear. Her granddaughter was in the room today and provided additional information. The patient had been living independently but had been needing more assistance with moving around and day-to-day activities. There was an abrupt change that began on January 6, 2023. The patient feels that she is still having the notable weakness persisting. Overnight, upon nursing evaluation, it was reported there was worsening of right upper extremity weakness. The patient did have a code stroke called. An emergent head CT was performed which revealed no new hemorrhage. The subacute left pontine infarct was noted. Laboratory values upon admission include a normal white blood cell count, normal platelets at 153. Creatinine was 1. 19. LFTs were normal.  Albumin was 3.3.     Past Medical History:   Diagnosis Date    Chronic edema     Diabetes ([address]     Family history of ischemic heart disease 4/12/2012    Hypercholesterolemia     Hypertension     PVC's (premature ventricular contractions) 10/10/2021        Past Surgical History:   Procedure Laterality Date    ENDOSCOPY, COLON, DIAGNOSTIC  12/2006    Dr. Noemi Greenoked BREAST BIOPSY Bilateral 1997    benign    HX [address] OMENT[facility]  09/03/2019    abscess removed from abdominal wall        Family History   Problem Relation Age of Onset    Stroke Mother     Hypertension Mother     Stroke Father     Hypertension Father     Hypertension Brother         Social History     Tobacco Use    Smoking status: Never    Smokeless tobacco: Never   Substance Use Topics    Alcohol use: No     Alcohol/week: 0.0 standard drinks        Allergies   Allergen Reactions    Latex Contact Dermatitis    Aspirin Palpitations    Glipizide Shortness of Breath and Swelling    Bactrim Ds [Sulfamethoxazole-Trimethoprim] Other (comments)     Patient experienced pain in legs and buttock and muscle cramping. Contrast Agent [Iodine] Hives and Itching     Itchy throat    Amaryl [Glimepiride] Other (comments)     \"nervous feeling\"    Avocado Rash     Pt states she bruises. Feldene [Piroxicam] Diarrhea    Januvia [Sitagliptin] Other (comments)     Upset stomach    Levofloxacin Unknown (comments)    Losartan Itching     Caused congestion per stated by pt    Derrick Hives    Oxycodone Diarrhea and Nausea Only    Pcn [Penicillins] Rash    Pineapple Rash    Prednisone Nausea and Vomiting    Tylenol [Acetaminophen] Palpitations    Welchol [Colesevelam] Other (comments)     Pt states \"made throat feel raw\"    Zestril [Lisinopril] Cough    Zetia [Ezetimibe] Nausea Only        Prior to Admission medications    Medication Sig Start Date End Date Taking? Authorizing Provider   glyBURIDE (DIABETA) 5 mg tablet TAKE 1 TABLET BY MOUTH  DAILY AFTER BREAKFAST  Patient taking differently: 2.5 mg. 12/12/22  Yes Eric SHELLEY MD   furosemide (LASIX) 20 mg tablet TAKE 1 TABLET BY MOUTH  DAILY 11/8/22  Yes Ortiz Koenig MD   Accu-Chek Ashley Plus test strp strip USE TO CHECK BLOOD SUGAR  TWICE DAILY 9/14/22  Yes Eric Malloy MD   metoprolol succinate (TOPROL-XL) 25 mg XL tablet 0.5 tablet at bedtime. Patient taking differently: 12.5 mg. 0.5 tablet at bedtime. 4/11/22  Yes Mundo Zarco NP   latanoprost (XALATAN) 0.005 % ophthalmic solution Administer 1 Drop to both eyes nightly. 12/7/21  Yes Provider, Historical   diclofenac (VOLTAREN) 1 % gel Apply  to affected area four (4) times daily as needed for Pain. 8/18/20  Yes Alina Koenig MD   cholecalciferol (VITAMIN D3) (2,000 UNITS /50 MCG) cap capsule Take 2,000 Units by mouth daily. Yes Provider, Historical   GARLIC PO Take 373 mg by mouth daily. Yes Provider, Historical   red yeast rice extract 600 mg Cap Take 600 mg by mouth daily. Yes Provider, Historical   cod liver oil Cap Take 1 Cap by mouth daily.    Yes Provider, Historical     Current Facility-Administered Medications   Medication Dose Route Frequency    atorvastatin (LIPITOR) tablet 40 mg  40 mg Oral QHS    melatonin tablet 3 mg  3 mg Oral QHS PRN    insulin lispro (HUMALOG) injection   SubCUTAneous AC&HS    glucose chewable tablet 16 g  4 Tablet Oral PRN    glucagon (GLUCAGEN) injection 1 mg  1 mg IntraMUSCular PRN    dextrose 10% infusion 0-250 mL  0-250 mL IntraVENous PRN    metoprolol succinate (TOPROL-XL) XL tablet 12.5 mg  12.5 mg Oral DAILY    latanoprost (XALATAN) 0.005 % ophthalmic solution 1 Drop  1 Drop Both Eyes QHS    hydrALAZINE (APRESOLINE) 20 mg/mL injection 20 mg  20 mg IntraVENous Q6H PRN    clopidogreL (PLAVIX) tablet 75 mg  75 mg Oral DAILY       Review of Systems:    General, constitutional: Leg cramping  Eyes, vision: negative  Ears, nose, throat: negative  Cardiovascular, heart: negative  Respiratory: negative  Gastrointestinal: negative  Genitourinary: negative  Musculoskeletal: negative  Skin and integumentary: negative  Psychiatric: negative  Endocrine: negative  Neurological: negative, except for HPI  Hematologic/lymphatic: negative  Allergy/immunology: negative    Objective:     Visit Vitals  BP (!) 111/57   Pulse 68   Temp 97.7 °F (36.5 °C)   Resp 17   Ht 5' 2\" (1.575 m)   Wt 170 lb (77.1 kg)   LMP 04/28/1972   SpO2 99%   BMI 31.09 kg/m²       Physical Exam:      General:  appears well nourished in no acute distress  Neck: no carotid bruits  Lungs: clear to auscultation  Heart:  no murmurs, regular rate  Lower extremity: peripheral pulses palpable and no edema  Skin: intact    Neurological exam:    Awake, alert, oriented to person, place and time  Recent and remote memory were normal  Attention and concentration were intact  Language was intact. There was no aphasia  Speech: Slurred speech  Fund of knowledge was preserved    Cranial nerves:   II-XII were tested    Perrrla  Fundoscopic examination revealed venous pulsations and no clear abnormalities  Visual fields were full  Eomi, no evidence of nystagmus  Facial sensation:  normal and symmetric  Facial motor: Mild facial asymmetry  Hearing intact  SCM strength intact  Tongue: midline without fasciculations    Motor: Tone normal  Right pronator drift  No evidence of fasciculations    Strength testing:   deltoid triceps biceps Wrist ext. Wrist flex. intrinsics Hip flex. Hip ext. Knee ext. Knee flex Dorsi flex Plantar flex   Right 3 3 3 4 4 4 3 3 3 4 4 4   Left 5 5 5 5 5 5 5 5 5 5 5 5         Sensory:  Upper extremity: intact to pp  Lower extremity: intact to pp    Reflexes:    Right Left  Biceps  2 2  Triceps 2 2  Brachiorad.  2 2  Patella  2 2  Achilles 1 1    Plantar response:  extensor on the right      Cerebellar testing:  no tremor apparent, finger/nose and rowena were intact on the left    Gait: not assessed due to concerns over safety    Labs:     Lab Results   Component Value Date/Time    Hemoglobin A1c 7.0 (H) 07/20/2021 03:48 PM    Sodium 141 01/08/2023 12:57 AM    Potassium 4.3 01/08/2023 12:57 AM    Chloride 108 01/08/2023 12:57 AM    Glucose 131 (H) 01/08/2023 12:57 AM    BUN 27 (H) 01/08/2023 12:57 AM    Creatinine 1.19 (H) 01/08/2023 12:57 AM    Calcium 9.3 01/08/2023 12:57 AM    WBC 9.1 01/07/2023 06:50 AM    HCT 36.5 01/07/2023 06:50 AM    HGB 12.2 01/07/2023 06:50 AM    PLATELET 116 81/34/5825 06:50 AM       Imaging:    Results from Hospital Encounter encounter on 01/07/23    MRI BRAIN WO CONT    Narrative  EXAM: MRI BRAIN WO CONT    INDICATION: new onset weakness since yesterday, right hand dysmetria, mild  dysarthria, concern for possible sub-acute stroke    COMPARISON: None. CONTRAST: None. TECHNIQUE:  Multiplanar multisequence acquisition without contrast of the brain. FINDINGS:  There is a subacute infarct in the left maninder (series 2 image 12) with mild DWI  hyperintensity. Generalized parenchymal volume loss with commensurate dilation of the sulci and  ventricular system. Scattered periventricular and deep white matter T2/FLAIR  hyperintensities, consistent with mild chronic microvascular ischemic disease. Small area of chronic hemorrhage in the right occipital lobe, and tiny chronic  microhemorrhage in the posterior limb of the right internal capsule. There is no  acute hemorrhage, extra-axial fluid collection, or mass effect. There is no  cerebellar tonsillar herniation. Expected arterial flow-voids are present. The paranasal sinuses, mastoid air cells, and middle ears are clear. The orbital  contents are within normal limits. No significant osseous or scalp lesions are  identified. Degenerative disc disease and facet arthropathy noted in the upper  cervical spine. Impression  1. Subacute infarct in the left maninder. 2. Generalized parenchymal volume loss and mild chronic microvascular ischemic  disease. 3. Small chronic hemorrhage in the right occipital lobe. Results from [place] encounter on 01/07/23    CT CODE NEURO HEAD WO CONTRAST    Narrative  EXAM: CT CODE NEURO HEAD WO CONTRAST    INDICATION: increasing right extremity weakness. Left pontine infarct on recent  MRI. COMPARISON: MRI brain on 1/7/2023 at 1045 hours. CONTRAST: None. TECHNIQUE: Unenhanced CT of the head was performed using 5 mm images. Brain and  bone windows were generated. Coronal and sagittal reformats.  CT dose reduction  was achieved through use of a standardized protocol tailored for this  examination and automatic exposure control for dose modulation. FINDINGS:  The ventricles and sulci are normal in size, shape and configuration. Minimal  chronic microvascular ischemic disease in the cerebral white matter is better  appreciated on the comparison MRI. There is no intracranial hemorrhage,  extra-axial collection, or mass effect. The basilar cisterns are open. Left  pontine infarct is subtle and better appreciated on the comparison MRI. The bone windows demonstrate no abnormalities. The visualized portions of the  paranasal sinuses and mastoid air cells are clear. Impression  Subacute left pontine infarct. No intracranial hemorrhage. See recent MRI brain  report. I did independently review the brain MRI from January 7, 2023. He has a subacute stroke in the left maninder. There is atrophy and chronic microvascular ischemic disease. There is a small area of chronic hemorrhage in the right occipital lobe and posterior limb of the internal capsule. Complex decision making was necessary due to the patient's current medical condition and other medical co-morbidities.          Active Problems:    CVA (cerebral vascular accident) ([facility] [address] (1/7/2023)                   Signed By:  Arelis Lowry DO FAAN    January 8, 2023

## 2023-01-08 NOTE — PROGRESS NOTES
18:25 Notified Dr. Bebo Vela of change in NIH score, and that pt c/o worsening right sided weakness. Per Dr. Bebo Vela, code Stroke called. 20:07  Bedside shift change report given to [address] [place] [address] (oncoming nurse) by Cristian Vu (offgoing nurse). Report included the following information SBAR, Kardex, MAR, Recent Results, and Cardiac Rhythm NSR . Pt returned to room after code Stroke work up and bedside report given to [address] [place] [address]. Pt awake and alert with no acute distress, no new complaints.

## 2023-01-08 NOTE — PROGRESS NOTES
Problem: Mobility Impaired (Adult and Pediatric)  Goal: *Acute Goals and Plan of Care (Insert Text)  Description: FUNCTIONAL STATUS PRIOR TO ADMISSION: Independent w/ household ambulation, use of SPC in the community. Denies history of falls. HOME SUPPORT PRIOR TO ADMISSION: The patient lived alone however states her grandson plans to move in with her. Physical Therapy Goals  Initiated 1/8/2023  1. Patient will move from supine to sit and sit to supine , scoot up and down, and roll side to side in bed with supervision/set-up within 7 day(s). 2.  Patient will transfer from bed to chair and chair to bed with minimal assistance using the least restrictive device within 7 day(s). 3.  Patient will perform sit to stand with minimal assistance assist within 7 day(s). 4.  Patient will ambulate with minimal assistance assist for 50 feet with the least restrictive device within 7 day(s). 6.  Patient will improve Augustin Balance score by 7 points within 7 days. Outcome: Progressing Towards Goal   PHYSICAL THERAPY EVALUATION- NEURO POPULATION  Patient: Kyle Tomlinson (4 y.o. female)  Date: 1/8/2023  Primary Diagnosis: CVA (cerebral vascular accident) [facility]) [I63.9]       Precautions:   Fall      ASSESSMENT  Based on the objective data described below, the patient presents with R sided weakness (RUE >RLE), mild R sided inattention, impaired sitting and standing balance, altered gait pattern, impaired activity tolerance, and overall impaired functional mobility. Pt required modAx2 as well as facilitation of forward and midline weight shift secondary to strong R posterolateral lean. Immediate hyperextension/buckling of R LE noted during weightbearing and weightshift attempts. Pt able to sidestep along EOB w/ HHA and maxA x2 however fatigued quickly and eventually required maxAx2 via standpivot transfer to reach bedside chair. Max verbal cues to attend to R UE throughout mobility.  Pt is functioning well below her baseline independent level and most appropriate for intensive rehab at discharge. Current Level of Function Impacting Discharge (mobility/balance): modAx2 sit<>stand, maxAx2 during brief ambulation/stand pivot transfer to bedside chair    Functional Outcome Measure: The patient scored Total: 1/56 on the [address] which is indicative of high fall risk. Other factors to consider for discharge: independent prior, large falls risk, caregiver burden     Patient will benefit from skilled therapy intervention to address the above noted impairments. PLAN :  Recommendations and Planned Interventions: bed mobility training, transfer training, gait training, therapeutic exercises, patient and family training/education, and therapeutic activities      Frequency/Duration: Patient will be followed by physical therapy:  5 times a week to address goals. Recommendation for discharge: (in order for the patient to meet his/her long term goals)  Therapy 3 hours per day 5-7 days per week    This discharge recommendation:  Has been made in collaboration with the attending provider and/or case management    IF patient discharges home will need the following DME: to be determined (TBD)         SUBJECTIVE:   Patient stated It feels good to be in the chair.     OBJECTIVE DATA SUMMARY:   HISTORY:    Past Medical History:   Diagnosis Date    Chronic edema     Diabetes ([address]     Family history of ischemic heart disease 4/12/2012    Hypercholesterolemia     Hypertension     PVC's (premature ventricular contractions) 10/10/2021     Past Surgical History:   Procedure Laterality Date    ENDOSCOPY, COLON, DIAGNOSTIC  12/2006    Dr. Elma Hart BREAST BIOPSY Bilateral 1997    benign    HX [address] [facility]MENT[facility]  09/03/2019    abscess removed from abdominal wall       Personal factors and/or comorbidities impacting plan of care:     Home Situation  Home Environment: Private residence  # Steps to Enter: 5  One/Two Story Residence: Two story, live on 1st floor  Living Alone: Yes  Support Systems: Other Family Member(s) (grandson, granddaughter)  Patient Expects to be Discharged to[de-identified] Home  Current DME Used/Available at Home: Cane, straight    EXAMINATION/PRESENTATION/DECISION MAKING:   Critical Behavior:  Neurologic State: Alert  Orientation Level: Oriented X4  Cognition: Decreased command following, Appropriate for age attention/concentration  Safety/Judgement: Lack of insight into deficits, Decreased awareness of need for assistance, Decreased awareness of need for safety, Fall prevention, Home safety  Hearing: Auditory  Auditory Impairment: Hard of hearing, right side  Skin:  intact  Edema: none noted  Range Of Motion:  AROM: Grossly decreased, non-functional           PROM: Generally decreased, functional (excluding right hand)           Strength:    Strength: Grossly decreased, non-functional                    Tone & Sensation:   Tone: Abnormal              Sensation: Impaired (right UE and LE)               Coordination:  Coordination: Grossly decreased, non-functional  Vision:   Corrective Lenses: Glasses  Functional Mobility:  Bed Mobility:     Supine to Sit: Maximum assistance;Assist x2; Additional time;Bed Modified     Scooting: Maximum assistance;Assist x2  Transfers:  Sit to Stand: Moderate assistance;Assist x2  Stand to Sit: Maximum assistance;Assist x2        Bed to Chair: Assist x2; Moderate assistance              Balance:   Sitting: Impaired  Sitting - Static: Poor (constant support); Fair (occasional)  Sitting - Dynamic: Poor (constant support)  Standing: Impaired; With support  Standing - Static: Poor;Constant support  Standing - Dynamic : Poor;Constant support  Ambulation/Gait Training:  Distance (ft): 2 Feet (ft)  Assistive Device: Gait belt  Ambulation - Level of Assistance:  Moderate assistance;Maximum assistance;Assist x2        Gait Abnormalities: Decreased step clearance;Shuffling gait;Trunk sway increased; Foot drop        Base of Support: Widened     Speed/Concepcion: Pace decreased (<100 feet/min); Shuffled  Step Length: Left shortened;Right shortened               Functional Measure  Augustin Balance Test:    Sitting to Standin  Standing Unsupported: 0  Sitting with Back Unsupported: 1  Standing to Sittin  Transfers: 0  Standing Unsupported with Eyes Closed: 0  Standing Unsupported with Feet Together: 0  Reach Forward with Outstretched Arm: 0   Object: 0  Turn to Look Over Shoulders: 0  Turn 360 Degrees: 0  Alternate Foot on Step/Stool: 0  Standing Unsupported One Foot in Front: 0  Stand on One Le  Total:          56=Maximum possible score;   0-20=High fall risk  21-40=Moderate fall risk   41-56=Low fall risk        Physical Therapy Evaluation Charge Determination   History Examination Presentation Decision-Making   MEDIUM  Complexity : 1-2 comorbidities / personal factors will impact the outcome/ POC  MEDIUM Complexity : 3 Standardized tests and measures addressing body structure, function, activity limitation and / or participation in recreation  MEDIUM Complexity : Evolving with changing characteristics  MEDIUM Complexity : FOTO score of 26-74      Based on the above components, the patient evaluation is determined to be of the following complexity level: MEDIUM    Pain Rating:  Did not report pain    Activity Tolerance:   Pt fatigues quickly      After treatment patient left in no apparent distress:   Sitting in chair, Call bell within reach, Bed / chair alarm activated, and Caregiver / family present    COMMUNICATION/EDUCATION:   The patients plan of care was discussed with: Occupational therapist and Registered nurse. Patient was educated regarding her deficit(s) of R sided weakness, impaired balance as this relates to her diagnosis of CVA. She demonstrated Good understanding as evidenced by verbalization.     Patient and/or family was verbally educated on the BE FAST acronym for signs/symptoms of CVA and TIA. BE FAST was written on patient's communication board  for visual education and reinforcement. All questions answered with patient indicating good understanding. Fall prevention education was provided and the patient/caregiver indicated understanding., Patient/family have participated as able in goal setting and plan of care. , and Patient/family agree to work toward stated goals and plan of care.     Thank you for this referral.  Monique Yu, PT, DPT   Time Calculation: 21 mins

## 2023-01-08 NOTE — PROGRESS NOTES
Hospitalist Progress Note    NAME:  Jenniffer Collins   :  1935   MRN:  [id number]     Assessment / Plan:  Subacute CVA  Chronic hemorrhage in the R occipital lobe  Seen on MRI. CT imaging and CTA was deferred on admission due to no significant clinical concern for LVO and given the unclear time course. - Neurology consulted  TSH 1.5  A1c 6.8    Atorva 40  Plavix per Neurology, has ASA allergy  Check carotid duplex  Check limited echo  PT/OT rec IPR, case management consulted     T2DM  Cont' SSI  Hold glyburide     HTN  Resume BB     Code Status: Full  DVT Prophylaxis: scd   GI Prophylaxis: not indicated  Baseline: independent   Contact:   Lorena Deng (Daughter)   [phone] (Mobile)    Recommended Disposition: IPR  Estimated discharge date: 23  Barriers: limited TTE, carotid duplex, placement     Subjective:     Chief Complaint  Followup CVA      Subjective  Discussed with RN events overnight. Stroke alert called at shift change for worsening of RUE weakness. CT done with no new hemorrhage, showed known infarct. No other events. Patient notes persistent R arm weakness today. Slurred speech noted but near baseline per patient. Review of Systems:  14 point ROS reviewed with patient and negative except per above. Objective:     VITALS:   Last 24hrs VS reviewed since prior progress note.  Most recent are:  Patient Vitals for the past 24 hrs:   Temp Pulse Resp BP SpO2   23 1200 -- 77 -- -- --   23 1123 97.3 °F (36.3 °C) 71 18 115/69 99 %   23 0800 -- 80 -- -- --   23 0747 97.5 °F (36.4 °C) 66 16 134/65 99 %   23 0347 97.7 °F (36.5 °C) 68 17 (!) 111/57 99 %   23 2326 97.3 °F (36.3 °C) 66 18 136/65 98 %   23 2004 97.3 °F (36.3 °C) 66 16 121/75 96 %   23 1832 98.1 °F (36.7 °C) 69 -- (!) 145/64 100 %   23 1659 98.1 °F (36.7 °C) 69 16 (!) 111/50 100 %   23 1453 -- 69 17 121/65 99 %   23 1423 -- 78 13 126/67 99 %     No intake or output data in the 24 hours ending 01/08/23 1336     I had a face to face encounter and independently examined this patient on 1/8/2023, as outlined below:    PHYSICAL EXAM:  General:   No acute distress, Answering questions appropriately  HEENT:  EOMI, Anicteric sclera. MMM  Neck:   Supple  Resp:    CTAB, non-labored, No wheezes or crackles  Cardio:  regular rate, normal rhythm, no murmurs, no JVD  GI:    Soft, Non-tender, Non-distended, Normal bowel sounds. Extremities:  Warm, well perfused, no LE edema  Skin:    Warm, Dry, Pink, Intact. Neurologic:   Alert and Oriented x4, RUE/RLE weakness noted, mildly slurred speech, No other focal defects    Reviewed most current lab test results and cultures  YES  Reviewed most current radiology test results   YES  Review and summation of old records today    NO  Reviewed patient's current orders and MAR    YES  PMH/SH reviewed - no change compared to H&P  ______________________________________________________________________    Procedures: see electronic medical records for all procedures/Xrays and details which were not copied into this note but were reviewed prior to creation of Plan. LABS:  I reviewed today's most current labs and imaging studies. Pertinent labs include:  Recent Labs     01/07/23  0650   WBC 9.1   HGB 12.2   HCT 36.5        Recent Labs     01/08/23  0057 01/07/23  0650    140   K 4.3 4.3    106   CO2 28 27   * 195*   BUN 27* 28*   CREA 1.19* 1.21*   CA 9.3 9.0   ALB  --  3.3*   TBILI  --  0.4   ALT  --  19       Imaging/Diagnostics:  CT CODE NEURO HEAD WO CONTRAST  Narrative: EXAM: CT CODE NEURO HEAD WO CONTRAST    INDICATION: increasing right extremity weakness. Left pontine infarct on recent  MRI. COMPARISON: MRI brain on 1/7/2023 at 1045 hours. CONTRAST: None. TECHNIQUE: Unenhanced CT of the head was performed using 5 mm images. Brain and  bone windows were generated. Coronal and sagittal reformats.  CT dose reduction  was achieved through use of a standardized protocol tailored for this  examination and automatic exposure control for dose modulation. FINDINGS:  The ventricles and sulci are normal in size, shape and configuration. Minimal  chronic microvascular ischemic disease in the cerebral white matter is better  appreciated on the comparison MRI. There is no intracranial hemorrhage,  extra-axial collection, or mass effect. The basilar cisterns are open. Left  pontine infarct is subtle and better appreciated on the comparison MRI. The bone windows demonstrate no abnormalities. The visualized portions of the  paranasal sinuses and mastoid air cells are clear. Impression: Subacute left pontine infarct. No intracranial hemorrhage. See recent MRI brain  report. MRI BRAIN WO CONT  Narrative: EXAM: MRI BRAIN WO CONT    INDICATION: new onset weakness since yesterday, right hand dysmetria, mild  dysarthria, concern for possible sub-acute stroke    COMPARISON: None. CONTRAST: None. TECHNIQUE:    Multiplanar multisequence acquisition without contrast of the brain. FINDINGS:  There is a subacute infarct in the left maninder (series 2 image 12) with mild DWI  hyperintensity. Generalized parenchymal volume loss with commensurate dilation of the sulci and  ventricular system. Scattered periventricular and deep white matter T2/FLAIR  hyperintensities, consistent with mild chronic microvascular ischemic disease. Small area of chronic hemorrhage in the right occipital lobe, and tiny chronic  microhemorrhage in the posterior limb of the right internal capsule. There is no  acute hemorrhage, extra-axial fluid collection, or mass effect. There is no  cerebellar tonsillar herniation. Expected arterial flow-voids are present. The paranasal sinuses, mastoid air cells, and middle ears are clear. The orbital  contents are within normal limits. No significant osseous or scalp lesions are  identified.  Degenerative disc disease and facet arthropathy noted in the upper  cervical spine. Impression: 1. Subacute infarct in the left maninder. 2. Generalized parenchymal volume loss and mild chronic microvascular ischemic  disease. 3. Small chronic hemorrhage in the right occipital lobe. XR CHEST SNGL V  Narrative: EXAM: XR CHEST SNGL V    INDICATION: generalized weakness    COMPARISON: 4/26/2017    FINDINGS: A portable AP radiograph of the chest was obtained at 0953 hours. Mild  to moderate elevation of left hemidiaphragm is again shown. Lungs are clear. There is no pneumothorax or pleural effusion. Cardiac size is within normal  limits. Atherosclerotic vascular tortuosity appears unchanged. Mediastinal and  hilar contours otherwise appear normal.   Impression: No acute findings. Care Plan discussed with:    Comments   Patient y    Family      RN y    Care Manager     Consultant  y                      Multidiciplinary team rounds were held today with , nursing, pharmacist and clinical coordinator. Patient's plan of care was discussed; medications were reviewed and discharge planning was addressed.      _    Signed: Leonidas Estevez MD

## 2023-01-09 ENCOUNTER — APPOINTMENT (OUTPATIENT)
Dept: NON INVASIVE DIAGNOSTICS | Age: 88
DRG: 065 | End: 2023-01-09
Attending: INTERNAL MEDICINE
Payer: MEDICARE

## 2023-01-09 ENCOUNTER — APPOINTMENT (OUTPATIENT)
Dept: VASCULAR SURGERY | Age: 88
DRG: 065 | End: 2023-01-09
Attending: INTERNAL MEDICINE
Payer: MEDICARE

## 2023-01-09 LAB
ANION GAP SERPL CALC-SCNC: 6 MMOL/L (ref 5–15)
BASOPHILS # BLD: 0.1 K/UL (ref 0–0.1)
BASOPHILS NFR BLD: 1 % (ref 0–1)
BUN SERPL-MCNC: 28 MG/DL (ref 6–20)
BUN/CREAT SERPL: 20 (ref 12–20)
CALCIUM SERPL-MCNC: 9.3 MG/DL (ref 8.5–10.1)
CHLORIDE SERPL-SCNC: 108 MMOL/L (ref 97–108)
CO2 SERPL-SCNC: 27 MMOL/L (ref 21–32)
CREAT SERPL-MCNC: 1.41 MG/DL (ref 0.55–1.02)
DIFFERENTIAL METHOD BLD: NORMAL
ECHO AO ASC DIAM: 2.6 CM
ECHO AO ASCENDING AORTA INDEX: 1.46 CM/M2
ECHO AO ROOT DIAM: 2.7 CM
ECHO AO ROOT INDEX: 1.52 CM/M2
ECHO AV AREA PEAK VELOCITY: 1.4 CM2
ECHO AV AREA VTI: 1.8 CM2
ECHO AV AREA/BSA PEAK VELOCITY: 0.8 CM2/M2
ECHO AV AREA/BSA VTI: 1 CM2/M2
ECHO AV MEAN GRADIENT: 3 MMHG
ECHO AV MEAN VELOCITY: 0.8 M/S
ECHO AV PEAK GRADIENT: 6 MMHG
ECHO AV PEAK VELOCITY: 1.2 M/S
ECHO AV VELOCITY RATIO: 0.58
ECHO AV VTI: 24.8 CM
ECHO LA DIAMETER INDEX: 1.35 CM/M2
ECHO LA DIAMETER: 2.4 CM
ECHO LA TO AORTIC ROOT RATIO: 0.89
ECHO LVOT AREA: 2.8 CM2
ECHO LVOT AV VTI INDEX: 0.65
ECHO LVOT DIAM: 1.9 CM
ECHO LVOT MEAN GRADIENT: 1 MMHG
ECHO LVOT PEAK GRADIENT: 2 MMHG
ECHO LVOT PEAK VELOCITY: 0.7 M/S
ECHO LVOT STROKE VOLUME INDEX: 25.5 ML/M2
ECHO LVOT SV: 45.3 ML
ECHO LVOT VTI: 16 CM
ECHO MV A VELOCITY: 1.44 M/S
ECHO MV E DECELERATION TIME (DT): 274.8 MS
ECHO MV E VELOCITY: 0.81 M/S
ECHO MV E/A RATIO: 0.56
ECHO PV MAX VELOCITY: 0.7 M/S
ECHO PV PEAK GRADIENT: 2 MMHG
ECHO TV REGURGITANT MAX VELOCITY: 2.51 M/S
ECHO TV REGURGITANT PEAK GRADIENT: 26 MMHG
EOSINOPHIL # BLD: 0.2 K/UL (ref 0–0.4)
EOSINOPHIL NFR BLD: 3 % (ref 0–7)
ERYTHROCYTE [DISTWIDTH] IN BLOOD BY AUTOMATED COUNT: 14 % (ref 11.5–14.5)
GLUCOSE BLD STRIP.AUTO-MCNC: 154 MG/DL (ref 65–117)
GLUCOSE BLD STRIP.AUTO-MCNC: 159 MG/DL (ref 65–117)
GLUCOSE BLD STRIP.AUTO-MCNC: 202 MG/DL (ref 65–117)
GLUCOSE BLD STRIP.AUTO-MCNC: 227 MG/DL (ref 65–117)
GLUCOSE SERPL-MCNC: 133 MG/DL (ref 65–100)
HCT VFR BLD AUTO: 37.8 % (ref 35–47)
HGB BLD-MCNC: 12.3 G/DL (ref 11.5–16)
IMM GRANULOCYTES # BLD AUTO: 0 K/UL (ref 0–0.04)
IMM GRANULOCYTES NFR BLD AUTO: 0 % (ref 0–0.5)
LEFT CCA DIST DIAS: 14.2 CM/S
LEFT CCA DIST SYS: 63.8 CM/S
LEFT CCA PROX DIAS: 11.6 CM/S
LEFT CCA PROX SYS: 83.3 CM/S
LEFT ECA DIAS: 0 CM/S
LEFT ECA SYS: 40.3 CM/S
LEFT ICA DIST DIAS: 19.4 CM/S
LEFT ICA DIST SYS: 65.1 CM/S
LEFT ICA MID DIAS: 9 CM/S
LEFT ICA MID SYS: 33.8 CM/S
LEFT ICA PROX DIAS: 11.6 CM/S
LEFT ICA PROX SYS: 46.8 CM/S
LEFT ICA/CCA SYS: 1 NO UNITS
LEFT VERTEBRAL DIAS: 9 CM/S
LEFT VERTEBRAL SYS: 39 CM/S
LYMPHOCYTES # BLD: 2 K/UL (ref 0.8–3.5)
LYMPHOCYTES NFR BLD: 22 % (ref 12–49)
MAGNESIUM SERPL-MCNC: 2.1 MG/DL (ref 1.6–2.4)
MCH RBC QN AUTO: 26.8 PG (ref 26–34)
MCHC RBC AUTO-ENTMCNC: 32.5 G/DL (ref 30–36.5)
MCV RBC AUTO: 82.4 FL (ref 80–99)
MONOCYTES # BLD: 0.5 K/UL (ref 0–1)
MONOCYTES NFR BLD: 6 % (ref 5–13)
NEUTS SEG # BLD: 6.1 K/UL (ref 1.8–8)
NEUTS SEG NFR BLD: 68 % (ref 32–75)
NRBC # BLD: 0 K/UL (ref 0–0.01)
NRBC BLD-RTO: 0 PER 100 WBC
PHOSPHATE SERPL-MCNC: 3.2 MG/DL (ref 2.6–4.7)
PLATELET # BLD AUTO: 263 K/UL (ref 150–400)
PMV BLD AUTO: 10.2 FL (ref 8.9–12.9)
POTASSIUM SERPL-SCNC: 4.4 MMOL/L (ref 3.5–5.1)
RBC # BLD AUTO: 4.59 M/UL (ref 3.8–5.2)
RIGHT CCA DIST DIAS: 11.6 CM/S
RIGHT CCA DIST SYS: 54.6 CM/S
RIGHT CCA PROX DIAS: 11.6 CM/S
RIGHT CCA PROX SYS: 65.1 CM/S
RIGHT ECA DIAS: 0 CM/S
RIGHT ECA SYS: 48.1 CM/S
RIGHT ICA DIST DIAS: 19.2 CM/S
RIGHT ICA DIST SYS: 73.8 CM/S
RIGHT ICA MID DIAS: 18.1 CM/S
RIGHT ICA MID SYS: 57.3 CM/S
RIGHT ICA PROX DIAS: 11.6 CM/S
RIGHT ICA PROX SYS: 39 CM/S
RIGHT ICA/CCA SYS: 1.4 NO UNITS
RIGHT VERTEBRAL DIAS: 11.6 CM/S
RIGHT VERTEBRAL SYS: 41.6 CM/S
SERVICE CMNT-IMP: ABNORMAL
SODIUM SERPL-SCNC: 141 MMOL/L (ref 136–145)
VAS LEFT SUBCLAVIAN PROX EDV: 0 CM/S
VAS LEFT SUBCLAVIAN PROX PSV: 95.1 CM/S
VAS RIGHT SUBCLAVIAN PROX EDV: 0 CM/S
VAS RIGHT SUBCLAVIAN PROX PSV: 67.7 CM/S
WBC # BLD AUTO: 9 K/UL (ref 3.6–11)

## 2023-01-09 PROCEDURE — 77030038269 HC DRN EXT URIN PURWCK BARD -A

## 2023-01-09 PROCEDURE — 83735 ASSAY OF MAGNESIUM: CPT

## 2023-01-09 PROCEDURE — 74011636637 HC RX REV CODE- 636/637: Performed by: INTERNAL MEDICINE

## 2023-01-09 PROCEDURE — 65270000046 HC RM TELEMETRY

## 2023-01-09 PROCEDURE — 74011000250 HC RX REV CODE- 250: Performed by: INTERNAL MEDICINE

## 2023-01-09 PROCEDURE — 92523 SPEECH SOUND LANG COMPREHEN: CPT

## 2023-01-09 PROCEDURE — 85025 COMPLETE CBC W/AUTO DIFF WBC: CPT

## 2023-01-09 PROCEDURE — 93880 EXTRACRANIAL BILAT STUDY: CPT | Performed by: PSYCHIATRY & NEUROLOGY

## 2023-01-09 PROCEDURE — 82962 GLUCOSE BLOOD TEST: CPT

## 2023-01-09 PROCEDURE — 92610 EVALUATE SWALLOWING FUNCTION: CPT

## 2023-01-09 PROCEDURE — 74011250637 HC RX REV CODE- 250/637: Performed by: STUDENT IN AN ORGANIZED HEALTH CARE EDUCATION/TRAINING PROGRAM

## 2023-01-09 PROCEDURE — 74011250637 HC RX REV CODE- 250/637: Performed by: INTERNAL MEDICINE

## 2023-01-09 PROCEDURE — 84100 ASSAY OF PHOSPHORUS: CPT

## 2023-01-09 PROCEDURE — 93308 TTE F-UP OR LMTD: CPT

## 2023-01-09 PROCEDURE — 80048 BASIC METABOLIC PNL TOTAL CA: CPT

## 2023-01-09 PROCEDURE — 93880 EXTRACRANIAL BILAT STUDY: CPT

## 2023-01-09 PROCEDURE — 36415 COLL VENOUS BLD VENIPUNCTURE: CPT

## 2023-01-09 PROCEDURE — 99232 SBSQ HOSP IP/OBS MODERATE 35: CPT | Performed by: PSYCHIATRY & NEUROLOGY

## 2023-01-09 RX ORDER — CLOPIDOGREL BISULFATE 75 MG/1
75 TABLET ORAL DAILY
Qty: 30 TABLET | Refills: 0 | Status: SHIPPED | OUTPATIENT
Start: 2023-01-10 | End: 2023-01-17 | Stop reason: SDUPTHER

## 2023-01-09 RX ORDER — ATORVASTATIN CALCIUM 40 MG/1
40 TABLET, FILM COATED ORAL
Qty: 30 TABLET | Refills: 0 | Status: SHIPPED | OUTPATIENT
Start: 2023-01-09 | End: 2023-02-08

## 2023-01-09 RX ADMIN — CLOPIDOGREL BISULFATE 75 MG: 75 TABLET ORAL at 10:09

## 2023-01-09 RX ADMIN — ATORVASTATIN CALCIUM 40 MG: 40 TABLET, FILM COATED ORAL at 21:43

## 2023-01-09 RX ADMIN — LATANOPROST 1 DROP: 50 SOLUTION OPHTHALMIC at 21:51

## 2023-01-09 RX ADMIN — Medication 2 UNITS: at 12:53

## 2023-01-09 RX ADMIN — Medication 1 UNITS: at 23:12

## 2023-01-09 NOTE — PROGRESS NOTES
Transition of Care Plan:     RUR: 13%  Disposition: IPR - [address]:   Burdett Tony started on 1/9/23. - pending  4:12 PM   Referrals were sent to:   1) [facility]: Accepted. CM talked to [facility] : [phone] and they can accept. They started auth today, 1/9/23 - pending. CM updated family. 2) [facility] - not in network. If SNF or IPR: Date FOC offered: 1/8/23  Date [address] received: 1/8/23  Date authorization started with reference number: 1/9/23. CM requested auth reference number. Date authorization received and expires: TBD  Accepting facility[facility]    Follow up appointments: PCP & specialists as indicated  DME needed: TBD - has cane, shower seat  Transportation at Discharge: Medicaid BLS  [place] or means to access home: N/a       IM Medicare Letter: 2nd IM letter needed at d/c  Is patient a [place] and connected with the [place]? N/a              If yes, was [employer] transfer form completed and VA notified? N/a  Caregiver Contact: [facility]delrahman Chanel [facility] ([phone])  Discharge Caregiver contacted prior to discharge? yes  Care Conference needed?:  No       CM will continue to monitor discharge plan.      Jp Vela, Neuro CM  Ext [phone]

## 2023-01-09 NOTE — PROGRESS NOTES
Bedside shift change report given to Levi Plaza  (oncoming nurse) by Massiel Cannon RN (offgoing nurse). Report included the following information SBAR, Kardex, Intake/Output, and MAR     Shift worked:  3-7p   Shift summary and any significant changes:       none      Concerns for physician to address:     Zone phone for oncoming shift:         Patient Information  Sergio Schaefer  81 y.o.  1/7/2023  7:17 AM by Shannan Gaucher, MD. Sergio Riding was admitted from Home     Problem List          Patient Active Problem List     Diagnosis Date Noted    CVA (cerebral vascular accident) ([address] 01/07/2023    PVC's (premature ventricular contractions) 10/10/2021    Abscess of abdominal wall 09/03/2019    Type 2 diabetes with nephropathy ([address] 04/17/2018    Statin intolerance 05/06/2016    Essential hypertension 01/06/2016    Encounter for medication monitoring 01/06/2016    Diabetes mellitus type 2, controlled ([address] 10/16/2015    Generalized OA 05/28/2014    Nonrheumatic tricuspid valve disorder 04/12/2012    Family history of ischemic heart disease 04/12/2012    Mixed hyperlipidemia 04/12/2012    Mitral valve disease 04/12/2012    Obesity, unspecified 04/12/2012    Osteoarthritis 05/04/2010    Hypovitaminosis D 05/04/2010              Past Medical History:   Diagnosis Date    Chronic edema      Diabetes ([address]      Family history of ischemic heart disease 4/12/2012    Hypercholesterolemia      Hypertension      PVC's (premature ventricular contractions) 10/10/2021         Core Measures:  CVA: Yes Yes        Activity:  Activity Level:  Up with Assistance        Cardiac:   Cardiac Monitoring: Yes      Cardiac Rhythm: Sinus Rhythm     Access:   Current line(s): PIV         Genitourinary:   Urinary status: voiding        Respiratory:   O2 Device: None (Room air)        GI:  Last Bowel Movement Date: 01/07/23  Current diet:  ADULT DIET Regular        Pain Management:   Patient states pain is manageable on current regimen: N/A     Skin:  Lopez Score: 17  Interventions: increase time out of bed, PT/OT consult, and nutritional support     Patient Safety:  Fall Score:  Total Score: 3  Interventions: bed/chair alarm, gripper socks, pt to call before getting OOB, and stay with me (per policy)  High Fall Risk: Yes  @Rollbelt  @dexterity to release roll belt  Yes/No ( must document dexterity  here by stating Yes or No here, otherwise this is a restraint and must follow restraint documentation policy.)     DVT prophylaxis:  DVT prophylaxis Med- Not applicable  DVT prophylaxis SCD or DENNYS- Yes      Wounds: (If Applicable)  Wounds- No  Location      Active Consults:  IP CONSULT TO HOSPITALIST  IP CONSULT TO NEUROLOGY     Length of Stay:  Expected LOS: - - -  Actual LOS: 1  Discharge Plan:  Rehab when ready

## 2023-01-09 NOTE — PROGRESS NOTES
Comprehensive Nutrition Assessment    Type and Reason for Visit: Initial, Positive nutrition screen    Nutrition Recommendations/Plan:   Continue regular diet. Please document % meals and supplements consumed in flowsheet I/O's under intake. Malnutrition Assessment:  Malnutrition Status:  No malnutrition (01/09/23 1622)      Nutrition Assessment:    1/9: Chart reviewed; med noted for CVA. Hx of DM, HTN, PVC. Pt tolerating a regular diet. Weight trends are stable. MST trigger for unsure weight loss on admission. Documented weight trends are stable. Pt is medically stable and awaiting placement. No data found. Last Weight Metric  Weight Loss Metrics 1/7/2023 10/24/2022 6/22/2022 4/11/2022 2/25/2022 10/26/2021 10/11/2021   Today's Wt 170 lb 173 lb 174 lb 9.6 oz 174 lb 174 lb 9.6 oz 175 lb 12.8 oz 187 lb   BMI 31.09 kg/m2 31.64 kg/m2 31.93 kg/m2 31.83 kg/m2 32.46 kg/m2 32.68 kg/m2 34.2 kg/m2      Nutrition Related Findings:    BM: 1/8; Labs: reviewed; Meds: Lipitor, Plavix Wound Type: None    Current Nutrition Intake & Therapies:        ADULT DIET Regular; per pt preferences, no milk but CAN have milk products, no eggs ; doesn't like them ; no tea    Anthropometric Measures:  Height: 5' 2\" (157.5 cm)  Ideal Body Weight (IBW): 110 lbs (50 kg)     Current Body Wt:  77.1 kg (169 lb 15.6 oz), 154.5 % IBW. Current BMI (kg/m2): 31.1                          BMI Category: Obese class 1 (BMI 30.0-34. 9)    Estimated Daily Nutrient Needs:  Energy Requirements Based On: Formula  Weight Used for Energy Requirements: Current  Energy (kcal/day): 1500 (BMR x 1. 3AF)  Weight Used for Protein Requirements: Current  Protein (g/day): 77 (1.0 g/kg bw)  Method Used for Fluid Requirements: 1 ml/kcal  Fluid (ml/day): 1500 ml/day    Nutrition Diagnosis:   No nutrition diagnosis at this time     Nutrition Interventions:   Food and/or Nutrient Delivery: Continue current diet  Nutrition Education/Counseling: No recommendations at this time  Coordination of Nutrition Care: Continue to monitor while inpatient       Goals:     Goals: PO intake 50% or greater, by next RD assessment       Nutrition Monitoring and Evaluation:   Behavioral-Environmental Outcomes: None identified  Food/Nutrient Intake Outcomes: Food and nutrient intake  Physical Signs/Symptoms Outcomes: Biochemical data, Skin, Weight    Discharge Planning:    Continue current diet    Jeana Soto RD  Contact:

## 2023-01-09 NOTE — PROGRESS NOTES
End of Shift Note     Bedside shift change report given to Soto (oncoming nurse) by Jos Choi (offgoing nurse). Report included the following information SBAR, Kardex, Intake/Output, and MAR     Shift worked:  [id number]   Shift summary and any significant changes:      No acute changes. RUE and RLE weakness continued. Sensation is decreased on R side in comparison to L side. No s/s of distress at this time. No pain reported. VSS. Concerns for physician to address:  None   Zone phone for oncoming shift:         Patient Information  Navin Roberson  81 y.o.  1/7/2023  7:17 AM by Chelle Beckford MD. Navin Roberson was admitted from Home     Problem List       Patient Active Problem List     Diagnosis Date Noted    CVA (cerebral vascular accident) ([address] 01/07/2023    PVC's (premature ventricular contractions) 10/10/2021    Abscess of abdominal wall 09/03/2019    Type 2 diabetes with nephropathy ([address] 04/17/2018    Statin intolerance 05/06/2016    Essential hypertension 01/06/2016    Encounter for medication monitoring 01/06/2016    Diabetes mellitus type 2, controlled ([address] 10/16/2015    Generalized OA 05/28/2014    Nonrheumatic tricuspid valve disorder 04/12/2012    Family history of ischemic heart disease 04/12/2012    Mixed hyperlipidemia 04/12/2012    Mitral valve disease 04/12/2012    Obesity, unspecified 04/12/2012    Osteoarthritis 05/04/2010    Hypovitaminosis D 05/04/2010           Past Medical History:   Diagnosis Date    Chronic edema      Diabetes ([address]      Family history of ischemic heart disease 4/12/2012    Hypercholesterolemia      Hypertension      PVC's (premature ventricular contractions) 10/10/2021         Core Measures:  CVA: Yes Yes        Activity:  Activity Level:  Up with Assistance        Cardiac:   Cardiac Monitoring: Yes      Cardiac Rhythm: Sinus Rhythm     Access:   Current line(s): PIV         Genitourinary:   Urinary status: voiding        Respiratory:   O2 Device: None (Room air)        GI:  Last Bowel Movement Date: 01/07/23  Current diet:  ADULT DIET Regular        Pain Management:   Patient states pain is manageable on current regimen: N/A     Skin:  Lopez Score: 17  Interventions: increase time out of bed, PT/OT consult, and nutritional support     Patient Safety:  Fall Score:  Total Score: 3  Interventions: bed/chair alarm, gripper socks, pt to call before getting OOB, and stay with me (per policy)  High Fall Risk: Yes  @Rollbelt  @dexterity to release roll belt  Yes/No ( must document dexterity  here by stating Yes or No here, otherwise this is a restraint and must follow restraint documentation policy.)     DVT prophylaxis:  DVT prophylaxis Med- Not applicable  DVT prophylaxis SCD or DENNYS- Yes      Wounds: (If Applicable)  Wounds- No  Location      Active Consults:  IP CONSULT TO HOSPITALIST  IP CONSULT TO NEUROLOGY     Length of Stay:  Expected LOS: - - -  Actual LOS: 1  Discharge Plan:  Rehab when ready        Carlitos Ceballos RN

## 2023-01-09 NOTE — PROGRESS NOTES
Problem: Pressure Injury - Risk of  Goal: *Prevention of pressure injury  Description: Document Lopez Scale and appropriate interventions in the flowsheet.   Outcome: Progressing Towards Goal  Note: Pressure Injury Interventions:  Sensory Interventions: Assess changes in LOC    Moisture Interventions: Absorbent underpads, Minimize layers    Activity Interventions: Assess need for specialty bed, PT/OT evaluation    Mobility Interventions: Assess need for specialty bed, HOB 30 degrees or less    Nutrition Interventions: Document food/fluid/supplement intake    Friction and Shear Interventions: Minimize layers                Problem: Patient Education: Go to Patient Education Activity  Goal: Patient/Family Education  Outcome: Progressing Towards Goal     Problem: Patient Education: Go to Patient Education Activity  Goal: Patient/Family Education  Outcome: Progressing Towards Goal     Problem: TIA/CVA Stroke: 0-24 hours  Goal: Off Pathway (Use only if patient is Off Pathway)  Outcome: Progressing Towards Goal  Goal: Activity/Safety  Outcome: Progressing Towards Goal  Goal: Consults, if ordered  Outcome: Progressing Towards Goal  Goal: Diagnostic Test/Procedures  Outcome: Progressing Towards Goal  Goal: Nutrition/Diet  Outcome: Progressing Towards Goal  Goal: Discharge Planning  Outcome: Progressing Towards Goal  Goal: Medications  Outcome: Progressing Towards Goal  Goal: Respiratory  Outcome: Progressing Towards Goal  Goal: Treatments/Interventions/Procedures  Outcome: Progressing Towards Goal  Goal: Minimize risk of bleeding post-thrombolytic infusion  Outcome: Progressing Towards Goal  Goal: Monitor for complications post-thrombolytic infusion  Outcome: Progressing Towards Goal  Goal: Psychosocial  Outcome: Progressing Towards Goal  Goal: *Hemodynamically stable  Outcome: Progressing Towards Goal  Goal: *Neurologically stable  Description: Absence of additional neurological deficits    Outcome: Progressing Towards Goal  Goal: *Verbalizes anxiety and depression are reduced or absent  Outcome: Progressing Towards Goal  Goal: *Absence of Signs of Aspiration on Current Diet  Outcome: Progressing Towards Goal  Goal: *Absence of deep venous thrombosis signs and symptoms(Stroke Metric)  Outcome: Progressing Towards Goal  Goal: *Ability to perform ADLs and demonstrates progressive mobility and function  Outcome: Progressing Towards Goal  Goal: *Stroke education started(Stroke Metric)  Outcome: Progressing Towards Goal  Goal: *Dysphagia screen performed(Stroke Metric)  Outcome: Progressing Towards Goal  Goal: *Rehab consulted(Stroke Metric)  Outcome: Progressing Towards Goal     Problem: TIA/CVA Stroke: Day 2 Until Discharge  Goal: Off Pathway (Use only if patient is Off Pathway)  Outcome: Progressing Towards Goal  Goal: Activity/Safety  Outcome: Progressing Towards Goal  Goal: Diagnostic Test/Procedures  Outcome: Progressing Towards Goal  Goal: Nutrition/Diet  Outcome: Progressing Towards Goal  Goal: Discharge Planning  Outcome: Progressing Towards Goal  Goal: Medications  Outcome: Progressing Towards Goal  Goal: Respiratory  Outcome: Progressing Towards Goal  Goal: Treatments/Interventions/Procedures  Outcome: Progressing Towards Goal  Goal: Psychosocial  Outcome: Progressing Towards Goal  Goal: *Verbalizes anxiety and depression are reduced or absent  Outcome: Progressing Towards Goal  Goal: *Absence of aspiration  Outcome: Progressing Towards Goal  Goal: *Absence of deep venous thrombosis signs and symptoms(Stroke Metric)  Outcome: Progressing Towards Goal  Goal: *Optimal pain control at patient's stated goal  Outcome: Progressing Towards Goal  Goal: *Tolerating diet  Outcome: Progressing Towards Goal  Goal: *Ability to perform ADLs and demonstrates progressive mobility and function  Outcome: Progressing Towards Goal  Goal: *Stroke education continued(Stroke Metric)  Outcome: Progressing Towards Goal     Problem: Ischemic Stroke: Discharge Outcomes  Goal: *Verbalizes anxiety and depression are reduced or absent  Outcome: Progressing Towards Goal  Goal: *Verbalize understanding of risk factor modification(Stroke Metric)  Outcome: Progressing Towards Goal  Goal: *Hemodynamically stable  Outcome: Progressing Towards Goal  Goal: *Absence of aspiration pneumonia  Outcome: Progressing Towards Goal  Goal: *Aware of needed dietary changes  Outcome: Progressing Towards Goal  Goal: *Verbalize understanding of prescribed medications including anti-coagulants, anti-lipid, and/or anti-platelets(Stroke Metric)  Outcome: Progressing Towards Goal  Goal: *Tolerating diet  Outcome: Progressing Towards Goal  Goal: *Aware of follow-up diagnostics related to anticoagulants  Outcome: Progressing Towards Goal  Goal: *Ability to perform ADLs and demonstrates progressive mobility and function  Outcome: Progressing Towards Goal  Goal: *Absence of DVT(Stroke Metric)  Outcome: Progressing Towards Goal  Goal: *Absence of aspiration  Outcome: Progressing Towards Goal  Goal: *Optimal pain control at patient's stated goal  Outcome: Progressing Towards Goal  Goal: *Home safety concerns addressed  Outcome: Progressing Towards Goal  Goal: *Describes available resources and support systems  Outcome: Progressing Towards Goal  Goal: *Verbalizes understanding of activation of EMS(911) for stroke symptoms(Stroke Metric)  Outcome: Progressing Towards Goal  Goal: *Understands and describes signs and symptoms to report to providers(Stroke Metric)  Outcome: Progressing Towards Goal  Goal: *Neurolgocially stable (absence of additional neurological deficits)  Outcome: Progressing Towards Goal  Goal: *Verbalizes importance of follow-up with primary care physician(Stroke Metric)  Outcome: Progressing Towards Goal  Goal: *Smoking cessation discussed,if applicable(Stroke Metric)  Outcome: Progressing Towards Goal  Goal: *Depression screening completed(Stroke Metric)  Outcome: Progressing Towards Goal     Problem: Falls - Risk of  Goal: *Absence of Falls  Description: Document Jose Antonio Foote Fall Risk and appropriate interventions in the flowsheet. Outcome: Progressing Towards Goal     Problem: Patient Education: Go to Patient Education Activity  Goal: Patient/Family Education  Outcome: Progressing Towards Goal     Problem: Diabetes Self-Management  Goal: *Disease process and treatment process  Description: Define diabetes and identify own type of diabetes; list 3 options for treating diabetes. Outcome: Progressing Towards Goal  Goal: *Incorporating nutritional management into lifestyle  Description: Describe effect of type, amount and timing of food on blood glucose; list 3 methods for planning meals. Outcome: Progressing Towards Goal  Goal: *Incorporating physical activity into lifestyle  Description: State effect of exercise on blood glucose levels. Outcome: Progressing Towards Goal  Goal: *Developing strategies to promote health/change behavior  Description: Define the ABC's of diabetes; identify appropriate screenings, schedule and personal plan for screenings. Outcome: Progressing Towards Goal  Goal: *Using medications safely  Description: State effect of diabetes medications on diabetes; name diabetes medication taking, action and side effects. Outcome: Progressing Towards Goal  Goal: *Monitoring blood glucose, interpreting and using results  Description: Identify recommended blood glucose targets  and personal targets. Outcome: Progressing Towards Goal  Goal: *Prevention, detection, treatment of acute complications  Description: List symptoms of hyper- and hypoglycemia; describe how to treat low blood sugar and actions for lowering  high blood glucose level.   Outcome: Progressing Towards Goal  Goal: *Prevention, detection and treatment of chronic complications  Description: Define the natural course of diabetes and describe the relationship of blood glucose levels to long term complications of diabetes.   Outcome: Progressing Towards Goal  Goal: *Developing strategies to address psychosocial issues  Description: Describe feelings about living with diabetes; identify support needed and support network  Outcome: Progressing Towards Goal  Goal: *Insulin pump training  Outcome: Progressing Towards Goal  Goal: *Sick day guidelines  Outcome: Progressing Towards Goal  Goal: *Patient Specific Goal (EDIT GOAL, INSERT TEXT)  Outcome: Progressing Towards Goal

## 2023-01-09 NOTE — PROGRESS NOTES
Spiritual Care Assessment/Progress Note  [facility]      NAME: Dorian Engle      MRN: [id number]  AGE: 81 y.o.  SEX: female  [employer] Affiliation: Methodist   Language: English     1/9/2023     Total Time (in minutes): 12     Spiritual Assessment begun in [facility] through conversation with:         [x]Patient        [x] Family    [] Friend(s)        Reason for Consult: Advance medical directive consult     Spiritual beliefs: (Please include comment if needed)     [] Identifies with a kelsi tradition:         [] Supported by a kelsi community:            [] Claims no spiritual orientation:           [] Seeking spiritual identity:                [] Adheres to an individual form of spirituality:           [x] Not able to assess:                           Identified resources for coping:      [] Prayer                               [] Music                  [] Guided Imagery     [x] Family/friends                 [] Pet visits     [] Devotional reading                         [] Unknown     [] Other:                                               Interventions offered during this visit: (See comments for more details)    Patient Interventions: Advance medical directive consult     Family/Friend(s): Advance medical directive consult     Plan of Care:     [x] Support spiritual and/or cultural needs    [x] Support AMD and/or advance care planning process      [] Support grieving process   [] Coordinate Rites and/or Rituals    [] Coordination with community clergy   [] No spiritual needs identified at this time   [] Detailed Plan of Care below (See Comments)  [] Make referral to Music Therapy  [] Make referral to Pet Therapy     [] Make referral to Addiction services  [] Make referral to [facility]  [] Make referral to Spiritual Care Partner  [] No future visits requested        [x] Contact Spiritual Care for further referrals     Comments:  received an Advance Medical Directive (AMD) request from [employer] in the [facility] [address].  talked with Milady SANTILLAN ext [phone] in reference to the AMD process. She stated Ms. Jenny Sanchez is alert and can complete the Advance Medical Directive process. Ms. Jenny Sanchez was eating her breakfast and visiting with her daughter Aarti Golden.  explained the AMD to her  and left the forms for her to review. She requested the  come back after she finish eating her breakfast.  agreed to visit with her again.  services are available 24 hours a day as requested. Rev. AUREA Rodriguez  [address] [facility]   Paging Service [phone] ([phone])

## 2023-01-09 NOTE — PROGRESS NOTES
End of Shift Note     Bedside shift change report given to Yvette Hinds RN (oncoming nurse) by Eusebio Guevara RN (offgoing nurse). Report included the following information SBAR, Kardex, Intake/Output, and MAR     Shift worked:  4596-7141   Shift summary and any significant changes:      No acute changes. RUE and RLE weakness continued. Metoprolol held for low BP this am. No acute s/s of distress at this time. Concerns for physician to address:  None   Zone phone for oncoming shift:         Patient Information  Tuyet Martinez  81 y.o.  1/7/2023  7:17 AM by Jaki Maciel MD. Tuyet Martinez was admitted from Home     Problem List          Patient Active Problem List     Diagnosis Date Noted    CVA (cerebral vascular accident) ([address] 01/07/2023    PVC's (premature ventricular contractions) 10/10/2021    Abscess of abdominal wall 09/03/2019    Type 2 diabetes with nephropathy ([address] 04/17/2018    Statin intolerance 05/06/2016    Essential hypertension 01/06/2016    Encounter for medication monitoring 01/06/2016    Diabetes mellitus type 2, controlled ([address] 10/16/2015    Generalized OA 05/28/2014    Nonrheumatic tricuspid valve disorder 04/12/2012    Family history of ischemic heart disease 04/12/2012    Mixed hyperlipidemia 04/12/2012    Mitral valve disease 04/12/2012    Obesity, unspecified 04/12/2012    Osteoarthritis 05/04/2010    Hypovitaminosis D 05/04/2010              Past Medical History:   Diagnosis Date    Chronic edema      Diabetes ([address]      Family history of ischemic heart disease 4/12/2012    Hypercholesterolemia      Hypertension      PVC's (premature ventricular contractions) 10/10/2021         Core Measures:  CVA: Yes Yes        Activity:  Activity Level:  Up with Assistance        Cardiac:   Cardiac Monitoring: Yes      Cardiac Rhythm: Sinus Rhythm     Access:   Current line(s): PIV         Genitourinary:   Urinary status: voiding        Respiratory:   O2 Device: None (Room air)        GI:  Last Bowel Movement Date: 01/07/23  Current diet:  ADULT DIET Regular        Pain Management:   Patient states pain is manageable on current regimen: N/A     Skin:  Lopez Score: 17  Interventions: increase time out of bed, PT/OT consult, and nutritional support     Patient Safety:  Fall Score:  Total Score: 3  Interventions: bed/chair alarm, gripper socks, pt to call before getting OOB, and stay with me (per policy)  High Fall Risk: Yes  @Rollbelt  @dexterity to release roll belt  Yes/No ( must document dexterity  here by stating Yes or No here, otherwise this is a restraint and must follow restraint documentation policy.)     DVT prophylaxis:  DVT prophylaxis Med- Not applicable  DVT prophylaxis SCD or DENNYS- Yes      Wounds: (If Applicable)  Wounds- No  Location      Active Consults:  IP CONSULT TO HOSPITALIST  IP CONSULT TO NEUROLOGY     Length of Stay:  Expected LOS: - - -  Actual LOS: 2  Discharge Plan:  Rehab when ready        Mynor Bernstein RN

## 2023-01-09 NOTE — PROGRESS NOTES
End of Shift Note     Bedside shift change report given to Esme Ponce RN (oncoming nurse) by Isamar Colvin RN (offgoing nurse). Report included the following information SBAR, Kardex, Intake/Output, and MAR     Shift worked:  [id number]   Shift summary and any significant changes:      No acute changes. RUE and RLE weakness continued. Sensation is decreased on R side in comparison to L side. No s/s of distress at this time. No pain reported. VSS. Concerns for physician to address:  None   Zone phone for oncoming shift:         Patient Information  Tayler Casey  81 y.o.  1/7/2023  7:17 AM by Giana Martinez MD. Tayler Casey was admitted from Home     Problem List       Patient Active Problem List     Diagnosis Date Noted    CVA (cerebral vascular accident) ([address] 01/07/2023    PVC's (premature ventricular contractions) 10/10/2021    Abscess of abdominal wall 09/03/2019    Type 2 diabetes with nephropathy ([address] 04/17/2018    Statin intolerance 05/06/2016    Essential hypertension 01/06/2016    Encounter for medication monitoring 01/06/2016    Diabetes mellitus type 2, controlled ([address] 10/16/2015    Generalized OA 05/28/2014    Nonrheumatic tricuspid valve disorder 04/12/2012    Family history of ischemic heart disease 04/12/2012    Mixed hyperlipidemia 04/12/2012    Mitral valve disease 04/12/2012    Obesity, unspecified 04/12/2012    Osteoarthritis 05/04/2010    Hypovitaminosis D 05/04/2010           Past Medical History:   Diagnosis Date    Chronic edema      Diabetes ([address]      Family history of ischemic heart disease 4/12/2012    Hypercholesterolemia      Hypertension      PVC's (premature ventricular contractions) 10/10/2021         Core Measures:  CVA: Yes Yes        Activity:  Activity Level:  Up with Assistance        Cardiac:   Cardiac Monitoring: Yes      Cardiac Rhythm: Sinus Rhythm     Access:   Current line(s): PIV         Genitourinary:   Urinary status: voiding        Respiratory:   O2 Device: None (Room air)        GI:  Last Bowel Movement Date: 01/07/23  Current diet:  ADULT DIET Regular        Pain Management:   Patient states pain is manageable on current regimen: N/A     Skin:  Lopez Score: 17  Interventions: increase time out of bed, PT/OT consult, and nutritional support     Patient Safety:  Fall Score:  Total Score: 3  Interventions: bed/chair alarm, gripper socks, pt to call before getting OOB, and stay with me (per policy)  High Fall Risk: Yes  @Rollbelt  @dexterity to release roll belt  Yes/No ( must document dexterity  here by stating Yes or No here, otherwise this is a restraint and must follow restraint documentation policy.)     DVT prophylaxis:  DVT prophylaxis Med- Not applicable  DVT prophylaxis SCD or DENNYS- Yes      Wounds: (If Applicable)  Wounds- No  Location      Active Consults:  IP CONSULT TO HOSPITALIST  IP CONSULT TO NEUROLOGY     Length of Stay:  Expected LOS: - - -  Actual LOS: 1  Discharge Plan:  Rehab when ready        Edda Haney RN

## 2023-01-09 NOTE — PROGRESS NOTES
Neurology Note    Patient ID:  Christianne Alexander  [id number]  65 y.o.  1935      Date of Consultation:  January 9, 2023        Assessment and Plan:    The patient is a pleasant 71-year-old female with acute onset of speech difficulties and right-sided weakness. Her brain MRI does reveal an acute left pontine stroke. Acute stroke:    Risk factors for stroke include hypertension, diabetes, dyslipidemia  Brain MRI does reveal evidence of an acute left pontine stroke and old chronic microhemorrhages. The patient does have an aspirin allergy. Continue Plavix 75 mg daily. This was previously discussed with the patient and her granddaughter at the bedside. Unclear if chronic microhemorrhages were related to prior hypertensive ischemic events. We will need to watch her neurological examination closely however does have multiple risk factors for ischemic stroke. diabetes. Updated hemoglobin A1c is 6.8. continue aggressive glycemic control   Dyslipidemia: . Patient needs intensive statin therapy  Hypertension: Aggressive blood pressure control with goal blood pressure less than 140/90  The patient is unable to obtain a CTA due to contrast allergy. carotid Doppler study with no significant stenosis  Echocardiogram completed this am. Results pending  The patient will need aggressive physical, occupational, and speech therapy. I provided stroke education  again today to the patient  in regards to risk factors for stroke and lifestyle modifications to help minimize the risk of future stroke. This included medication compliance, regular follow up with primary care physician,  and healthy lifestyle habits (nutrition/exercise)    There is no other additional neurology recommendations at this time. If questions arise, please do not hesitate to contact me and I will return to see the patient.   The patient should follow-up in clinic in approximately 4 -8 weeks time after discharge with one of the neurology providers           Subjective: I am still weak       History of Present Illness:   Brent Torres is a 81 y.o. female with a history of diabetes, dyslipidemia, hypertension who presented to the emergency department with generalized weakness and slurred speech. There also was blurry vision and nausea. Since admission, the patient did have her brain MRI which revealed a left pontine stroke. Overnight, there is no new numbness, tingling, or weakness. The patient states that she does feel that the weakness is slightly improved but still persisting. Past Medical History:   Diagnosis Date    Chronic edema     Diabetes ([address]     Family history of ischemic heart disease 4/12/2012    Hypercholesterolemia     Hypertension     PVC's (premature ventricular contractions) 10/10/2021        Past Surgical History:   Procedure Laterality Date    ENDOSCOPY, COLON, DIAGNOSTIC  12/2006    Dr. Paige Whittington BREAST BIOPSY Bilateral 1997    benign    HX [address] OMENTUM  09/03/2019    abscess removed from abdominal wall        Family History   Problem Relation Age of Onset    Stroke Mother     Hypertension Mother     Stroke Father     Hypertension Father     Hypertension Brother         Social History     Tobacco Use    Smoking status: Never    Smokeless tobacco: Never   Substance Use Topics    Alcohol use: No     Alcohol/week: 0.0 standard drinks        Allergies   Allergen Reactions    Latex Contact Dermatitis    Aspirin Palpitations    Glipizide Shortness of Breath and Swelling    Bactrim Ds [Sulfamethoxazole-Trimethoprim] Other (comments)     Patient experienced pain in legs and buttock and muscle cramping. Contrast Agent [Iodine] Hives and Itching     Itchy throat    Amaryl [Glimepiride] Other (comments)     \"nervous feeling\"    Avocado Rash     Pt states she bruises.     Feldene [Piroxicam] Diarrhea    Januvia [Sitagliptin] Other (comments)     Upset stomach Levofloxacin Unknown (comments)    Losartan Itching     Caused congestion per stated by pt    Bokoshe Hives    Oxycodone Diarrhea and Nausea Only    Pcn [Penicillins] Rash    Pineapple Rash    Prednisone Nausea and Vomiting    Tylenol [Acetaminophen] Palpitations    Welchol [Colesevelam] Other (comments)     Pt states \"made throat feel raw\"    Zestril [Lisinopril] Cough    Zetia [Ezetimibe] Nausea Only          Current Facility-Administered Medications   Medication Dose Route Frequency    atorvastatin (LIPITOR) tablet 40 mg  40 mg Oral QHS    melatonin tablet 3 mg  3 mg Oral QHS PRN    insulin lispro (HUMALOG) injection   SubCUTAneous AC&HS    glucose chewable tablet 16 g  4 Tablet Oral PRN    glucagon (GLUCAGEN) injection 1 mg  1 mg IntraMUSCular PRN    dextrose 10% infusion 0-250 mL  0-250 mL IntraVENous PRN    metoprolol succinate (TOPROL-XL) XL tablet 12.5 mg  12.5 mg Oral DAILY    latanoprost (XALATAN) 0.005 % ophthalmic solution 1 Drop  1 Drop Both Eyes QHS    hydrALAZINE (APRESOLINE) 20 mg/mL injection 20 mg  20 mg IntraVENous Q6H PRN    clopidogreL (PLAVIX) tablet 75 mg  75 mg Oral DAILY       Review of Systems:    General, constitutional: Leg cramping  Eyes, vision: negative  Ears, nose, throat: negative  Cardiovascular, heart: negative  Respiratory: negative  Gastrointestinal: negative  Genitourinary: negative  Musculoskeletal: negative  Skin and integumentary: negative  Psychiatric: negative  Endocrine: negative  Neurological: negative, except for HPI  Hematologic/lymphatic: negative  Allergy/immunology: negative    Objective:     Visit Vitals  BP (!) 117/57 (BP 1 Location: Left upper arm, BP Patient Position: Supine)   Pulse 82   Temp 97.3 °F (36.3 °C)   Resp 18   Ht 5' 2\" (1.575 m)   Wt 170 lb (77.1 kg)   LMP 04/28/1972   SpO2 99%   BMI 31.09 kg/m²       Physical Exam:    General:  appears well nourished in no acute distress  Neck: no carotid bruits  Lungs: clear to auscultation  Heart:  no murmurs, regular rate  Lower extremity: peripheral pulses palpable and no edema  Skin: intact    Neurological exam:    Awake, alert, oriented to person, place and time  Recent and remote memory were normal  Attention and concentration were intact  Language was intact. There was no aphasia  Speech: Slurred speech  Fund of knowledge was preserved    Cranial nerves:   II-XII were tested    [employer] fields were full  Eomi, no evidence of nystagmus  Facial sensation:  normal and symmetric  Facial motor: Mild facial asymmetry  Hearing intact  SCM strength intact  Tongue: midline without fasciculations    Motor: Tone normal  Right pronator drift  No evidence of fasciculations    Strength testing:   deltoid triceps biceps Wrist ext. Wrist flex. intrinsics Hip flex. Hip ext. Knee ext. Knee flex Dorsi flex Plantar flex   Right 3 3 3 4 4 4 4 4 4 4 4 4   Left 5 5 5 5 5 5 5 5 5 5 5 5         Sensory:  Upper extremity: intact to pp  Lower extremity: intact to pp    Reflexes:    Right Left  Biceps  2 2  Triceps   2 2  Brachiorad.  2 2  Patella  2 2  Achilles 1 1    Plantar response:  extensor on the right    Cerebellar testing:  no tremor apparent, finger/nose and rowena were intact on the left    Gait: not assessed due to concerns over safety    Labs:     Lab Results   Component Value Date/Time    Hemoglobin A1c 6.8 (H) 01/08/2023 12:57 AM    Sodium 141 01/09/2023 01:28 AM    Potassium 4.4 01/09/2023 01:28 AM    Chloride 108 01/09/2023 01:28 AM    Glucose 133 (H) 01/09/2023 01:28 AM    BUN 28 (H) 01/09/2023 01:28 AM    Creatinine 1.41 (H) 01/09/2023 01:28 AM    Calcium 9.3 01/09/2023 01:28 AM    WBC 9.0 01/09/2023 01:28 AM    HCT 37.8 01/09/2023 01:28 AM    HGB 12.3 01/09/2023 01:28 AM    PLATELET 317 08/38/1241 01:28 AM       Imaging:    Results from Hospital Encounter encounter on 01/07/23    MRI BRAIN WO CONT    Narrative  EXAM: MRI BRAIN WO CONT    INDICATION: new onset weakness since yesterday, right hand dysmetria, mild  dysarthria, concern for possible sub-acute stroke    COMPARISON: None. CONTRAST: None. TECHNIQUE:  Multiplanar multisequence acquisition without contrast of the brain. FINDINGS:  There is a subacute infarct in the left maninder (series 2 image 12) with mild DWI  hyperintensity. Generalized parenchymal volume loss with commensurate dilation of the sulci and  ventricular system. Scattered periventricular and deep white matter T2/FLAIR  hyperintensities, consistent with mild chronic microvascular ischemic disease. Small area of chronic hemorrhage in the right occipital lobe, and tiny chronic  microhemorrhage in the posterior limb of the right internal capsule. There is no  acute hemorrhage, extra-axial fluid collection, or mass effect. There is no  cerebellar tonsillar herniation. Expected arterial flow-voids are present. The paranasal sinuses, mastoid air cells, and middle ears are clear. The orbital  contents are within normal limits. No significant osseous or scalp lesions are  identified. Degenerative disc disease and facet arthropathy noted in the upper  cervical spine. Impression  1. Subacute infarct in the left maninder. 2. Generalized parenchymal volume loss and mild chronic microvascular ischemic  disease. 3. Small chronic hemorrhage in the right occipital lobe. Results from [place] encounter on 01/07/23    CT CODE NEURO HEAD WO CONTRAST    Narrative  EXAM: CT CODE NEURO HEAD WO CONTRAST    INDICATION: increasing right extremity weakness. Left pontine infarct on recent  MRI. COMPARISON: MRI brain on 1/7/2023 at 1045 hours. CONTRAST: None. TECHNIQUE: Unenhanced CT of the head was performed using 5 mm images. Brain and  bone windows were generated. Coronal and sagittal reformats. CT dose reduction  was achieved through use of a standardized protocol tailored for this  examination and automatic exposure control for dose modulation.     FINDINGS:  The ventricles and sulci are normal in size, shape and configuration. Minimal  chronic microvascular ischemic disease in the cerebral white matter is better  appreciated on the comparison MRI. There is no intracranial hemorrhage,  extra-axial collection, or mass effect. The basilar cisterns are open. Left  pontine infarct is subtle and better appreciated on the comparison MRI. The bone windows demonstrate no abnormalities. The visualized portions of the  paranasal sinuses and mastoid air cells are clear. Impression  Subacute left pontine infarct. No intracranial hemorrhage. See recent MRI brain  report. brain MRI from January 7, 2023. He has a subacute stroke in the left maninder. There is atrophy and chronic microvascular ischemic disease. There is a small area of chronic hemorrhage in the right occipital lobe and posterior limb of the internal capsule. I spent   35  minutes providing care to this  acutely ill inpatient with > 50% of the time counseling and assisting in the coordination of care of the patient on the patient's hospital floor/unit.              Active Problems:    CVA (cerebral vascular accident) ([facility] [address] (1/7/2023)                 Signed By:  Abhi Quevedo DO FAAN    January 9, 2023

## 2023-01-09 NOTE — DISCHARGE INSTRUCTIONS
HOSPITALIST DISCHARGE INSTRUCTIONS    NAME: Vamsi Swain   :  1935   MRN:  [id number]     Date/Time:  2023 11:21 AM    ADMIT DATE: 2023   DISCHARGE DATE: 2023     Attending Physician: Mamadou Mckoy MD    DISCHARGE DIAGNOSIS:  Left sided CVA with Right hemiparesis  Right hand gout  Chronic hemorrhage Right occipital lobe    Medications: Per above medication reconciliation. Pain Management: per above medications    Recommended diet: Diabetic Diet    Recommended activity: Activity as tolerated    Wound care: None    Indwelling devices:  None    Supplemental Oxygen: None    Required Lab work: Per SNF routine    Glucose management:  Accucheck ACHS with sliding scale per SNF protocol    Code status: Full        Outside physician follow up: Your PMD         Skilled nursing facility/ SNF MD responsible for above on discharge. Information obtained by :  I understand that if any problems occur once I am at home I am to contact my physician. I understand and acknowledge receipt of the instructions indicated above.                                                                                                                                            Physician's or R.N.'s Signature                                                                  Date/Time                                                                                                                                              Patient or Repres

## 2023-01-09 NOTE — DISCHARGE SUMMARY
Hospitalist Discharge Summary     Patient ID:  Tuyet Martinez  [id number]  86 y.o.  1935 1/7/2023    PCP on record: Mayda Vora MD    Admit date: 1/7/2023  Discharge date and time: 1/9/2023    DISCHARGE DIAGNOSIS:    Subacute CVA  Chronic hemorrhage in the R occipital lobe  T2DM  HTN    CONSULTATIONS:  IP CONSULT TO HOSPITALIST  IP CONSULT TO NEUROLOGY    Excerpted HPI from H&P of Jaki Maciel MD:  Ana Maria Anne is a 81 y.o.  female with PMHx significant for t2dm, hld, htn, presents to the ER for evaluation of generalized weakness started yesterday, ~1-2 days ago. Pt reports her speech is a bit slower and garbled than usual. She also has some blurry vision and thought it was due her glasses. Pt then woke up this AM ~3am feeling sick with nausea so she called her son for help. EMS called. Denies any confusion, gait difficulty, numbness and tingling. No dysphagia. In the er, pt got an MRI head showed subacute infarct in the left maninder. Small chronic hemorrhage in the right occipital lobe. We were asked to admit for work up and evaluation of the above problems. ______________________________________________________________________  DISCHARGE SUMMARY/HOSPITAL COURSE:  for full details see H&P, daily progress notes, labs, consult notes. Subacute CVA  Chronic hemorrhage in the R occipital lobe  Seen on MRI. CT imaging and CTA was deferred on admission due to no significant clinical concern for LVO and given the unclear time course. - Neurology consulted  TSH 1.5  A1c 6.8    Atorva 40  Plavix per Neurology, has ASA allergy  TTE with no shunt, did not comment on thrombus.  EF normal  PT/OT rec IPR, case management consulted  - Check carotid duplex, done, read pending     T2DM  Cont' SSI  Hold glyburide     HTN  Resume BB    Discharged when facility secured and carotid duplex read    _______________________________________________________________________  Patient seen and examined by me on discharge day. Pertinent Findings:  General:          No acute distress, Answering questions appropriately  HEENT:           EOMI, Anicteric sclera. MMM  Neck:               Supple  Resp:               CTAB, non-labored, No wheezes or crackles  Cardio:            regular rate, normal rhythm, no murmurs, no JVD  GI:                   Soft, Non-tender, Non-distended, Normal bowel sounds. Extremities:     Warm, well perfused, no LE edema  Skin:                Warm, Dry, Pink, Intact. Neurologic:      Alert and Oriented x4, RUE/RLE weakness noted, mildly slurred speech, No other focal defects  _______________________________________________________________________  DISCHARGE MEDICATIONS:   Current Discharge Medication List        START taking these medications    Details   atorvastatin (LIPITOR) 40 mg tablet Take 1 Tablet by mouth nightly for 30 days. Qty: 30 Tablet, Refills: 0  Start date: 1/9/2023, End date: 2/8/2023      clopidogreL (PLAVIX) 75 mg tab Take 1 Tablet by mouth daily for 30 days. Qty: 30 Tablet, Refills: 0  Start date: 1/10/2023, End date: 2/9/2023           CONTINUE these medications which have NOT CHANGED    Details   furosemide (LASIX) 20 mg tablet TAKE 1 TABLET BY MOUTH  DAILY  Qty: 90 Tablet, Refills: 3    Comments: Requesting 1 year supply  Associated Diagnoses: Essential hypertension      Accu-Chek Ashley Plus test strp strip USE TO CHECK BLOOD SUGAR  TWICE DAILY  Qty: 200 Strip, Refills: 3    Comments: Requesting 1 year supply  Associated Diagnoses: Type 2 diabetes with nephropathy (HCC)      latanoprost (XALATAN) 0.005 % ophthalmic solution Administer 1 Drop to both eyes nightly. diclofenac (VOLTAREN) 1 % gel Apply  to affected area four (4) times daily as needed for Pain.   Qty: 100 g, Refills: 0    Associated Diagnoses: Primary osteoarthritis involving multiple joints; DDD (degenerative disc disease), lumbar      cholecalciferol (VITAMIN D3) (2,000 UNITS /50 MCG) cap capsule Take 2,000 Units by mouth daily. GARLIC PO Take 002 mg by mouth daily. cod liver oil Cap Take 1 Cap by mouth daily. STOP taking these medications       glyBURIDE (DIABETA) 5 mg tablet Comments:   Reason for Stopping:         metoprolol succinate (TOPROL-XL) 25 mg XL tablet Comments:   Reason for Stopping:         red yeast rice extract 600 mg Cap Comments:   Reason for Stopping:                 Patient Follow Up Instructions:    Activity: Activity as tolerated  Diet: Cardiac Diet and Diabetic Diet  Wound Care: As directed  Follow-up tests/labs none    Follow-up Information       Follow up With Specialties Details Why Contact Info    Naveed Enriquez MD Family Medicine Follow up in 1 week(s) for post hospitalization needs [address] [facility]  [phone]      [facility], DO Neurology Follow up in 1 month(s) for followup of your stroke [address] [place] [address]  [phone]            ________________________________________________________________    Risk of deterioration: Low    Condition at Discharge:  Stable  __________________________________________________________________    Disposition  IP Rehab    ____________________________________________________________________    Code Status: Full Code  ___________________________________________________________________      Total time in minutes spent coordinating this discharge (includes going over instructions, follow-up, prescriptions, and preparing report for sign off to her PCP) :  >30 minutes    Signed:  Jose Cardoza MD

## 2023-01-09 NOTE — PROGRESS NOTES
followed up with Ms. Inocencio Olivares in reference to her Advance Medical Directive. She stated she would like to name her daughter Janay Kaplan as her primary agent however, she didn't know her phone number and address. She stated her daughter just left to go home. She stated she will return in the afternoon.  advised her to let her nurse know when she is ready to start the Advance Medical Directive process.  services are available 24 hours a day as requested,     Rev. AUREA Rossi  [address] [facility]   Paging Service [phone])

## 2023-01-09 NOTE — PROGRESS NOTES
Problem: Motor Speech Impaired (Adult)  Goal: *Acute Goals and Plan of Care (Insert Text)  Description: 1/9/2023  Speech path goals  1. Patient will produce conversational speech with precise articulation with 95% acc   1/9/2023 1047 by MAHI Damon  Outcome: Not Met  SPEECH [facility]  Patient: Justin Crandall (89 y.o. female)  Date: 1/9/2023  Primary Diagnosis: CVA (cerebral vascular accident) [facility]) [I63.9]       Precautions:   Fall    ASSESSMENT :  Based on the objective data described below, the patient presents with functional swallow and min dysarthria with minimally imprecise artic and slower rate of speech than usual. 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 Min R sided facial weakness noted and her tongue had reduced range bilaterally and had reduced strength to resistance. Her basic language skills are functional in conversation and on eval. She is [place] and her hearing aids are at home. Increased volume was helpful for her otherwise she needed repetition of stimuli. In a reading task she did not show R inattention. She was very pleasant and cooperative. Her daughter was present at bedside and was helpful with information. She feels her mother's speech is slower than previous. Patient will benefit from skilled intervention to address the above impairments. Patients rehabilitation potential is considered to be Good     PLAN :  Recommendations and Planned Interventions:  Speech path services once a week  No follow up needed at discharge  Frequency/Duration: Patient will be followed by speech-language pathology 1 time a week to address goals. Discharge Recommendations: None     SUBJECTIVE:   Patient stated her speech is slower currently.      OBJECTIVE:     Past Medical History:   Diagnosis Date    Chronic edema     Diabetes ([facility] [address]     Family history of ischemic heart disease 4/12/2012    Hypercholesterolemia     Hypertension     PVC's (premature ventricular contractions) 10/10/2021     Past Surgical History:   Procedure Laterality Date    ENDOSCOPY, COLON, DIAGNOSTIC  12/2006    Dr. Nick Small BREAST BIOPSY Bilateral 1997    benign    HX [address] [facility]  09/03/2019    abscess removed from abdominal wall     Prior Level of Function/Home Situation:   Home Situation  Home Environment: Private residence  # Steps to Enter: 5  One/Two Story Residence: Two story, live on 1st floor  Living Alone: Yes  Support Systems: Child(juan)  Patient Expects to be Discharged to[de-identified] Rehab hospital/unit acute  Current DME Used/Available at Home: Cane, straight  Diet prior to admission: reg/thins  Current Diet: reg/thins  Cognitive and Communication Status:  Neurologic State: Alert  Orientation Level: Appropriate for age, Oriented X4  Cognition: Appropriate decision making  Perception: Appears intact  Perseveration: No perseveration noted  Safety/Judgement: Lack of insight into deficits, Decreased awareness of need for assistance, Decreased awareness of need for safety, Fall prevention, Home safety  Swallowing Evaluation:   Oral Assessment:  Oral Assessment  Labial: Right droop  Dentition: Natural  Lingual: Decreased strength  Mandible: No impairment  P.O. Trials:  Patient Position: upright im bed  Vocal quality prior to P.O.: No impairment  Consistency Presented: Thin liquid;Puree;Mechanical soft  How Presented: Self-fed/presented;Cup/gulp; Successive swallows     Bolus Acceptance: No impairment  Bolus Formation/Control: No impairment     Propulsion: No impairment  Oral Residue: None  Initiation of Swallow: No impairment  Laryngeal Elevation: Functional  Aspiration Signs/Symptoms: None  Pharyngeal Phase Characteristics: No impairment, issues, or problems              Oral Phase Severity: No impairment  Pharyngeal Phase Severity : No impairment    NOMS:   The NOMS functional outcome measure was used to quantify this patient's level of swallowing impairment. Based on the NOMS, the patient was determined to be at level 7 for swallow function       NOMS Swallowing Levels:  Level 1 (CN): NPO  Level 2 (CM): NPO but takes consistency in therapy  Level 3 (CL): Takes less than 50% of nutrition p.o. and continues with nonoral feedings; and/or safe with mod cues; and/or max diet restriction  Level 4 (CK): Safe swallow but needs mod cues; and/or mod diet restriction; and/or still requires some nonoral feeding/supplements  Level 5 (CJ): Safe swallow with min diet restriction; and/or needs min cues  Level 6 (CI): Independent with p.o.; rare cues; usually self cues; may need to avoid some foods or needs extra time  Level 7 ([address] [place] [address]): Independent for all p.o.  EDOUARD. (2003). National Outcomes Measurement System (NOMS): Adult Speech-Language Pathology User's Guide. Speech/Language Evaluation  Motor Speech:  Oral-Motor Structure/Motor Speech  Face: Lower facial weakness  Labial: Right droop  Dentition: Natural  Lingual: Decreased strength  Mandible: No impairment  Dysarthric Characteristics: Imprecise (slightly reduced volume)  Intelligibility: No impairment  Overall Impairment Severity: Minimal  Language Comprehension and Expression:  Auditory Comprehension  Auditory Impairment: No   Hearing Aid: Bilateral  Verbal Expression  Primary Mode of Expression: Verbal  Initiation: No impairment  Naming: No impairment  Conversation: Fluent; Dysarthric  Overall Impairment: Minimal        Pragmatics:      wnl  Voice:     Vocal Quality: No impairment            NOMS: 6    Pain:  Pain Scale 1: Numeric (0 - 10)  Pain Intensity 1: 0       After treatment:   Patient left in no apparent distress in bed    COMMUNICATION/EDUCATION:   Patient was educated regarding her deficit(s) of motor speech    The patient's plan of care including recommendations, planned interventions, and recommended diet changes were discussed with: Registered nurse.      Patient/family have participated as able in goal setting and plan of care.     Thank you for this referral.  Bimal Mejia SLP  Time Calculation: 25 mins

## 2023-01-10 ENCOUNTER — APPOINTMENT (OUTPATIENT)
Dept: VASCULAR SURGERY | Age: 88
DRG: 065 | End: 2023-01-10
Attending: STUDENT IN AN ORGANIZED HEALTH CARE EDUCATION/TRAINING PROGRAM
Payer: MEDICARE

## 2023-01-10 ENCOUNTER — APPOINTMENT (OUTPATIENT)
Dept: GENERAL RADIOLOGY | Age: 88
DRG: 065 | End: 2023-01-10
Attending: STUDENT IN AN ORGANIZED HEALTH CARE EDUCATION/TRAINING PROGRAM
Payer: MEDICARE

## 2023-01-10 LAB
ANION GAP SERPL CALC-SCNC: 5 MMOL/L (ref 5–15)
ATRIAL RATE: 60 BPM
BASOPHILS # BLD: 0 K/UL (ref 0–0.1)
BASOPHILS NFR BLD: 0 % (ref 0–1)
BUN SERPL-MCNC: 27 MG/DL (ref 6–20)
BUN/CREAT SERPL: 22 (ref 12–20)
CALCIUM SERPL-MCNC: 9.3 MG/DL (ref 8.5–10.1)
CALCULATED P AXIS, ECG09: 50 DEGREES
CALCULATED R AXIS, ECG10: 41 DEGREES
CALCULATED T AXIS, ECG11: 50 DEGREES
CHLORIDE SERPL-SCNC: 107 MMOL/L (ref 97–108)
CO2 SERPL-SCNC: 26 MMOL/L (ref 21–32)
CREAT SERPL-MCNC: 1.24 MG/DL (ref 0.55–1.02)
DIAGNOSIS, 93000: NORMAL
DIFFERENTIAL METHOD BLD: ABNORMAL
EOSINOPHIL # BLD: 0.1 K/UL (ref 0–0.4)
EOSINOPHIL NFR BLD: 1 % (ref 0–7)
ERYTHROCYTE [DISTWIDTH] IN BLOOD BY AUTOMATED COUNT: 13.8 % (ref 11.5–14.5)
GLUCOSE BLD STRIP.AUTO-MCNC: 165 MG/DL (ref 65–117)
GLUCOSE BLD STRIP.AUTO-MCNC: 189 MG/DL (ref 65–117)
GLUCOSE BLD STRIP.AUTO-MCNC: 289 MG/DL (ref 65–117)
GLUCOSE BLD STRIP.AUTO-MCNC: 348 MG/DL (ref 65–117)
GLUCOSE SERPL-MCNC: 158 MG/DL (ref 65–100)
HCT VFR BLD AUTO: 37.1 % (ref 35–47)
HGB BLD-MCNC: 12.3 G/DL (ref 11.5–16)
IMM GRANULOCYTES # BLD AUTO: 0 K/UL (ref 0–0.04)
IMM GRANULOCYTES NFR BLD AUTO: 0 % (ref 0–0.5)
LYMPHOCYTES # BLD: 1.5 K/UL (ref 0.8–3.5)
LYMPHOCYTES NFR BLD: 15 % (ref 12–49)
MAGNESIUM SERPL-MCNC: 2 MG/DL (ref 1.6–2.4)
MCH RBC QN AUTO: 27 PG (ref 26–34)
MCHC RBC AUTO-ENTMCNC: 33.2 G/DL (ref 30–36.5)
MCV RBC AUTO: 81.5 FL (ref 80–99)
MONOCYTES # BLD: 0.8 K/UL (ref 0–1)
MONOCYTES NFR BLD: 8 % (ref 5–13)
NEUTS SEG # BLD: 7.9 K/UL (ref 1.8–8)
NEUTS SEG NFR BLD: 76 % (ref 32–75)
NRBC # BLD: 0 K/UL (ref 0–0.01)
NRBC BLD-RTO: 0 PER 100 WBC
P-R INTERVAL, ECG05: 126 MS
PHOSPHATE SERPL-MCNC: 3.2 MG/DL (ref 2.6–4.7)
PLATELET # BLD AUTO: 246 K/UL (ref 150–400)
PMV BLD AUTO: 10.1 FL (ref 8.9–12.9)
POTASSIUM SERPL-SCNC: 4.3 MMOL/L (ref 3.5–5.1)
Q-T INTERVAL, ECG07: 440 MS
QRS DURATION, ECG06: 82 MS
QTC CALCULATION (BEZET), ECG08: 440 MS
RBC # BLD AUTO: 4.55 M/UL (ref 3.8–5.2)
SERVICE CMNT-IMP: ABNORMAL
SODIUM SERPL-SCNC: 138 MMOL/L (ref 136–145)
VENTRICULAR RATE, ECG03: 60 BPM
WBC # BLD AUTO: 10.4 K/UL (ref 3.6–11)

## 2023-01-10 PROCEDURE — 73070 X-RAY EXAM OF ELBOW: CPT

## 2023-01-10 PROCEDURE — 83735 ASSAY OF MAGNESIUM: CPT

## 2023-01-10 PROCEDURE — 80048 BASIC METABOLIC PNL TOTAL CA: CPT

## 2023-01-10 PROCEDURE — 93971 EXTREMITY STUDY: CPT

## 2023-01-10 PROCEDURE — 36415 COLL VENOUS BLD VENIPUNCTURE: CPT

## 2023-01-10 PROCEDURE — 82962 GLUCOSE BLOOD TEST: CPT

## 2023-01-10 PROCEDURE — 74011250637 HC RX REV CODE- 250/637: Performed by: STUDENT IN AN ORGANIZED HEALTH CARE EDUCATION/TRAINING PROGRAM

## 2023-01-10 PROCEDURE — 65270000046 HC RM TELEMETRY

## 2023-01-10 PROCEDURE — 74011636637 HC RX REV CODE- 636/637: Performed by: INTERNAL MEDICINE

## 2023-01-10 PROCEDURE — 74011000250 HC RX REV CODE- 250: Performed by: INTERNAL MEDICINE

## 2023-01-10 PROCEDURE — 74011250637 HC RX REV CODE- 250/637: Performed by: INTERNAL MEDICINE

## 2023-01-10 PROCEDURE — 85025 COMPLETE CBC W/AUTO DIFF WBC: CPT

## 2023-01-10 PROCEDURE — 84100 ASSAY OF PHOSPHORUS: CPT

## 2023-01-10 RX ORDER — TRAMADOL HYDROCHLORIDE 50 MG/1
50 TABLET ORAL ONCE
Status: COMPLETED | OUTPATIENT
Start: 2023-01-11 | End: 2023-01-11

## 2023-01-10 RX ORDER — LIDOCAINE 4 G/100G
1 PATCH TOPICAL
Status: DISCONTINUED | OUTPATIENT
Start: 2023-01-10 | End: 2023-01-17 | Stop reason: HOSPADM

## 2023-01-10 RX ORDER — FACIAL-BODY WIPES
10 EACH TOPICAL DAILY PRN
Status: DISCONTINUED | OUTPATIENT
Start: 2023-01-10 | End: 2023-01-17 | Stop reason: HOSPADM

## 2023-01-10 RX ADMIN — CLOPIDOGREL BISULFATE 75 MG: 75 TABLET ORAL at 09:37

## 2023-01-10 RX ADMIN — ATORVASTATIN CALCIUM 40 MG: 40 TABLET, FILM COATED ORAL at 21:48

## 2023-01-10 RX ADMIN — LATANOPROST 1 DROP: 50 SOLUTION OPHTHALMIC at 21:49

## 2023-01-10 RX ADMIN — BISACODYL 10 MG: 10 SUPPOSITORY RECTAL at 21:48

## 2023-01-10 RX ADMIN — Medication 2 UNITS: at 22:44

## 2023-01-10 RX ADMIN — Medication 3 UNITS: at 11:52

## 2023-01-10 NOTE — ACP (ADVANCE CARE PLANNING)
Advance Care Planning   Advance Care Planning Inpatient Note  [facility]  Spiritual Care Department    Today's Date: 1/10/2023  Unit: [facility] 1 NEUROSCIENCE TELEMETRY    Received request from Health Care provider. Upon review of chart and communication with care team, patient's decision making abilities are not in question. Patient was/were present in the room during visit. Goals of ACP Conversation:  Discuss Advance Care planning documents  Facilitate a discussion related to patient's goals of care as they align with the patient's values and beliefs    Health Care Decision Makers:      Primary Decision Maker: SowmyaLorena - Daughter - [phone]    Secondary Decision Maker: Jonnndcaryn Layton - [phone]    Summary:  No Decision Maker named by patient at this time    Monique 53 (Patient Wishes) on file:  Living Will/ Advance Directive     Assessment:      received an Advance Medical Directive (AMD) request from [place] in the [place].  talked with Milady SANTILLAN ext [phone] in reference to the AMD process. She stated Ms. Pepe Perry is alert and can complete the Advance Medical Directive process. Ms. Pepe Perry was eating her breakfast and visiting with her daughter Ty Paez.  explained the AMD to her  and left the forms for her to review. She requested the  come back after she finish eating her breakfast.  agreed to visit with her again.  services are available 24 hours a day as requested.      Interventions:  Provided education on documents for clarity and greater understanding  Deferred conversation as patient not interested in completing an advance directive at this time      Care Preferences Communicated:  No    Outcomes/Plan:  ACP Discussion [address] on 1/10/2023 at 10:11 AM

## 2023-01-10 NOTE — PROGRESS NOTES
Bedside shift change report given to Cynthia Armenta LPN (oncoming nurse) by Yvette Hinds RN (offgoing nurse). Report included the following information SBAR, Kardex, Intake/Output, and MAR     Shift worked:  3p-7p   Shift summary and any significant changes:         changed to carb diet, obtained order for dulcolax PRN, imaging of left arm normal. Keep L arm elevated on pillow   Concerns for physician to address:     Zone phone for oncoming shift:         Patient Information  Tuyet Martinez  81 y.o.  1/7/2023  7:17 AM by Jaki Maciel MD. Tuyet Martinez was admitted from Home     Problem List          Patient Active Problem List     Diagnosis Date Noted    CVA (cerebral vascular accident) ([address] 01/07/2023    PVC's (premature ventricular contractions) 10/10/2021    Abscess of abdominal wall 09/03/2019    Type 2 diabetes with nephropathy ([address] 04/17/2018    Statin intolerance 05/06/2016    Essential hypertension 01/06/2016    Encounter for medication monitoring 01/06/2016    Diabetes mellitus type 2, controlled ([address] 10/16/2015    Generalized OA 05/28/2014    Nonrheumatic tricuspid valve disorder 04/12/2012    Family history of ischemic heart disease 04/12/2012    Mixed hyperlipidemia 04/12/2012    Mitral valve disease 04/12/2012    Obesity, unspecified 04/12/2012    Osteoarthritis 05/04/2010    Hypovitaminosis D 05/04/2010              Past Medical History:   Diagnosis Date    Chronic edema      Diabetes ([address]      Family history of ischemic heart disease 4/12/2012    Hypercholesterolemia      Hypertension      PVC's (premature ventricular contractions) 10/10/2021         Core Measures:  CVA: Yes Yes        Activity:  Activity Level:  Up with Assistance        Cardiac:   Cardiac Monitoring: no     Cardiac Rhythm:      Access:   Current line(s): PIV         Genitourinary:   Urinary status: voiding        Respiratory:   O2 Device: None (Room air)        GI:  Last Bowel Movement Date: 01/08/23  Current diet:  ADULT DIET Regular Pain Management:   Patient states pain is manageable on current regimen: N/A     Skin:  Lopez Score: 17  Interventions: increase time out of bed, PT/OT consult, and nutritional support     Patient Safety:  Fall Score: Total Score: 3  Interventions: bed/chair alarm, gripper socks, pt to call before getting OOB, and stay with me (per policy)  High Fall Risk: Yes        DVT prophylaxis:  DVT prophylaxis Med- Not applicable  DVT prophylaxis SCD or DENNYS- Yes      Wounds: (If Applicable)  Wounds- No  Location      Active Consults:  IP CONSULT TO HOSPITALIST  IP CONSULT TO NEUROLOGY     Length of Stay:  Expected LOS: - - -  Actual LOS: 1  Discharge Plan:  Rehab when ready,waiting on auth.  Anticipate d/c 1/11

## 2023-01-10 NOTE — PROGRESS NOTES
Problem: Pressure Injury - Risk of  Goal: *Prevention of pressure injury  Description: Document Lopez Scale and appropriate interventions in the flowsheet.   Outcome: Progressing Towards Goal  Note: Pressure Injury Interventions:  Sensory Interventions: Assess changes in LOC, Discuss PT/OT consult with provider    Moisture Interventions: Absorbent underpads, Internal/External urinary devices    Activity Interventions: Increase time out of bed, PT/OT evaluation    Mobility Interventions: HOB 30 degrees or less, PT/OT evaluation    Nutrition Interventions: Document food/fluid/supplement intake    Friction and Shear Interventions: HOB 30 degrees or less                Problem: Patient Education: Go to Patient Education Activity  Goal: Patient/Family Education  Outcome: Progressing Towards Goal     Problem: Patient Education: Go to Patient Education Activity  Goal: Patient/Family Education  Outcome: Progressing Towards Goal     Problem: TIA/CVA Stroke: 0-24 hours  Goal: Off Pathway (Use only if patient is Off Pathway)  Outcome: Progressing Towards Goal  Goal: Activity/Safety  Outcome: Progressing Towards Goal  Goal: Consults, if ordered  Outcome: Progressing Towards Goal  Goal: Diagnostic Test/Procedures  Outcome: Progressing Towards Goal  Goal: Nutrition/Diet  Outcome: Progressing Towards Goal  Goal: Discharge Planning  Outcome: Progressing Towards Goal  Goal: Medications  Outcome: Progressing Towards Goal  Goal: Respiratory  Outcome: Progressing Towards Goal  Goal: Treatments/Interventions/Procedures  Outcome: Progressing Towards Goal  Goal: Minimize risk of bleeding post-thrombolytic infusion  Outcome: Progressing Towards Goal  Goal: Monitor for complications post-thrombolytic infusion  Outcome: Progressing Towards Goal  Goal: Psychosocial  Outcome: Progressing Towards Goal  Goal: *Hemodynamically stable  Outcome: Progressing Towards Goal  Goal: *Neurologically stable  Description: Absence of additional neurological deficits    Outcome: Progressing Towards Goal  Goal: *Verbalizes anxiety and depression are reduced or absent  Outcome: Progressing Towards Goal  Goal: *Absence of Signs of Aspiration on Current Diet  Outcome: Progressing Towards Goal  Goal: *Absence of deep venous thrombosis signs and symptoms(Stroke Metric)  Outcome: Progressing Towards Goal  Goal: *Ability to perform ADLs and demonstrates progressive mobility and function  Outcome: Progressing Towards Goal  Goal: *Stroke education started(Stroke Metric)  Outcome: Progressing Towards Goal  Goal: *Dysphagia screen performed(Stroke Metric)  Outcome: Progressing Towards Goal  Goal: *Rehab consulted(Stroke Metric)  Outcome: Progressing Towards Goal     Problem: TIA/CVA Stroke: Day 2 Until Discharge  Goal: Off Pathway (Use only if patient is Off Pathway)  Outcome: Progressing Towards Goal  Goal: Activity/Safety  Outcome: Progressing Towards Goal  Goal: Diagnostic Test/Procedures  Outcome: Progressing Towards Goal  Goal: Nutrition/Diet  Outcome: Progressing Towards Goal  Goal: Discharge Planning  Outcome: Progressing Towards Goal  Goal: Medications  Outcome: Progressing Towards Goal  Goal: Respiratory  Outcome: Progressing Towards Goal  Goal: Treatments/Interventions/Procedures  Outcome: Progressing Towards Goal  Goal: Psychosocial  Outcome: Progressing Towards Goal  Goal: *Verbalizes anxiety and depression are reduced or absent  Outcome: Progressing Towards Goal  Goal: *Absence of aspiration  Outcome: Progressing Towards Goal  Goal: *Absence of deep venous thrombosis signs and symptoms(Stroke Metric)  Outcome: Progressing Towards Goal  Goal: *Optimal pain control at patient's stated goal  Outcome: Progressing Towards Goal  Goal: *Tolerating diet  Outcome: Progressing Towards Goal  Goal: *Ability to perform ADLs and demonstrates progressive mobility and function  Outcome: Progressing Towards Goal  Goal: *Stroke education continued(Stroke Metric)  Outcome: Progressing Towards Goal     Problem: Ischemic Stroke: Discharge Outcomes  Goal: *Verbalizes anxiety and depression are reduced or absent  Outcome: Progressing Towards Goal  Goal: *Verbalize understanding of risk factor modification(Stroke Metric)  Outcome: Progressing Towards Goal  Goal: *Hemodynamically stable  Outcome: Progressing Towards Goal  Goal: *Absence of aspiration pneumonia  Outcome: Progressing Towards Goal  Goal: *Aware of needed dietary changes  Outcome: Progressing Towards Goal  Goal: *Verbalize understanding of prescribed medications including anti-coagulants, anti-lipid, and/or anti-platelets(Stroke Metric)  Outcome: Progressing Towards Goal  Goal: *Tolerating diet  Outcome: Progressing Towards Goal  Goal: *Aware of follow-up diagnostics related to anticoagulants  Outcome: Progressing Towards Goal  Goal: *Ability to perform ADLs and demonstrates progressive mobility and function  Outcome: Progressing Towards Goal  Goal: *Absence of DVT(Stroke Metric)  Outcome: Progressing Towards Goal  Goal: *Absence of aspiration  Outcome: Progressing Towards Goal  Goal: *Optimal pain control at patient's stated goal  Outcome: Progressing Towards Goal  Goal: *Home safety concerns addressed  Outcome: Progressing Towards Goal  Goal: *Describes available resources and support systems  Outcome: Progressing Towards Goal  Goal: *Verbalizes understanding of activation of EMS(911) for stroke symptoms(Stroke Metric)  Outcome: Progressing Towards Goal  Goal: *Understands and describes signs and symptoms to report to providers(Stroke Metric)  Outcome: Progressing Towards Goal  Goal: *Neurolgocially stable (absence of additional neurological deficits)  Outcome: Progressing Towards Goal  Goal: *Verbalizes importance of follow-up with primary care physician(Stroke Metric)  Outcome: Progressing Towards Goal  Goal: *Smoking cessation discussed,if applicable(Stroke Metric)  Outcome: Progressing Towards Goal  Goal: *Depression screening completed(Stroke Metric)  Outcome: Progressing Towards Goal     Problem: Falls - Risk of  Goal: *Absence of Falls  Description: Document Cole Gilbert Fall Risk and appropriate interventions in the flowsheet. Outcome: Progressing Towards Goal     Problem: Patient Education: Go to Patient Education Activity  Goal: Patient/Family Education  Outcome: Progressing Towards Goal     Problem: Diabetes Self-Management  Goal: *Disease process and treatment process  Description: Define diabetes and identify own type of diabetes; list 3 options for treating diabetes. Outcome: Progressing Towards Goal  Goal: *Incorporating nutritional management into lifestyle  Description: Describe effect of type, amount and timing of food on blood glucose; list 3 methods for planning meals. Outcome: Progressing Towards Goal  Goal: *Incorporating physical activity into lifestyle  Description: State effect of exercise on blood glucose levels. Outcome: Progressing Towards Goal  Goal: *Developing strategies to promote health/change behavior  Description: Define the ABC's of diabetes; identify appropriate screenings, schedule and personal plan for screenings. Outcome: Progressing Towards Goal  Goal: *Using medications safely  Description: State effect of diabetes medications on diabetes; name diabetes medication taking, action and side effects. Outcome: Progressing Towards Goal  Goal: *Monitoring blood glucose, interpreting and using results  Description: Identify recommended blood glucose targets  and personal targets. Outcome: Progressing Towards Goal  Goal: *Prevention, detection, treatment of acute complications  Description: List symptoms of hyper- and hypoglycemia; describe how to treat low blood sugar and actions for lowering  high blood glucose level.   Outcome: Progressing Towards Goal  Goal: *Prevention, detection and treatment of chronic complications  Description: Define the natural course of diabetes and describe the relationship of blood glucose levels to long term complications of diabetes.   Outcome: Progressing Towards Goal  Goal: *Developing strategies to address psychosocial issues  Description: Describe feelings about living with diabetes; identify support needed and support network  Outcome: Progressing Towards Goal  Goal: *Insulin pump training  Outcome: Progressing Towards Goal  Goal: *Sick day guidelines  Outcome: Progressing Towards Goal  Goal: *Patient Specific Goal (EDIT GOAL, INSERT TEXT)  Outcome: Progressing Towards Goal

## 2023-01-10 NOTE — PROGRESS NOTES
Bedside shift change report given to Rhina(oncoming nurse) by Susi Ramos RN (offgoing nurse). Report included the following information SBAR, Kardex, Intake/Output, and MAR     Shift worked:  7-3   Shift summary and any significant changes:       Right arm swollen since last night. Doppler and xray ordered. Concerns for physician to address:     Zone phone for oncoming shift:         Patient Information  Griffin Hanks  81 y.o.  1/7/2023  7:17 AM by Brice Jordan MD. Griffin Hanks was admitted from Home     Problem List          Patient Active Problem List     Diagnosis Date Noted    CVA (cerebral vascular accident) ([address] 01/07/2023    PVC's (premature ventricular contractions) 10/10/2021    Abscess of abdominal wall 09/03/2019    Type 2 diabetes with nephropathy ([address] 04/17/2018    Statin intolerance 05/06/2016    Essential hypertension 01/06/2016    Encounter for medication monitoring 01/06/2016    Diabetes mellitus type 2, controlled ([address] 10/16/2015    Generalized OA 05/28/2014    Nonrheumatic tricuspid valve disorder 04/12/2012    Family history of ischemic heart disease 04/12/2012    Mixed hyperlipidemia 04/12/2012    Mitral valve disease 04/12/2012    Obesity, unspecified 04/12/2012    Osteoarthritis 05/04/2010    Hypovitaminosis D 05/04/2010              Past Medical History:   Diagnosis Date    Chronic edema      Diabetes ([address]      Family history of ischemic heart disease 4/12/2012    Hypercholesterolemia      Hypertension      PVC's (premature ventricular contractions) 10/10/2021         Core Measures:  CVA: Yes Yes        Activity:  Activity Level:  Up with Assistance        Cardiac:   Cardiac Monitoring: no     Cardiac Rhythm:      Access:   Current line(s): PIV         Genitourinary:   Urinary status: voiding        Respiratory:   O2 Device: None (Room air)        GI:  Last Bowel Movement Date: 01/08/23  Current diet:  ADULT DIET Regular        Pain Management:   Patient states pain is manageable on current regimen: N/A     Skin:  Lopez Score: 17  Interventions: increase time out of bed, PT/OT consult, and nutritional support     Patient Safety:  Fall Score: Total Score: 3  Interventions: bed/chair alarm, gripper socks, pt to call before getting OOB, and stay with me (per policy)  High Fall Risk: Yes       DVT prophylaxis:  DVT prophylaxis Med- Not applicable  DVT prophylaxis SCD or DENNYS- Yes      Wounds: (If Applicable)  Wounds- No  Location      Active Consults:  IP CONSULT TO HOSPITALIST  IP CONSULT TO NEUROLOGY     Length of Stay:  Expected LOS: - - -  Actual LOS: 1  Discharge Plan:  Rehab when ready,waiting on auth.  Anticipate d/c 1/11

## 2023-01-10 NOTE — PROGRESS NOTES
Bedside shift change report given to JACQUE Bates (oncoming nurse) by Rigo Martini (offgoing nurse). Report included the following information SBAR, Kardex, Intake/Output, and MAR     Shift worked:  Nights   Shift summary and any significant changes:     Swelling on the rt hand      Concerns for physician to address:  none   Zone phone for oncoming shift:   [phone]      Patient Information  Tuyet Martinez  81 y.o.  1/7/2023  7:17 AM by Jaki Maciel MD. Tuyet Martinez was admitted from Home     Problem List          Patient Active Problem List     Diagnosis Date Noted    CVA (cerebral vascular accident) ([address] 01/07/2023    PVC's (premature ventricular contractions) 10/10/2021    Abscess of abdominal wall 09/03/2019    Type 2 diabetes with nephropathy ([address] 04/17/2018    Statin intolerance 05/06/2016    Essential hypertension 01/06/2016    Encounter for medication monitoring 01/06/2016    Diabetes mellitus type 2, controlled ([address] 10/16/2015    Generalized OA 05/28/2014    Nonrheumatic tricuspid valve disorder 04/12/2012    Family history of ischemic heart disease 04/12/2012    Mixed hyperlipidemia 04/12/2012    Mitral valve disease 04/12/2012    Obesity, unspecified 04/12/2012    Osteoarthritis 05/04/2010    Hypovitaminosis D 05/04/2010              Past Medical History:   Diagnosis Date    Chronic edema      Diabetes ([address]      Family history of ischemic heart disease 4/12/2012    Hypercholesterolemia      Hypertension      PVC's (premature ventricular contractions) 10/10/2021         Core Measures:  CVA: Yes Yes        Activity:  Activity Level:  Up with Assistance        Cardiac:   Cardiac Monitoring: Yes      Cardiac Rhythm: Sinus Rhythm     Access:   Current line(s): PIV         Genitourinary:   Urinary status: voiding        Respiratory:   O2 Device: None (Room air)        GI:  Last Bowel Movement Date: 01/07/23  Current diet:  ADULT DIET Regular        Pain Management:   Patient states pain is manageable on current regimen: N/A     Skin:  Lopez Score: 17  Interventions: increase time out of bed, PT/OT consult, and nutritional support     Patient Safety:  Fall Score:  Total Score: 3  Interventions: bed/chair alarm, gripper socks, pt to call before getting OOB, and stay with me (per policy)  High Fall Risk: Yes  @Rollbelt  @dexterity to release roll belt  Yes/No ( must document dexterity  here by stating Yes or No here, otherwise this is a restraint and must follow restraint documentation policy.)     DVT prophylaxis:  DVT prophylaxis Med- Not applicable  DVT prophylaxis SCD or DENNYS- Yes      Wounds: (If Applicable)  Wounds- No  Location      Active Consults:  IP CONSULT TO HOSPITALIST  IP CONSULT TO NEUROLOGY     Length of Stay:  Expected LOS: - - -  Actual LOS: 1  Discharge Plan:  Rehab when ready

## 2023-01-10 NOTE — DISCHARGE SUMMARY
Hospitalist Discharge Summary     Patient ID:  Tayler Casey  [id number]  53 y.o.  1935 1/7/2023    PCP on record: Lona Mtz MD    Admit date: 1/7/2023  Discharge date and time: 1/10/2023    DISCHARGE DIAGNOSIS:    Subacute CVA  Chronic hemorrhage in the R occipital lobe  T2DM  HTN    CONSULTATIONS:  IP CONSULT TO HOSPITALIST  IP CONSULT TO NEUROLOGY    Excerpted HPI from H&P of Giana Martinez MD:  Michel Murguia is a 81 y.o.  female with PMHx significant for t2dm, hld, htn, presents to the ER for evaluation of generalized weakness started yesterday, ~1-2 days ago. Pt reports her speech is a bit slower and garbled than usual. She also has some blurry vision and thought it was due her glasses. Pt then woke up this AM ~3am feeling sick with nausea so she called her son for help. EMS called. Denies any confusion, gait difficulty, numbness and tingling. No dysphagia. In the er, pt got an MRI head showed subacute infarct in the left maninder. Small chronic hemorrhage in the right occipital lobe. We were asked to admit for work up and evaluation of the above problems. ______________________________________________________________________  DISCHARGE SUMMARY/HOSPITAL COURSE:  for full details see H&P, daily progress notes, labs, consult notes. Subacute CVA  Chronic hemorrhage in the R occipital lobe  Seen on MRI. CT imaging and CTA was deferred on admission due to no significant clinical concern for LVO and given the unclear time course. - Neurology consulted  TSH 1.5  A1c 6.8    Atorva 40  Plavix per Neurology, has ASA allergy  TTE with no shunt, did not comment on thrombus. EF normal  PT/OT rec IPR, case management consulted  - carotid duplex with intimal thickening in L ICA, otherwise normal    R arm pain  Mild swelling, suspect dependent edema but with point tenderness at below, no erythema or fluctuance noted.  Mild R hand swelling.  - R arm US and elbow XR without abnormality  - lidocaine patch while inpatient     T2DM  Cont' SSI  Hold glyburide     HTN  Resume BB    Discharged when facility secured.    _______________________________________________________________________  Patient seen and examined by me on discharge day. Pertinent Findings:  General:          No acute distress, Answering questions appropriately  HEENT:           EOMI, Anicteric sclera. MMM  Neck:               Supple  Resp:               CTAB, non-labored, No wheezes or crackles  Cardio:            regular rate, normal rhythm, no murmurs, no JVD  GI:                   Soft, Non-tender, Non-distended, Normal bowel sounds. Extremities:     R elbow tenderness, no erythema, mild R hand swelling, otherwise Warm, well perfused, no LE edema  Skin:                Warm, Dry, Pink, Intact. Neurologic:      Alert and Oriented x4, RUE/RLE weakness noted, mildly slurred speech, No other focal defects  _______________________________________________________________________  DISCHARGE MEDICATIONS:   Current Discharge Medication List        START taking these medications    Details   atorvastatin (LIPITOR) 40 mg tablet Take 1 Tablet by mouth nightly for 30 days. Qty: 30 Tablet, Refills: 0  Start date: 1/9/2023, End date: 2/8/2023      clopidogreL (PLAVIX) 75 mg tab Take 1 Tablet by mouth daily for 30 days.   Qty: 30 Tablet, Refills: 0  Start date: 1/10/2023, End date: 2/9/2023           CONTINUE these medications which have NOT CHANGED    Details   furosemide (LASIX) 20 mg tablet TAKE 1 TABLET BY MOUTH  DAILY  Qty: 90 Tablet, Refills: 3    Comments: Requesting 1 year supply  Associated Diagnoses: Essential hypertension      Accu-Chek Ashley Plus test strp strip USE TO CHECK BLOOD SUGAR  TWICE DAILY  Qty: 200 Strip, Refills: 3    Comments: Requesting 1 year supply  Associated Diagnoses: Type 2 diabetes with nephropathy (HCC)      latanoprost (XALATAN) 0.005 % ophthalmic solution Administer 1 Drop to both eyes nightly. diclofenac (VOLTAREN) 1 % gel Apply  to affected area four (4) times daily as needed for Pain. Qty: 100 g, Refills: 0    Associated Diagnoses: Primary osteoarthritis involving multiple joints; DDD (degenerative disc disease), lumbar      cholecalciferol (VITAMIN D3) (2,000 UNITS /50 MCG) cap capsule Take 2,000 Units by mouth daily. GARLIC PO Take 296 mg by mouth daily. cod liver oil Cap Take 1 Cap by mouth daily. STOP taking these medications       glyBURIDE (DIABETA) 5 mg tablet Comments:   Reason for Stopping:         metoprolol succinate (TOPROL-XL) 25 mg XL tablet Comments:   Reason for Stopping:         red yeast rice extract 600 mg Cap Comments:   Reason for Stopping:                 Patient Follow Up Instructions:    Activity: Activity as tolerated  Diet: Cardiac Diet and Diabetic Diet  Wound Care: As directed  Follow-up tests/labs none    Follow-up Information       Follow up With Specialties Details Why Contact Info    Anna aCstaneda MD Family Medicine Follow up in 1 week(s) for post hospitalization needs [address] [facility]  [phone]      Emily Wood DO Neurology Follow up in 1 month(s) for followup of your stroke [address] [place] [address]  [phone]            ________________________________________________________________    Risk of deterioration: Low    Condition at Discharge:  Stable  __________________________________________________________________    Disposition  IP Rehab    ____________________________________________________________________    Code Status: Full Code  ___________________________________________________________________      Total time in minutes spent coordinating this discharge (includes going over instructions, follow-up, prescriptions, and preparing report for sign off to her PCP) :  >30 minutes    Signed:  Bari Gómez MD

## 2023-01-10 NOTE — PROGRESS NOTES
Transition of Care Plan:     RUR: 13%    DARBY: STABLE: Anticipating 1/11 d/t waiting on auth? Disposition: IPR - [address]:   Venessa Salvador started on 1/9/23. - pending  12:17 PM   [facility]: Accepted. CM talked to [place] : [phone] and they can accept. They started auth 1/9/23 - pending. Auth still pending. CM updated family. If SNF or IPR: Date FOC offered: 1/8/23  Date [address] received: 1/8/23  Date authorization started with reference number: 1/9/23. CM requested auth reference number. Date authorization received and expires: TBD  [facility]     Follow up appointments: PCP & specialists as indicated  DME needed: TBD - has cane, shower seat  Transportation at Discharge: Medicaid BLS  [place] or means to access home: N/a       IM Medicare Letter: 2nd IM letter needed at d/c  Is patient a [place] and connected with the [place]? N/a              If yes, was [facility] transfer form completed and VA notified? N/a  Caregiver Contact: Zulay Shipman DTR/[facility] ([phone])  Discharge Caregiver contacted prior to discharge? yes  Care Conference needed?:  No        CM will continue to monitor discharge plan.       Della Boothe, Neuro CM  Ext [phone]

## 2023-01-11 ENCOUNTER — APPOINTMENT (OUTPATIENT)
Dept: CT IMAGING | Age: 88
DRG: 065 | End: 2023-01-11
Attending: INTERNAL MEDICINE
Payer: MEDICARE

## 2023-01-11 ENCOUNTER — APPOINTMENT (OUTPATIENT)
Dept: GENERAL RADIOLOGY | Age: 88
DRG: 065 | End: 2023-01-11
Attending: INTERNAL MEDICINE
Payer: MEDICARE

## 2023-01-11 LAB
ANION GAP SERPL CALC-SCNC: 9 MMOL/L (ref 5–15)
BASOPHILS # BLD: 0 K/UL (ref 0–0.1)
BASOPHILS NFR BLD: 0 % (ref 0–1)
BUN SERPL-MCNC: 24 MG/DL (ref 6–20)
BUN/CREAT SERPL: 18 (ref 12–20)
CALCIUM SERPL-MCNC: 9.1 MG/DL (ref 8.5–10.1)
CHLORIDE SERPL-SCNC: 104 MMOL/L (ref 97–108)
CO2 SERPL-SCNC: 23 MMOL/L (ref 21–32)
CREAT SERPL-MCNC: 1.31 MG/DL (ref 0.55–1.02)
DIFFERENTIAL METHOD BLD: ABNORMAL
EOSINOPHIL # BLD: 0 K/UL (ref 0–0.4)
EOSINOPHIL NFR BLD: 0 % (ref 0–7)
ERYTHROCYTE [DISTWIDTH] IN BLOOD BY AUTOMATED COUNT: 13.7 % (ref 11.5–14.5)
GLUCOSE BLD STRIP.AUTO-MCNC: 207 MG/DL (ref 65–117)
GLUCOSE BLD STRIP.AUTO-MCNC: 216 MG/DL (ref 65–117)
GLUCOSE BLD STRIP.AUTO-MCNC: 237 MG/DL (ref 65–117)
GLUCOSE BLD STRIP.AUTO-MCNC: 244 MG/DL (ref 65–117)
GLUCOSE SERPL-MCNC: 243 MG/DL (ref 65–100)
HCT VFR BLD AUTO: 37.2 % (ref 35–47)
HGB BLD-MCNC: 12.3 G/DL (ref 11.5–16)
IMM GRANULOCYTES # BLD AUTO: 0.1 K/UL (ref 0–0.04)
IMM GRANULOCYTES NFR BLD AUTO: 1 % (ref 0–0.5)
LYMPHOCYTES # BLD: 0.8 K/UL (ref 0.8–3.5)
LYMPHOCYTES NFR BLD: 7 % (ref 12–49)
MAGNESIUM SERPL-MCNC: 2 MG/DL (ref 1.6–2.4)
MCH RBC QN AUTO: 26.7 PG (ref 26–34)
MCHC RBC AUTO-ENTMCNC: 33.1 G/DL (ref 30–36.5)
MCV RBC AUTO: 80.7 FL (ref 80–99)
MONOCYTES # BLD: 1 K/UL (ref 0–1)
MONOCYTES NFR BLD: 8 % (ref 5–13)
NEUTS SEG # BLD: 10.6 K/UL (ref 1.8–8)
NEUTS SEG NFR BLD: 84 % (ref 32–75)
NRBC # BLD: 0 K/UL (ref 0–0.01)
NRBC BLD-RTO: 0 PER 100 WBC
PHOSPHATE SERPL-MCNC: 2.9 MG/DL (ref 2.6–4.7)
PLATELET # BLD AUTO: 236 K/UL (ref 150–400)
PMV BLD AUTO: 10.2 FL (ref 8.9–12.9)
POTASSIUM SERPL-SCNC: 4.7 MMOL/L (ref 3.5–5.1)
RBC # BLD AUTO: 4.61 M/UL (ref 3.8–5.2)
SERVICE CMNT-IMP: ABNORMAL
SODIUM SERPL-SCNC: 136 MMOL/L (ref 136–145)
WBC # BLD AUTO: 12.6 K/UL (ref 3.6–11)

## 2023-01-11 PROCEDURE — 97112 NEUROMUSCULAR REEDUCATION: CPT | Performed by: OCCUPATIONAL THERAPIST

## 2023-01-11 PROCEDURE — 80048 BASIC METABOLIC PNL TOTAL CA: CPT

## 2023-01-11 PROCEDURE — 97530 THERAPEUTIC ACTIVITIES: CPT | Performed by: OCCUPATIONAL THERAPIST

## 2023-01-11 PROCEDURE — 97535 SELF CARE MNGMENT TRAINING: CPT | Performed by: OCCUPATIONAL THERAPIST

## 2023-01-11 PROCEDURE — 71045 X-RAY EXAM CHEST 1 VIEW: CPT

## 2023-01-11 PROCEDURE — 74011250637 HC RX REV CODE- 250/637: Performed by: STUDENT IN AN ORGANIZED HEALTH CARE EDUCATION/TRAINING PROGRAM

## 2023-01-11 PROCEDURE — 97110 THERAPEUTIC EXERCISES: CPT

## 2023-01-11 PROCEDURE — 97530 THERAPEUTIC ACTIVITIES: CPT

## 2023-01-11 PROCEDURE — 36415 COLL VENOUS BLD VENIPUNCTURE: CPT

## 2023-01-11 PROCEDURE — 70450 CT HEAD/BRAIN W/O DYE: CPT

## 2023-01-11 PROCEDURE — 84100 ASSAY OF PHOSPHORUS: CPT

## 2023-01-11 PROCEDURE — 65270000046 HC RM TELEMETRY

## 2023-01-11 PROCEDURE — 83735 ASSAY OF MAGNESIUM: CPT

## 2023-01-11 PROCEDURE — 92507 TX SP LANG VOICE COMM INDIV: CPT

## 2023-01-11 PROCEDURE — 97112 NEUROMUSCULAR REEDUCATION: CPT

## 2023-01-11 PROCEDURE — 74011636637 HC RX REV CODE- 636/637: Performed by: INTERNAL MEDICINE

## 2023-01-11 PROCEDURE — 74011250637 HC RX REV CODE- 250/637: Performed by: INTERNAL MEDICINE

## 2023-01-11 PROCEDURE — 74011250637 HC RX REV CODE- 250/637: Performed by: NURSE PRACTITIONER

## 2023-01-11 PROCEDURE — 82962 GLUCOSE BLOOD TEST: CPT

## 2023-01-11 PROCEDURE — 85025 COMPLETE CBC W/AUTO DIFF WBC: CPT

## 2023-01-11 RX ADMIN — ATORVASTATIN CALCIUM 40 MG: 40 TABLET, FILM COATED ORAL at 22:00

## 2023-01-11 RX ADMIN — Medication 2 UNITS: at 08:18

## 2023-01-11 RX ADMIN — TRAMADOL HYDROCHLORIDE 50 MG: 50 TABLET ORAL at 02:43

## 2023-01-11 RX ADMIN — LATANOPROST 1 DROP: 50 SOLUTION OPHTHALMIC at 22:03

## 2023-01-11 RX ADMIN — Medication 2 UNITS: at 16:54

## 2023-01-11 RX ADMIN — Medication 2 UNITS: at 11:43

## 2023-01-11 RX ADMIN — Medication 1 UNITS: at 22:17

## 2023-01-11 RX ADMIN — CLOPIDOGREL BISULFATE 75 MG: 75 TABLET ORAL at 09:00

## 2023-01-11 NOTE — PROGRESS NOTES
Received notification from bedside RN about patient with regards to: 8/10 ankle pain, requesting medication for relief  VS: /55, HR 96, RR 14, O2 sat 100% on RA    Intervention given: Tramadol 50 mg PO x 1 dose ordered

## 2023-01-11 NOTE — PROGRESS NOTES
Hospitalist Progress Note    NAME: Jenniffer Grier   :  1935   MRN:  [id number]       Assessment / Plan:        30.0 - 39.9 Obese / Body mass index is 31.09 kg/m². Subacute CVA  Chronic hemorrhage in the R occipital lobe  Seen on MRI. CT imaging and CTA was deferred on admission due to no significant clinical concern for LVO and given the unclear time course. - Neurology consulted  TSH 1.5  A1c 6.8    Atorva 40  Plavix per Neurology, has ASA allergy  TTE with no shunt, did not comment on thrombus. EF normal  PT/OT rec IPR, case management consulted  - carotid duplex with intimal thickening in L ICA, otherwise normal  -as per nurse concern patient more weak today , head Ct was done and show no acute changes       Fever   -follow up UA   -WBC elevated   -check CXR      R arm pain  Mild swelling, suspect dependent edema but with point tenderness at below, no erythema or fluctuance noted. Mild R hand swelling.  - R arm US and elbow XR without abnormality  - lidocaine patch while inpatient     T2DM  Cont' SSI  Hold glyburide     HTN  Resume BB     Estimated discharge date:   Barriers:waiting for placement     Code status: Full  Prophylaxis: Lovenox  Recommended Disposition: SNF/LTC     Subjective:     Chief Complaint / Reason for Physician Visit  \"\". Discussed with RN events overnight. Peer to peer was called waiting to call back     Review of Systems:  Symptom Y/N Comments  Symptom Y/N Comments   Fever/Chills    Chest Pain     Poor Appetite    Edema     Cough    Abdominal Pain     Sputum    Joint Pain     SOB/GUTIERREZ    Pruritis/Rash     Nausea/vomit    Tolerating PT/OT     Diarrhea    Tolerating Diet     Constipation    Other       Could NOT obtain due to:      Objective:     VITALS:   Last 24hrs VS reviewed since prior progress note.  Most recent are:  Patient Vitals for the past 24 hrs:   Temp Pulse Resp BP SpO2   23 1125 97.9 °F (36.6 °C) 84 16 125/69 100 %   23 0753 97.9 °F (36.6 °C) 93 16 104/63 100 %   01/10/23 2224 (!) 100.8 °F (38.2 °C) 96 16 (!) 117/55 97 %   01/10/23 1547 99.7 °F (37.6 °C) 96 14 (!) 108/52 100 %       Intake/Output Summary (Last 24 hours) at 1/11/2023 1441  Last data filed at 1/10/2023 1911  Gross per 24 hour   Intake --   Output 500 ml   Net -500 ml        I had a face to face encounter and independently examined this patient on 1/11/2023, as outlined below:  PHYSICAL EXAM:  General: WD, WN. Alert, cooperative, no acute distress    EENT:  EOMI. Anicteric sclerae. MMM  Resp:  CTA bilaterally, no wheezing or rales. No accessory muscle use  CV:  Regular  rhythm,  No edema  GI:  Soft, Non distended, Non tender. +Bowel sounds  Neurologic:  Alert and oriented X 3, normal speech,   Psych:   Good insight. Not anxious nor agitated  Skin:  No rashes. No jaundice    Reviewed most current lab test results and cultures  YES  Reviewed most current radiology test results   YES  Review and summation of old records today    NO  Reviewed patient's current orders and MAR    YES  PMH/ reviewed - no change compared to H&P  ________________________________________________________________________  Care Plan discussed with:    Comments   Patient y    Family      RN y    Care Manager     Consultant                        Multidiciplinary team rounds were held today with , nursing, pharmacist and clinical coordinator. Patient's plan of care was discussed; medications were reviewed and discharge planning was addressed.      ________________________________________________________________________  Total NON critical care TIME:  35   Minutes    Total CRITICAL CARE TIME Spent:   Minutes non procedure based      Comments   >50% of visit spent in counseling and coordination of care     ________________________________________________________________________  Bigg Single, MD     Procedures: see electronic medical records for all procedures/Xrays and details which were not copied into this note but were reviewed prior to creation of Plan. LABS:  I reviewed today's most current labs and imaging studies. Pertinent labs include:  Recent Labs     01/11/23  0243 01/10/23  0200 01/09/23  0128   WBC 12.6* 10.4 9.0   HGB 12.3 12.3 12.3   HCT 37.2 37.1 37.8    246 263     Recent Labs     01/11/23  0243 01/10/23  0200 01/09/23  0128    138 141   K 4.7 4.3 4.4    107 108   CO2 23 26 27   * 158* 133*   BUN 24* 27* 28*   CREA 1.31* 1.24* 1.41*   CA 9.1 9.3 9.3   MG 2.0 2.0 2.1   PHOS 2.9 3.2 3.2       Signed:  Frank Vasquez MD

## 2023-01-11 NOTE — PROGRESS NOTES
CM received phone call from [facility] that patient needs a P2P completed today prior to 3:00pm for [place] review for patient to go to Acute Rehab. Information sent via [employer] to Dr. Danyel Pruitt and to LUCINA. Please provide patient name,  and Insurance FD#[id number]. Phone number to call - [phone].     Cherelle Narayanan RN, BSN, [address] [place] [address]  Manager of Case Management  [phone]

## 2023-01-11 NOTE — PROGRESS NOTES
Bedside shift change report given to Yuan RN (oncoming nurse) by Lelia Lawrence RN (offgoing nurse). Report included the following information SBAR, Kardex, Intake/Output, and MAR     Shift worked: 3-7P   Shift summary and any significant changes:     none   Concerns for physician to address:     Zone phone for oncoming shift:         Patient Information  Ravi Garcia  81 y.o.  1/7/2023  7:17 AM by Rashaad Moore MD. Ravi Garcia was admitted from Home     Problem List          Patient Active Problem List     Diagnosis Date Noted    CVA (cerebral vascular accident) ([address] 01/07/2023    PVC's (premature ventricular contractions) 10/10/2021    Abscess of abdominal wall 09/03/2019    Type 2 diabetes with nephropathy ([address] 04/17/2018    Statin intolerance 05/06/2016    Essential hypertension 01/06/2016    Encounter for medication monitoring 01/06/2016    Diabetes mellitus type 2, controlled ([address] 10/16/2015    Generalized OA 05/28/2014    Nonrheumatic tricuspid valve disorder 04/12/2012    Family history of ischemic heart disease 04/12/2012    Mixed hyperlipidemia 04/12/2012    Mitral valve disease 04/12/2012    Obesity, unspecified 04/12/2012    Osteoarthritis 05/04/2010    Hypovitaminosis D 05/04/2010              Past Medical History:   Diagnosis Date    Chronic edema      Diabetes ([address]      Family history of ischemic heart disease 4/12/2012    Hypercholesterolemia      Hypertension      PVC's (premature ventricular contractions) 10/10/2021         Core Measures:  CVA: Yes Yes        Activity:  Activity Level:  Up with Assistance        Cardiac:   Cardiac Monitoring: Yes      Cardiac Rhythm: Sinus Rhythm     Access:   Current line(s): PIV         Genitourinary:   Urinary status: voiding        Respiratory:   O2 Device: None (Room air)        GI:  Last Bowel Movement Date: 01/07/23  Current diet:  ADULT DIET Regular        Pain Management:   Patient states pain is manageable on current regimen: N/A     Skin:  Lopez Score: 17  Interventions: increase time out of bed, PT/OT consult, and nutritional support     Patient Safety:  Fall Score:  Total Score: 3  Interventions: bed/chair alarm, gripper socks, pt to call before getting OOB, and stay with me (per policy)  High Fall Risk: Yes  @Rollbelt  @dexterity to release roll belt  Yes/No ( must document dexterity  here by stating Yes or No here, otherwise this is a restraint and must follow restraint documentation policy.)     DVT prophylaxis:  DVT prophylaxis Med- Not applicable  DVT prophylaxis SCD or DENNYS- Yes      Wounds: (If Applicable)  Wounds- No  Location      Active Consults:  IP CONSULT TO HOSPITALIST  IP CONSULT TO NEUROLOGY     Length of Stay:  Expected LOS: - - -  Actual LOS: 1  Discharge Plan:  Rehab when ready

## 2023-01-11 NOTE — PROGRESS NOTES
Problem: Motor Speech Impaired (Adult)  Goal: *Acute Goals and Plan of Care (Insert Text)  Description: 1/9/2023  Speech path goals  1. Patient will produce conversational speech with precise articulation with 95% acc   Outcome: Progressing Towards Goal     SPEECH LANGUAGE PATHOLOGY TREATMENT  Patient: Brent Torres (89 y.o. female)  Date: 1/11/2023  Diagnosis: CVA (cerebral vascular accident) ([facility] [I63.9] <principal problem not specified>        ASSESSMENT:  Patient seen upright in bed for dysarthria treatment. Introduced motor speech strategies of speak loud, slow, and clear. Achieved motor speech intelligibility of 80% in conversation and 100% intelligibility with answering questions. Patient with ability to teach back 1/3 motor speech strategies at completion of treatment. Would benefit from continued SLP follow. PLAN:  Patient continues to benefit from skilled intervention to address the above impairments. Continue treatment per established plan of care. --encourage patient to speak loud, slow, and clear for improved overall intelligibility. Discharge Recommendations:  Inpatient Rehab     SUBJECTIVE:   Patient stated I would stay home. OBJECTIVE:   Mental Status:  Neurologic State: Alert  Orientation Level: Oriented X4  Cognition: Follows commands  Perception: Appears intact  Perseveration: No perseveration noted  Safety/Judgement: Lack of insight into deficits, Decreased awareness of need for assistance, Decreased awareness of need for safety, Fall prevention, Home safety  Treatment & Interventions: Motor Speech:  Conversation Intelligibility (%): 80 %  Intelligibility: Impaired        Sentence Intelligibility (%): 100 %  Conversation Intelligibility (%): 80 %           Dysarthric Characteristics: Imprecise;Blended word boundaries; Phonation/respiration incoordination     Language Comprehension and Expression:     Verbal Expression     Neuro-Linguistics: Voice:        Response & Tolerance to Activities:       Pain:  Pain Scale 1: Numeric (0 - 10)  Pain Intensity 1: 0  Pain Location 1: Arm;Leg    After treatment:   Patient left in no apparent distress in bed, Call bell within reach, and Nursing notified    COMMUNICATION/EDUCATION:   Patient was educated regarding her deficit(s) of dysarthria as this relates to her diagnosis of CVA. She demonstrated Fair understanding as evidenced by teachback of 1/3 motor speech strategies. The patient's plan of care including recommendations, planned interventions, and recommended diet changes were discussed with: Registered nurse.      Aisha Valerio, SLP  Time Calculation: 9 mins

## 2023-01-11 NOTE — PROGRESS NOTES
NIH scale 10 on assessment, previous shift was 4. Dr. Julian Costa notified, will continue to monitor.

## 2023-01-11 NOTE — PROGRESS NOTES
Bedside shift change report given to Danny Riley RN (oncoming nurse) by Heydi Amezcua RN (offgoing nurse). Report included the following information SBAR and Kardex.

## 2023-01-11 NOTE — PROGRESS NOTES
Problem: Self Care Deficits Care Plan (Adult)  Goal: *Acute Goals and Plan of Care (Insert Text)  Description: FUNCTIONAL STATUS PRIOR TO ADMISSION: Patient was independent and active without use of DME. Increased assist with mobility and ADL's recently    HOME SUPPORT: The patient lived alone with family, sister, granddaughter to provide assistance. Occupational Therapy Goals  Initiated 1/8/2023  1. Patient will maintain static sitting balance at midline seated edge of bed with bilateral UE's supported on bed within 7 day(s). 2.  Patient will position right hand hand on bed with palm down for support without cue within 7 day(s)  3. Patient will use right hand as dependent stabilizer during functional tasks with 1-2 cues within 7 day(s). 4.  Patient will stand with bilateral UE support at elbows > or = 2 minutes with moderate assist of 1 within 7 day(s)  5. Patient will squat pivot transfer to the left with moderate assist of 1 within 7 day(s)  6. Patient will squat pivot transfer to the right with maximal assist of 1 within 7 day(s). Outcome: Progressing Towards Goal   OCCUPATIONAL THERAPY TREATMENT  Patient: Maudie Romberg (36 y.o. female)  Date: 1/11/2023  Diagnosis: CVA (cerebral vascular accident) [facility]) [I63.9] <principal problem not specified>      Precautions: Fall  Chart, occupational therapy assessment, plan of care, and goals were reviewed. ASSESSMENT  Patient continues with skilled OT services and is slowly progressing towards goals. Pt is very stiff on R hemiparetic side. She seems to lack initiation to mobilize at bed level. Pt c/o pain with movement of RLE (especially ankle/foot) as well as all movement of RUE. Worked on gentle PROM motion in RUE and educated on self ROM, importance of using and moving RUE/RLE to facilitate recovery from cva, and attention to, caring for, and positioning RUE. She anticipated pain and was fearful during standing efforts.   Pt demonstrates poor insight into her deficits at this time as well as decreased initiation to mobilize and perform self care. Noted vision concerns, re: decreased awareness of R side/R UE. Pt does not report vision changes and testing was not performed this date. .       Current Level of Function Impacting Discharge (ADLs): maximal to total assistance for adls; assist X2 for mobility    Other factors to consider for discharge: possible field cut noted-lower, pt lives alone         PLAN :  Patient continues to benefit from skilled intervention to address the above impairments. Continue treatment per established plan of care to address goals. Recommend with staff: encourage participation in self care activities; encourage appropriate positioning of RUE-  R shoulder abduction and elevation on pillow. Pt reports signficant arthritis at baseline which may be contributing to her joint stiffness and pain. Recommend next OT session: POC    Recommendation for discharge: (in order for the patient to meet his/her long term goals)  Therapy 3 hours per day 5-7 days per week    This discharge recommendation:  Has not yet been discussed the attending provider and/or case management    IF patient discharges home will need the following DME: tbd in rehab       SUBJECTIVE:   Patient stated that hurts.     OBJECTIVE DATA SUMMARY:   Cognitive/Behavioral Status:  Neurologic State: Alert  Orientation Level: Oriented to person;Oriented to place  Cognition: Follows commands (seems to be [place]  vs. receptive aphasia vs. both)  Perception: Cues to attend to right side of body;Cues to attend right visual field;Cues to maintain midline in sitting  Perseveration: No perseveration noted  Safety/Judgement: Decreased insight into deficits; Fall prevention;Decreased awareness of environment;Decreased awareness of need for assistance;Decreased awareness of need for safety    Functional Mobility and Transfers for ADLs:  Bed Mobility:  Rolling: Maximum assistance  Supine to Sit: Maximum assistance  Sit to Supine: Maximum assistance;Assist x2  Scooting: Maximum assistance    Transfers:  Sit to Stand: Maximum assistance;Assist x2  Functional Transfers  Toilet Transfer :  (unable to stand/pivot at this time)       Balance:  Sitting: Impaired  Sitting - Static: Fair (occasional) (cues for midline awareness, postural control)  Sitting - Dynamic:  (limited, pt appears fearful to lean forward in preparation for standing)  Standing: Impaired; With support  Standing - Static: Constant support;Poor  Standing - Dynamic : Poor;Constant support    ADL Intervention:  Feeding  Feeding Assistance:  (not assessed this date)              Suspect that pt may have visual field cuts (lower). Lower Body Bathing  Perineal  : Total assistance (dependent)         Lower Body Dressing Assistance  Socks: Total assistance (dependent)    Toileting  Toileting Assistance: Total assistance(dependent)  Bladder Hygiene: Total assistance (dependent)  Bowel Hygiene: Total assistance (dependent)  Clothing Management: Total assistance (dependent)  Cues: Verbal cues provided; Tactile cues provided  Adaptive Equipment:  (bed rail--R--pt unable to use RUE to grasp L rail)    Cognitive Retraining  Following Commands: Follows one step commands/directions (needs occasional repeititon--[place]?)  Safety/Judgement: Decreased insight into deficits; Fall prevention;Decreased awareness of environment;Decreased awareness of need for assistance;Decreased awareness of need for safety  Cues: Verbal cues provided; Tactile cues provided;Visual cues provided    Therapeutic Exercises:   Very gentle PROM to pt's pain tolerance. Educated on self ROM RUE--encourage grasp on R wrist area (not fingers) for safety    Pain:  Did not rate pain.   Pt is quite painful, crying out with little movement in RUE/RLE    Activity Tolerance:   No complaints    After treatment patient left in no apparent distress:   Supine in bed, Call bell within reach, Bed / chair alarm activated, and Side rails x 3    COMMUNICATION/COLLABORATION:   The patients plan of care was discussed with: Physical therapist.     Michele Mauricio, OTR/L  Time Calculation: 51 mins

## 2023-01-11 NOTE — PROGRESS NOTES
Transition of Care Plan:     RUR: 13%     DARBY: STABLE: 1/13?  1/11: Insurance would not cover Acute Rehab. SNF referal Pending  Auth will need to be obtained     Disposition:SNF: Pending    4:36 PM   CM received message from the MD stating that insurance denied auth for Acute Rehab. CM just met with Pt and DTR and she stated she would like referral sent to [facility]: Pending. CM will need to check with [address] [place] [address],[address] in the morning and see if they can accept. . if so . . start auth. DTR is going to review SNF choice list and provide additional choice list.     2 PM   CM supervisor was notified of Insurance requesting a Peer to Peer. Information was added to chart under Levar Connolly CM note. CM sent [facility] to MD and he stated he was trying to do Peer to Peer. CM talked briefly to DTR and let her know that insurance is requesting a Peer to Peer and that if they will not cover Acute Rehab we can try for SNF. CM provided choice list and she is reviewing just in case but all parties are hoping for Acute Rehab if insurance will cover. 9:16 AM   [facility]: Accepted. CM talked to [facility] : [phone] and they can accept. They started auth 1/9/23 - pending. Auth still pending. CM updated family. If SNF or IPR: Date FOC offered: 1/8/23  Date [address] received: 1/8/23  Date authorization started with reference number: 1/9/23. CM requested auth reference number. Date authorization received and expires: TBD  Accepting facility: [facility]     Follow up appointments: PCP & specialists as indicated  DME needed: TBD - has cane, shower seat  Transportation at Discharge: Medicaid BLS  [place] or means to access home: N/a       IM Medicare Letter: 2nd IM letter needed at d/c  Is patient a [place] and connected with the [place]? N/a              If yes, was [employer] transfer form completed and VA notified?  N/a  Caregiver Contact: Derek Hwang DTR/[facility] ([phone])  Discharge Caregiver contacted prior to discharge? yes  Care Conference needed?:  No        CM will continue to monitor discharge plan.       Jp Vela, Neuro CM  Ext [phone]

## 2023-01-11 NOTE — PROGRESS NOTES
Bedside shift change report given to [address] [place] [address] (oncoming nurse) by Delores Lund (offgoing nurse). Report included the following information SBAR, Kardex, Intake/Output, and MAR     Shift worked:  Nights   Shift summary and any significant changes:     Patient complained of pain in R leg and R arm and had a fever of 100.8. Due to various allergies patient was ordered Tramadol for pain and to apply ice pack for fever. Fever decreased over the night. Concerns for physician to address:  none   Zone phone for oncoming shift:   1711      Patient Information  Quinten Narvaez  81 y.o.  1/7/2023  7:17 AM by Yoan Florian MD. Quinten Narvaez was admitted from Home     Problem List          Patient Active Problem List     Diagnosis Date Noted    CVA (cerebral vascular accident) ([address] 01/07/2023    PVC's (premature ventricular contractions) 10/10/2021    Abscess of abdominal wall 09/03/2019    Type 2 diabetes with nephropathy ([address] 04/17/2018    Statin intolerance 05/06/2016    Essential hypertension 01/06/2016    Encounter for medication monitoring 01/06/2016    Diabetes mellitus type 2, controlled ([address] 10/16/2015    Generalized OA 05/28/2014    Nonrheumatic tricuspid valve disorder 04/12/2012    Family history of ischemic heart disease 04/12/2012    Mixed hyperlipidemia 04/12/2012    Mitral valve disease 04/12/2012    Obesity, unspecified 04/12/2012    Osteoarthritis 05/04/2010    Hypovitaminosis D 05/04/2010              Past Medical History:   Diagnosis Date    Chronic edema      Diabetes ([address]      Family history of ischemic heart disease 4/12/2012    Hypercholesterolemia      Hypertension      PVC's (premature ventricular contractions) 10/10/2021         Core Measures:  CVA: Yes Yes        Activity:  Activity Level:  Up with Assistance        Cardiac:   Cardiac Monitoring: Yes      Cardiac Rhythm: Sinus Rhythm     Access:   Current line(s): PIV         Genitourinary:   Urinary status: voiding        Respiratory:   O2 Device: None (Room air)        GI:  Last Bowel Movement Date: 01/07/23  Current diet:  ADULT DIET Regular        Pain Management:   Patient states pain is manageable on current regimen: N/A     Skin:  Lopez Score: 17  Interventions: increase time out of bed, PT/OT consult, and nutritional support     Patient Safety:  Fall Score:  Total Score: 3  Interventions: bed/chair alarm, gripper socks, pt to call before getting OOB, and stay with me (per policy)  High Fall Risk: Yes  @Rollbelt  @dexterity to release roll belt  Yes/No ( must document dexterity  here by stating Yes or No here, otherwise this is a restraint and must follow restraint documentation policy.)     DVT prophylaxis:  DVT prophylaxis Med- Not applicable  DVT prophylaxis SCD or DENNYS- Yes      Wounds: (If Applicable)  Wounds- No  Location      Active Consults:  IP CONSULT TO HOSPITALIST  IP CONSULT TO NEUROLOGY     Length of Stay:  Expected LOS: - - -  Actual LOS: 1  Discharge Plan:  Rehab when ready

## 2023-01-11 NOTE — PROGRESS NOTES
Problem: Mobility Impaired (Adult and Pediatric)  Goal: *Acute Goals and Plan of Care (Insert Text)  Description: FUNCTIONAL STATUS PRIOR TO ADMISSION: Independent w/ household ambulation, use of SPC in the community. Denies history of falls. HOME SUPPORT PRIOR TO ADMISSION: The patient lived alone however states her grandson plans to move in with her. Physical Therapy Goals  Initiated 1/8/2023  1. Patient will move from supine to sit and sit to supine , scoot up and down, and roll side to side in bed with supervision/set-up within 7 day(s). 2.  Patient will transfer from bed to chair and chair to bed with minimal assistance using the least restrictive device within 7 day(s). 3.  Patient will perform sit to stand with minimal assistance assist within 7 day(s). 4.  Patient will ambulate with minimal assistance assist for 50 feet with the least restrictive device within 7 day(s). 6.  Patient will improve [place] Balance score by 7 points within 7 days. Outcome: Progressing Towards Goal   PHYSICAL THERAPY TREATMENT  Patient: Ravi Garcia (24 y.o. female)  Date: 1/11/2023  Diagnosis: CVA (cerebral vascular accident) ([facility] [I63.9] <principal problem not specified>      Precautions: Fall, Skin, Bed Alarm  Chart, physical therapy assessment, plan of care and goals were reviewed. ASSESSMENT  Patient continues with skilled PT services and is progressing towards goals. Patient lying in bed when PT arrived and agreed to treatment. Provided exercises to RLE - ankle df was 1/5, knee extension 2-/5, hip flex 2-/5, hip add 3-/5. Noted increased tenderness about R ankle with pain limiting dorsiflexion greater than neutral. Noted patient was incontinent of bowel and bladder. Needed max a for rolling and cuing to use rails for clean up with OT assistance. Afterwards came to sitting with max a using log roll technique. Sitting balance was fair at first with patient leaning to right and backwards.  After a couple minutes with reaching activities she was good at maintaining midline orientation. Assisted to standing with max a x 2 with both therapist's blocking patients knees. She was able to come to partial standing only after the 3rd attempt and stood < 30 seconds. Patient was then trained in side scooting to the head of bed with mod/max a x 2. Returned to supine with max a x 2 and positioned in semi-mccormick with patient resting comfortably. During the session, she reported dizziness, but BP was minimally changed from supine each time it was checked. Current Level of Function Impacting Discharge (mobility/balance): max a x 2    Other factors to consider for discharge: modified independent community ambulator with cane; bernadette plans to move in with patient once back home; will benefit from IPR and is capable of tolerating 15 hours of rehab per week. PLAN :  Patient continues to benefit from skilled intervention to address the above impairments. Continue treatment per established plan of care. to address goals. Recommendation for discharge: (in order for the patient to meet his/her long term goals)  Therapy 3 hours per day 5-7 days per week    This discharge recommendation:  Has been made in collaboration with the attending provider and/or case management    IF patient discharges home will need the following DME: hospital bed, mechanical lift, wheelchair, and specially mattress. SUBJECTIVE:   Patient stated  my right arm is good for nothing.      OBJECTIVE DATA SUMMARY:   Critical Behavior:  Neurologic State: Alert  Orientation Level: Oriented to person, Oriented to place  Cognition: Follows commands (seems to be [place]  vs. receptive aphasia vs. both)  Safety/Judgement: Decreased insight into deficits, Fall prevention, Decreased awareness of environment, Decreased awareness of need for assistance, Decreased awareness of need for safety  Functional Mobility Training:  Bed Mobility:  Rolling: Maximum assistance  Supine to Sit: Maximum assistance  Sit to Supine: Maximum assistance;Assist x2  Scooting: Maximum assistance        Transfers:  Sit to Stand: Maximum assistance;Assist x2  Stand to Sit: Maximum assistance;Assist x2                             Balance:  Sitting: Impaired  Sitting - Static: Fair (occasional) (cues for midline awareness, postural control)  Sitting - Dynamic:  (limited, pt appears fearful to lean forward in preparation for standing)  Standing: Impaired; With support  Standing - Static: Constant support;Poor  Standing - Dynamic : Poor;Constant support    Therapeutic Exercises:   PROM/AAROM/quick stretches with manual resistance to R ankle, knee and hip. 5 - 10 reps. Pain Ratin-8 R ankle with moderate lateral palpation and dorsiflexion > neutral.    Activity Tolerance:   Fair, SpO2 stable on RA, and requires rest breaks    After treatment patient left in no apparent distress:   Supine in bed, Heels elevated for pressure relief, Call bell within reach, Bed / chair alarm activated, and Side rails x 3    COMMUNICATION/COLLABORATION:   The patients plan of care was discussed with: Occupational therapist and Registered nurse.      Fiordaliza James, PT   Time Calculation: 47 mins

## 2023-01-12 LAB
ANION GAP SERPL CALC-SCNC: 7 MMOL/L (ref 5–15)
BASOPHILS # BLD: 0.1 K/UL (ref 0–0.1)
BASOPHILS NFR BLD: 1 % (ref 0–1)
BUN SERPL-MCNC: 36 MG/DL (ref 6–20)
BUN/CREAT SERPL: 25 (ref 12–20)
CALCIUM SERPL-MCNC: 9.4 MG/DL (ref 8.5–10.1)
CHLORIDE SERPL-SCNC: 102 MMOL/L (ref 97–108)
CO2 SERPL-SCNC: 25 MMOL/L (ref 21–32)
CREAT SERPL-MCNC: 1.45 MG/DL (ref 0.55–1.02)
DIFFERENTIAL METHOD BLD: ABNORMAL
EOSINOPHIL # BLD: 0.1 K/UL (ref 0–0.4)
EOSINOPHIL NFR BLD: 1 % (ref 0–7)
ERYTHROCYTE [DISTWIDTH] IN BLOOD BY AUTOMATED COUNT: 13.6 % (ref 11.5–14.5)
GLUCOSE BLD STRIP.AUTO-MCNC: 194 MG/DL (ref 65–117)
GLUCOSE BLD STRIP.AUTO-MCNC: 240 MG/DL (ref 65–117)
GLUCOSE BLD STRIP.AUTO-MCNC: 242 MG/DL (ref 65–117)
GLUCOSE BLD STRIP.AUTO-MCNC: 270 MG/DL (ref 65–117)
GLUCOSE SERPL-MCNC: 180 MG/DL (ref 65–100)
HCT VFR BLD AUTO: 34.2 % (ref 35–47)
HGB BLD-MCNC: 11.4 G/DL (ref 11.5–16)
IMM GRANULOCYTES # BLD AUTO: 0.1 K/UL (ref 0–0.04)
IMM GRANULOCYTES NFR BLD AUTO: 1 % (ref 0–0.5)
LYMPHOCYTES # BLD: 1.7 K/UL (ref 0.8–3.5)
LYMPHOCYTES NFR BLD: 17 % (ref 12–49)
MAGNESIUM SERPL-MCNC: 2.1 MG/DL (ref 1.6–2.4)
MCH RBC QN AUTO: 27.1 PG (ref 26–34)
MCHC RBC AUTO-ENTMCNC: 33.3 G/DL (ref 30–36.5)
MCV RBC AUTO: 81.4 FL (ref 80–99)
MONOCYTES # BLD: 1.2 K/UL (ref 0–1)
MONOCYTES NFR BLD: 12 % (ref 5–13)
NEUTS SEG # BLD: 7.2 K/UL (ref 1.8–8)
NEUTS SEG NFR BLD: 68 % (ref 32–75)
NRBC # BLD: 0 K/UL (ref 0–0.01)
NRBC BLD-RTO: 0 PER 100 WBC
PHOSPHATE SERPL-MCNC: 3.3 MG/DL (ref 2.6–4.7)
PLATELET # BLD AUTO: 227 K/UL (ref 150–400)
PMV BLD AUTO: 10.5 FL (ref 8.9–12.9)
POTASSIUM SERPL-SCNC: 4.6 MMOL/L (ref 3.5–5.1)
RBC # BLD AUTO: 4.2 M/UL (ref 3.8–5.2)
SERVICE CMNT-IMP: ABNORMAL
SODIUM SERPL-SCNC: 134 MMOL/L (ref 136–145)
WBC # BLD AUTO: 10.4 K/UL (ref 3.6–11)

## 2023-01-12 PROCEDURE — 84100 ASSAY OF PHOSPHORUS: CPT

## 2023-01-12 PROCEDURE — 74011636637 HC RX REV CODE- 636/637: Performed by: INTERNAL MEDICINE

## 2023-01-12 PROCEDURE — 85025 COMPLETE CBC W/AUTO DIFF WBC: CPT

## 2023-01-12 PROCEDURE — 74011250637 HC RX REV CODE- 250/637: Performed by: INTERNAL MEDICINE

## 2023-01-12 PROCEDURE — 74011250637 HC RX REV CODE- 250/637: Performed by: STUDENT IN AN ORGANIZED HEALTH CARE EDUCATION/TRAINING PROGRAM

## 2023-01-12 PROCEDURE — 80048 BASIC METABOLIC PNL TOTAL CA: CPT

## 2023-01-12 PROCEDURE — 97530 THERAPEUTIC ACTIVITIES: CPT | Performed by: PHYSICAL THERAPIST

## 2023-01-12 PROCEDURE — 97535 SELF CARE MNGMENT TRAINING: CPT | Performed by: OCCUPATIONAL THERAPIST

## 2023-01-12 PROCEDURE — 97530 THERAPEUTIC ACTIVITIES: CPT | Performed by: OCCUPATIONAL THERAPIST

## 2023-01-12 PROCEDURE — 83735 ASSAY OF MAGNESIUM: CPT

## 2023-01-12 PROCEDURE — 82962 GLUCOSE BLOOD TEST: CPT

## 2023-01-12 PROCEDURE — 65270000046 HC RM TELEMETRY

## 2023-01-12 PROCEDURE — 36415 COLL VENOUS BLD VENIPUNCTURE: CPT

## 2023-01-12 PROCEDURE — 97112 NEUROMUSCULAR REEDUCATION: CPT | Performed by: OCCUPATIONAL THERAPIST

## 2023-01-12 RX ADMIN — CLOPIDOGREL BISULFATE 75 MG: 75 TABLET ORAL at 08:48

## 2023-01-12 RX ADMIN — LATANOPROST 1 DROP: 50 SOLUTION OPHTHALMIC at 21:35

## 2023-01-12 RX ADMIN — Medication 2 UNITS: at 16:32

## 2023-01-12 RX ADMIN — Medication 2 UNITS: at 22:17

## 2023-01-12 RX ADMIN — ATORVASTATIN CALCIUM 40 MG: 40 TABLET, FILM COATED ORAL at 21:35

## 2023-01-12 NOTE — PROGRESS NOTES
Transition of Care Plan:     RUR: 14%  Disposition:SNF: [address] [facility] (Oriana Jeong started 1/12/23)  If SNF or IPR: Date FOC offered: 1/8/23  Date [address] received: 1/8/23  Date authorization started with reference number: 1/9/23. CM requested auth reference number. Date authorization received and expires: TBD  Accepting facility: [facility]     Follow up appointments: PCP & specialists as indicated  DME needed: TBD - has cane, shower seat  Transportation at Discharge: Medicaid BLS  [place] or means to access home: N/a       IM Medicare Letter: 2nd IM letter needed at d/c  Is patient a [place] and connected with the [facility]? N/a              If yes, was [place] transfer form completed and VA notified? N/a  Caregiver Contact: Hallie Gallardo DTR/[place] ([phone])  Discharge Caregiver contacted prior to discharge? yes  Care Conference needed?:  No      12 p.m. [address] [facility],[address] accepted pt. CM spoke with pt's daughter to inform. Daughter wants to proceed. CM updated [address] [place] [address],[address] and selected them in cclink. 9:45 a.m. CM approached by pt's daughter in [facility] who was asking for an update. CM advised referral sent to [address] [place] [address],[address] is pending at this time. CM asked for daughter to review list and pick a couple other options she would like referral sent to. CM acknowledged d/c order. CM checked referral via cclink for [facility] and it is pending at this time. CM will continue to check.     Bozena Causey, MSW  Care Management, [address]

## 2023-01-12 NOTE — PROGRESS NOTES
Problem: Pressure Injury - Risk of  Goal: *Prevention of pressure injury  Description: Document Lopez Scale and appropriate interventions in the flowsheet.   Outcome: Progressing Towards Goal  Note: Pressure Injury Interventions:  Sensory Interventions: Assess changes in LOC    Moisture Interventions: Absorbent underpads, Internal/External urinary devices    Activity Interventions: Increase time out of bed, PT/OT evaluation    Mobility Interventions: HOB 30 degrees or less, PT/OT evaluation    Nutrition Interventions: Document food/fluid/supplement intake    Friction and Shear Interventions: HOB 30 degrees or less                Problem: Patient Education: Go to Patient Education Activity  Goal: Patient/Family Education  Outcome: Progressing Towards Goal     Problem: Patient Education: Go to Patient Education Activity  Goal: Patient/Family Education  Outcome: Progressing Towards Goal     Problem: TIA/CVA Stroke: 0-24 hours  Goal: Off Pathway (Use only if patient is Off Pathway)  Outcome: Progressing Towards Goal  Goal: Activity/Safety  Outcome: Progressing Towards Goal  Goal: Consults, if ordered  Outcome: Progressing Towards Goal  Goal: Diagnostic Test/Procedures  Outcome: Progressing Towards Goal  Goal: Nutrition/Diet  Outcome: Progressing Towards Goal  Goal: Discharge Planning  Outcome: Progressing Towards Goal  Goal: Medications  Outcome: Progressing Towards Goal  Goal: Respiratory  Outcome: Progressing Towards Goal  Goal: Treatments/Interventions/Procedures  Outcome: Progressing Towards Goal  Goal: Minimize risk of bleeding post-thrombolytic infusion  Outcome: Progressing Towards Goal  Goal: Monitor for complications post-thrombolytic infusion  Outcome: Progressing Towards Goal  Goal: Psychosocial  Outcome: Progressing Towards Goal  Goal: *Hemodynamically stable  Outcome: Progressing Towards Goal  Goal: *Neurologically stable  Description: Absence of additional neurological deficits    Outcome: Progressing Towards Goal  Goal: *Verbalizes anxiety and depression are reduced or absent  Outcome: Progressing Towards Goal  Goal: *Absence of Signs of Aspiration on Current Diet  Outcome: Progressing Towards Goal  Goal: *Absence of deep venous thrombosis signs and symptoms(Stroke Metric)  Outcome: Progressing Towards Goal  Goal: *Ability to perform ADLs and demonstrates progressive mobility and function  Outcome: Progressing Towards Goal  Goal: *Stroke education started(Stroke Metric)  Outcome: Progressing Towards Goal  Goal: *Dysphagia screen performed(Stroke Metric)  Outcome: Progressing Towards Goal  Goal: *Rehab consulted(Stroke Metric)  Outcome: Progressing Towards Goal     Problem: TIA/CVA Stroke: Day 2 Until Discharge  Goal: Off Pathway (Use only if patient is Off Pathway)  Outcome: Progressing Towards Goal  Goal: Activity/Safety  Outcome: Progressing Towards Goal  Goal: Diagnostic Test/Procedures  Outcome: Progressing Towards Goal  Goal: Nutrition/Diet  Outcome: Progressing Towards Goal  Goal: Discharge Planning  Outcome: Progressing Towards Goal  Goal: Medications  Outcome: Progressing Towards Goal  Goal: Respiratory  Outcome: Progressing Towards Goal  Goal: Treatments/Interventions/Procedures  Outcome: Progressing Towards Goal  Goal: Psychosocial  Outcome: Progressing Towards Goal  Goal: *Verbalizes anxiety and depression are reduced or absent  Outcome: Progressing Towards Goal  Goal: *Absence of aspiration  Outcome: Progressing Towards Goal  Goal: *Absence of deep venous thrombosis signs and symptoms(Stroke Metric)  Outcome: Progressing Towards Goal  Goal: *Optimal pain control at patient's stated goal  Outcome: Progressing Towards Goal  Goal: *Tolerating diet  Outcome: Progressing Towards Goal  Goal: *Ability to perform ADLs and demonstrates progressive mobility and function  Outcome: Progressing Towards Goal  Goal: *Stroke education continued(Stroke Metric)  Outcome: Progressing Towards Goal     Problem: Ischemic Stroke: Discharge Outcomes  Goal: *Verbalizes anxiety and depression are reduced or absent  Outcome: Progressing Towards Goal  Goal: *Verbalize understanding of risk factor modification(Stroke Metric)  Outcome: Progressing Towards Goal  Goal: *Hemodynamically stable  Outcome: Progressing Towards Goal  Goal: *Absence of aspiration pneumonia  Outcome: Progressing Towards Goal  Goal: *Aware of needed dietary changes  Outcome: Progressing Towards Goal  Goal: *Verbalize understanding of prescribed medications including anti-coagulants, anti-lipid, and/or anti-platelets(Stroke Metric)  Outcome: Progressing Towards Goal  Goal: *Tolerating diet  Outcome: Progressing Towards Goal  Goal: *Aware of follow-up diagnostics related to anticoagulants  Outcome: Progressing Towards Goal  Goal: *Ability to perform ADLs and demonstrates progressive mobility and function  Outcome: Progressing Towards Goal  Goal: *Absence of DVT(Stroke Metric)  Outcome: Progressing Towards Goal  Goal: *Absence of aspiration  Outcome: Progressing Towards Goal  Goal: *Optimal pain control at patient's stated goal  Outcome: Progressing Towards Goal  Goal: *Home safety concerns addressed  Outcome: Progressing Towards Goal  Goal: *Describes available resources and support systems  Outcome: Progressing Towards Goal  Goal: *Verbalizes understanding of activation of EMS(911) for stroke symptoms(Stroke Metric)  Outcome: Progressing Towards Goal  Goal: *Understands and describes signs and symptoms to report to providers(Stroke Metric)  Outcome: Progressing Towards Goal  Goal: *Neurolgocially stable (absence of additional neurological deficits)  Outcome: Progressing Towards Goal  Goal: *Verbalizes importance of follow-up with primary care physician(Stroke Metric)  Outcome: Progressing Towards Goal  Goal: *Smoking cessation discussed,if applicable(Stroke Metric)  Outcome: Progressing Towards Goal  Goal: *Depression screening completed(Stroke Metric)  Outcome: Progressing Towards Goal     Problem: Falls - Risk of  Goal: *Absence of Falls  Description: Document Jose Antonio Foote Fall Risk and appropriate interventions in the flowsheet. Outcome: Progressing Towards Goal     Problem: Patient Education: Go to Patient Education Activity  Goal: Patient/Family Education  Outcome: Progressing Towards Goal     Problem: Diabetes Self-Management  Goal: *Disease process and treatment process  Description: Define diabetes and identify own type of diabetes; list 3 options for treating diabetes. Outcome: Progressing Towards Goal  Goal: *Incorporating nutritional management into lifestyle  Description: Describe effect of type, amount and timing of food on blood glucose; list 3 methods for planning meals. Outcome: Progressing Towards Goal  Goal: *Incorporating physical activity into lifestyle  Description: State effect of exercise on blood glucose levels. Outcome: Progressing Towards Goal  Goal: *Developing strategies to promote health/change behavior  Description: Define the ABC's of diabetes; identify appropriate screenings, schedule and personal plan for screenings. Outcome: Progressing Towards Goal  Goal: *Using medications safely  Description: State effect of diabetes medications on diabetes; name diabetes medication taking, action and side effects. Outcome: Progressing Towards Goal  Goal: *Monitoring blood glucose, interpreting and using results  Description: Identify recommended blood glucose targets  and personal targets. Outcome: Progressing Towards Goal  Goal: *Prevention, detection, treatment of acute complications  Description: List symptoms of hyper- and hypoglycemia; describe how to treat low blood sugar and actions for lowering  high blood glucose level.   Outcome: Progressing Towards Goal  Goal: *Prevention, detection and treatment of chronic complications  Description: Define the natural course of diabetes and describe the relationship of blood glucose levels to long term complications of diabetes.   Outcome: Progressing Towards Goal  Goal: *Developing strategies to address psychosocial issues  Description: Describe feelings about living with diabetes; identify support needed and support network  Outcome: Progressing Towards Goal  Goal: *Insulin pump training  Outcome: Progressing Towards Goal  Goal: *Sick day guidelines  Outcome: Progressing Towards Goal  Goal: *Patient Specific Goal (EDIT GOAL, INSERT TEXT)  Outcome: Progressing Towards Goal

## 2023-01-12 NOTE — PROGRESS NOTES
Problem: Mobility Impaired (Adult and Pediatric)  Goal: *Acute Goals and Plan of Care (Insert Text)  Description: FUNCTIONAL STATUS PRIOR TO ADMISSION: Independent w/ household ambulation, use of SPC in the community. Denies history of falls. HOME SUPPORT PRIOR TO ADMISSION: The patient lived alone however states her grandson plans to move in with her. Physical Therapy Goals  Initiated 1/8/2023  1. Patient will move from supine to sit and sit to supine , scoot up and down, and roll side to side in bed with supervision/set-up within 7 day(s). 2.  Patient will transfer from bed to chair and chair to bed with minimal assistance using the least restrictive device within 7 day(s). 3.  Patient will perform sit to stand with minimal assistance assist within 7 day(s). 4.  Patient will ambulate with minimal assistance assist for 50 feet with the least restrictive device within 7 day(s). 6.  Patient will improve Augustin Balance score by 7 points within 7 days. Outcome: Progressing Towards Goal   PHYSICAL THERAPY TREATMENT  Patient: Sergio Riding (31 y.o. female)  Date: 1/12/2023  Diagnosis: CVA (cerebral vascular accident) [facility]) [I63.9] <principal problem not specified>      Precautions: Fall, Skin, Bed Alarm  Chart, physical therapy assessment, plan of care and goals were reviewed. ASSESSMENT  Patient continues with skilled PT services and is progressing towards goals. Patient overall limited by diffuse joint pain, gait instability, impaired balance, R sided weakness and decreased activity tolerance. Currently needing maxA x 2 to come to EOB with good sitting balance once feet on the floor. Sit to stand with maxA x 2 and only able to stand briefly for brief and linen change. Patient continues to be well below her functional baseline and recommend SNF rehab to progress to more independent level of function. Anticipate slow progress overall.        Other factors to consider for discharge: at risk for falls, below baseline, R sided pain limits ability to participate         PLAN :  Patient continues to benefit from skilled intervention to address the above impairments. Continue treatment per established plan of care. to address goals. Recommendation for discharge: (in order for the patient to meet his/her long term goals)  Therapy up to 5 days/week in SNF setting        IF patient discharges home will need the following DME: to be determined (TBD)       SUBJECTIVE:   Patient stated I had a lot of company earlier today. I want to just sit right here.     OBJECTIVE DATA SUMMARY:   Critical Behavior:  Neurologic State: Alert  Orientation Level: Oriented to person, Oriented to place  Cognition: Follows commands  Safety/Judgement: Decreased insight into deficits, Decreased awareness of need for safety, Decreased awareness of environment  Functional Mobility Training:  Bed Mobility:  Rolling: Maximum assistance  Supine to Sit: Maximum assistance  Sit to Supine: Maximum assistance;Assist x2  Scooting: Maximum assistance        Transfers:  Sit to Stand: Maximum assistance;Assist x2  Stand to Sit: Maximum assistance;Assist x2                             Balance:  Sitting: Impaired  Sitting - Static: Fair (occasional)  Sitting - Dynamic:  (not tested)  Standing: Impaired; Without support (assoist X2 max A)  Standing - Static: Constant support;Poor (Assist X2)  Standing - Dynamic : Constant support; Not tested  Ambulation/Gait Training:             Pain Rating:  Reports pain with movement    Activity Tolerance:   Fair and requires rest breaks    After treatment patient left in no apparent distress:   Supine in bed, Call bell within reach, Bed / chair alarm activated, Caregiver / family present, and Side rails x 3    COMMUNICATION/COLLABORATION:   The patients plan of care was discussed with: Physical therapist, Occupational therapist, and Registered nurse.      Celeste Rojas, PT, DPT   Time Calculation: 32 mins

## 2023-01-12 NOTE — PROGRESS NOTES
Problem: Self Care Deficits Care Plan (Adult)  Goal: *Acute Goals and Plan of Care (Insert Text)  Description: FUNCTIONAL STATUS PRIOR TO ADMISSION: Patient was independent and active without use of DME. Increased assist with mobility and ADL's recently    HOME SUPPORT: The patient lived alone with family, sister, granddaughter to provide assistance. Occupational Therapy Goals  Initiated 1/8/2023  1. Patient will maintain static sitting balance at midline seated edge of bed with bilateral UE's supported on bed within 7 day(s). 2.  Patient will position right hand hand on bed with palm down for support without cue within 7 day(s)  3. Patient will use right hand as dependent stabilizer during functional tasks with 1-2 cues within 7 day(s). 4.  Patient will stand with bilateral UE support at elbows > or = 2 minutes with moderate assist of 1 within 7 day(s)  5. Patient will squat pivot transfer to the left with moderate assist of 1 within 7 day(s)  6. Patient will squat pivot transfer to the right with maximal assist of 1 within 7 day(s). Outcome: slowly Progressing Towards Goal   OCCUPATIONAL THERAPY TREATMENT  Patient: Vamsi Swain (58 y.o. female)  Date: 1/12/2023  Diagnosis: CVA (cerebral vascular accident) [facility]) [I63.9] <principal problem not specified>      Precautions: Fall, Skin, Bed Alarm  Chart, occupational therapy assessment, plan of care, and goals were reviewed. ASSESSMENT  Patient continues with skilled OT services and is slowly progressing towards goals. Pt appeared to be somewhat less painful in general during mobility efforts this date. She continues to have pain with any movement of RUE-passive. She has 1+/5 R shoulder elevation and generally throughout elbow and wrist 1/5 (flexion)  as well. Her R fingers remain very stiff (very little passive joint movement tolerated due to pain.  and 0/5 AROM) Pt reported today that she usually applies Voltaren cream to her hands in the mornings at baseline (due to her arthritis). Standing for incontinence care (total A)  was performed with max A X2 briefly (~10 seconds). Encouraged sensory input into RUE using LUE this date using warmth, rough wash cloth, and smooth lotion in a retrograde fashion. Pt will need reinforcement and needed hand over hand assistance to perform this new task. Pt continues to need assistance for using her LUE for positioning her RUE at bed level and in sitting. Pt demonstrates limited attention to her RUE at this time. Pt will benefit from intensive inpatient rehab at discharge. Current Level of Function Impacting Discharge (ADLs): complex and multiple factors re: stroke recovery appropriate for inpatient rehab admission    Other factors to consider for discharge: was independent and living alone PTAS; supportive family. PLAN :  Patient continues to benefit from skilled intervention to address the above impairments. Continue treatment per established plan of care to address goals. Recommend with staff: encourage pt to position her RUE appropriately. Participate in self care. Recommend next OT session: POC . Simple grooming tasks seated EOB    Recommendation for discharge: (in order for the patient to meet his/her long term goals)  Therapy 3 hours per day 5-7 days per week    This discharge recommendation:  Has been made in collaboration with the attending provider and/or case management    IF patient discharges home will need the following DME: tbd in rehab setting       SUBJECTIVE:   Patient stated my heel hurts.   (R heel)    OBJECTIVE DATA SUMMARY:   Cognitive/Behavioral Status:  Neurologic State: Alert  Orientation Level: Oriented to person;Oriented to place  Cognition: Follows commands  Perception: Cues to attend right visual field;Cues to attend to right side of body  Perseveration: No perseveration noted  Safety/Judgement: Decreased insight into deficits; Decreased awareness of need for safety;Decreased awareness of environment    Functional Mobility and Transfers for ADLs:  Bed Mobility:  Rolling: Maximum assistance  Supine to Sit: Maximum assistance  Sit to Supine: Maximum assistance;Assist x2  Scooting: Maximum assistance    Transfers:  Sit to Stand: Maximum assistance;Assist x2          Balance:  Sitting: Impaired  Sitting - Static: Fair (occasional)  Sitting - Dynamic:  (not tested)  Standing: Impaired; Without support (assoist X2 max A)  Standing - Static: Constant support;Poor (Assist X2)  Standing - Dynamic : Constant support; Not tested    ADL Intervention:       Grooming  Washing Face:  (declines)                             Toileting  Toileting Assistance: Total assistance(dependent)  Bladder Hygiene: Total assistance (dependent)  Bowel Hygiene: Total assistance (dependent)  Clothing Management: Total assistance (dependent)  Cues: Tactile cues provided;Verbal cues provided;Visual cues provided  Adaptive Equipment:  (Bed rails)    Cognitive Retraining  Following Commands: Follows one step commands/directions (decr initiation of movement, positioning of RUE as educ.)  Safety/Judgement: Decreased insight into deficits; Decreased awareness of need for safety;Decreased awareness of environment  Cues: Verbal cues provided    Neuro Re-Education:   PROM to pain tolerance RUE.   AAROM post sensory input and PROM--R shoulder, elbow, wrist  No AROM facilitated R hand           Therapeutic Exercises:   Encouraged RUE awareness and self positioning of RUE  Pt performed AAROM in elbow flex/ext in supine position to her tolerance 5 reps  5 reps in raising BUEs (L hand holding R wrist-assist for positioning)     Pain:  Continues to c/o pain/discomfort, especially RUE, but does not rate    Activity Tolerance:   Fair    After treatment patient left in no apparent distress:   Supine in bed, Heels elevated for pressure relief, Call bell within reach, Bed / chair alarm activated, Side rails x 3, and nurse informed    COMMUNICATION/COLLABORATION:   The patients plan of care was discussed with: Physical therapist and Registered nurse. Patient was educated regarding Her deficit(s) of R hemiparesis as this relates to Her diagnosis of cva. She demonstrated Guarded understanding as evidenced by her frequent verbalizations re: PLOF as if current. Limited insight into deficits. Kyle Green, OTR/L  Time Calculation:  mins

## 2023-01-13 LAB
GLUCOSE BLD STRIP.AUTO-MCNC: 250 MG/DL (ref 65–117)
GLUCOSE BLD STRIP.AUTO-MCNC: 278 MG/DL (ref 65–117)
GLUCOSE BLD STRIP.AUTO-MCNC: 285 MG/DL (ref 65–117)
GLUCOSE BLD STRIP.AUTO-MCNC: 335 MG/DL (ref 65–117)
SERVICE CMNT-IMP: ABNORMAL

## 2023-01-13 PROCEDURE — 74011636637 HC RX REV CODE- 636/637: Performed by: STUDENT IN AN ORGANIZED HEALTH CARE EDUCATION/TRAINING PROGRAM

## 2023-01-13 PROCEDURE — 82962 GLUCOSE BLOOD TEST: CPT

## 2023-01-13 PROCEDURE — 97112 NEUROMUSCULAR REEDUCATION: CPT | Performed by: OCCUPATIONAL THERAPIST

## 2023-01-13 PROCEDURE — 74011250637 HC RX REV CODE- 250/637: Performed by: STUDENT IN AN ORGANIZED HEALTH CARE EDUCATION/TRAINING PROGRAM

## 2023-01-13 PROCEDURE — 74011250637 HC RX REV CODE- 250/637: Performed by: INTERNAL MEDICINE

## 2023-01-13 PROCEDURE — 74011636637 HC RX REV CODE- 636/637: Performed by: INTERNAL MEDICINE

## 2023-01-13 PROCEDURE — 65270000046 HC RM TELEMETRY

## 2023-01-13 RX ORDER — INSULIN GLARGINE 100 [IU]/ML
15 INJECTION, SOLUTION SUBCUTANEOUS
Status: DISCONTINUED | OUTPATIENT
Start: 2023-01-13 | End: 2023-01-14

## 2023-01-13 RX ADMIN — INSULIN GLARGINE 15 UNITS: 100 INJECTION, SOLUTION SUBCUTANEOUS at 22:34

## 2023-01-13 RX ADMIN — Medication 3 UNITS: at 08:45

## 2023-01-13 RX ADMIN — Medication 2 UNITS: at 22:00

## 2023-01-13 RX ADMIN — ATORVASTATIN CALCIUM 40 MG: 40 TABLET, FILM COATED ORAL at 22:34

## 2023-01-13 RX ADMIN — LATANOPROST 1 DROP: 50 SOLUTION OPHTHALMIC at 22:34

## 2023-01-13 RX ADMIN — Medication 4 UNITS: at 12:22

## 2023-01-13 RX ADMIN — Medication 3 UNITS: at 16:53

## 2023-01-13 RX ADMIN — CLOPIDOGREL BISULFATE 75 MG: 75 TABLET ORAL at 08:45

## 2023-01-13 NOTE — PROGRESS NOTES
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 CT imaging and CTA was deferred on admission due to no significant clinical concern for LVO and given the unclear time course. - Neurology consulted  TSH 1.5  A1c 6.8    Atorva 40  Plavix per Neurology, has ASA allergy  TTE with no shunt, did not comment on thrombus. EF normal  PT/OT rec IPR, case management consulted  - carotid duplex with intimal thickening in L ICA, otherwise normal  -as per nurse concern patient more weak today , head Ct was done and show no acute changes       Fever   -follow up UA   -WBC elevated   -check CXR      R arm pain  Mild swelling, suspect dependent edema but with point tenderness at below, no erythema or fluctuance noted. Mild R hand swelling.  - R arm US and elbow XR without abnormality  - lidocaine patch while inpatient     T2DM  Cont' SSI  Hold glyburide     HTN  Resume BB     Estimated discharge date:   Barriers:waiting for placement     Code status: Full  Prophylaxis: Lovenox  Recommended Disposition: SNF/LTC     Subjective:     Chief Complaint / Reason for Physician Visit  \"\". Discussed with RN events overnight. Peer to peer was called waiting to call back     Review of Systems:  Symptom Y/N Comments  Symptom Y/N Comments   Fever/Chills    Chest Pain     Poor Appetite    Edema     Cough    Abdominal Pain     Sputum    Joint Pain     SOB/GUTIERREZ    Pruritis/Rash     Nausea/vomit    Tolerating PT/OT     Diarrhea    Tolerating Diet     Constipation    Other       Could NOT obtain due to:      Objective:     VITALS:   Last 24hrs VS reviewed since prior progress note.  Most recent are:  Patient Vitals for the past 24 hrs:   Temp Pulse Resp BP SpO2   23 1641 99.5 °F (37.5 °C) 90 18 134/61 97 %   23 0738 98.6 °F (37 °C) 87 17 117/69 96 %   23 2212 99.7 °F (37.6 °C) 100 18 (!) 117/57 95 %         Intake/Output Summary (Last 24 hours) at 1/12/2023 1940  Last data filed at 1/12/2023 1520  Gross per 24 hour   Intake --   Output 500 ml   Net -500 ml          I had a face to face encounter and independently examined this patient on 1/12/2023, as outlined below:  PHYSICAL EXAM:  General: WD, WN. Alert, cooperative, no acute distress    EENT:  EOMI. Anicteric sclerae. MMM  Resp:  CTA bilaterally, no wheezing or rales. No accessory muscle use  CV:  Regular  rhythm,  No edema  GI:  Soft, Non distended, Non tender. +Bowel sounds  Neurologic:  Alert and oriented X 3, normal speech,   Psych:   Good insight. Not anxious nor agitated  Skin:  No rashes. No jaundice    Reviewed most current lab test results and cultures  YES  Reviewed most current radiology test results   YES  Review and summation of old records today    NO  Reviewed patient's current orders and MAR    YES  PMH/SH reviewed - no change compared to H&P  ________________________________________________________________________  Care Plan discussed with:    Comments   Patient y    Family      RN y    Care Manager     Consultant                        Multidiciplinary team rounds were held today with , nursing, pharmacist and clinical coordinator. Patient's plan of care was discussed; medications were reviewed and discharge planning was addressed. ________________________________________________________________________  Total NON critical care TIME:  35   Minutes    Total CRITICAL CARE TIME Spent:   Minutes non procedure based      Comments   >50% of visit spent in counseling and coordination of care     ________________________________________________________________________  Jose G Joe MD     Procedures: see electronic medical records for all procedures/Xrays and details which were not copied into this note but were reviewed prior to creation of Plan.       LABS:  I reviewed today's most current labs and imaging studies. Pertinent labs include:  Recent Labs     01/12/23  0010 01/11/23  0243 01/10/23  0200   WBC 10.4 12.6* 10.4   HGB 11.4* 12.3 12.3   HCT 34.2* 37.2 37.1    236 246       Recent Labs     01/12/23  0010 01/11/23  0243 01/10/23  0200   * 136 138   K 4.6 4.7 4.3    104 107   CO2 25 23 26   * 243* 158*   BUN 36* 24* 27*   CREA 1.45* 1.31* 1.24*   CA 9.4 9.1 9.3   MG 2.1 2.0 2.0   PHOS 3.3 2.9 3.2         Signed:  Umm Alves MD

## 2023-01-13 NOTE — PROGRESS NOTES
Hospitalist Progress Note    NAME: Fly Isbell   :  1935   MRN:  [id number]       Assessment / Plan:    Subacute CVA  Chronic hemorrhage in the R occipital lobe    Seen on MRI. CT imaging and CTA was deferred on admission due to no significant clinical concern for LVO and given the unclear time course. - Neurology evaluated and signed off  TSH 1.5  A1c 6.8    C/w statin  Plavix per Neurology, has ASA allergy  TTE with no shunt, did not comment on thrombus. EF normal  PT/OT rec IPR, case management consulted  Carotid duplex with intimal thickening in L ICA, otherwise normal  Continues to have weakness on right and some weakness on RLE     Fever   Resolved  UA negative  WBC improved. R arm pain  Mild swelling, suspect dependent edema but with point tenderness at below, no erythema or fluctuance noted. Mild R hand swelling.  - R arm US and elbow XR without abnormality  - lidocaine patch while inpatient     T2DM  Blood sugar not controlled, lantus added  Cont' SSI  Hold glyburide     HTN  Resume BB     Estimated discharge date: Medically ready  Barriers: Needs IPR placement     Code status: Full  Prophylaxis: Lovenox  Recommended Disposition: SNF/LTC        Subjective:     Chief Complaint / Reason for Physician Visit  Seen for CVA  She continues to have diffuse pain on b/l upper and lower ext  Weakness persists    Review of Systems:  Symptom Y/N Comments  Symptom Y/N Comments   Fever/Chills n   Chest Pain n    Poor Appetite n   Edema n    Cough n   Abdominal Pain n    Sputum n   Joint Pain y Extremity pain   SOB/GUTIERREZ    Pruritis/Rash     Nausea/vomit    Tolerating PT/OT     Diarrhea    Tolerating Diet     Constipation    Other       Could NOT obtain due to:      Objective:     VITALS:   Last 24hrs VS reviewed since prior progress note.  Most recent are:  Patient Vitals for the past 24 hrs:   Temp Pulse Resp BP SpO2   23 1529 99 °F (37.2 °C) 100 18 136/61 98 %   23 0740 98.8 °F (37.1 °C) 93 16 111/61 98 %   01/12/23 2333 98.8 °F (37.1 °C) (!) 101 18 127/60 95 %   01/12/23 1955 99.1 °F (37.3 °C) (!) 105 19 (!) 130/51 98 %     No intake or output data in the 24 hours ending 01/13/23 1650     I had a face to face encounter and independently examined this patient on 1/13/2023, as outlined below:  PHYSICAL EXAM:  General: Awake, No acute distress  EENT:  EOMI. Anicteric sclerae. MMM  Resp:  CTA bilaterally, no wheezing or rales. No accessory muscle use  CV:  Regular  rhythm,  No edema  GI:  Soft, Non distended, Non tender. +Bowel sounds  Neurologic:  Alert and oriented X 3, normal speech,   Psych:   Not anxious nor agitated  Skin:  No rashes. No jaundice    Reviewed most current lab test results and cultures  YES  Reviewed most current radiology test results   YES  Review and summation of old records today    NO  Reviewed patient's current orders and MAR    YES  PMH/ reviewed - no change compared to H&P  ________________________________________________________________________  Care Plan discussed with:    Comments   Patient y    Family      RN y    Care Manager     Consultant                        Multidiciplinary team rounds were held today with , nursing, pharmacist and clinical coordinator. Patient's plan of care was discussed; medications were reviewed and discharge planning was addressed. ________________________________________________________________________  Total NON critical care TIME:  36   Minutes    Total CRITICAL CARE TIME Spent:   Minutes non procedure based      Comments   >50% of visit spent in counseling and coordination of care     ________________________________________________________________________  Musa Flaherty MD     Procedures: see electronic medical records for all procedures/Xrays and details which were not copied into this note but were reviewed prior to creation of Plan.       LABS:  I reviewed today's most current labs and imaging studies.   Pertinent labs include:  Recent Labs     01/12/23  0010 01/11/23  0243   WBC 10.4 12.6*   HGB 11.4* 12.3   HCT 34.2* 37.2    236     Recent Labs     01/12/23  0010 01/11/23 0243   * 136   K 4.6 4.7    104   CO2 25 23   * 243*   BUN 36* 24*   CREA 1.45* 1.31*   CA 9.4 9.1   MG 2.1 2.0   PHOS 3.3 2.9       Signed: Jackie Lal MD

## 2023-01-13 NOTE — ROUTINE PROCESS
End of Shift Note    Bedside shift change report given to [facility], RN  (oncoming nurse) by Hermila Griffiths RN (offgoing nurse). Report included the following information SBAR, Kardex, Intake/Output, and MAR    Shift worked:  DAYS   Shift summary and any significant changes:    -DC PENDING INSURANCE AUTHORIZATION     Concerns for physician to address: -[address] phone for oncoming shift:   [phone]     Patient Information  Sergio Riding  81 y.o.  1/7/2023  7:17 AM by Shannan Gaucher, MD. Sergio Riding was admitted from Home    Problem List  Patient Active Problem List    Diagnosis Date Noted    CVA (cerebral vascular accident) ([address] 01/07/2023    PVC's (premature ventricular contractions) 10/10/2021    Abscess of abdominal wall 09/03/2019    Type 2 diabetes with nephropathy ([address] 04/17/2018    Statin intolerance 05/06/2016    Essential hypertension 01/06/2016    Encounter for medication monitoring 01/06/2016    Diabetes mellitus type 2, controlled ([address] 10/16/2015    Generalized OA 05/28/2014    Nonrheumatic tricuspid valve disorder 04/12/2012    Family history of ischemic heart disease 04/12/2012    Mixed hyperlipidemia 04/12/2012    Mitral valve disease 04/12/2012    Obesity, unspecified 04/12/2012    Osteoarthritis 05/04/2010    Hypovitaminosis D 05/04/2010     Past Medical History:   Diagnosis Date    Chronic edema     Diabetes ([address]     Family history of ischemic heart disease 4/12/2012    Hypercholesterolemia     Hypertension     PVC's (premature ventricular contractions) 10/10/2021       Core Measures:  CVA: Yes Yes      Activity:  Activity Level:  Up with Assistance      Cardiac:   Cardiac Monitoring: Yes      Cardiac Rhythm: Sinus Rhythm    Access:   Current line(s): PIV       Genitourinary:   Urinary status: voiding      Respiratory:   O2 Device: None (Room air)         GI:  Last Bowel Movement Date: 01/11/23  Current diet:  ADULT DIET Regular; 3 carb choices (45 gm/meal); per pt preferences, no milk but CAN have milk products, no eggs ; doesn't like them ; no tea         Pain Management:   Patient states pain is manageable on current regimen: N/A    Skin:  Lopez Score: 18  Interventions: increase time out of bed, PT/OT consult, and nutritional support     Patient Safety:  Fall Score:  Total Score: 3  Interventions: bed/chair alarm, gripper socks, pt to call before getting OOB, and stay with me (per policy)  High Fall Risk: Yes  @Rollbelt  @dexterity to release roll belt  Yes/No ( must document dexterity  here by stating Yes or No here, otherwise this is a restraint and must follow restraint documentation policy.)    DVT prophylaxis:  DVT prophylaxis Med- Not applicable  DVT prophylaxis SCD or DENNYS- Yes     Wounds: (If Applicable)  Wounds- No  Location     Active Consults:  IP CONSULT TO HOSPITALIST  IP CONSULT TO NEUROLOGY    Length of Stay:  Expected LOS: 1d 21h  Actual LOS: 6  Discharge Plan:  Rehab when ready      Eve Humphreys RN

## 2023-01-13 NOTE — PROGRESS NOTES
Transition of Care Plan:     RUR: 13%  Disposition:SNF: [address] [facility] ([facility] started 1/12/23)  If SNF or IPR: Date FOC offered: 1/8/23  Date [address] received: 1/8/23  Date authorization started with reference number: 1/9/23. CM requested auth reference number. Date authorization received and expires: TBD  Accepting facility: [facility]     Follow up appointments: PCP & specialists as indicated  DME needed: TBD - has cane, shower seat  Transportation at Discharge: Medicaid BLS  [place] or means to access home: N/a       IM Medicare Letter: 2nd IM letter needed at d/c  Is patient a [place] and connected with the [place]? N/a              If yes, was [employer] transfer form completed and VA notified? N/a  Caregiver Contact: Derek Hwang DTR/Micky ([phone])  Discharge Caregiver contacted prior to discharge? yes  Care Conference needed?:  No      Cm called Rella Lab of [facility] to check on auth. Auth still pending at this time. [facility]ella [facility] stated she will email for an update.      Amber Conklin, MSW  Care Management, [address]

## 2023-01-13 NOTE — PROGRESS NOTES
Bedside shift change report given to Gagan Lezama RN (oncoming nurse) by Pradeep Oliva RN (offgoing nurse). Report included the following information SBAR, Kardex, Intake/Output, and MAR     Shift worked: Nights    Shift summary and any significant changes:     Pt still complaining of pain in R hand. Slightly swollen. Concerns for physician to address: See above    [employer] phone for oncoming shift:   [phone]      Patient Information  Tuyet Martinez  81 y.o.  1/7/2023  7:17 AM by Jaki Maciel MD. Tuyet Martinez was admitted from Home     Problem List          Patient Active Problem List     Diagnosis Date Noted    CVA (cerebral vascular accident) ([address] 01/07/2023    PVC's (premature ventricular contractions) 10/10/2021    Abscess of abdominal wall 09/03/2019    Type 2 diabetes with nephropathy ([address] 04/17/2018    Statin intolerance 05/06/2016    Essential hypertension 01/06/2016    Encounter for medication monitoring 01/06/2016    Diabetes mellitus type 2, controlled ([address] 10/16/2015    Generalized OA 05/28/2014    Nonrheumatic tricuspid valve disorder 04/12/2012    Family history of ischemic heart disease 04/12/2012    Mixed hyperlipidemia 04/12/2012    Mitral valve disease 04/12/2012    Obesity, unspecified 04/12/2012    Osteoarthritis 05/04/2010    Hypovitaminosis D 05/04/2010              Past Medical History:   Diagnosis Date    Chronic edema      Diabetes ([address]      Family history of ischemic heart disease 4/12/2012    Hypercholesterolemia      Hypertension      PVC's (premature ventricular contractions) 10/10/2021         Core Measures:  CVA: Yes Yes        Activity:  Activity Level:  Up with Assistance        Cardiac:   Cardiac Monitoring: Yes      Cardiac Rhythm: Sinus Rhythm     Access:   Current line(s): PIV         Genitourinary:   Urinary status: voiding        Respiratory:   O2 Device: None (Room air)        GI:  Last Bowel Movement Date: 01/07/23  Current diet:  ADULT DIET Regular        Pain Management:   Patient states pain is manageable on current regimen: N/A     Skin:  Lopez Score: 17  Interventions: increase time out of bed, PT/OT consult, and nutritional support     Patient Safety:  Fall Score:  Total Score: 3  Interventions: bed/chair alarm, gripper socks, pt to call before getting OOB, and stay with me (per policy)  High Fall Risk: Yes  @Rollbelt  @dexterity to release roll belt  Yes/No ( must document dexterity  here by stating Yes or No here, otherwise this is a restraint and must follow restraint documentation policy.)     DVT prophylaxis:  DVT prophylaxis Med- Not applicable  DVT prophylaxis SCD or DENNYS- Yes      Wounds: (If Applicable)  Wounds- No  Location      Active Consults:  IP CONSULT TO HOSPITALIST  IP CONSULT TO NEUROLOGY     Length of Stay:  Expected LOS: - - -  Actual LOS: 1  Discharge Plan:  Rehab when ready

## 2023-01-13 NOTE — PROGRESS NOTES
Problem: Pressure Injury - Risk of  Goal: *Prevention of pressure injury  Description: Document Lopez Scale and appropriate interventions in the flowsheet. Outcome: Progressing Towards Goal  Note: Pressure Injury Interventions:  Sensory Interventions: Assess changes in LOC    Moisture Interventions: Minimize layers, Limit adult briefs    Activity Interventions: Increase time out of bed, PT/OT evaluation    Mobility Interventions: HOB 30 degrees or less, Turn and reposition approx.  every two hours(pillow and wedges)    Nutrition Interventions: Document food/fluid/supplement intake, Discuss nutritional consult with provider    Friction and Shear Interventions: Feet elevated on foot rest, HOB 30 degrees or less

## 2023-01-13 NOTE — PROGRESS NOTES
Problem: Self Care Deficits Care Plan (Adult)  Goal: *Acute Goals and Plan of Care (Insert Text)  Description: FUNCTIONAL STATUS PRIOR TO ADMISSION: Patient was independent and active without use of DME. Increased assist with mobility and ADL's recently    HOME SUPPORT: The patient lived alone with family, sister, granddaughter to provide assistance. Occupational Therapy Goals  Initiated 1/8/2023  1. Patient will maintain static sitting balance at midline seated edge of bed with bilateral UE's supported on bed within 7 day(s). 2.  Patient will position right hand hand on bed with palm down for support without cue within 7 day(s)  3. Patient will use right hand as dependent stabilizer during functional tasks with 1-2 cues within 7 day(s). 4.  Patient will stand with bilateral UE support at elbows > or = 2 minutes with moderate assist of 1 within 7 day(s)  5. Patient will squat pivot transfer to the left with moderate assist of 1 within 7 day(s)  6. Patient will squat pivot transfer to the right with maximal assist of 1 within 7 day(s). Outcome: Not Progressing Towards Goal   OCCUPATIONAL THERAPY TREATMENT  Patient: Dianna Mc (87 y.o. female)  Date: 1/13/2023  Diagnosis: CVA (cerebral vascular accident) [facility]) [I63.9] <principal problem not specified>      Precautions: Fall, Skin, Bed Alarm  Chart, occupational therapy assessment, plan of care, and goals were reviewed. ASSESSMENT  Patient continues with skilled OT services and is not progressing towards goals. Pt was supine upon arrival, her RUE at rest against the bed. Pt with decreased attention to R and RUE. No carryover of previous tx session. Facilitated pt to position and manage her affected hemiparetic and painful RUE using her functional LUE with maximal multimodal cues. Encouraged elevation of RUE to decrease edema and position within pt's view.   Pt again requiring maximal cues for stroking RUE fingertips to shoulder instead of rubbing dorsal aspect of hand towards fingertips. Pt required hand over hand assistance to perform task. Progressed from stroking retrograde light massage to applying lotion with assistance and again stroking in retrograde massage to facilitate multisensory approach during neurofacilitation. Overall decreased awareness of RUE and limited carryover of neurofacilitation techniques for self management of R hemiparesis post CVA. Pt will benefit from intensive inpatient rehab at discharge. Current Level of Function Impacting Discharge (ADLs): maximal assistance for managing RUE and in self care    Other factors to consider for discharge: poor awareness of RUE, decreased carryover of therapy sessions/RUE management         PLAN :  Patient continues to benefit from skilled intervention to address the above impairments. Continue treatment per established plan of care to address goals. Recommend with staff: daughter present during tx session    Recommend next OT session: POC; reinforce techniques    Recommendation for discharge: (in order for the patient to meet his/her long term goals)  Therapy 3 hours per day 5-7 days per week    This discharge recommendation:  Has not yet been discussed the attending provider and/or case management    IF patient discharges home will need the following DME: tbd in rehab setting       SUBJECTIVE:   Patient stated you don't know how painful it is.   (Encouraging pt to use her  LUE to position RUE appropriately)  OBJECTIVE DATA SUMMARY:   Cognitive/Behavioral Status:  Neurologic State: Alert  Orientation Level: Disoriented to time  Cognition: Follows commands                          ADL Intervention:     Needs reinforcement of therapeutic techniques for RUE management and awareness of RUE. Therapeutic Exercises:    Focused on self management of RUE using LUE due to pain and decreased awareness of R hemiparesis    Pain:  C/o pain with any movement of RUE , but does not rate    Activity Tolerance:   Poor and due to pain    After treatment patient left in no apparent distress:   Supine in bed, Call bell within reach, and Caregiver / family present    COMMUNICATION   The patients plan of care was discussed with:  pt and family member .      Enrigue Kussmaul, OTR/L  Time Calculation: 11 mins

## 2023-01-14 ENCOUNTER — APPOINTMENT (OUTPATIENT)
Dept: GENERAL RADIOLOGY | Age: 88
DRG: 065 | End: 2023-01-14
Attending: STUDENT IN AN ORGANIZED HEALTH CARE EDUCATION/TRAINING PROGRAM
Payer: MEDICARE

## 2023-01-14 LAB
ANION GAP SERPL CALC-SCNC: 7 MMOL/L (ref 5–15)
APPEARANCE UR: ABNORMAL
BACTERIA URNS QL MICRO: ABNORMAL /HPF
BASOPHILS # BLD: 0 K/UL (ref 0–0.1)
BASOPHILS NFR BLD: 0 % (ref 0–1)
BILIRUB UR QL: NEGATIVE
BUN SERPL-MCNC: 42 MG/DL (ref 6–20)
BUN/CREAT SERPL: 31 (ref 12–20)
CALCIUM SERPL-MCNC: 9.8 MG/DL (ref 8.5–10.1)
CHLORIDE SERPL-SCNC: 102 MMOL/L (ref 97–108)
CO2 SERPL-SCNC: 24 MMOL/L (ref 21–32)
COLOR UR: ABNORMAL
COVID-19 RAPID TEST, COVR: NOT DETECTED
CREAT SERPL-MCNC: 1.35 MG/DL (ref 0.55–1.02)
DIFFERENTIAL METHOD BLD: ABNORMAL
EOSINOPHIL # BLD: 0 K/UL (ref 0–0.4)
EOSINOPHIL NFR BLD: 0 % (ref 0–7)
EPITH CASTS URNS QL MICRO: ABNORMAL /LPF
ERYTHROCYTE [DISTWIDTH] IN BLOOD BY AUTOMATED COUNT: 13.2 % (ref 11.5–14.5)
GLUCOSE BLD STRIP.AUTO-MCNC: 269 MG/DL (ref 65–117)
GLUCOSE BLD STRIP.AUTO-MCNC: 269 MG/DL (ref 65–117)
GLUCOSE BLD STRIP.AUTO-MCNC: 279 MG/DL (ref 65–117)
GLUCOSE BLD STRIP.AUTO-MCNC: 316 MG/DL (ref 65–117)
GLUCOSE SERPL-MCNC: 298 MG/DL (ref 65–100)
GLUCOSE UR STRIP.AUTO-MCNC: NEGATIVE MG/DL
HCT VFR BLD AUTO: 38.6 % (ref 35–47)
HGB BLD-MCNC: 12.8 G/DL (ref 11.5–16)
HGB UR QL STRIP: ABNORMAL
IMM GRANULOCYTES # BLD AUTO: 0.1 K/UL (ref 0–0.04)
IMM GRANULOCYTES NFR BLD AUTO: 1 % (ref 0–0.5)
KETONES UR QL STRIP.AUTO: NEGATIVE MG/DL
LEUKOCYTE ESTERASE UR QL STRIP.AUTO: ABNORMAL
LYMPHOCYTES # BLD: 0.8 K/UL (ref 0.8–3.5)
LYMPHOCYTES NFR BLD: 8 % (ref 12–49)
MAGNESIUM SERPL-MCNC: 2.3 MG/DL (ref 1.6–2.4)
MCH RBC QN AUTO: 26.8 PG (ref 26–34)
MCHC RBC AUTO-ENTMCNC: 33.2 G/DL (ref 30–36.5)
MCV RBC AUTO: 80.9 FL (ref 80–99)
MONOCYTES # BLD: 0.7 K/UL (ref 0–1)
MONOCYTES NFR BLD: 7 % (ref 5–13)
NEUTS SEG # BLD: 8.5 K/UL (ref 1.8–8)
NEUTS SEG NFR BLD: 84 % (ref 32–75)
NITRITE UR QL STRIP.AUTO: POSITIVE
NRBC # BLD: 0 K/UL (ref 0–0.01)
NRBC BLD-RTO: 0 PER 100 WBC
PH UR STRIP: 5.5 (ref 5–8)
PHOSPHATE SERPL-MCNC: 3.8 MG/DL (ref 2.6–4.7)
PLATELET # BLD AUTO: 271 K/UL (ref 150–400)
PMV BLD AUTO: 10.4 FL (ref 8.9–12.9)
POTASSIUM SERPL-SCNC: 4.6 MMOL/L (ref 3.5–5.1)
PROCALCITONIN SERPL-MCNC: 0.32 NG/ML
PROT UR STRIP-MCNC: 100 MG/DL
RBC # BLD AUTO: 4.77 M/UL (ref 3.8–5.2)
RBC #/AREA URNS HPF: ABNORMAL /HPF (ref 0–5)
SERVICE CMNT-IMP: ABNORMAL
SODIUM SERPL-SCNC: 133 MMOL/L (ref 136–145)
SOURCE, COVRS: NORMAL
SP GR UR REFRACTOMETRY: 1.01
UA: UC IF INDICATED,UAUC: ABNORMAL
UROBILINOGEN UR QL STRIP.AUTO: 0.2 EU/DL (ref 0.2–1)
WBC # BLD AUTO: 10.1 K/UL (ref 3.6–11)
WBC URNS QL MICRO: >100 /HPF (ref 0–4)

## 2023-01-14 PROCEDURE — 74011250637 HC RX REV CODE- 250/637: Performed by: STUDENT IN AN ORGANIZED HEALTH CARE EDUCATION/TRAINING PROGRAM

## 2023-01-14 PROCEDURE — 71045 X-RAY EXAM CHEST 1 VIEW: CPT

## 2023-01-14 PROCEDURE — 84100 ASSAY OF PHOSPHORUS: CPT

## 2023-01-14 PROCEDURE — 87086 URINE CULTURE/COLONY COUNT: CPT

## 2023-01-14 PROCEDURE — 87040 BLOOD CULTURE FOR BACTERIA: CPT

## 2023-01-14 PROCEDURE — 87147 CULTURE TYPE IMMUNOLOGIC: CPT

## 2023-01-14 PROCEDURE — 74011250636 HC RX REV CODE- 250/636: Performed by: STUDENT IN AN ORGANIZED HEALTH CARE EDUCATION/TRAINING PROGRAM

## 2023-01-14 PROCEDURE — 83735 ASSAY OF MAGNESIUM: CPT

## 2023-01-14 PROCEDURE — 65270000046 HC RM TELEMETRY

## 2023-01-14 PROCEDURE — 74011636637 HC RX REV CODE- 636/637: Performed by: INTERNAL MEDICINE

## 2023-01-14 PROCEDURE — 81001 URINALYSIS AUTO W/SCOPE: CPT

## 2023-01-14 PROCEDURE — 87635 SARS-COV-2 COVID-19 AMP PRB: CPT

## 2023-01-14 PROCEDURE — 84145 PROCALCITONIN (PCT): CPT

## 2023-01-14 PROCEDURE — 82962 GLUCOSE BLOOD TEST: CPT

## 2023-01-14 PROCEDURE — 74011636637 HC RX REV CODE- 636/637: Performed by: STUDENT IN AN ORGANIZED HEALTH CARE EDUCATION/TRAINING PROGRAM

## 2023-01-14 PROCEDURE — 87186 SC STD MICRODIL/AGAR DIL: CPT

## 2023-01-14 PROCEDURE — 36415 COLL VENOUS BLD VENIPUNCTURE: CPT

## 2023-01-14 PROCEDURE — 80048 BASIC METABOLIC PNL TOTAL CA: CPT

## 2023-01-14 PROCEDURE — 87077 CULTURE AEROBIC IDENTIFY: CPT

## 2023-01-14 PROCEDURE — 74011250637 HC RX REV CODE- 250/637: Performed by: INTERNAL MEDICINE

## 2023-01-14 PROCEDURE — 85025 COMPLETE CBC W/AUTO DIFF WBC: CPT

## 2023-01-14 RX ORDER — INSULIN GLARGINE 100 [IU]/ML
20 INJECTION, SOLUTION SUBCUTANEOUS
Status: DISCONTINUED | OUTPATIENT
Start: 2023-01-14 | End: 2023-01-15

## 2023-01-14 RX ORDER — FUROSEMIDE 40 MG/1
40 TABLET ORAL DAILY
Status: DISCONTINUED | OUTPATIENT
Start: 2023-01-14 | End: 2023-01-17 | Stop reason: HOSPADM

## 2023-01-14 RX ORDER — PROCHLORPERAZINE EDISYLATE 5 MG/ML
10 INJECTION INTRAMUSCULAR; INTRAVENOUS
Status: DISCONTINUED | OUTPATIENT
Start: 2023-01-14 | End: 2023-01-17 | Stop reason: HOSPADM

## 2023-01-14 RX ADMIN — CLOPIDOGREL BISULFATE 75 MG: 75 TABLET ORAL at 09:53

## 2023-01-14 RX ADMIN — INSULIN GLARGINE 20 UNITS: 100 INJECTION, SOLUTION SUBCUTANEOUS at 21:31

## 2023-01-14 RX ADMIN — FUROSEMIDE 40 MG: 40 TABLET ORAL at 12:56

## 2023-01-14 RX ADMIN — Medication 3 UNITS: at 09:53

## 2023-01-14 RX ADMIN — ATORVASTATIN CALCIUM 40 MG: 40 TABLET, FILM COATED ORAL at 21:05

## 2023-01-14 RX ADMIN — PROCHLORPERAZINE EDISYLATE 10 MG: 5 INJECTION INTRAMUSCULAR; INTRAVENOUS at 09:53

## 2023-01-14 RX ADMIN — PROCHLORPERAZINE EDISYLATE 10 MG: 5 INJECTION INTRAMUSCULAR; INTRAVENOUS at 21:19

## 2023-01-14 RX ADMIN — Medication 2 UNITS: at 21:16

## 2023-01-14 RX ADMIN — Medication 3 UNITS: at 12:56

## 2023-01-14 RX ADMIN — Medication 3 UNITS: at 17:23

## 2023-01-14 NOTE — PROGRESS NOTES
Transition of Care Plan:     RUR: 13%  Disposition:SNF: [facility] ([facility] started 1/12/23)  If SNF or IPR: Date FOC offered: 1/8/23  Date [address] received: 1/8/23  Date authorization started with reference number: 1/9/23. CM requested auth reference number. Date authorization received and expires: TBD  Accepting facility: [facility]     Follow up appointments: PCP & specialists as indicated  DME needed: TBD - has cane, shower seat  Transportation at Discharge: Medicaid BLS  [place] or means to access home: N/a       IM Medicare Letter: 2nd IM letter needed at d/c  Is patient a [place] and connected with the [address]? N/a              If yes, was [employer] transfer form completed and VA notified? N/a  Caregiver Contact: Claudean Kyle, DTR/Micky ([phone])  Discharge Caregiver contacted prior to discharge? yes  Care Conference needed?:  No         CM acknowledges discharge order, awaiting insurance authorization for patient to transition to [facility]. Auth started on 1/12/23. Will provide updates as available. Care management will continue to be available to assist as transition of care planning needs arise.       Lorine Kanner, [address] [facility] [address]

## 2023-01-14 NOTE — PROGRESS NOTES
Problem: Pressure Injury - Risk of  Goal: *Prevention of pressure injury  Description: Document Lopez Scale and appropriate interventions in the flowsheet. Outcome: Progressing Towards Goal  Note: Pressure Injury Interventions:  Sensory Interventions: Assess changes in LOC    Moisture Interventions: Absorbent underpads    Activity Interventions: Increase time out of bed, PT/OT evaluation    Mobility Interventions: HOB 30 degrees or less, Turn and reposition approx.  every two hours(pillow and wedges)    Nutrition Interventions: Document food/fluid/supplement intake, Offer support with meals,snacks and hydration    Friction and Shear Interventions: Minimize layers

## 2023-01-14 NOTE — PROGRESS NOTES
Hospitalist Progress Note    NAME: Chelle Escobar   :  1935   MRN:  [id number]       Assessment / Plan:    Left maninder CVA  Right hemiparesis  Chronic hemorrhage in the R occipital lobe    MR brain:  1. Subacute infarct in the left maninder. 2. Generalized parenchymal volume loss and mild chronic microvascular ischemic  disease. 3. Small chronic hemorrhage in the right occipital lobe. CT imaging and CTA was deferred on admission due to no significant clinical concern for LVO and given the unclear time course. Neurology evaluated and signed off  TSH 1.5, A1c 6.8,   C/w statin  Plavix per Neurology, has ASA allergy  TTE with no shunt, did not comment on thrombus. EF normal  PT/OT rec IPR, case management working on this  Carotid duplex with intimal thickening in L ICA, otherwise normal  Continues to have weakness on right and some weakness on RLE     Intermittent fever:  Had a low grade fever last evening  Repeat CXR, UA, blood culture procal  Has some nausea but no abdominal tenderness/ diarrhea    R arm pain  Mild swelling, suspect dependent edema but with point tenderness at below, no erythema or fluctuance noted. Mild R hand swelling. R arm US and elbow XR without abnormality     T2DM  Lantus increased to 20 units  Cont' SSI  Hold glyburide     HTN  Resume home lasix     Estimated discharge date:   Barriers: Fever, work up repeating, Needs placement     Code status: Full  Prophylaxis: Lovenox  Recommended Disposition: SNF/LTC  NOK: Updated daughter over the phone today        Subjective:     Chief Complaint / Reason for Physician Visit  Seen for CVA  She is nauseous today, but has no abdominal pain.        Review of Systems:  Symptom Y/N Comments  Symptom Y/N Comments   Fever/Chills n   Chest Pain n    Poor Appetite n   Edema n    Cough n   Abdominal Pain n    Sputum n   Joint Pain y Extremity pain   SOB/GUTIERREZ    Pruritis/Rash     Nausea/vomit y   Tolerating PT/OT     Diarrhea    Tolerating Diet     Constipation    Other       Could NOT obtain due to:      Objective:     VITALS:   Last 24hrs VS reviewed since prior progress note. Most recent are:  Patient Vitals for the past 24 hrs:   Temp Pulse Resp BP SpO2   01/14/23 0855 99 °F (37.2 °C) 94 22 (!) 150/77 97 %   01/14/23 0346 99.5 °F (37.5 °C) 89 16 (!) 142/68 98 %   01/14/23 0018 99.3 °F (37.4 °C) 93 18 124/68 98 %   01/13/23 2022 (!) 100.6 °F (38.1 °C) (!) 104 18 (!) 141/60 95 %   01/13/23 1529 99 °F (37.2 °C) 100 18 136/61 98 %     No intake or output data in the 24 hours ending 01/14/23 1100     I had a face to face encounter and independently examined this patient on 1/14/2023, as outlined below:  PHYSICAL EXAM:  General: Awake, No acute distress  EENT:  EOMI. Anicteric sclerae. MMM  Resp:  CTA bilaterally, no wheezing or rales. No accessory muscle use  CV:  Regular  rhythm,  No edema  GI:  Soft, Non distended, Non tender. +Bowel sounds  Neurologic:  Alert and oriented X 3, normal speech,   Psych:   Not anxious nor agitated  Skin:  No rashes. No jaundice    Reviewed most current lab test results and cultures  YES  Reviewed most current radiology test results   YES  Review and summation of old records today    NO  Reviewed patient's current orders and MAR    YES  PMH/ reviewed - no change compared to H&P  ________________________________________________________________________  Care Plan discussed with:    Comments   Patient y    Family      RN y    Care Manager     Consultant                        Multidiciplinary team rounds were held today with , nursing, pharmacist and clinical coordinator. Patient's plan of care was discussed; medications were reviewed and discharge planning was addressed.      ________________________________________________________________________  Total NON critical care TIME:    Minutes    Total CRITICAL CARE TIME Spent:   Minutes non procedure based      Comments   >50% of visit spent in counseling and coordination of care     ________________________________________________________________________  Rosalia Reyes MD     Procedures: see electronic medical records for all procedures/Xrays and details which were not copied into this note but were reviewed prior to creation of Plan. LABS:  I reviewed today's most current labs and imaging studies.   Pertinent labs include:  Recent Labs     01/12/23  0010   WBC 10.4   HGB 11.4*   HCT 34.2*        Recent Labs     01/12/23  0010   *   K 4.6      CO2 25   *   BUN 36*   CREA 1.45*   CA 9.4   MG 2.1   PHOS 3.3       Signed: Rosalia Reyes MD

## 2023-01-14 NOTE — ROUTINE PROCESS
End of Shift Note    Bedside shift change report given to Bigg Cohen  (oncoming nurse) by Emmanuel Rose RN (offgoing nurse). Report included the following information SBAR, Kardex, Intake/Output, and MAR    Shift worked: Nights    Shift summary and any significant changes:    Pt had a bowel movement     No significant events      Concerns for physician to address: -[email or web address] phone for oncoming shift:   [phone]     Patient Information  Jenniffer Grier  81 y.o.  1/7/2023  7:17 AM by Mary Alice Esqueda MD. Jenniffer Grier was admitted from Home    Problem List  Patient Active Problem List    Diagnosis Date Noted    CVA (cerebral vascular accident) ([address] 01/07/2023    PVC's (premature ventricular contractions) 10/10/2021    Abscess of abdominal wall 09/03/2019    Type 2 diabetes with nephropathy ([address] 04/17/2018    Statin intolerance 05/06/2016    Essential hypertension 01/06/2016    Encounter for medication monitoring 01/06/2016    Diabetes mellitus type 2, controlled ([address] 10/16/2015    Generalized OA 05/28/2014    Nonrheumatic tricuspid valve disorder 04/12/2012    Family history of ischemic heart disease 04/12/2012    Mixed hyperlipidemia 04/12/2012    Mitral valve disease 04/12/2012    Obesity, unspecified 04/12/2012    Osteoarthritis 05/04/2010    Hypovitaminosis D 05/04/2010     Past Medical History:   Diagnosis Date    Chronic edema     Diabetes ([address]     Family history of ischemic heart disease 4/12/2012    Hypercholesterolemia     Hypertension     PVC's (premature ventricular contractions) 10/10/2021       Core Measures:  CVA: Yes Yes      Activity:  Activity Level:  Up with Assistance      Cardiac:   Cardiac Monitoring: Yes      Cardiac Rhythm: Sinus Rhythm    Access:   Current line(s): PIV       Genitourinary:   Urinary status: voiding      Respiratory:   O2 Device: None (Room air)         GI:  Last Bowel Movement Date: 01/11/23  Current diet:  ADULT DIET Regular; 3 carb choices (45 gm/meal); per pt preferences, no milk but CAN have milk products, no eggs ; doesn't like them ; no tea         Pain Management:   Patient states pain is manageable on current regimen: N/A    Skin:  Lopez Score: 18  Interventions: increase time out of bed, PT/OT consult, and nutritional support     Patient Safety:  Fall Score:  Total Score: 3  Interventions: bed/chair alarm, gripper socks, pt to call before getting OOB, and stay with me (per policy)  High Fall Risk: Yes  @Rollbelt  @dexterity to release roll belt  Yes/No ( must document dexterity  here by stating Yes or No here, otherwise this is a restraint and must follow restraint documentation policy.)    DVT prophylaxis:  DVT prophylaxis Med- Not applicable  DVT prophylaxis SCD or DENNYS- Yes     Wounds: (If Applicable)  Wounds- No  Location     Active Consults:  IP CONSULT TO HOSPITALIST  IP CONSULT TO NEUROLOGY    Length of Stay:  Expected LOS: 1d 21h  Actual LOS: 7  Discharge Plan:  Rehab when ready      Jose Yoder RN

## 2023-01-15 LAB
ANION GAP SERPL CALC-SCNC: 6 MMOL/L (ref 5–15)
BASOPHILS # BLD: 0 K/UL (ref 0–0.1)
BASOPHILS NFR BLD: 1 % (ref 0–1)
BUN SERPL-MCNC: 46 MG/DL (ref 6–20)
BUN/CREAT SERPL: 37 (ref 12–20)
CALCIUM SERPL-MCNC: 9.6 MG/DL (ref 8.5–10.1)
CHLORIDE SERPL-SCNC: 101 MMOL/L (ref 97–108)
CO2 SERPL-SCNC: 23 MMOL/L (ref 21–32)
CREAT SERPL-MCNC: 1.25 MG/DL (ref 0.55–1.02)
DIFFERENTIAL METHOD BLD: ABNORMAL
EOSINOPHIL # BLD: 0.1 K/UL (ref 0–0.4)
EOSINOPHIL NFR BLD: 1 % (ref 0–7)
ERYTHROCYTE [DISTWIDTH] IN BLOOD BY AUTOMATED COUNT: 13.3 % (ref 11.5–14.5)
GLUCOSE BLD STRIP.AUTO-MCNC: 225 MG/DL (ref 65–117)
GLUCOSE BLD STRIP.AUTO-MCNC: 259 MG/DL (ref 65–117)
GLUCOSE BLD STRIP.AUTO-MCNC: 278 MG/DL (ref 65–117)
GLUCOSE BLD STRIP.AUTO-MCNC: 283 MG/DL (ref 65–117)
GLUCOSE SERPL-MCNC: 310 MG/DL (ref 65–100)
HCT VFR BLD AUTO: 38 % (ref 35–47)
HGB BLD-MCNC: 12.2 G/DL (ref 11.5–16)
IMM GRANULOCYTES # BLD AUTO: 0 K/UL (ref 0–0.04)
IMM GRANULOCYTES NFR BLD AUTO: 1 % (ref 0–0.5)
LYMPHOCYTES # BLD: 0.9 K/UL (ref 0.8–3.5)
LYMPHOCYTES NFR BLD: 12 % (ref 12–49)
MAGNESIUM SERPL-MCNC: 2.2 MG/DL (ref 1.6–2.4)
MCH RBC QN AUTO: 26.7 PG (ref 26–34)
MCHC RBC AUTO-ENTMCNC: 32.1 G/DL (ref 30–36.5)
MCV RBC AUTO: 83.2 FL (ref 80–99)
MONOCYTES # BLD: 0.7 K/UL (ref 0–1)
MONOCYTES NFR BLD: 10 % (ref 5–13)
NEUTS SEG # BLD: 5.7 K/UL (ref 1.8–8)
NEUTS SEG NFR BLD: 75 % (ref 32–75)
NRBC # BLD: 0 K/UL (ref 0–0.01)
NRBC BLD-RTO: 0 PER 100 WBC
PHOSPHATE SERPL-MCNC: 4.1 MG/DL (ref 2.6–4.7)
PLATELET # BLD AUTO: 265 K/UL (ref 150–400)
PMV BLD AUTO: 10.3 FL (ref 8.9–12.9)
POTASSIUM SERPL-SCNC: 4.5 MMOL/L (ref 3.5–5.1)
RBC # BLD AUTO: 4.57 M/UL (ref 3.8–5.2)
SERVICE CMNT-IMP: ABNORMAL
SODIUM SERPL-SCNC: 130 MMOL/L (ref 136–145)
WBC # BLD AUTO: 7.5 K/UL (ref 3.6–11)

## 2023-01-15 PROCEDURE — 74011250637 HC RX REV CODE- 250/637: Performed by: INTERNAL MEDICINE

## 2023-01-15 PROCEDURE — 65270000046 HC RM TELEMETRY

## 2023-01-15 PROCEDURE — 36415 COLL VENOUS BLD VENIPUNCTURE: CPT

## 2023-01-15 PROCEDURE — 82962 GLUCOSE BLOOD TEST: CPT

## 2023-01-15 PROCEDURE — 74011250637 HC RX REV CODE- 250/637: Performed by: STUDENT IN AN ORGANIZED HEALTH CARE EDUCATION/TRAINING PROGRAM

## 2023-01-15 PROCEDURE — 74011636637 HC RX REV CODE- 636/637: Performed by: INTERNAL MEDICINE

## 2023-01-15 PROCEDURE — 80048 BASIC METABOLIC PNL TOTAL CA: CPT

## 2023-01-15 PROCEDURE — 85025 COMPLETE CBC W/AUTO DIFF WBC: CPT

## 2023-01-15 PROCEDURE — 83735 ASSAY OF MAGNESIUM: CPT

## 2023-01-15 PROCEDURE — 74011250636 HC RX REV CODE- 250/636: Performed by: STUDENT IN AN ORGANIZED HEALTH CARE EDUCATION/TRAINING PROGRAM

## 2023-01-15 PROCEDURE — 74011000250 HC RX REV CODE- 250: Performed by: INTERNAL MEDICINE

## 2023-01-15 PROCEDURE — 84100 ASSAY OF PHOSPHORUS: CPT

## 2023-01-15 PROCEDURE — 74011000258 HC RX REV CODE- 258: Performed by: STUDENT IN AN ORGANIZED HEALTH CARE EDUCATION/TRAINING PROGRAM

## 2023-01-15 PROCEDURE — 74011636637 HC RX REV CODE- 636/637: Performed by: STUDENT IN AN ORGANIZED HEALTH CARE EDUCATION/TRAINING PROGRAM

## 2023-01-15 RX ORDER — INSULIN GLARGINE 100 [IU]/ML
30 INJECTION, SOLUTION SUBCUTANEOUS
Status: DISCONTINUED | OUTPATIENT
Start: 2023-01-15 | End: 2023-01-16

## 2023-01-15 RX ADMIN — Medication 2 UNITS: at 21:41

## 2023-01-15 RX ADMIN — MELATONIN 3 MG: at 01:09

## 2023-01-15 RX ADMIN — LATANOPROST 1 DROP: 50 SOLUTION OPHTHALMIC at 21:44

## 2023-01-15 RX ADMIN — LATANOPROST 1 DROP: 50 SOLUTION OPHTHALMIC at 01:08

## 2023-01-15 RX ADMIN — INSULIN GLARGINE 30 UNITS: 100 INJECTION, SOLUTION SUBCUTANEOUS at 21:41

## 2023-01-15 RX ADMIN — Medication 3 UNITS: at 08:05

## 2023-01-15 RX ADMIN — SODIUM CHLORIDE 1 G: 900 INJECTION INTRAVENOUS at 11:05

## 2023-01-15 RX ADMIN — FUROSEMIDE 40 MG: 40 TABLET ORAL at 08:05

## 2023-01-15 RX ADMIN — CLOPIDOGREL BISULFATE 75 MG: 75 TABLET ORAL at 08:05

## 2023-01-15 RX ADMIN — Medication 3 UNITS: at 11:05

## 2023-01-15 RX ADMIN — Medication 2 UNITS: at 17:46

## 2023-01-15 RX ADMIN — MELATONIN 3 MG: at 21:17

## 2023-01-15 RX ADMIN — ATORVASTATIN CALCIUM 40 MG: 40 TABLET, FILM COATED ORAL at 21:17

## 2023-01-15 NOTE — ROUTINE PROCESS
End of Shift Note    Bedside shift change report given to Terrence Bingham  (oncoming nurse) by Ja Garcias RN (offgoing nurse). Report included the following information SBAR, Kardex, Intake/Output, and MAR    Shift worked:  DAYS   Shift summary and any significant changes:    -ANTIBIOTIC GIVEN  -LAST BM TODAY     Concerns for physician to address: NONE   [employer] phone for oncoming shift:   [phone]     Patient Information  Harley Nagy  81 y.o.  1/7/2023  7:17 AM by Precious Hawthorne MD. Harley Nagy was admitted from Home    Problem List  Patient Active Problem List    Diagnosis Date Noted    CVA (cerebral vascular accident) ([address] 01/07/2023    PVC's (premature ventricular contractions) 10/10/2021    Abscess of abdominal wall 09/03/2019    Type 2 diabetes with nephropathy ([address] 04/17/2018    Statin intolerance 05/06/2016    Essential hypertension 01/06/2016    Encounter for medication monitoring 01/06/2016    Diabetes mellitus type 2, controlled ([address] 10/16/2015    Generalized OA 05/28/2014    Nonrheumatic tricuspid valve disorder 04/12/2012    Family history of ischemic heart disease 04/12/2012    Mixed hyperlipidemia 04/12/2012    Mitral valve disease 04/12/2012    Obesity, unspecified 04/12/2012    Osteoarthritis 05/04/2010    Hypovitaminosis D 05/04/2010     Past Medical History:   Diagnosis Date    Chronic edema     Diabetes ([address]     Family history of ischemic heart disease 4/12/2012    Hypercholesterolemia     Hypertension     PVC's (premature ventricular contractions) 10/10/2021       Core Measures:  CVA: Yes Yes      Activity:  Activity Level:  Up with Assistance      Cardiac:   Cardiac Monitoring: Yes      Cardiac Rhythm: Sinus Rhythm    Access:   Current line(s): PIV       Genitourinary:   Urinary status: voiding      Respiratory:   O2 Device: None (Room air)         GI:  Last Bowel Movement Date: 01/14/23  Current diet:  ADULT DIET Regular; 3 carb choices (45 gm/meal); per pt preferences, no milk but CAN have milk products, no eggs ; doesn't like them ; no tea         Pain Management:   Patient states pain is manageable on current regimen: N/A    Skin:  Lopez Score: 16  Interventions: increase time out of bed, PT/OT consult, and nutritional support     Patient Safety:  Fall Score:  Total Score: 3  Interventions: bed/chair alarm, gripper socks, pt to call before getting OOB, and stay with me (per policy)  High Fall Risk: Yes  @Rollbelt  @dexterity to release roll belt  Yes/No ( must document dexterity  here by stating Yes or No here, otherwise this is a restraint and must follow restraint documentation policy.)    DVT prophylaxis:  DVT prophylaxis Med- Not applicable  DVT prophylaxis SCD or DENNYS- Yes     Wounds: (If Applicable)  Wounds- No  Location     Active Consults:  IP CONSULT TO HOSPITALIST  IP CONSULT TO NEUROLOGY    Length of Stay:  Expected LOS: 1d 21h  Actual LOS: 8  Discharge Plan:  Rehab when ready      Maura Hall RN

## 2023-01-15 NOTE — PROGRESS NOTES
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 Subacute infarct in the left maninder. 2. Generalized parenchymal volume loss and mild chronic microvascular ischemic  disease. 3. Small chronic hemorrhage in the right occipital lobe. Neurology evaluated and signed off  TSH 1.5, A1c 6.8,   C/w statin  Plavix per Neurology, has ASA allergy  TTE with no shunt, did not comment on thrombus. EF normal  PT/OT rec IPR, case management working on this  Carotid duplex with intimal thickening in L ICA, otherwise normal     Intermittent fever:  UTI  Repeat CXR negative, UA s/o UTI, blood culture pending,  procal .32  Nausea resolved today  Started on Rocephin, fu urine culture    R arm pain  Resolved. R arm US and elbow XR without abnormality     T2DM  Lantus increased to 30 units  Cont' SSI  Hold glyburide     HTN  Resume home lasix     Estimated discharge date:   Barriers: Fever, urine cultures, placement     Code status: Full  Prophylaxis: Lovenox  Recommended Disposition: SNF/LTC  NOK: Daughter        Subjective:     Chief Complaint / Reason for Physician Visit  Seen for CVA  Her nausea has improved today  Discussed with daughter at bedside  Continues to have right sided weakness      Review of Systems:  Symptom Y/N Comments  Symptom Y/N Comments   Fever/Chills n   Chest Pain n    Poor Appetite n   Edema n    Cough n   Abdominal Pain n    Sputum n   Joint Pain n    SOB/GUTIERREZ n   Pruritis/Rash     Nausea/vomit n   Tolerating PT/OT     Diarrhea    Tolerating Diet     Constipation    Other       Could NOT obtain due to:      Objective:     VITALS:   Last 24hrs VS reviewed since prior progress note.  Most recent are:  Patient Vitals for the past 24 hrs:   Temp Pulse Resp BP SpO2   01/15/23 0809 97.5 °F (36.4 °C) 93 20 (!) 157/80 99 %   23 2311 98.2 °F (36.8 °C) 90 19 (!) 151/76 97 %   01/14/23 1507 98.4 °F (36.9 °C) 91 18 (!) 142/77 98 %       Intake/Output Summary (Last 24 hours) at 1/15/2023 1023  Last data filed at 1/15/2023 9189  Gross per 24 hour   Intake --   Output 700 ml   Net -700 ml        I had a face to face encounter and independently examined this patient on 1/15/2023, as outlined below:  PHYSICAL EXAM:  General: Awake, No acute distress  EENT:  EOMI. Anicteric sclerae. MMM  Resp:  CTA bilaterally, no wheezing or rales. No accessory muscle use  CV:  Regular  rhythm,  No edema  GI:  Soft, Non distended, Non tender. +Bowel sounds  Neurologic:  Alert and oriented X 3, normal speech,   Psych:   Not anxious nor agitated  Skin:  No rashes. No jaundice    Reviewed most current lab test results and cultures  YES  Reviewed most current radiology test results   YES  Review and summation of old records today    NO  Reviewed patient's current orders and MAR    YES  PMH/ reviewed - no change compared to H&P  ________________________________________________________________________  Care Plan discussed with:    Comments   Patient y    Family      RN y    Care Manager     Consultant                        Multidiciplinary team rounds were held today with , nursing, pharmacist and clinical coordinator. Patient's plan of care was discussed; medications were reviewed and discharge planning was addressed. ________________________________________________________________________  Total NON critical care TIME:    Minutes    Total CRITICAL CARE TIME Spent:   Minutes non procedure based      Comments   >50% of visit spent in counseling and coordination of care     ________________________________________________________________________  Felecia Ayon MD     Procedures: see electronic medical records for all procedures/Xrays and details which were not copied into this note but were reviewed prior to creation of Plan.       LABS:  I reviewed today's most current labs and imaging studies.   Pertinent labs include:  Recent Labs     01/15/23  0103 01/14/23  1148   WBC 7.5 10.1   HGB 12.2 12.8   HCT 38.0 38.6    271     Recent Labs     01/15/23  0103 01/14/23  1157   * 133*   K 4.5 4.6    102   CO2 23 24   * 298*   BUN 46* 42*   CREA 1.25* 1.35*   CA 9.6 9.8   MG 2.2 2.3   PHOS 4.1 3.8       Signed: Elenita Sheets MD

## 2023-01-15 NOTE — ROUTINE PROCESS
End of Shift Note    Bedside shift change report given to Vivienne Zhang RN  (oncoming nurse) by Trang Munoz RN (offgoing nurse). Report included the following information SBAR, Kardex, Intake/Output, and MAR    Shift worked:  7p-7a   Shift summary and any significant changes:    Compazine given X1. No complaints of pain. VSS. No significant events over night     Concerns for physician to address: N/A   [employer] phone for oncoming shift:   [phone]     Patient Information  Jenniffer Grier  81 y.o.  1/7/2023  7:17 AM by Mary Alice Esqueda MD. Jenniffer Grier was admitted from Home    Problem List  Patient Active Problem List    Diagnosis Date Noted    CVA (cerebral vascular accident) ([address] 01/07/2023    PVC's (premature ventricular contractions) 10/10/2021    Abscess of abdominal wall 09/03/2019    Type 2 diabetes with nephropathy ([address] 04/17/2018    Statin intolerance 05/06/2016    Essential hypertension 01/06/2016    Encounter for medication monitoring 01/06/2016    Diabetes mellitus type 2, controlled ([address] 10/16/2015    Generalized OA 05/28/2014    Nonrheumatic tricuspid valve disorder 04/12/2012    Family history of ischemic heart disease 04/12/2012    Mixed hyperlipidemia 04/12/2012    Mitral valve disease 04/12/2012    Obesity, unspecified 04/12/2012    Osteoarthritis 05/04/2010    Hypovitaminosis D 05/04/2010     Past Medical History:   Diagnosis Date    Chronic edema     Diabetes ([address]     Family history of ischemic heart disease 4/12/2012    Hypercholesterolemia     Hypertension     PVC's (premature ventricular contractions) 10/10/2021       Core Measures:  CVA: Yes Yes      Activity:  Activity Level:  Up with Assistance      Cardiac:   Cardiac Monitoring: Yes      Cardiac Rhythm: Sinus Rhythm    Access:   Current line(s): PIV       Genitourinary:   Urinary status: voiding      Respiratory:   O2 Device: None (Room air)         GI:  Last Bowel Movement Date: 01/14/23  Current diet:  ADULT DIET Regular; 3 carb choices (45 gm/meal); per pt preferences, no milk but CAN have milk products, no eggs ; doesn't like them ; no tea         Pain Management:   Patient states pain is manageable on current regimen: N/A    Skin:  Lopez Score: 15  Interventions: increase time out of bed, PT/OT consult, and nutritional support     Patient Safety:  Fall Score:  Total Score: 3  Interventions: bed/chair alarm, gripper socks, pt to call before getting OOB, and stay with me (per policy)  High Fall Risk: Yes  @Rollbelt  @dexterity to release roll belt  Yes/No ( must document dexterity  here by stating Yes or No here, otherwise this is a restraint and must follow restraint documentation policy.)    DVT prophylaxis:  DVT prophylaxis Med- Not applicable  DVT prophylaxis SCD or DENNYS- Yes     Wounds: (If Applicable)  Wounds- No  Location     Active Consults:  IP CONSULT TO HOSPITALIST  IP CONSULT TO NEUROLOGY    Length of Stay:  Expected LOS: 1d 21h  Actual LOS: 8  Discharge Plan:  Rehab when ready      María Loredo RN

## 2023-01-15 NOTE — ROUTINE PROCESS
End of Shift Note    Bedside shift change report given to Sudhakar Nugent  (oncoming nurse) by Candelaria Lomeli RN (offgoing nurse). Report included the following information SBAR, Kardex, Intake/Output, and MAR    Shift worked:  DAYS   Shift summary and any significant changes:    -LABS DRAWN; URINE SENT; COVID NEGATIVE     Concerns for physician to address: NONE   Zone phone for oncoming shift:   [phone]     Patient Information  Justina Trivedi  81 y.o.  1/7/2023  7:17 AM by Barrera Reeves MD. Justina Trivedi was admitted from Home    Problem List  Patient Active Problem List    Diagnosis Date Noted    CVA (cerebral vascular accident) ([address] 01/07/2023    PVC's (premature ventricular contractions) 10/10/2021    Abscess of abdominal wall 09/03/2019    Type 2 diabetes with nephropathy ([address] 04/17/2018    Statin intolerance 05/06/2016    Essential hypertension 01/06/2016    Encounter for medication monitoring 01/06/2016    Diabetes mellitus type 2, controlled ([address] 10/16/2015    Generalized OA 05/28/2014    Nonrheumatic tricuspid valve disorder 04/12/2012    Family history of ischemic heart disease 04/12/2012    Mixed hyperlipidemia 04/12/2012    Mitral valve disease 04/12/2012    Obesity, unspecified 04/12/2012    Osteoarthritis 05/04/2010    Hypovitaminosis D 05/04/2010     Past Medical History:   Diagnosis Date    Chronic edema     Diabetes ([address]     Family history of ischemic heart disease 4/12/2012    Hypercholesterolemia     Hypertension     PVC's (premature ventricular contractions) 10/10/2021       Core Measures:  CVA: Yes Yes      Activity:  Activity Level:  Up with Assistance      Cardiac:   Cardiac Monitoring: Yes      Cardiac Rhythm: Sinus Rhythm    Access:   Current line(s): PIV       Genitourinary:   Urinary status: voiding      Respiratory:   O2 Device: None (Room air)         GI:  Last Bowel Movement Date: 01/14/23  Current diet:  ADULT DIET Regular; 3 carb choices (45 gm/meal); per pt preferences, no milk but CAN have milk products, no eggs ; doesn't like them ; no tea         Pain Management:   Patient states pain is manageable on current regimen: N/A    Skin:  Lopez Score: 15  Interventions: increase time out of bed, PT/OT consult, and nutritional support     Patient Safety:  Fall Score:  Total Score: 3  Interventions: bed/chair alarm, gripper socks, pt to call before getting OOB, and stay with me (per policy)  High Fall Risk: Yes  @Rollbelt  @dexterity to release roll belt  Yes/No ( must document dexterity  here by stating Yes or No here, otherwise this is a restraint and must follow restraint documentation policy.)    DVT prophylaxis:  DVT prophylaxis Med- Not applicable  DVT prophylaxis SCD or DENNYS- Yes     Wounds: (If Applicable)  Wounds- No  Location     Active Consults:  IP CONSULT TO HOSPITALIST  IP CONSULT TO NEUROLOGY    Length of Stay:  Expected LOS: 1d 21h  Actual LOS: 7  Discharge Plan:  Rehab when ready      Beto Blanco RN

## 2023-01-15 NOTE — PROGRESS NOTES
Problem: Pressure Injury - Risk of  Goal: *Prevention of pressure injury  Description: Document Lopez Scale and appropriate interventions in the flowsheet. Outcome: Progressing Towards Goal  Note: Pressure Injury Interventions:  Sensory Interventions: Assess changes in LOC    Moisture Interventions: Minimize layers    Activity Interventions: PT/OT evaluation, Increase time out of bed    Mobility Interventions: HOB 30 degrees or less, Turn and reposition approx.  every two hours(pillow and wedges)    Nutrition Interventions: Document food/fluid/supplement intake, Offer support with meals,snacks and hydration    Friction and Shear Interventions: Minimize layers

## 2023-01-16 LAB
ANION GAP SERPL CALC-SCNC: 7 MMOL/L (ref 5–15)
BACTERIA SPEC CULT: ABNORMAL
BACTERIA SPEC CULT: ABNORMAL
BASOPHILS # BLD: 0 K/UL (ref 0–0.1)
BASOPHILS NFR BLD: 0 % (ref 0–1)
BUN SERPL-MCNC: 42 MG/DL (ref 6–20)
BUN/CREAT SERPL: 35 (ref 12–20)
CALCIUM SERPL-MCNC: 9.6 MG/DL (ref 8.5–10.1)
CC UR VC: ABNORMAL
CHLORIDE SERPL-SCNC: 102 MMOL/L (ref 97–108)
CO2 SERPL-SCNC: 25 MMOL/L (ref 21–32)
COVID-19 RAPID TEST, COVR: NOT DETECTED
CREAT SERPL-MCNC: 1.21 MG/DL (ref 0.55–1.02)
DIFFERENTIAL METHOD BLD: ABNORMAL
EOSINOPHIL # BLD: 0.1 K/UL (ref 0–0.4)
EOSINOPHIL NFR BLD: 1 % (ref 0–7)
ERYTHROCYTE [DISTWIDTH] IN BLOOD BY AUTOMATED COUNT: 13.2 % (ref 11.5–14.5)
GLUCOSE BLD STRIP.AUTO-MCNC: 163 MG/DL (ref 65–117)
GLUCOSE BLD STRIP.AUTO-MCNC: 200 MG/DL (ref 65–117)
GLUCOSE BLD STRIP.AUTO-MCNC: 242 MG/DL (ref 65–117)
GLUCOSE BLD STRIP.AUTO-MCNC: 287 MG/DL (ref 65–117)
GLUCOSE SERPL-MCNC: 184 MG/DL (ref 65–100)
HCT VFR BLD AUTO: 36.6 % (ref 35–47)
HGB BLD-MCNC: 12.7 G/DL (ref 11.5–16)
IMM GRANULOCYTES # BLD AUTO: 0.1 K/UL (ref 0–0.04)
IMM GRANULOCYTES NFR BLD AUTO: 1 % (ref 0–0.5)
LYMPHOCYTES # BLD: 1.1 K/UL (ref 0.8–3.5)
LYMPHOCYTES NFR BLD: 10 % (ref 12–49)
MAGNESIUM SERPL-MCNC: 2.2 MG/DL (ref 1.6–2.4)
MCH RBC QN AUTO: 27.1 PG (ref 26–34)
MCHC RBC AUTO-ENTMCNC: 34.7 G/DL (ref 30–36.5)
MCV RBC AUTO: 78.2 FL (ref 80–99)
MONOCYTES # BLD: 1 K/UL (ref 0–1)
MONOCYTES NFR BLD: 9 % (ref 5–13)
NEUTS SEG # BLD: 8.5 K/UL (ref 1.8–8)
NEUTS SEG NFR BLD: 79 % (ref 32–75)
NRBC # BLD: 0 K/UL (ref 0–0.01)
NRBC BLD-RTO: 0 PER 100 WBC
PHOSPHATE SERPL-MCNC: 4.4 MG/DL (ref 2.6–4.7)
PLATELET # BLD AUTO: 356 K/UL (ref 150–400)
PMV BLD AUTO: 10.2 FL (ref 8.9–12.9)
POTASSIUM SERPL-SCNC: 4.4 MMOL/L (ref 3.5–5.1)
RBC # BLD AUTO: 4.68 M/UL (ref 3.8–5.2)
SERVICE CMNT-IMP: ABNORMAL
SODIUM SERPL-SCNC: 134 MMOL/L (ref 136–145)
SOURCE, COVRS: NORMAL
WBC # BLD AUTO: 10.7 K/UL (ref 3.6–11)

## 2023-01-16 PROCEDURE — 74011636637 HC RX REV CODE- 636/637: Performed by: STUDENT IN AN ORGANIZED HEALTH CARE EDUCATION/TRAINING PROGRAM

## 2023-01-16 PROCEDURE — 74011250636 HC RX REV CODE- 250/636: Performed by: STUDENT IN AN ORGANIZED HEALTH CARE EDUCATION/TRAINING PROGRAM

## 2023-01-16 PROCEDURE — 65270000046 HC RM TELEMETRY

## 2023-01-16 PROCEDURE — 85025 COMPLETE CBC W/AUTO DIFF WBC: CPT

## 2023-01-16 PROCEDURE — 83735 ASSAY OF MAGNESIUM: CPT

## 2023-01-16 PROCEDURE — 97112 NEUROMUSCULAR REEDUCATION: CPT

## 2023-01-16 PROCEDURE — 97535 SELF CARE MNGMENT TRAINING: CPT

## 2023-01-16 PROCEDURE — 80048 BASIC METABOLIC PNL TOTAL CA: CPT

## 2023-01-16 PROCEDURE — 74011250637 HC RX REV CODE- 250/637: Performed by: STUDENT IN AN ORGANIZED HEALTH CARE EDUCATION/TRAINING PROGRAM

## 2023-01-16 PROCEDURE — 74011636637 HC RX REV CODE- 636/637: Performed by: INTERNAL MEDICINE

## 2023-01-16 PROCEDURE — 84100 ASSAY OF PHOSPHORUS: CPT

## 2023-01-16 PROCEDURE — 36415 COLL VENOUS BLD VENIPUNCTURE: CPT

## 2023-01-16 PROCEDURE — 74011250637 HC RX REV CODE- 250/637: Performed by: INTERNAL MEDICINE

## 2023-01-16 PROCEDURE — 82962 GLUCOSE BLOOD TEST: CPT

## 2023-01-16 PROCEDURE — 76937 US GUIDE VASCULAR ACCESS: CPT

## 2023-01-16 PROCEDURE — 87635 SARS-COV-2 COVID-19 AMP PRB: CPT

## 2023-01-16 PROCEDURE — 74011000258 HC RX REV CODE- 258: Performed by: STUDENT IN AN ORGANIZED HEALTH CARE EDUCATION/TRAINING PROGRAM

## 2023-01-16 PROCEDURE — 97530 THERAPEUTIC ACTIVITIES: CPT

## 2023-01-16 RX ORDER — INSULIN GLARGINE 100 [IU]/ML
35 INJECTION, SOLUTION SUBCUTANEOUS
Status: DISCONTINUED | OUTPATIENT
Start: 2023-01-16 | End: 2023-01-17 | Stop reason: HOSPADM

## 2023-01-16 RX ORDER — ENOXAPARIN SODIUM 100 MG/ML
40 INJECTION SUBCUTANEOUS EVERY 24 HOURS
Status: DISCONTINUED | OUTPATIENT
Start: 2023-01-16 | End: 2023-01-17 | Stop reason: HOSPADM

## 2023-01-16 RX ADMIN — ATORVASTATIN CALCIUM 40 MG: 40 TABLET, FILM COATED ORAL at 21:28

## 2023-01-16 RX ADMIN — FUROSEMIDE 40 MG: 40 TABLET ORAL at 08:55

## 2023-01-16 RX ADMIN — INSULIN GLARGINE 35 UNITS: 100 INJECTION, SOLUTION SUBCUTANEOUS at 21:30

## 2023-01-16 RX ADMIN — Medication 3 UNITS: at 17:42

## 2023-01-16 RX ADMIN — Medication 1 UNITS: at 22:42

## 2023-01-16 RX ADMIN — CLOPIDOGREL BISULFATE 75 MG: 75 TABLET ORAL at 08:55

## 2023-01-16 RX ADMIN — LATANOPROST 1 DROP: 50 SOLUTION OPHTHALMIC at 21:30

## 2023-01-16 RX ADMIN — ENOXAPARIN SODIUM 40 MG: 100 INJECTION SUBCUTANEOUS at 09:18

## 2023-01-16 RX ADMIN — SODIUM CHLORIDE 1 G: 900 INJECTION INTRAVENOUS at 11:47

## 2023-01-16 RX ADMIN — Medication 2 UNITS: at 11:47

## 2023-01-16 NOTE — PROGRESS NOTES
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 Subacute infarct in the left maninder. 2. Generalized parenchymal volume loss and mild chronic microvascular ischemic  disease. 3. Small chronic hemorrhage in the right occipital lobe. Neurology evaluated and signed off  TSH 1.5, A1c 6.8,   C/w statin  Plavix per Neurology, has ASA allergy  TTE with no shunt, did not comment on thrombus. EF normal  PT/OT rec IPR, case management working on this  Carotid duplex with intimal thickening in L ICA, otherwise normal     Intermittent fever:  UTI  Repeat CXR negative, UA s/o UTI,   blood culture No growth  Urine culture growing GNR and GBS    procal .32  Nausea resolved  Cw rocephine    R arm pain  Resolved.   R arm US and elbow XR without abnormality     T2DM  Lantus increased to 35 units  Cont' SSI  Hold glyburide     HTN  Resume home lasix     Estimated discharge date:   Barriers: Urine cultures, also needs placement     Code status: Full  Prophylaxis:   Was not on lovenox d/t chronic hemorrhage on right occipital lobe, however given her Right hemiparesis and prolonged hospitalization, high risk of PE, will start lovenox ppx  Recommended Disposition: SNF/LTC  NOK: Daughter        Subjective:     Chief Complaint / Reason for Physician Visit  Seen for CVA  No nausea/ vomiting  Feels better, wants to work with PT today  Discussed with daughter at bedside      Review of Systems:  Symptom Y/N Comments  Symptom Y/N Comments   Fever/Chills n   Chest Pain n    Poor Appetite n   Edema n    Cough n   Abdominal Pain n    Sputum n   Joint Pain n    SOB/GUTIERREZ n   Pruritis/Rash     Nausea/vomit n   Tolerating PT/OT     Diarrhea    Tolerating Diet     Constipation    Other       Could NOT obtain due to:      Objective:     VITALS:   Last 24hrs VS reviewed since prior progress note. Most recent are:  Patient Vitals for the past 24 hrs:   Temp Pulse Resp BP SpO2   01/16/23 0805 98.4 °F (36.9 °C) (!) 105 18 (!) 140/80 100 %   01/16/23 0007 98.8 °F (37.1 °C) (!) 108 18 (!) 142/79 100 %   01/15/23 1505 97.7 °F (36.5 °C) 95 22 (!) 161/78 100 %     No intake or output data in the 24 hours ending 01/16/23 0852       I had a face to face encounter and independently examined this patient on 1/16/2023, as outlined below:  PHYSICAL EXAM:  General: Awake, No acute distress  EENT:  EOMI. Anicteric sclerae. MMM  Resp:  CTA bilaterally, no wheezing or rales. No accessory muscle use  CV:  Regular  rhythm,  No edema  GI:  Soft, Non distended, Non tender. +Bowel sounds  Neurologic:  Alert and oriented X 3, normal speech, Power 1/5 on right, 5/5 on left  Psych:   Not anxious nor agitated  Skin:  No rashes. No jaundice    Reviewed most current lab test results and cultures  YES  Reviewed most current radiology test results   YES  Review and summation of old records today    NO  Reviewed patient's current orders and MAR    YES  PMH/ reviewed - no change compared to H&P  ________________________________________________________________________  Care Plan discussed with:    Comments   Patient y    Family      RN y    Care Manager     Consultant                        Multidiciplinary team rounds were held today with , nursing, pharmacist and clinical coordinator. Patient's plan of care was discussed; medications were reviewed and discharge planning was addressed.      ________________________________________________________________________  Total NON critical care TIME:    Minutes    Total CRITICAL CARE TIME Spent:   Minutes non procedure based      Comments   >50% of visit spent in counseling and coordination of care     ________________________________________________________________________  Sergio Galeana MD     Procedures: see electronic medical records for all procedures/Xrays and details which were not copied into this note but were reviewed prior to creation of Plan. LABS:  I reviewed today's most current labs and imaging studies.   Pertinent labs include:  Recent Labs     01/16/23  0352 01/15/23  0103 01/14/23  1148   WBC 10.7 7.5 10.1   HGB 12.7 12.2 12.8   HCT 36.6 38.0 38.6    265 271     Recent Labs     01/16/23  0429 01/15/23  0103 01/14/23  1157   * 130* 133*   K 4.4 4.5 4.6    101 102   CO2 25 23 24   * 310* 298*   BUN 42* 46* 42*   CREA 1.21* 1.25* 1.35*   CA 9.6 9.6 9.8   MG 2.2 2.2 2.3   PHOS 4.4 4.1 3.8       Signed: Taco Sarmiento MD

## 2023-01-16 NOTE — PROGRESS NOTES
Comprehensive Nutrition Assessment    Type and Reason for Visit: Reassess    Nutrition Recommendations/Plan:   Continue current diet      Malnutrition Assessment:  Malnutrition Status:  No malnutrition (01/09/23 1622)      Nutrition Assessment:    Chart reviewed; patient sleeping soundly at time of visit. Receiving a consistent carb diet. No documented PO intake. For possible discharge tomorrow pending insurance authorization. Encourage intake of meals. Nutrition Related Findings:    -[phone], Na 134   1-2+ edema   BM 1/15   Atorvastatin, Plavix, Lasix, lantus, Humalog   Wound Type: None    Current Nutrition Intake & Therapies:        ADULT DIET Regular; 3 carb choices (45 gm/meal); per pt preferences, no milk but CAN have milk products, no eggs ; doesn't like them ; no tea    Anthropometric Measures:  Height: 5' 2\" (157.5 cm)  Ideal Body Weight (IBW): 110 lbs (50 kg)     Current Body Wt:  77.1 kg (169 lb 15.6 oz), 154.5 % IBW. Current BMI (kg/m2): 31.1                          BMI Category: Obese class 1 (BMI 30.0-34. 9)    Estimated Daily Nutrient Needs:  Energy Requirements Based On: Formula  Weight Used for Energy Requirements: Current  Energy (kcal/day): 1500 (BMR x 1. 3AF)  Weight Used for Protein Requirements: Current  Protein (g/day): 77 (1.0 g/kg bw)  Method Used for Fluid Requirements: 1 ml/kcal  Fluid (ml/day): 1500 ml/day    Nutrition Diagnosis:   No nutrition diagnosis at this time      Nutrition Interventions:   Food and/or Nutrient Delivery: Continue current diet  Nutrition Education/Counseling: No recommendations at this time  Coordination of Nutrition Care: Continue to monitor while inpatient       Goals:  Previous Goal Met: Progressing toward goal(s)  Goals: PO intake 50% or greater, by next RD assessment       Nutrition Monitoring and Evaluation:   Behavioral-Environmental Outcomes: None identified  Food/Nutrient Intake Outcomes: Food and nutrient intake  Physical Signs/Symptoms Outcomes: Biochemical data, Skin, Weight    Discharge Planning:    Continue current diet    Jana Mendoza RD  Contact: ext [phone]

## 2023-01-16 NOTE — PROGRESS NOTES
Speech Pathology Note    SLP visit attempted on this date. Patient asleep upon SLP arrival. Patient drowsy and unable to arouse enough for motor speech treatment on this date, despite verbal and tactile cues (i.e. sternal rub, raising HOB upright, etc.). Will defer and continue to follow. Thank you.     Remington Ryan M.S., CCC-SLP

## 2023-01-16 NOTE — PROGRESS NOTES
End of Shift Note     Bedside shift change report given to Thomas Hartley  (oncoming nurse) by Jojo Palmer RN (offgoing nurse). Report included the following information SBAR, Kardex, Intake/Output, and MAR     Shift worked:  days   Shift summary and any significant changes:     No significant events      Concerns for physician to address: NONE   Zone phone for oncoming shift:   [phone]      Patient Information  Chelle Escobar  81 y.o.  1/7/2023  7:17 AM by Jose Schulz MD. Chelle Escobar was admitted from Home     Problem List       Patient Active Problem List     Diagnosis Date Noted    CVA (cerebral vascular accident) ([address] 01/07/2023    PVC's (premature ventricular contractions) 10/10/2021    Abscess of abdominal wall 09/03/2019    Type 2 diabetes with nephropathy ([address] 04/17/2018    Statin intolerance 05/06/2016    Essential hypertension 01/06/2016    Encounter for medication monitoring 01/06/2016    Diabetes mellitus type 2, controlled ([address] 10/16/2015    Generalized OA 05/28/2014    Nonrheumatic tricuspid valve disorder 04/12/2012    Family history of ischemic heart disease 04/12/2012    Mixed hyperlipidemia 04/12/2012    Mitral valve disease 04/12/2012    Obesity, unspecified 04/12/2012    Osteoarthritis 05/04/2010    Hypovitaminosis D 05/04/2010           Past Medical History:   Diagnosis Date    Chronic edema      Diabetes ([address]      Family history of ischemic heart disease 4/12/2012    Hypercholesterolemia      Hypertension      PVC's (premature ventricular contractions) 10/10/2021         Core Measures:  CVA: Yes Yes        Activity:  Activity Level:  Up with Assistance        Cardiac:   Cardiac Monitoring: Yes      Cardiac Rhythm: Sinus Rhythm     Access:   Current line(s): PIV         Genitourinary:   Urinary status: voiding        Respiratory:   O2 Device: None (Room air)        GI:  Last Bowel Movement Date: 01/15/23  Current diet:  ADULT DIET Regular; 3 carb choices (45 gm/meal); per pt preferences, no milk but CAN have milk products, no eggs ; doesn't like them ; no tea        Pain Management:   Patient states pain is manageable on current regimen: N/A     Skin:  Lopez Score: 15  Interventions: increase time out of bed, PT/OT consult, and nutritional support     Patient Safety:  Fall Score:  Total Score: 3  Interventions: bed/chair alarm, gripper socks, pt to call before getting OOB, and stay with me (per policy)  High Fall Risk: Yes  @Rollbelt  @dexterity to release roll belt  Yes/No ( must document dexterity  here by stating Yes or No here, otherwise this is a restraint and must follow restraint documentation policy.)     DVT prophylaxis:  DVT prophylaxis Med- Not applicable  DVT prophylaxis SCD or DENNYS- Yes      Wounds: (If Applicable)  Wounds- No  Location      Active Consults:  IP CONSULT TO HOSPITALIST  IP CONSULT TO NEUROLOGY     Length of Stay:  Expected LOS: 1d 21h  Actual LOS: 9  Discharge Plan:  Rehab when ready

## 2023-01-16 NOTE — PROGRESS NOTES
0715-  Bedside and Verbal shift change report given to Barbara Hill RN (oncoming nurse) by Chey Rose RN (offgoing nurse). Report included the following information SBAR, Kardex, Intake/Output, MAR, and Recent Results.

## 2023-01-16 NOTE — PROGRESS NOTES
Problem: Pressure Injury - Risk of  Goal: *Prevention of pressure injury  Description: Document Lopez Scale and appropriate interventions in the flowsheet.   Outcome: Progressing Towards Goal  Note: Pressure Injury Interventions:  Sensory Interventions: Float heels, Maintain/enhance activity level, Minimize linen layers, Monitor skin under medical devices, Keep linens dry and wrinkle-free    Moisture Interventions: Internal/External urinary devices, Limit adult briefs, Minimize layers    Activity Interventions: PT/OT evaluation, Increase time out of bed, Pressure redistribution bed/mattress(bed type)    Mobility Interventions: Float heels, Pressure redistribution bed/mattress (bed type)    Nutrition Interventions: Document food/fluid/supplement intake, Offer support with meals,snacks and hydration    Friction and Shear Interventions: Minimize layers                Problem: Patient Education: Go to Patient Education Activity  Goal: Patient/Family Education  Outcome: Progressing Towards Goal     Problem: Patient Education: Go to Patient Education Activity  Goal: Patient/Family Education  Outcome: Progressing Towards Goal     Problem: TIA/CVA Stroke: 0-24 hours  Goal: Off Pathway (Use only if patient is Off Pathway)  Outcome: Progressing Towards Goal  Goal: Activity/Safety  Outcome: Progressing Towards Goal  Goal: Consults, if ordered  Outcome: Progressing Towards Goal  Goal: Diagnostic Test/Procedures  Outcome: Progressing Towards Goal  Goal: Nutrition/Diet  Outcome: Progressing Towards Goal  Goal: Discharge Planning  Outcome: Progressing Towards Goal  Goal: Medications  Outcome: Progressing Towards Goal  Goal: Respiratory  Outcome: Progressing Towards Goal  Goal: Treatments/Interventions/Procedures  Outcome: Progressing Towards Goal  Goal: Minimize risk of bleeding post-thrombolytic infusion  Outcome: Progressing Towards Goal  Goal: Monitor for complications post-thrombolytic infusion  Outcome: Progressing Towards Goal  Goal: Psychosocial  Outcome: Progressing Towards Goal  Goal: *Hemodynamically stable  Outcome: Progressing Towards Goal  Goal: *Neurologically stable  Description: Absence of additional neurological deficits    Outcome: Progressing Towards Goal  Goal: *Verbalizes anxiety and depression are reduced or absent  Outcome: Progressing Towards Goal  Goal: *Absence of Signs of Aspiration on Current Diet  Outcome: Progressing Towards Goal  Goal: *Absence of deep venous thrombosis signs and symptoms(Stroke Metric)  Outcome: Progressing Towards Goal  Goal: *Ability to perform ADLs and demonstrates progressive mobility and function  Outcome: Progressing Towards Goal  Goal: *Stroke education started(Stroke Metric)  Outcome: Progressing Towards Goal  Goal: *Dysphagia screen performed(Stroke Metric)  Outcome: Progressing Towards Goal  Goal: *Rehab consulted(Stroke Metric)  Outcome: Progressing Towards Goal     Problem: TIA/CVA Stroke: Day 2 Until Discharge  Goal: Off Pathway (Use only if patient is Off Pathway)  Outcome: Progressing Towards Goal  Goal: Activity/Safety  Outcome: Progressing Towards Goal  Goal: Diagnostic Test/Procedures  Outcome: Progressing Towards Goal  Goal: Nutrition/Diet  Outcome: Progressing Towards Goal  Goal: Discharge Planning  Outcome: Progressing Towards Goal  Goal: Medications  Outcome: Progressing Towards Goal  Goal: Respiratory  Outcome: Progressing Towards Goal  Goal: Treatments/Interventions/Procedures  Outcome: Progressing Towards Goal  Goal: Psychosocial  Outcome: Progressing Towards Goal  Goal: *Verbalizes anxiety and depression are reduced or absent  Outcome: Progressing Towards Goal  Goal: *Absence of aspiration  Outcome: Progressing Towards Goal  Goal: *Absence of deep venous thrombosis signs and symptoms(Stroke Metric)  Outcome: Progressing Towards Goal  Goal: *Optimal pain control at patient's stated goal  Outcome: Progressing Towards Goal  Goal: *Tolerating diet  Outcome: Progressing Towards Goal  Goal: *Ability to perform ADLs and demonstrates progressive mobility and function  Outcome: Progressing Towards Goal  Goal: *Stroke education continued(Stroke Metric)  Outcome: Progressing Towards Goal     Problem: Ischemic Stroke: Discharge Outcomes  Goal: *Verbalizes anxiety and depression are reduced or absent  Outcome: Progressing Towards Goal  Goal: *Verbalize understanding of risk factor modification(Stroke Metric)  Outcome: Progressing Towards Goal  Goal: *Hemodynamically stable  Outcome: Progressing Towards Goal  Goal: *Absence of aspiration pneumonia  Outcome: Progressing Towards Goal  Goal: *Aware of needed dietary changes  Outcome: Progressing Towards Goal  Goal: *Verbalize understanding of prescribed medications including anti-coagulants, anti-lipid, and/or anti-platelets(Stroke Metric)  Outcome: Progressing Towards Goal  Goal: *Tolerating diet  Outcome: Progressing Towards Goal  Goal: *Aware of follow-up diagnostics related to anticoagulants  Outcome: Progressing Towards Goal  Goal: *Ability to perform ADLs and demonstrates progressive mobility and function  Outcome: Progressing Towards Goal  Goal: *Absence of DVT(Stroke Metric)  Outcome: Progressing Towards Goal  Goal: *Absence of aspiration  Outcome: Progressing Towards Goal  Goal: *Optimal pain control at patient's stated goal  Outcome: Progressing Towards Goal  Goal: *Home safety concerns addressed  Outcome: Progressing Towards Goal  Goal: *Describes available resources and support systems  Outcome: Progressing Towards Goal  Goal: *Verbalizes understanding of activation of EMS(911) for stroke symptoms(Stroke Metric)  Outcome: Progressing Towards Goal  Goal: *Understands and describes signs and symptoms to report to providers(Stroke Metric)  Outcome: Progressing Towards Goal  Goal: *Neurolgocially stable (absence of additional neurological deficits)  Outcome: Progressing Towards Goal  Goal: *Verbalizes importance of follow-up with primary care physician(Stroke Metric)  Outcome: Progressing Towards Goal  Goal: *Smoking cessation discussed,if applicable(Stroke Metric)  Outcome: Progressing Towards Goal  Goal: *Depression screening completed(Stroke Metric)  Outcome: Progressing Towards Goal     Problem: Falls - Risk of  Goal: *Absence of Falls  Description: Document Trip Fall Risk and appropriate interventions in the flowsheet. Outcome: Progressing Towards Goal     Problem: Patient Education: Go to Patient Education Activity  Goal: Patient/Family Education  Outcome: Progressing Towards Goal     Problem: Diabetes Self-Management  Goal: *Disease process and treatment process  Description: Define diabetes and identify own type of diabetes; list 3 options for treating diabetes. Outcome: Progressing Towards Goal  Goal: *Incorporating nutritional management into lifestyle  Description: Describe effect of type, amount and timing of food on blood glucose; list 3 methods for planning meals. Outcome: Progressing Towards Goal  Goal: *Incorporating physical activity into lifestyle  Description: State effect of exercise on blood glucose levels. Outcome: Progressing Towards Goal  Goal: *Developing strategies to promote health/change behavior  Description: Define the ABC's of diabetes; identify appropriate screenings, schedule and personal plan for screenings. Outcome: Progressing Towards Goal  Goal: *Using medications safely  Description: State effect of diabetes medications on diabetes; name diabetes medication taking, action and side effects. Outcome: Progressing Towards Goal  Goal: *Monitoring blood glucose, interpreting and using results  Description: Identify recommended blood glucose targets  and personal targets.   Outcome: Progressing Towards Goal  Goal: *Prevention, detection, treatment of acute complications  Description: List symptoms of hyper- and hypoglycemia; describe how to treat low blood sugar and actions for lowering  high blood glucose level. Outcome: Progressing Towards Goal  Goal: *Prevention, detection and treatment of chronic complications  Description: Define the natural course of diabetes and describe the relationship of blood glucose levels to long term complications of diabetes.   Outcome: Progressing Towards Goal  Goal: *Developing strategies to address psychosocial issues  Description: Describe feelings about living with diabetes; identify support needed and support network  Outcome: Progressing Towards Goal  Goal: *Insulin pump training  Outcome: Progressing Towards Goal  Goal: *Sick day guidelines  Outcome: Progressing Towards Goal  Goal: *Patient Specific Goal (EDIT GOAL, INSERT TEXT)  Outcome: Progressing Towards Goal

## 2023-01-16 NOTE — PROGRESS NOTES
Problem: Self Care Deficits Care Plan (Adult)  Goal: *Acute Goals and Plan of Care (Insert Text)  Description: FUNCTIONAL STATUS PRIOR TO ADMISSION: Patient was independent and active without use of DME. Increased assist with mobility and ADL's recently    HOME SUPPORT: The patient lived alone with family, sister, granddaughter to provide assistance. Occupational Therapy Goals  Initiated 1/8/2023; Reviewed during weekly reassessment 1/16/2022- continue all goals  1. Patient will maintain static sitting balance at midline seated edge of bed with bilateral UE's supported on bed within 7 day(s). 2.  Patient will position right hand hand on bed with palm down for support without cue within 7 day(s)  3. Patient will use right hand as dependent stabilizer during functional tasks with 1-2 cues within 7 day(s). 4.  Patient will stand with bilateral UE support at elbows > or = 2 minutes with moderate assist of 1 within 7 day(s)  5. Patient will squat pivot transfer to the left with moderate assist of 1 within 7 day(s)  6. Patient will squat pivot transfer to the right with maximal assist of 1 within 7 day(s). Outcome: Progressing Towards Goal   OCCUPATIONAL THERAPY TREATMENT/WEEKLY RE-EVALUATION  Patient: Justin Crandall (24 y.o. female)  Date: 1/16/2023  Diagnosis: CVA (cerebral vascular accident) ([facility] [I63.9] <principal problem not specified>      Precautions: Fall, Skin, Bed Alarm  Chart, occupational therapy assessment, plan of care, and goals were reviewed. ASSESSMENT  Based on the objective data described below, patient continues to progress in therapy with all goals remaining appropriated based on her current functional status. She continues to be limited by R sided hemiplegia, RUE pain, R sided neglect, impaired balance, decreased insight/safety awareness, and decreased activity tolerance. Patient received semisupine in bed with family present in room and agreeable for therapy.  She reported increased pain and stiffness in RUE and daughter reported she had not been moving in between therapy sessions. Patient required max assist x2 for supine > sit this session and required physical assist to maintain midline. Patient worked on finding and maintaining midline while prop sitting alternating between side. She attempted sit <> stand and required max assist x2 to clear hips from EOB but unable to come to full stand. While sitting EOB, patient was able to complete simple grooming task with min assist to maintain balance and midline. Patient returned to supine with max assist x2 and repositioned for comfort. Patient and family educated at length on importance of moving and visual input to R side of body. Patient was left semisupine in bed with all needs met, VSS, bed alarmed, and family present in room. Patient would continue to benefit from skilled OT services during acute hospital stay. Recommend patient discharge to rehab prior to returning home. Current Level of Function Impacting Discharge (ADLs): min to total assist for ADLs, max assist x2 for bed mobility     Other factors to consider for discharge: fall risk, R sided neglect, R sided hemiplegia          PLAN :  Goals have been updated based on progression since last assessment. Patient continues to benefit from skilled intervention to address the above impairments. Continue to follow patient 5 times a week to address goals. Recommendation for discharge: (in order for the patient to meet his/her long term goals)  Therapy 3 hours per day 5-7 days per week    This discharge recommendation:  Has been made in collaboration with the attending provider and/or case management    Equipment recommendations for successful discharge (if) home: TBD in rehab       SUBJECTIVE:   Patient stated I'm very determined.     OBJECTIVE DATA SUMMARY:     Cognitive/Behavioral Status:  Neurologic State: Alert  Orientation Level: Oriented X4  Cognition: Follows commands  Perception: Cues to maintain midline in sitting  Perseveration: No perseveration noted  Safety/Judgement: Awareness of environment;Decreased insight into deficits; Fall prevention    Hearing: Auditory  Auditory Impairment: Hard of hearing, bilateral    Vision/Perceptual:    Corrective Lenses: Glasses    Range of Motion:  AROM: Grossly decreased, non-functional (R sided hemiparesis)  PROM: Grossly decreased, non-functional (R sided hemiparesis)    Strength:  Strength: Grossly decreased, non-functional (R sided hemiparesis)    Coordination:  Coordination: Grossly decreased, non-functional (R sided hemiparesis; R fingers w/increased edema and painful to touch)  Fine Motor Skills-Upper: Left Intact; Right Impaired    Gross Motor Skills-Upper: Left Intact; Right Impaired    Tone & Sensation:  Tone: Abnormal  Sensation: Impaired    Functional Mobility and Transfers for ADLs:  Bed Mobility:  Rolling: Maximum assistance;Assist x2  Supine to Sit: Maximum assistance;Assist x2  Sit to Supine: Maximum assistance;Assist x2  Scooting: Maximum assistance    Transfers:  Sit to Stand: Maximum assistance;Assist x2  Stand to Sit: Maximum assistance;Assist x2    Balance:  Sitting: Impaired  Sitting - Static: Fair (occasional)  Sitting - Dynamic: Fair (occasional)  Standing: Impaired  Standing - Static: Poor;Constant support    ADL Assessment:  Feeding: Setup;Supervision (using nondominant L hand)  Oral Facial Hygiene/Grooming: Minimum assistance  Bathing: Maximum assistance  Upper Body Dressing: Maximum assistance  Lower Body Dressing: Total assistance  Toileting: Total assistance    ADL Intervention and task modifications:    Grooming  Position Performed: Seated edge of bed  Washing Face: Minimum assistance  Cues: Physical assistance; Tactile cues provided;Verbal cues provided    Cognitive Retraining  Safety/Judgement: Awareness of environment;Decreased insight into deficits; Fall prevention    Functional Measure:  Fugl-Townsend Assessment of Motor Recovery after Stroke:          Reflex Activity  Flexors/Biceps/Fingers: Can be elicited  Extensors/Triceps: Can be elicited  Reflex Subtotal: 4    Volitional Movement Within Synergies  Shoulder Retraction: Partial  Shoulder Elevation: Partial  Shoulder Abduction (90 degrees): None  Shoulder External Rotation: None  Elbow Flexion: Partial  Forearm Supination: None  Shoulder Adduction/Internal Rotation: None  Elbow Extension: Partial  Forearm Pronation: None  Subtotal: 4    Volitional Movement Mixing Synergies  Hand to Lumbar Spine: Partial  Shoulder Flexion (0-90 degrees): Partial  Pronation-Supination: None  Subtotal: 2    Volitional Movement With Little or No Synergy  Shoulder Abduction (0-90 degrees): None  Shoulder Flexion ( degrees): None  Pronation/Supination: None  Subtotal : 0    Normal Reflex Activity  Biceps, Triceps, Finger Flexors: Full  Subtotal : 2    Upper Extremity Total   Upper Extremity Total: 12    Wrist  Stability at 15 Degree Dorsiflexion: None  Repeated Dorsiflexion/ Volar Flexion: None  Stability at 15 Degree Dorsiflexion: None  Repeated Dorsiflexion/ Volar Flexion: None  Circumduction: None  Wrist Total: 0    Hand  Mass Flexion: Partial  Mass Extension: None  Grasp A: None  Grasp B: None  Grasp C: None  Grasp D: None  Grasp E: None  Hand Total: 1    Coordination/Speed  Tremor: Marked  Dysmetria: Marked  Time: >5s  Coordination/Speed Total : 0    Total A-D  Total A-D (Motor Function): 13/66         This is a reliable/valid measure of arm function after a neurological event. It has established value to characterize functional status and for measuring spontaneous and therapy-induced recovery; tests proximal and distal motor functions. Fugl-Townsend Assessment - UE scores recorded between five and 30 days post neurologic event can be used to predict UE recovery at six months post neurologic event.   Severe = 0-21 points   Moderately Severe = 22-33 points   Moderate = 34-47 points   Mild = 48-66 points  AUREA Hoffman, BRANDON Chauhan, & MULU Mccray (1992). Measurement of motor recovery after stroke: Outcome assessment and sample size requirements. Stroke, 23, pp. 3128-8196.   --------------------------------------------------------------------------------------------------------------------------------------------------------------------  MCID:  Stroke:   Marylee Hug et al, 2001; n = 171; mean age 79 (5) years; assessed within 16 (12) days of stroke, Acute Stroke)  FMA Motor Scores from Admission to Discharge   10 point increase in FMA Upper Extremity = 1.5 change in discharge FIM   10 point increase in FMA Lower Extremity = 1.9 change in discharge FIM  MDC:   Stroke:   Hakeem Oneal et al, 2008, n = 14, mean age = 59.9 (14.6) years, assessed on average 14 (6.5) months post stroke, Chronic Stroke)   FMA = 5.2 points for the Upper Extremity portion of the assessment     Pain:  Patient c/o RUE pain. Activity Tolerance:   Fair and requires rest breaks    After treatment patient left in no apparent distress:   Supine in bed, Call bell within reach, Bed / chair alarm activated, Caregiver / family present, and Side rails x 3    COMMUNICATION/COLLABORATION:   The patients plan of care was discussed with: Physical therapist and Registered nurse.      SANDY Dodd/L  Time Calculation: 30 mins

## 2023-01-16 NOTE — PROGRESS NOTES
Transition of Care Plan:     RUR: 13%    DARBY: 1/16? Disposition:SNF: [address] [facility]   (Mary Lou Blank started 1/12/23) - pending  Last RAPID COVID 1/14. Need another RAPID COVID today per SNF.     1:24 PM   CM talked to [facility] at [address] [facility]. She indicated that they are aware of [facility] waiver but they still have to send something into the insurance and she is waiting to hear back from them. CM let her know that we are waiting on Urine Cultures and hoping to DC 1/17/23. CM will call SNF in the AM to discuss. 12:53 PM   CM called [address] [facility] admission to get update on auth. . had to leave a VM. Per MD Urine Cultures are still pending. Hoping to be stable tomorrow, 1/17/23. CM called Lorena HARRELL and left VM to update her. 8:48 AM  Noted DC order. CM called [facility] [phone] and she indicated that she has not heard back from insurance company yet but will call me back once she has a decision. If SNF or IPR: Date FOC offered: 1/8/23  Date [address] received: 1/8/23  Date authorization started with reference number: 1/12/23 started for [facility]. Liz Freeman [facility]: 1/9/23. CM requested auth reference number. Date authorization received and expires: TBD  Accepting facility: [facility]     Follow up appointments: PCP & specialists as indicated  DME needed: TBD - has cane, shower seat  Transportation at Discharge: Medicaid BLS  [place] or means to access home: N/a       IM Medicare Letter: 2nd IM letter needed at d/c  Is patient a [place] and connected with the [place]? N/a              If yes, was [employer] transfer form completed and [facility] notified? N/a  Caregiver Contact: Guillermina Saucedo DTR/Micky ([phone])  Discharge Caregiver contacted prior to discharge? yes  Care Conference needed?:  No    CM will continue to monitor discharge plan.      Gibran Ruiz Neuro CM  Ext [phone]

## 2023-01-16 NOTE — PROGRESS NOTES
Problem: Mobility Impaired (Adult and Pediatric)  Goal: *Acute Goals and Plan of Care (Insert Text)  Description: FUNCTIONAL STATUS PRIOR TO ADMISSION: Independent w/ household ambulation, use of SPC in the community. Denies history of falls. HOME SUPPORT PRIOR TO ADMISSION: The patient lived alone however states her grandson plans to move in with her. Physical Therapy Goals  Initiated 1/8/2023  1. Patient will move from supine to sit and sit to supine , scoot up and down, and roll side to side in bed with supervision/set-up within 7 day(s). 2.  Patient will transfer from bed to chair and chair to bed with minimal assistance using the least restrictive device within 7 day(s). 3.  Patient will perform sit to stand with minimal assistance assist within 7 day(s). 4.  Patient will ambulate with minimal assistance assist for 50 feet with the least restrictive device within 7 day(s). 6.  Patient will improve Augustin Balance score by 7 points within 7 days. Physical Therapy Goals  Revised 1/16/2023  1. Patient will move from supine to sit and sit to supine  in bed with moderate assistance  within 7 day(s). 2.  Patient will transfer from bed to chair and chair to bed with maximal assistance using the least restrictive device within 7 day(s). 3.  Patient will perform sit to stand with moderate assistance  within 7 day(s). 4.  Patient will ambulate with maximal assistance for 5 feet with the least restrictive device within 7 day(s). 5.  Patient will improve Augustin Balance score by 7 points within 7 days. Outcome: Progressing Towards Goal    PHYSICAL THERAPY TREATMENT: WEEKLY REASSESSMENT  Patient: Maudie Romberg (54 y.o. female)  Date: 1/16/2023  Primary Diagnosis: CVA (cerebral vascular accident) [facility]) [I63.9]       Precautions:   Fall, Skin, Bed Alarm      ASSESSMENT  Patient continues with skilled PT services and is progressing towards goals.  Pt was received in supine with daughter at bedside and cleared by nursing to mobilize. Pt continues to have difficulty with R side, but has some movement. She required max A x 2 for all mobility. Came to the EOB and worked on midline awareness and maintenance. Worked on propped sidelying and then returning midline to get proprioceptive input to the RUE. Attempted to stand 3x with 2 person A and able to clear bottom but remained in a flexed position. Not safe to mobilize to chair without jonathan or best . She fatigued quickly and was returned to supine. Encourage to perform movements with visual input and mental repetitions. Remains a good candidate for rehab. Patient's progression toward goals since last assessment: slow progress, goals adjusted accordingly     Current Level of Function Impacting Discharge (mobility/balance): max A x 2           PLAN :  Goals have been updated based on progression since last assessment. Patient continues to benefit from skilled intervention to address the above impairments. Recommendations and Planned Interventions: bed mobility training, transfer training, gait training, therapeutic exercises, neuromuscular re-education, and patient and family training/education      Frequency/Duration: Patient will be followed by physical therapy:  5 times a week to address goals. Recommendation for discharge: (in order for the patient to meet his/her long term goals)  Therapy 3 hours per day 5-7 days per week    This discharge recommendation:  Has been made in collaboration with the attending provider and/or case management    IF patient discharges home will need the following DME: to be determined (TBD)         SUBJECTIVE:   Patient stated that side wont work.     OBJECTIVE DATA SUMMARY:   HISTORY:    Past Medical History:   Diagnosis Date    Chronic edema     Diabetes ([facility] [address]     Family history of ischemic heart disease 4/12/2012    Hypercholesterolemia     Hypertension     PVC's (premature ventricular contractions) 10/10/2021 Past Surgical History:   Procedure Laterality Date    ENDOSCOPY, COLON, DIAGNOSTIC  2006    Dr. Amber Beavers BREAST BIOPSY Bilateral     benign    HX [address] [facility]  2019    abscess removed from abdominal wall       Personal factors and/or comorbidities impacting plan of care:     Home Situation  Home Environment: Private residence  # Steps to Enter: 5  One/Two Story Residence: Two story, live on 1st floor  Living Alone: Yes  Support Systems: Child(juan)  Patient Expects to be Discharged to[de-identified] Skilled nursing facility  Current DME Used/Available at Home: Cane, straight    EXAMINATION/PRESENTATION/DECISION MAKING:   Critical Behavior:  Neurologic State: Alert  Orientation Level: Oriented X4  Cognition: Follows commands  Safety/Judgement: Decreased insight into deficits, Decreased awareness of need for safety, Decreased awareness of environment  Hearing:   Auditory  Auditory Impairment: Hard of hearing, right side  Skin:  intact  Edema: R hand      Functional Mobility:  Bed Mobility:  Rolling: Maximum assistance;Assist x2  Supine to Sit: Maximum assistance;Assist x2  Sit to Supine: Maximum assistance;Assist x2  Scooting: Maximum assistance  Transfers:  Sit to Stand: Maximum assistance;Assist x2  Stand to Sit: Maximum assistance;Assist x2                       Balance:   Sitting: Impaired  Sitting - Static: Fair (occasional)  Sitting - Dynamic: Fair (occasional)  Standing: Impaired  Standing - Static: Poor;Constant support          Augustin Balance Test:    Sitting to Standin  Standing Unsupported: 0  Sitting with Back Unsupported: 3  Standing to Sittin  Transfers: 0  Standing Unsupported with Eyes Closed: 0  Standing Unsupported with Feet Together: 0  Reach Forward with Outstretched Arm: 0   Object: 0  Turn to Look Over Shoulders: 0  Turn 360 Degrees: 0  Alternate Foot on Step/Stool: 0  Standing Unsupported One Foot in Front: 0  Stand on One Le  Total: 3/         56=Maximum possible score;   0-20=High fall risk  21-40=Moderate fall risk   41-56=Low fall risk             Pain Rating:  Painful RUE     Activity Tolerance:   Fair    After treatment patient left in no apparent distress:   Supine in bed and Call bell within reach    COMMUNICATION/EDUCATION:   The patients plan of care was discussed with: Occupational therapist and Registered nurse. Fall prevention education was provided and the patient/caregiver indicated understanding. and Patient/family agree to work toward stated goals and plan of care.     Thank you for this referral.  Nicole , PT, DPT   Time Calculation: 31 mins

## 2023-01-16 NOTE — ROUTINE PROCESS
End of Shift Note    Bedside shift change report given to Dana Galindo  (oncoming nurse) by Julius Toure RN (offgoing nurse). Report included the following information SBAR, Kardex, Intake/Output, and MAR    Shift worked:  Nights   Shift summary and any significant changes:    No significant events     Concerns for physician to address: NONE   Zone phone for oncoming shift:   [phone]     Patient Information  Kirstie Carpenter  81 y.o.  1/7/2023  7:17 AM by Mine Nagy MD. Kirstie Carpenter was admitted from Home    Problem List  Patient Active Problem List    Diagnosis Date Noted    CVA (cerebral vascular accident) ([address] 01/07/2023    PVC's (premature ventricular contractions) 10/10/2021    Abscess of abdominal wall 09/03/2019    Type 2 diabetes with nephropathy ([address] 04/17/2018    Statin intolerance 05/06/2016    Essential hypertension 01/06/2016    Encounter for medication monitoring 01/06/2016    Diabetes mellitus type 2, controlled ([address] 10/16/2015    Generalized OA 05/28/2014    Nonrheumatic tricuspid valve disorder 04/12/2012    Family history of ischemic heart disease 04/12/2012    Mixed hyperlipidemia 04/12/2012    Mitral valve disease 04/12/2012    Obesity, unspecified 04/12/2012    Osteoarthritis 05/04/2010    Hypovitaminosis D 05/04/2010     Past Medical History:   Diagnosis Date    Chronic edema     Diabetes ([address]     Family history of ischemic heart disease 4/12/2012    Hypercholesterolemia     Hypertension     PVC's (premature ventricular contractions) 10/10/2021       Core Measures:  CVA: Yes Yes      Activity:  Activity Level:  Up with Assistance      Cardiac:   Cardiac Monitoring: Yes      Cardiac Rhythm: Sinus Rhythm    Access:   Current line(s): PIV       Genitourinary:   Urinary status: voiding      Respiratory:   O2 Device: None (Room air)         GI:  Last Bowel Movement Date: 01/15/23  Current diet:  ADULT DIET Regular; 3 carb choices (45 gm/meal); per pt preferences, no milk but CAN have milk products, no eggs ; doesn't like them ; no tea         Pain Management:   Patient states pain is manageable on current regimen: N/A    Skin:  Lopez Score: 15  Interventions: increase time out of bed, PT/OT consult, and nutritional support     Patient Safety:  Fall Score:  Total Score: 3  Interventions: bed/chair alarm, gripper socks, pt to call before getting OOB, and stay with me (per policy)  High Fall Risk: Yes  @Rollbelt  @dexterity to release roll belt  Yes/No ( must document dexterity  here by stating Yes or No here, otherwise this is a restraint and must follow restraint documentation policy.)    DVT prophylaxis:  DVT prophylaxis Med- Not applicable  DVT prophylaxis SCD or DENNYS- Yes     Wounds: (If Applicable)  Wounds- No  Location     Active Consults:  IP CONSULT TO HOSPITALIST  IP CONSULT TO NEUROLOGY    Length of Stay:  Expected LOS: 1d 21h  Actual LOS: 9  Discharge Plan:  Rehab when ready      Maikel Bautista RN

## 2023-01-17 ENCOUNTER — APPOINTMENT (OUTPATIENT)
Dept: GENERAL RADIOLOGY | Age: 88
DRG: 065 | End: 2023-01-17
Attending: STUDENT IN AN ORGANIZED HEALTH CARE EDUCATION/TRAINING PROGRAM
Payer: MEDICARE

## 2023-01-17 VITALS
SYSTOLIC BLOOD PRESSURE: 105 MMHG | WEIGHT: 170 LBS | OXYGEN SATURATION: 98 % | DIASTOLIC BLOOD PRESSURE: 82 MMHG | TEMPERATURE: 98.1 F | BODY MASS INDEX: 31.28 KG/M2 | HEART RATE: 89 BPM | RESPIRATION RATE: 18 BRPM | HEIGHT: 62 IN

## 2023-01-17 LAB
ANION GAP SERPL CALC-SCNC: 8 MMOL/L (ref 5–15)
BASOPHILS # BLD: 0.1 K/UL (ref 0–0.1)
BASOPHILS NFR BLD: 1 % (ref 0–1)
BUN SERPL-MCNC: 42 MG/DL (ref 6–20)
BUN/CREAT SERPL: 37 (ref 12–20)
CALCIUM SERPL-MCNC: 9.3 MG/DL (ref 8.5–10.1)
CHLORIDE SERPL-SCNC: 103 MMOL/L (ref 97–108)
CO2 SERPL-SCNC: 25 MMOL/L (ref 21–32)
CREAT SERPL-MCNC: 1.15 MG/DL (ref 0.55–1.02)
DIFFERENTIAL METHOD BLD: ABNORMAL
EOSINOPHIL # BLD: 0.1 K/UL (ref 0–0.4)
EOSINOPHIL NFR BLD: 1 % (ref 0–7)
ERYTHROCYTE [DISTWIDTH] IN BLOOD BY AUTOMATED COUNT: 13.2 % (ref 11.5–14.5)
GLUCOSE BLD STRIP.AUTO-MCNC: 134 MG/DL (ref 65–117)
GLUCOSE BLD STRIP.AUTO-MCNC: 88 MG/DL (ref 65–117)
GLUCOSE SERPL-MCNC: 148 MG/DL (ref 65–100)
HCT VFR BLD AUTO: 35.9 % (ref 35–47)
HGB BLD-MCNC: 12.4 G/DL (ref 11.5–16)
IMM GRANULOCYTES # BLD AUTO: 0.1 K/UL (ref 0–0.04)
IMM GRANULOCYTES NFR BLD AUTO: 1 % (ref 0–0.5)
LYMPHOCYTES # BLD: 1.5 K/UL (ref 0.8–3.5)
LYMPHOCYTES NFR BLD: 14 % (ref 12–49)
MAGNESIUM SERPL-MCNC: 2 MG/DL (ref 1.6–2.4)
MCH RBC QN AUTO: 26.8 PG (ref 26–34)
MCHC RBC AUTO-ENTMCNC: 34.5 G/DL (ref 30–36.5)
MCV RBC AUTO: 77.7 FL (ref 80–99)
MONOCYTES # BLD: 1.1 K/UL (ref 0–1)
MONOCYTES NFR BLD: 11 % (ref 5–13)
NEUTS SEG # BLD: 7.6 K/UL (ref 1.8–8)
NEUTS SEG NFR BLD: 72 % (ref 32–75)
NRBC # BLD: 0 K/UL (ref 0–0.01)
NRBC BLD-RTO: 0 PER 100 WBC
PHOSPHATE SERPL-MCNC: 3.1 MG/DL (ref 2.6–4.7)
PLATELET # BLD AUTO: 353 K/UL (ref 150–400)
PMV BLD AUTO: 9.6 FL (ref 8.9–12.9)
POTASSIUM SERPL-SCNC: 4 MMOL/L (ref 3.5–5.1)
RBC # BLD AUTO: 4.62 M/UL (ref 3.8–5.2)
SERVICE CMNT-IMP: ABNORMAL
SERVICE CMNT-IMP: NORMAL
SODIUM SERPL-SCNC: 136 MMOL/L (ref 136–145)
WBC # BLD AUTO: 10.4 K/UL (ref 3.6–11)

## 2023-01-17 PROCEDURE — 74011250637 HC RX REV CODE- 250/637: Performed by: STUDENT IN AN ORGANIZED HEALTH CARE EDUCATION/TRAINING PROGRAM

## 2023-01-17 PROCEDURE — 85025 COMPLETE CBC W/AUTO DIFF WBC: CPT

## 2023-01-17 PROCEDURE — 74011250636 HC RX REV CODE- 250/636: Performed by: STUDENT IN AN ORGANIZED HEALTH CARE EDUCATION/TRAINING PROGRAM

## 2023-01-17 PROCEDURE — 74011636637 HC RX REV CODE- 636/637: Performed by: STUDENT IN AN ORGANIZED HEALTH CARE EDUCATION/TRAINING PROGRAM

## 2023-01-17 PROCEDURE — 74011000258 HC RX REV CODE- 258: Performed by: STUDENT IN AN ORGANIZED HEALTH CARE EDUCATION/TRAINING PROGRAM

## 2023-01-17 PROCEDURE — 73120 X-RAY EXAM OF HAND: CPT

## 2023-01-17 PROCEDURE — 82962 GLUCOSE BLOOD TEST: CPT

## 2023-01-17 PROCEDURE — 84100 ASSAY OF PHOSPHORUS: CPT

## 2023-01-17 PROCEDURE — 92507 TX SP LANG VOICE COMM INDIV: CPT

## 2023-01-17 PROCEDURE — 83735 ASSAY OF MAGNESIUM: CPT

## 2023-01-17 PROCEDURE — 36415 COLL VENOUS BLD VENIPUNCTURE: CPT

## 2023-01-17 PROCEDURE — 80048 BASIC METABOLIC PNL TOTAL CA: CPT

## 2023-01-17 RX ORDER — PREDNISONE 20 MG/1
40 TABLET ORAL
Status: DISCONTINUED | OUTPATIENT
Start: 2023-01-17 | End: 2023-01-17 | Stop reason: HOSPADM

## 2023-01-17 RX ORDER — CLOPIDOGREL BISULFATE 75 MG/1
75 TABLET ORAL DAILY
Qty: 30 TABLET | Refills: 2 | Status: SHIPPED
Start: 2023-01-17

## 2023-01-17 RX ORDER — PREDNISONE 20 MG/1
40 TABLET ORAL
Qty: 8 TABLET | Refills: 0 | Status: SHIPPED
Start: 2023-01-18 | End: 2023-01-22

## 2023-01-17 RX ORDER — CEFPODOXIME PROXETIL 100 MG/1
100 TABLET, FILM COATED ORAL 2 TIMES DAILY
Qty: 6 TABLET | Refills: 0 | Status: SHIPPED
Start: 2023-01-17 | End: 2023-01-20

## 2023-01-17 RX ADMIN — ENOXAPARIN SODIUM 40 MG: 100 INJECTION SUBCUTANEOUS at 09:03

## 2023-01-17 RX ADMIN — FUROSEMIDE 40 MG: 40 TABLET ORAL at 09:03

## 2023-01-17 RX ADMIN — SODIUM CHLORIDE 1 G: 900 INJECTION INTRAVENOUS at 10:39

## 2023-01-17 RX ADMIN — CLOPIDOGREL BISULFATE 75 MG: 75 TABLET ORAL at 09:03

## 2023-01-17 RX ADMIN — PREDNISONE 40 MG: 20 TABLET ORAL at 10:39

## 2023-01-17 NOTE — DISCHARGE SUMMARY
Hospitalist Discharge Summary     Patient ID:  Imtiaz Melendrez  [id number]  27 y.o.  1935 1/7/2023    PCP on record: Karishma Mcwilliams MD    Admit date: 1/7/2023  Discharge date and time: 1/17/2023    DISCHARGE DIAGNOSIS:  Left maninder CVA with Right hemiparesis  Chronic hemorrhage of Right occipital lobe  UTI  DM  HTN    CONSULTATIONS:  IP CONSULT TO HOSPITALIST  IP CONSULT TO NEUROLOGY    Excerpted HPI from H&P of Sari Caldwell MD:  Ta Campuzano is a 81 y.o.  female with PMHx significant for t2dm, hld, htn, presents to the ER for evaluation of generalized weakness started yesterday, ~1-2 days ago. Pt reports her speech is a bit slower and garbled than usual. She also has some blurry vision and thought it was due her glasses. Pt then woke up this AM ~3am feeling sick with nausea so she called her son for help. EMS called. Denies any confusion, gait difficulty, numbness and tingling. No dysphagia. In the er, pt got an MRI head showed subacute infarct in the left maninder. Small chronic hemorrhage in the right occipital lobe. We were asked to admit for work up and evaluation of the above problems. ______________________________________________________________________  DISCHARGE SUMMARY/HOSPITAL COURSE:  for full details see H&P, daily progress notes, labs, consult notes. Left maninder CVA  Right hemiparesis  Chronic hemorrhage in the R occipital lobe     MR brain:  1. Subacute infarct in the left maninder. 2. Generalized parenchymal volume loss and mild chronic microvascular ischemic  disease. 3. Small chronic hemorrhage in the right occipital lobe. Neurology evaluated and signed off  TSH 1.5, A1c 6.8,   C/w statin  Plavix per Neurology, has ASA allergy  TTE with no shunt, did not comment on thrombus.  EF normal  PT/OT rec IPR, case management working on this  Carotid duplex with intimal thickening in L ICA, otherwise normal  Dc to SNF today     Intermittent fever:  UTI  Repeat CXR negative, UA s/o UTI,   blood culture No growth  Urine culture growing E coli pansensitive and GBS    procal .32  Nausea resolved  Stopped rocephin on discharge, Vantin for 3 more days     R arm pain  Has some pain on right metacarpal today, ?gout flare up, will do predinsone 5 day course  Check xray right hand today prior to discharge  R arm US and elbow XR without abnormality     T2DM  Resume home meds     HTN  Resume home lasix           _______________________________________________________________________  Patient seen and examined by me on discharge day. Pertinent Findings:  Gen:    Not in distress  Chest: Clear lungs  CVS:   Regular rhythm. No edema  Abd:  Soft, not distended, not tender  Neuro:  Alert,   _______________________________________________________________________  DISCHARGE MEDICATIONS:   Current Discharge Medication List        START taking these medications    Details   clopidogreL (PLAVIX) 75 mg tab Take 1 Tablet by mouth daily. Qty: 30 Tablet, Refills: 2  Start date: 1/17/2023      predniSONE (DELTASONE) 20 mg tablet Take 2 Tablets by mouth daily (with breakfast) for 4 doses. Qty: 8 Tablet, Refills: 0  Start date: 1/18/2023, End date: 1/22/2023      cefpodoxime (VANTIN) 100 mg tablet Take 1 Tablet by mouth two (2) times a day for 3 days. Qty: 6 Tablet, Refills: 0  Start date: 1/17/2023, End date: 1/20/2023      atorvastatin (LIPITOR) 40 mg tablet Take 1 Tablet by mouth nightly for 30 days.   Qty: 30 Tablet, Refills: 0  Start date: 1/9/2023, End date: 2/8/2023           CONTINUE these medications which have NOT CHANGED    Details   glyBURIDE (DIABETA) 5 mg tablet TAKE 1 TABLET BY MOUTH  DAILY AFTER BREAKFAST  Qty: 90 Tablet, Refills: 3    Comments: Requesting 1 year supply  Associated Diagnoses: Type 2 diabetes with nephropathy (HCC)      furosemide (LASIX) 20 mg tablet TAKE 1 TABLET BY MOUTH  DAILY  Qty: 90 Tablet, Refills: 3    Comments: Requesting 1 year supply  Associated Diagnoses: Essential hypertension      Accu-Chek Ashley Plus test strp strip USE TO CHECK BLOOD SUGAR  TWICE DAILY  Qty: 200 Strip, Refills: 3    Comments: Requesting 1 year supply  Associated Diagnoses: Type 2 diabetes with nephropathy (HCC)      latanoprost (XALATAN) 0.005 % ophthalmic solution Administer 1 Drop to both eyes nightly. diclofenac (VOLTAREN) 1 % gel Apply  to affected area four (4) times daily as needed for Pain. Qty: 100 g, Refills: 0    Associated Diagnoses: Primary osteoarthritis involving multiple joints; DDD (degenerative disc disease), lumbar      cholecalciferol (VITAMIN D3) (2,000 UNITS /50 MCG) cap capsule Take 2,000 Units by mouth daily. GARLIC PO Take 142 mg by mouth daily. cod liver oil Cap Take 1 Cap by mouth daily. STOP taking these medications       metoprolol succinate (TOPROL-XL) 25 mg XL tablet Comments:   Reason for Stopping:         red yeast rice extract 600 mg Cap Comments:   Reason for Stopping:                 Patient Follow Up Instructions:    Activity: Activity as tolerated  Diet: Diabetic Diet  Wound Care: None needed    Follow-up with     Follow-up Information       Follow up With Specialties Details Why Contact Info    Ena Porter MD Family Medicine Follow up in 1 week(s) for post hospitalization needs [address] [facility]  [phone]      Josse Sue DO Neurology Follow up in 1 month(s) for followup of your stroke [address] [place], [place] [address]  [address] [facility]  [phone]          ________________________________________________________________    Risk of deterioration: Moderate    Condition at Discharge: Stable  __________________________________________________________________    Disposition  SNF/LTC    ____________________________________________________________________    Code Status: Full Code  ___________________________________________________________________      Total time in minutes spent coordinating this discharge (includes going over instructions, follow-up, prescriptions, and preparing report for sign off to her PCP) : 36 min    Signed:  Ronald Weinberg MD

## 2023-01-17 NOTE — PROGRESS NOTES
Report called to [facility].  Patient being transferred to [facility] room 412

## 2023-01-17 NOTE — PROGRESS NOTES
End of Shift Note     Bedside shift change report given to Shahana Sanford RN  (oncoming nurse) by Berny Salvador RN (offgoing nurse). Report included the following information SBAR, Kardex, Intake/Output, and MAR     Shift worked:  nights   Shift summary and any significant changes:      Pt slept throughout the night. No significant events. Family at bedside. RUE elevated still c/o discomfort to touch. Concerns for physician to address: NONE   Zone phone for oncoming shift:   [phone]      Patient Information  Deb Chaney  81 y.o.  1/7/2023  7:17 AM by Enio Abdi MD. Deb Chaney was admitted from Home     Problem List       Patient Active Problem List     Diagnosis Date Noted    CVA (cerebral vascular accident) ([address] 01/07/2023    PVC's (premature ventricular contractions) 10/10/2021    Abscess of abdominal wall 09/03/2019    Type 2 diabetes with nephropathy ([address] 04/17/2018    Statin intolerance 05/06/2016    Essential hypertension 01/06/2016    Encounter for medication monitoring 01/06/2016    Diabetes mellitus type 2, controlled ([address] 10/16/2015    Generalized OA 05/28/2014    Nonrheumatic tricuspid valve disorder 04/12/2012    Family history of ischemic heart disease 04/12/2012    Mixed hyperlipidemia 04/12/2012    Mitral valve disease 04/12/2012    Obesity, unspecified 04/12/2012    Osteoarthritis 05/04/2010    Hypovitaminosis D 05/04/2010           Past Medical History:   Diagnosis Date    Chronic edema      Diabetes ([address]      Family history of ischemic heart disease 4/12/2012    Hypercholesterolemia      Hypertension      PVC's (premature ventricular contractions) 10/10/2021         Core Measures:  CVA: Yes Yes        Activity:  Activity Level:  Up with Assistance        Cardiac:   Cardiac Monitoring: Yes      Cardiac Rhythm: Sinus Rhythm     Access:   Current line(s): PIV         Genitourinary:   Urinary status: voiding        Respiratory:   O2 Device: None (Room air)        GI:  Last Bowel Movement Date: 01/15/23  Current diet:  ADULT DIET Regular; 3 carb choices (45 gm/meal); per pt preferences, no milk but CAN have milk products, no eggs ; doesn't like them ; no tea        Pain Management:   Patient states pain is manageable on current regimen: N/A     Skin:  Lopez Score: 15  Interventions: increase time out of bed, PT/OT consult, and nutritional support     Patient Safety:  Fall Score:  Total Score: 3  Interventions: bed/chair alarm, gripper socks, pt to call before getting OOB, and stay with me (per policy)  High Fall Risk: Yes  @Rollbelt  @dexterity to release roll belt  Yes/No ( must document dexterity  here by stating Yes or No here, otherwise this is a restraint and must follow restraint documentation policy.)     DVT prophylaxis:  DVT prophylaxis Med- Not applicable  DVT prophylaxis SCD or DENNYS- Yes      Wounds: (If Applicable)  Wounds- No  Location      Active Consults:  IP CONSULT TO HOSPITALIST  IP CONSULT TO NEUROLOGY     Length of Stay:  Expected LOS: 1d 21h  Actual LOS: 9  Discharge Plan:  Rehab when ready

## 2023-01-17 NOTE — PROGRESS NOTES
Transition of Care Plan:     RUR: 13%     DABRY: 1/17     Disposition:SNF: [facility] secured. RAPID COVID NEGATIVE 1/16. Pt will go to [facility]: Room 412  RN will call report to     2 PM   LTSS was faxed to [address] [place] [address],[address] at 1:30 PM. CM called Admissions and asked for them to be looking for it. It just needs MD signature and we will forward signed copy once it is ready to the SNF. NO further CM needs. 10:53 AM  CM talked to [address] [place] [address],[address] and they are requesting a LTSS prior to DC. This CM does not have access to LTSS at this point. CM supervisor is going to work on submitting on this now. CM let [address] [place] [address] now. 8:31 AM  CM received a call from [facility] and they can take Pt today. They requested that I set up transport for 2 PM so the room will be ready for her. If SNF or IPR: Date FOC offered: 1/8/23  Date [address] received: 1/8/23  Date authorization started with reference number: 1/12/23 started for [facility]. Liz Freeman Partha: 1/9/23. CM requested auth reference number. Date authorization received and expires: 1/17  Accepting facility: [facility]     Follow up appointments: PCP & specialists as indicated after rehab. DME needed: TBD - has cane, shower seat  Transportation at Discharge: Stretcher transport arranged for 2 PM   Keys or means to access home: N/a       IM Medicare Letter: 2nd IM letter delivered. Is patient a [place] and connected with the [place]? N/a              If yes, was [employer] transfer form completed and VA notified? N/a  Caregiver Contact: Claudean Kyle, DTR/[facility] ([phone])  Discharge Caregiver contacted prior to discharge? yes  Care Conference needed?:  No     Transition of Care Plan to SNF/Rehab    SNF/Rehab Transition:  Patient has been accepted to [address] [place] [address],[address] and meets criteria for admission.    Patient will transported by [facility] and expected to leave at 2 PM     Communication to Patient/Family:  Met with patient and Tj HARRELL and they are agreeable to the transition plan. Communication to SNF/Rehab:  Bedside RN Bill Ellis has been notified to update the transition plan to the facility and call report (phone number [phone]  Discharge information has been updated on the AVS.     Discharge instructions to be sent with Pt. [facility]    Nursing Please include all hard scripts for controlled substances, med rec and dc summary, and AVS in packet. Reviewed and confirmed with facility, [address] [place] [address],[address] can manage the patient care needs for the following:     Isiah Oliver with (X) only those applicable:    Medication:  [x]  Medications will be available at the facility  []  IV Antibiotics   []  Controlled Substance - hard copy to be sent with patient   []  Weekly Labs   Documents:  [] Hard RX  [] MAR  [] Kardex  [x] AVS  []Transfer Summary  [x]Discharge   Equipment:  []  CPAP/BiPAP  []  Wound Vacuum  []  Lott or Urinary Device  []  PICC/Central Line  []  Nebulizer  []  Ventilator   Treatment:  []Isolation (for MRSA, VRE, etc.)  []Surgical Drain Management  []Tracheostomy Care  []Dressing Changes  []Dialysis with transportation and chair time   []PEG Care  []Oxygen  []Daily Weights for Heart Failure   Dietary:  [x]Any diet limitations  ADULT DIET Regular; 3 carb choices (45 gm/meal); per pt preferences, no milk but CAN have milk products, no eggs ; doesn't like them ; no tea starting at 01/10 1715  []Tube Feedings   []Total Parenteral Management (TPN)   Eligible for Medicaid Long Term Services and Supports  Yes:  [] Eligible for medical assistance or will become eligible within 180 days and UAI completed. [] Provider/Patient and/or support system has requested screening. [x] UAI copy provided to patient or responsible party,   10:55 AM: SNF is asking for a UAI. CM asked CM Supervisor to help with LTSS. [] UAI unavailable at discharge will send once processed to SNF provider.   [] UAI unavailable at discharged mailed to patient  No: [] Private pay and is not financially eligible for Medicaid within the next 180 days. [] Reside out-of-state. [] A residents of a state owned/operated facility that is licensed  by [address] marked[address] [facility] or [facility]  [] Enrollment in [facility] hospice services  [] [address] [facility]  [] Patient /Family declines to have screening completed or provide financial information for screening     Financial Resources:  Medicaid    [] Initiated and application pending   [x] Full coverage     Advanced Care Plan:  []Surrogate Decision Maker of Care  []POA  [x]Communicated Code Status Full Code  AMD sent to facility if needed. Other      NO further CM needs. Care Management Interventions  PCP Verified by CM:  Yes  Palliative Care Criteria Met (RRAT>21 & CHF Dx)?: No  Mode of Transport at Discharge: S  Transition of Care Consult (CM Consult): Discharge Planning  Discharge Durable Medical Equipment: No  Health Maintenance Reviewed: Yes  Physical Therapy Consult: Yes  Occupational Therapy Consult: Yes  Speech Therapy Consult: Yes  Support Systems: Child(juan)  Confirm Follow Up Transport: Family  The Plan for Transition of Care is Related to the Following Treatment Goals : IPR  The Patient and/or Patient Representative was Provided with a Choice of Provider and Agrees with the Discharge Plan?: Yes  Name of the Patient Representative Who was Provided with a Choice of Provider and Agrees with the Discharge Plan: Pam Grant (pt)Lorena (DTR)  Freedom of Choice List was Provided with Basic Dialogue that Supports the Patient's Individualized Plan of Care/Goals, Treatment Preferences and Shares the Quality Data Associated with the Providers?: Yes  Discharge Location  Patient Expects to be Discharged to[de-identified] Skilled nursing facility       [facility], [facility] CM  Ext [phone]

## 2023-01-17 NOTE — PROGRESS NOTES
Problem: Motor Speech Impaired (Adult)  Goal: *Acute Goals and Plan of Care (Insert Text)  Description: 1/9/2023  Speech path goals  1. Patient will produce conversational speech with precise articulation with 95% acc. MET 1/17/2023  Outcome: Resolved/Met     SPEECH LANGUAGE PATHOLOGY TREATMENT/DISCHARGE  Patient: Kirstie Carpenter (28 y.o. female)  Date: 1/17/2023  Diagnosis: CVA (cerebral vascular accident) ([facility] [address] [I63.9] <principal problem not specified>      Precautions: Fall, Skin, Bed Alarm     ASSESSMENT:  Patient continues to be followed by SLP for dysarthria treatment. Patient received in bed, alert, and agreeable to SLP visit. Patient and patient's daughter at bedside report improved motor speech on this date. Patient observed to be intelligible at the sentence and conversational level on this date. Patient demonstrated difficulty recalling motor speech intelligibility strategies, however with demonstration and teach back, patient able to implement motor speech strategies. Patient's daughter verbalized good understanding. No further acute SLP needs. PLAN:  -- Encourage loud, slow, and clear speech for improved overall intelligibility    Patient will be discharged from skilled acute speech therapy at this time. Rationale for discharge:  Goals achieved    Discharge Recommendations:  Could consider SLP treatment for motor speech at discharge if concerns persis     SUBJECTIVE:   Patient stated sounds pretty good re: her speech. OBJECTIVE:   Mental Status:  Neurologic State: Alert  Orientation Level: Oriented X4, Appropriate for age  Cognition: Follows commands  Perception: Cues to maintain midline in sitting  Perseveration: No perseveration noted  Safety/Judgement: Awareness of environment    Treatment & Interventions:   Motor Speech:  Conversation Intelligibility (%): 100 %  Intelligibility: No impairment        Sentence Intelligibility (%): 100 %  Conversation Intelligibility (%): 100 % Language Comprehension and Expression:     Verbal Expression       Voice:   WFL         NOMS: The NOMS functional outcome measure was used to quantify this patient's level of motor speech impairment. Based on the NOMS, the patient was determined to be at level 7 for motor speech function. NOMS Motor Speech:  Level 1 (CN):  100% unintelligible  Level 2 (CM):  Communication partner responsible for message; can do CV or automatic words w/ max cues but rarely intelligible in context  Level 3 (CL): communication partner primarily responsible for message but says CV/automatic words intelligibly; mod cues to say simple words/phrases  Level 4 (CK): In structured conversation with familiar listener can say simple words and phrases. Mod cues for simple sentences  Level 5 (CJ):  Uses simple sentences for ADLs with familiar and unfamiliar listener; min cues for complex sentences  Level 6 (CI):  Intelligible in ADLs; difficulty in voc/social activites; rare cues for complex message; uses comp strategies  Level 7 ([address] [place] [address]):  Intelligible in all activities. May occasionally use compensatory strategies. EDOUARD. (2003). National Outcomes Measurement System (NOMS): Adult Speech-Language Pathology User's Guide. Response & Tolerance to Activities:       Pain:  Pain Scale 1: Numeric (0 - 10)  Pain Intensity 1: 0       After treatment:   Patient left in no apparent distress in bed, Call bell within reach, Nursing notified, and Caregiver / family present    COMMUNICATION/EDUCATION:   Patient was educated regarding motor speech strategies. She demonstrated fair understanding as evidenced by verbalizing understanding. The patient's plan of care including recommendations, planned interventions, and recommended diet changes were discussed with:  Patient and patient's daughter .      MAHI Cedillo  Time Calculation: 10 mins

## 2023-01-17 NOTE — PROGRESS NOTES
Dc education reviewed with pt and daughter. Aware of fu appointments, medications. Verbalized understanding with no further questions    Stroke Education provided to patient and relative(s) and the following topics were discussed    1. Patients personal risk factors for stroke are hypertension, family history, hyperlipidemia, and diabetes mellitus    2. Warning signs of Stroke:        * Sudden numbness or weakness of the face, arm or leg, especially on one side of          The body            * Sudden confusion, trouble speaking or understanding        * Sudden trouble seeing in one or both eyes        * Sudden trouble walking, dizziness, loss of balance or coordination        * Sudden severe headache with no known cause      3. Importance of activation Emergency Medical Services ( 9-1-1 ) immediately if experience any warning signs of stroke. 4. Be sure and schedule a follow-up appointment with your primary care doctor or any specialists as instructed. 5. You must take medicine every day to treat your risk factors for stroke. Be sure to take your medicines exactly as your doctor tells you: no more, no less. Know what your medicines are for , what they do. Anti-thrombotics /anticoagulants can help prevent strokes. You are taking the following medicine(s)  see dc meds     6. Smoking and second-hand smoke greatly increase your risk of stroke, cardiovascular disease and death. Smoking history never    7. Information provided was BS Stroke Education Binder, Stroke Handouts, or Verbal Education    8. Documentation of teaching completed in Patient Education Activity and on Care Plan with teaching response noted? yes                  Problem: Diabetes Self-Management  Goal: *Sick day guidelines  Outcome: Resolved/Met     Problem: Pressure Injury - Risk of  Goal: *Prevention of pressure injury  Description: Document Lopez Scale and appropriate interventions in the flowsheet.   Outcome: Resolved/Met Problem: Patient Education: Go to Patient Education Activity  Goal: Patient/Family Education  Outcome: Resolved/Met     Problem: Patient Education: Go to Patient Education Activity  Goal: Patient/Family Education  Outcome: Resolved/Met     Problem: TIA/CVA Stroke: 0-24 hours  Goal: Off Pathway (Use only if patient is Off Pathway)  Outcome: Resolved/Met  Goal: Activity/Safety  Outcome: Resolved/Met  Goal: Consults, if ordered  Outcome: Resolved/Met  Goal: Diagnostic Test/Procedures  Outcome: Resolved/Met  Goal: Nutrition/Diet  Outcome: Resolved/Met  Goal: Discharge Planning  Outcome: Resolved/Met  Goal: Medications  Outcome: Resolved/Met  Goal: Respiratory  Outcome: Resolved/Met  Goal: Treatments/Interventions/Procedures  Outcome: Resolved/Met  Goal: Minimize risk of bleeding post-thrombolytic infusion  Outcome: Resolved/Met  Goal: Monitor for complications post-thrombolytic infusion  Outcome: Resolved/Met  Goal: Psychosocial  Outcome: Resolved/Met  Goal: *Hemodynamically stable  Outcome: Resolved/Met  Goal: *Neurologically stable  Description: Absence of additional neurological deficits    Outcome: Resolved/Met  Goal: *Verbalizes anxiety and depression are reduced or absent  Outcome: Resolved/Met  Goal: *Absence of Signs of Aspiration on Current Diet  Outcome: Resolved/Met  Goal: *Absence of deep venous thrombosis signs and symptoms(Stroke Metric)  Outcome: Resolved/Met  Goal: *Ability to perform ADLs and demonstrates progressive mobility and function  Outcome: Resolved/Met  Goal: *Stroke education started(Stroke Metric)  Outcome: Resolved/Met  Goal: *Dysphagia screen performed(Stroke Metric)  Outcome: Resolved/Met  Goal: *Rehab consulted(Stroke Metric)  Outcome: Resolved/Met     Problem: TIA/CVA Stroke: 0-24 hours  Goal: Off Pathway (Use only if patient is Off Pathway)  Outcome: Resolved/Met  Goal: Activity/Safety  Outcome: Resolved/Met  Goal: Consults, if ordered  Outcome: Resolved/Met  Goal: Diagnostic Test/Procedures  Outcome: Resolved/Met  Goal: Nutrition/Diet  Outcome: Resolved/Met  Goal: Discharge Planning  Outcome: Resolved/Met  Goal: Medications  Outcome: Resolved/Met  Goal: Respiratory  Outcome: Resolved/Met  Goal: Treatments/Interventions/Procedures  Outcome: Resolved/Met  Goal: Minimize risk of bleeding post-thrombolytic infusion  Outcome: Resolved/Met  Goal: Monitor for complications post-thrombolytic infusion  Outcome: Resolved/Met  Goal: Psychosocial  Outcome: Resolved/Met  Goal: *Hemodynamically stable  Outcome: Resolved/Met  Goal: *Neurologically stable  Description: Absence of additional neurological deficits    Outcome: Resolved/Met  Goal: *Verbalizes anxiety and depression are reduced or absent  Outcome: Resolved/Met  Goal: *Absence of Signs of Aspiration on Current Diet  Outcome: Resolved/Met  Goal: *Absence of deep venous thrombosis signs and symptoms(Stroke Metric)  Outcome: Resolved/Met  Goal: *Ability to perform ADLs and demonstrates progressive mobility and function  Outcome: Resolved/Met  Goal: *Stroke education started(Stroke Metric)  Outcome: Resolved/Met  Goal: *Dysphagia screen performed(Stroke Metric)  Outcome: Resolved/Met  Goal: *Rehab consulted(Stroke Metric)  Outcome: Resolved/Met     Problem: Ischemic Stroke: Discharge Outcomes  Goal: *Verbalizes anxiety and depression are reduced or absent  Outcome: Resolved/Met  Goal: *Verbalize understanding of risk factor modification(Stroke Metric)  Outcome: Resolved/Met  Goal: *Hemodynamically stable  Outcome: Resolved/Met  Goal: *Absence of aspiration pneumonia  Outcome: Resolved/Met  Goal: *Aware of needed dietary changes  Outcome: Resolved/Met  Goal: *Verbalize understanding of prescribed medications including anti-coagulants, anti-lipid, and/or anti-platelets(Stroke Metric)  Outcome: Resolved/Met  Goal: *Tolerating diet  Outcome: Resolved/Met  Goal: *Aware of follow-up diagnostics related to anticoagulants  Outcome: Resolved/Met  Goal: *Ability to perform ADLs and demonstrates progressive mobility and function  Outcome: Resolved/Met  Goal: *Absence of DVT(Stroke Metric)  Outcome: Resolved/Met  Goal: *Absence of aspiration  Outcome: Resolved/Met  Goal: *Optimal pain control at patient's stated goal  Outcome: Resolved/Met  Goal: *Home safety concerns addressed  Outcome: Resolved/Met  Goal: *Describes available resources and support systems  Outcome: Resolved/Met  Goal: *Verbalizes understanding of activation of EMS(911) for stroke symptoms(Stroke Metric)  Outcome: Resolved/Met  Goal: *Understands and describes signs and symptoms to report to providers(Stroke Metric)  Outcome: Resolved/Met  Goal: *Neurolgocially stable (absence of additional neurological deficits)  Outcome: Resolved/Met  Goal: *Verbalizes importance of follow-up with primary care physician(Stroke Metric)  Outcome: Resolved/Met  Goal: *Smoking cessation discussed,if applicable(Stroke Metric)  Outcome: Resolved/Met  Goal: *Depression screening completed(Stroke Metric)  Outcome: Resolved/Met     Problem: Patient Education: Go to Patient Education Activity  Goal: Patient/Family Education  Outcome: Resolved/Met     Problem: Patient Education: Go to Patient Education Activity  Goal: Patient/Family Education  Outcome: Resolved/Met     Problem: Patient Education: Go to Patient Education Activity  Goal: Patient/Family Education  Outcome: Resolved/Met     Problem: Patient Education: Go to Patient Education Activity  Goal: Patient/Family Education  Outcome: Resolved/Met

## 2023-01-17 NOTE — PROGRESS NOTES
Pharmacist Discharge Medication Reconciliation      Significant PMH:   Past Medical History:   Diagnosis Date    Chronic edema     Diabetes ([facility] [address]     Family history of ischemic heart disease 4/12/2012    Hypercholesterolemia     Hypertension     PVC's (premature ventricular contractions) 10/10/2021     Encounter Diagnoses:   Encounter Diagnoses   Name Primary? C[facility]brovascular [facility] ([facility]) [facility] Yes    Chronic idiopathic spontaneous parenchymal intracranial hemorrhage (HCC)     Essential hypertension [I10 (ICD-10-CM)]     Dyslipidemia [E78.5 (ICD-10-CM)]     Cerebrovascular accident (CVA), unspecified mechanism ([facility] [I63.9 (ICD-10-CM)]     Bilateral carotid artery stenosis [I65.23 (ICD-10-CM)]      Allergies: Latex, Aspirin, Glipizide, Bactrim ds [sulfamethoxazole-trimethoprim], Contrast agent [iodine], Amaryl [glimepiride], Avocado, Feldene [piroxicam], Januvia [sitagliptin], Levofloxacin, Losartan, Derrick, Oxycodone, Pcn [penicillins], Pineapple, Prednisone, Tylenol [acetaminophen], Welchol [colesevelam], Zestril [lisinopril], and Zetia [ezetimibe]    Admission date: 1/7/2023     Discharge Medications:   Current Discharge Medication List        START taking these medications    Details   clopidogreL (PLAVIX) 75 mg tab Take 1 Tablet by mouth daily. Qty: 30 Tablet, Refills: 2  Start date: 1/17/2023      predniSONE (DELTASONE) 20 mg tablet Take 2 Tablets by mouth daily (with breakfast) for 4 doses. Qty: 8 Tablet, Refills: 0  Start date: 1/18/2023, End date: 1/22/2023      cefpodoxime (VANTIN) 100 mg tablet Take 1 Tablet by mouth two (2) times a day for 3 days. Qty: 6 Tablet, Refills: 0  Start date: 1/17/2023, End date: 1/20/2023      atorvastatin (LIPITOR) 40 mg tablet Take 1 Tablet by mouth nightly for 30 days.   Qty: 30 Tablet, Refills: 0  Start date: 1/9/2023, End date: 2/8/2023           CONTINUE these medications which have NOT CHANGED    Details   glyBURIDE (DIABETA) 5 mg tablet TAKE 1 TABLET BY MOUTH  DAILY AFTER BREAKFAST  Qty: 90 Tablet, Refills: 3    Comments: Requesting 1 year supply  Associated Diagnoses: Type 2 diabetes with nephropathy (HCC)      furosemide (LASIX) 20 mg tablet TAKE 1 TABLET BY MOUTH  DAILY  Qty: 90 Tablet, Refills: 3    Comments: Requesting 1 year supply  Associated Diagnoses: Essential hypertension      Accu-Chek Ashley Plus test strp strip USE TO CHECK BLOOD SUGAR  TWICE DAILY  Qty: 200 Strip, Refills: 3    Comments: Requesting 1 year supply  Associated Diagnoses: Type 2 diabetes with nephropathy (HCC)      latanoprost (XALATAN) 0.005 % ophthalmic solution Administer 1 Drop to both eyes nightly. diclofenac (VOLTAREN) 1 % gel Apply  to affected area four (4) times daily as needed for Pain. Qty: 100 g, Refills: 0    Associated Diagnoses: Primary osteoarthritis involving multiple joints; DDD (degenerative disc disease), lumbar      cholecalciferol (VITAMIN D3) (2,000 UNITS /50 MCG) cap capsule Take 2,000 Units by mouth daily. GARLIC PO Take 412 mg by mouth daily. cod liver oil Cap Take 1 Cap by mouth daily. STOP taking these medications       metoprolol succinate (TOPROL-XL) 25 mg XL tablet Comments:   Reason for Stopping:         red yeast rice extract 600 mg Cap Comments:   Reason for Stopping:               The patient's chart, MAR and AVS were reviewed by José Miguel Mcginnis, [facility].     Discharging Provider: Yary Pink MD     Thank you,     José Miguel Mcginnis, [facility]

## 2023-01-18 ENCOUNTER — PATIENT OUTREACH (OUTPATIENT)
Dept: CASE MANAGEMENT | Age: 88
End: 2023-01-18

## 2023-01-18 NOTE — PROGRESS NOTES
Per EMR patient discharged to [facility]. Transition of care outreach postponed for 7 days due to patient's discharge to SNF.

## 2023-01-20 LAB
BACTERIA SPEC CULT: NORMAL
SERVICE CMNT-IMP: NORMAL

## 2023-01-25 ENCOUNTER — PATIENT OUTREACH (OUTPATIENT)
Dept: CASE MANAGEMENT | Age: 88
End: 2023-01-25

## 2023-01-25 NOTE — PROGRESS NOTES
Patient currently admitted to [address] [place] [address],[address]. Transition of care outreach postponed for 7 days due to patient's discharge to SNF.

## 2023-02-01 ENCOUNTER — PATIENT OUTREACH (OUTPATIENT)
Dept: CASE MANAGEMENT | Age: 88
End: 2023-02-01

## 2023-02-01 NOTE — PROGRESS NOTES
Patient currently admitted to [facility]. Transition of care outreach postponed for 7 days due to patient's discharge to SNF.

## 2023-02-08 ENCOUNTER — PATIENT OUTREACH (OUTPATIENT)
Dept: CASE MANAGEMENT | Age: 88
End: 2023-02-08

## 2023-02-08 NOTE — PROGRESS NOTES
Patient currently admitted to [facility]. Transition of care outreach postponed for 7 days due to patient's discharge to SNF.

## 2023-02-09 ENCOUNTER — TELEPHONE (OUTPATIENT)
Dept: FAMILY MEDICINE CLINIC | Age: 88
End: 2023-02-09

## 2023-02-09 NOTE — TELEPHONE ENCOUNTER
Patients daughter, Lavern Severino called requesting Dr. Aidee Juarez call her back in regards to the patients medications. She can be reached at [phone].

## 2023-02-10 ENCOUNTER — PATIENT OUTREACH (OUTPATIENT)
Dept: CASE MANAGEMENT | Age: 88
End: 2023-02-10

## 2023-02-10 NOTE — TELEPHONE ENCOUNTER
Pt daughter Ayo Kendrick is requesting a call back [facility] the pt was sent 2 types of insulin to the pharmacy after leaving the rehab and the pt refuses to use it. One is still at the pharmacy and the other is the Lantus 15 units qhs. The BS is running 77-96 in the morning before breakfast and during the day in the low 100's and some nights 168. She also wants a referral for [facility] to help her get her mother in bed at night and out of bed in the  mornings. Pt wants a call to discuss the insulin and BS issues at [phone].

## 2023-02-10 NOTE — PROGRESS NOTES
[address] [place] [address] Dr Discharge Follow-Up      Date/Time:  2/10/2023 11:53 AM    Patient was admitted to [facility] on 23 and discharged to [facility] on 23. The patients discharge diagnosis was Left maninder CVA . Patient was discharge on 23 from [facility]. The discharge summary from the post acute facilty was not available at the time of outreach. Patient was contacted within 3 business days of discharge from the post acute facility. Spoke with patient's daughter-listed on [employer]. LPN Care Coordinator contacted the caregiver by telephone to perform post hospital discharge follow up. Verified name and  with family as identifiers. Provided introduction to self, and explanation of the LPN Care Coordinator role. Family received post acute facility discharge instructions. LPN Care Coordinator reviewed discharge instructions and red flags with family who verbalized understanding. Family given an opportunity to ask questions and does not have any further questions or concerns at this time. The family agrees to contact the PCP office for questions related to their healthcare. LPN Care Coordinator provided contact information for future reference. Advance Care Planning:   Does patient have an Advance Directive:  reviewed and current     Home Health orders at discharge: PT, OT, [address]: [facility]   Date of initial visit: 23    Durable Medical Equipment ordered at discharge: none  [address] received: n/a    Patient has a cane and shower chair    Medication(s):   The post acute facility medication discharge list was not available for this call. Medication review was performed with patient, who verbalizes understanding of administration of home medications. There were no barriers to obtaining medications identified at this time.  Patient [facility] follow up appointment(s):   Future Appointments   Date Time Provider Alexandria Kang   2/24/2023  8:20 AM Odell Koenig MD [facility] BS AMB      Non-[facility] follow up appointment(s): Daughter will call to schedule follow up with Dr. Izaiah Ellis.    [facility]:  information provided as a resource

## 2023-02-14 ENCOUNTER — TELEPHONE (OUTPATIENT)
Dept: FAMILY MEDICINE CLINIC | Age: 88
End: 2023-02-14

## 2023-02-14 NOTE — TELEPHONE ENCOUNTER
Wayne ( O.T ) with [facility] would like to get a verbal to work with patient twice a week for three weeks he can be reached @ [phone]

## 2023-02-16 ENCOUNTER — TELEPHONE (OUTPATIENT)
Dept: FAMILY MEDICINE CLINIC | Age: 88
End: 2023-02-16

## 2023-02-16 NOTE — TELEPHONE ENCOUNTER
----- Message from Radha Ward sent at 2/15/2023  2:56 PM EST -----  Subject: Message to Provider    QUESTIONS  Information for Provider? Florinda Sweet with [address] seen patient today and   the patient has increased edema bilateral feet and legs and some in hands. clear lungs and no SOB. Her next appt with PCP is a virtual on 2/28. asking if the patient can be seen sooner. Call back to Florinda Sweet   [phone] or Daughter Brianna Rudd at [phone]  ---------------------------------------------------------------------------  --------------  [address]  [phone]; Do not leave any message, patient will call back for answer  ---------------------------------------------------------------------------  --------------  SCRIPT ANSWERS  Relationship to Patient? Third Party  Third Party Type? Home Health Care? Representative Name?  Florinda Sweet with [address]

## 2023-02-16 NOTE — TELEPHONE ENCOUNTER
----- Message from Doron Ly sent at 2/15/2023  2:56 PM EST -----  Subject: Message to Provider    QUESTIONS  Information for Provider? Bina Brewer with [facility] seen patient today and   the patient has increased edema bilateral feet and legs and some in hands. clear lungs and no SOB. Her next appt with PCP is a virtual on 2/28. asking if the patient can be seen sooner. Call back to Bina Brewer   [phone] or Daughter Cody Carrera at [phone]  ---------------------------------------------------------------------------  --------------  [id number] [employer]  [phone]; Do not leave any message, patient will call back for answer  ---------------------------------------------------------------------------  --------------  SCRIPT ANSWERS  Relationship to Patient? Third Party  Third Party Type? Home Health Care? Representative Name?  Bina Brewer with [facility]

## 2023-02-21 ENCOUNTER — VIRTUAL VISIT (OUTPATIENT)
Dept: FAMILY MEDICINE CLINIC | Age: 88
End: 2023-02-21
Payer: MEDICARE

## 2023-02-21 DIAGNOSIS — Z09 ENCOUNTER FOR EXAMINATION FOLLOWING TREATMENT AT HOSPITAL: Primary | ICD-10-CM

## 2023-02-21 DIAGNOSIS — E11.21 TYPE 2 DIABETES WITH NEPHROPATHY (HCC): ICD-10-CM

## 2023-02-21 DIAGNOSIS — I10 ESSENTIAL HYPERTENSION: ICD-10-CM

## 2023-02-21 DIAGNOSIS — M15.9 PRIMARY OSTEOARTHRITIS INVOLVING MULTIPLE JOINTS: ICD-10-CM

## 2023-02-21 DIAGNOSIS — B37.2 INTERTRIGINOUS CANDIDIASIS: ICD-10-CM

## 2023-02-21 DIAGNOSIS — Z51.81 ENCOUNTER FOR MEDICATION MONITORING: ICD-10-CM

## 2023-02-21 DIAGNOSIS — E78.2 MIXED HYPERLIPIDEMIA: ICD-10-CM

## 2023-02-21 DIAGNOSIS — I69.359 HEMIPLEGIA, DOMINANT SIDE S/P CVA (CEREBROVASCULAR ACCIDENT) (HCC): ICD-10-CM

## 2023-02-21 PROCEDURE — 99213 OFFICE O/P EST LOW 20 MIN: CPT | Performed by: FAMILY MEDICINE

## 2023-02-21 PROCEDURE — G8432 DEP SCR NOT DOC, RNG: HCPCS | Performed by: FAMILY MEDICINE

## 2023-02-21 PROCEDURE — 3044F HG A1C LEVEL LT 7.0%: CPT | Performed by: FAMILY MEDICINE

## 2023-02-21 PROCEDURE — 1101F PT FALLS ASSESS-DOCD LE1/YR: CPT | Performed by: FAMILY MEDICINE

## 2023-02-21 PROCEDURE — 1090F PRES/ABSN URINE INCON ASSESS: CPT | Performed by: FAMILY MEDICINE

## 2023-02-21 PROCEDURE — 1123F ACP DISCUSS/DSCN MKR DOCD: CPT | Performed by: FAMILY MEDICINE

## 2023-02-21 PROCEDURE — G8427 DOCREV CUR MEDS BY ELIG CLIN: HCPCS | Performed by: FAMILY MEDICINE

## 2023-02-21 RX ORDER — GLYBURIDE 5 MG/1
TABLET ORAL
Qty: 90 TABLET | Refills: 3 | COMMUNITY
Start: 2023-02-21

## 2023-02-21 RX ORDER — CLOPIDOGREL BISULFATE 75 MG/1
75 TABLET ORAL DAILY
Qty: 30 TABLET | Refills: 5 | Status: SHIPPED | OUTPATIENT
Start: 2023-02-21

## 2023-02-21 RX ORDER — METOPROLOL SUCCINATE 25 MG/1
12.5 TABLET, EXTENDED RELEASE ORAL DAILY
COMMUNITY
Start: 2023-02-21

## 2023-02-21 RX ORDER — KETOCONAZOLE 20 MG/G
CREAM TOPICAL
Qty: 30 G | Refills: 1 | Status: SHIPPED | OUTPATIENT
Start: 2023-02-21

## 2023-02-21 NOTE — PROGRESS NOTES
Identified pt with two pt identifiers(name and ). Reviewed record in preparation for visit and have obtained necessary documentation. Chief Complaint   Patient presents with    Foot Pain     Both heel pain but right is the worse     Arm Pain     Right arm     Other     Discussing home health          There were no vitals filed for this visit. There are no preventive care reminders to display for this patient. Coordination of Care Questionnaire:  :   1) Have you been to an emergency room, urgent care, or hospitalized since your last visit? If yes, where when, and reason for visit? no       2. Have seen or consulted any other health care provider since your last visit? If yes, where when, and reason for visit? NO      Patient is accompanied by daughter I have received verbal consent from Puneet Pike to discuss any/all medical information while they are present in the room.

## 2023-02-21 NOTE — PROGRESS NOTES
HISTORY OF PRESENT ILLNESS  Petra Ames is a 81 y.o. female. HPI  Patient encounter by synchronous (real-time) audio-video technology which is patient-initiated. Patient is aware that this encounter is a billable service with coverage as determined by her insurance carrier, all discussed at the time of check-in. Patient has given verbal consent to proceed. Follow up from rehab. Hospital admission admit date: 1/7/2023  Discharge date and time: 1/17/2023   DISCHARGE DIAGNOSIS:  Left maninder CVA with Right hemiparesis  Chronic hemorrhage of Right occipital lobe  UTI, DM, HTN  Presented to the ER with c/o speech is a bit slower and garbled than usual. She also has some blurry vision and thought it was due her glasses. MRI head showed subacute infarct in the left maninder. Small chronic hemorrhage in the right occipital lobe. TTE with no shunt, did not comment on thrombus. EF normal.  Plavix per Neurology, has ASA allergy. Pt was transferred form hospital to SNF for further rehab and d/c home on 2/6/2023. Has HH coming in with PT/OT. Going well. BS are good. Appetite is good. Sleeping well on most night.       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    Family history of ischemic heart disease Z82.49    Mixed hyperlipidemia E78.2    Mitral valve disease I05.9    Obesity, unspecified E66.9    Generalized OA M15.9    Diabetes mellitus type 2, controlled ([address] E11.9    Essential hypertension I10    Encounter for medication monitoring Z51.81    Statin intolerance Z78.9    Type 2 diabetes with nephropathy (HCC) E11.21    Abscess of abdominal wall L02.211    PVC's (premature ventricular contractions) I49.3    CVA (cerebral vascular accident) ([address] I63.9       Current Outpatient Medications   Medication Sig Dispense Refill    glyBURIDE (DIABETA) 5 mg tablet TAKE 1 TABLET BY MOUTH  DAILY AFTER BREAKFAST 90 Tablet 3    metoprolol succinate (TOPROL-XL) 25 mg XL tablet Take 0.5 Tablets by mouth daily. furosemide (LASIX) 20 mg tablet TAKE 1 TABLET BY MOUTH  DAILY 90 Tablet 3    Accu-Chek Ashley Plus test strp strip USE TO CHECK BLOOD SUGAR  TWICE DAILY 200 Strip 3    latanoprost (XALATAN) 0.005 % ophthalmic solution Administer 1 Drop to both eyes nightly. diclofenac (VOLTAREN) 1 % gel Apply  to affected area four (4) times daily as needed for Pain. 100 g 0    cholecalciferol (VITAMIN D3) (2,000 UNITS /50 MCG) cap capsule Take 2,000 Units by mouth daily. GARLIC PO Take 044 mg by mouth daily. cod liver oil Cap Take 1 Cap by mouth daily. clopidogreL (PLAVIX) 75 mg tab Take 1 Tablet by mouth daily. (Patient not taking: Reported on 2/21/2023) 30 Tablet 2       Allergies   Allergen Reactions    Latex Contact Dermatitis    Aspirin Palpitations    Glipizide Shortness of Breath and Swelling    Bactrim Ds [Sulfamethoxazole-Trimethoprim] Other (comments)     Patient experienced pain in legs and buttock and muscle cramping. Contrast Agent [Iodine] Hives and Itching     Itchy throat    Amaryl [Glimepiride] Other (comments)     \"nervous feeling\"    Avocado Rash     Pt states she bruises.     Feldene [Piroxicam] Diarrhea    Januvia [Sitagliptin] Other (comments)     Upset stomach    Levofloxacin Unknown (comments)    Losartan Itching     Caused congestion per stated by pt    Derrick Hives    Oxycodone Diarrhea and Nausea Only    Pcn [Penicillins] Rash    Pineapple Rash    Prednisone Nausea and Vomiting    Tylenol [Acetaminophen] Palpitations    Welchol [Colesevelam] Other (comments)     Pt states \"made throat feel raw\"    Zestril [Lisinopril] Cough    Zetia [Ezetimibe] Nausea Only         Past Medical History:   Diagnosis Date    Chronic edema     Diabetes ([facility]     Family history of ischemic heart disease 4/12/2012    Hypercholesterolemia     Hypertension     PVC's (premature ventricular contractions) 10/10/2021 Past Surgical History:   Procedure Laterality Date    ENDOSCOPY, COLON, DIAGNOSTIC  12/2006    Dr. Sarah Alvarez BREAST BIOPSY Bilateral 1997    benign    HX HYSTERECTOMY  1972    KS UNLISTED PROCEDURE ABDOMEN PERITONEUM & OMENTUM  09/03/2019    abscess removed from abdominal wall         Family History   Problem Relation Age of Onset    Stroke Mother     Hypertension Mother     Stroke Father     Hypertension Father     Hypertension Brother        Social History     Tobacco Use    Smoking status: Never    Smokeless tobacco: Never   Substance Use Topics    Alcohol use: No     Alcohol/week: 0.0 standard drinks        Lab Results   Component Value Date/Time    WBC 10.4 01/17/2023 01:48 AM    HGB 12.4 01/17/2023 01:48 AM    HCT 35.9 01/17/2023 01:48 AM    PLATELET 928 69/30/7006 01:48 AM    MCV 77.7 (L) 01/17/2023 01:48 AM     Lab Results   Component Value Date/Time    Cholesterol, total 225 (H) 01/08/2023 12:57 AM    HDL Cholesterol 55 01/08/2023 12:57 AM    LDL, calculated 144.2 (H) 01/08/2023 12:57 AM    Triglyceride 129 01/08/2023 12:57 AM    CHOL/HDL Ratio 4.1 01/08/2023 12:57 AM     Lab Results   Component Value Date/Time    TSH 1.51 01/08/2023 12:57 AM      Lab Results   Component Value Date/Time    Sodium 136 01/17/2023 01:48 AM    Potassium 4.0 01/17/2023 01:48 AM    Chloride 103 01/17/2023 01:48 AM    CO2 25 01/17/2023 01:48 AM    Anion gap 8 01/17/2023 01:48 AM    Glucose 148 (H) 01/17/2023 01:48 AM    BUN 42 (H) 01/17/2023 01:48 AM    Creatinine 1.15 (H) 01/17/2023 01:48 AM    BUN/Creatinine ratio 37 (H) 01/17/2023 01:48 AM    GFR est AA 53 (L) 06/22/2022 09:32 AM    GFR est non-AA 43 (L) 06/22/2022 09:32 AM    Calcium 9.3 01/17/2023 01:48 AM    Bilirubin, total 0.4 01/07/2023 06:50 AM    ALT (SGPT) 19 01/07/2023 06:50 AM    Alk.  phosphatase 103 01/07/2023 06:50 AM    Protein, total 7.0 01/07/2023 06:50 AM    Albumin 3.3 (L) 01/07/2023 06:50 AM    Globulin 3.7 01/07/2023 06:50 AM    A-G Ratio 0.9 (L) 01/07/2023 06:50 AM      Lab Results   Component Value Date/Time    Hemoglobin A1c 6.8 (H) 01/08/2023 12:57 AM    Hemoglobin A1c (POC) 6.7 10/24/2022 09:00 AM         Review of Systems   Constitutional:  Negative for malaise/fatigue. HENT:  Negative for congestion. Eyes:  Negative for blurred vision. Respiratory:  Negative for cough and shortness of breath. Cardiovascular:  Negative for chest pain, palpitations and leg swelling. Gastrointestinal:  Negative for abdominal pain, constipation and heartburn. Genitourinary:  Negative for dysuria, frequency and urgency. Musculoskeletal:  Negative for back pain and joint pain. Skin:  Positive for rash (has yeast rash in the fold her of lower abd.). Neurological:  Negative for dizziness, tingling and headaches. Endo/Heme/Allergies:  Negative for environmental allergies. Psychiatric/Behavioral:  Negative for depression. The patient does not have insomnia. Physical Exam  Constitutional:       Appearance: Normal appearance. Pulmonary:      Effort: Pulmonary effort is normal.   Neurological:      Mental Status: She is alert. Psychiatric:         Mood and Affect: Mood normal.         Thought Content: Thought content normal.       ASSESSMENT and PLAN  Diagnoses and all orders for this visit:    1. Encounter for examination following treatment at hospital    2. Hemiplegia, dominant side S/P CVA (cerebrovascular accident) ([facility]  -     clopidogreL (PLAVIX) 75 mg tab; Take 1 Tablet by mouth daily. Continue with HH. 3. Essential hypertension  Stable on current medications. 4. Mixed hyperlipidemia  Not on statin. Does not want statin at this time. 5. Type 2 diabetes with nephropathy (HCC)  Stable with glyburide. 6. Primary osteoarthritis involving multiple joints  Stable     7.  Intertriginous candidiasis  -     ketoconazole (NIZORAL) 2 % topical cream; Apply  to affected area two (2) times daily as needed for Skin Irritation or Itching. 8. Encounter for medication monitoring    Next OV in the office. Jamesll schedule. reviewed diet, exercise and weight control  cardiovascular risk and specific lipid/LDL goals reviewed  reviewed medications and side effects in detail  specific diabetic recommendations: low cholesterol diet, weight control and daily exercise discussed and glycohemoglobin and other lab monitoring discussed    I have discussed diagnosis listed in this note with pt and/or family. I have discussed treatment plans and options and the risk/benefit analysis of those options, including safe use of medications and possible medication side effects. Through the use of shared decision making we have agreed to the above plan. Questions were answered concerning future plans and follow up. Advise pt of any urgent changes then to proceed to the ER. Due to this being a TeleHealth encounter (During [address] emergency), evaluation of the following organ systems was limited: Vital/Constitutional/EENT/Resp/CV/GI//MS/Neuro/Skin/Heme-Lymph-Imm. Pursuant to the emergency declaration under the [address] and [facility] waiver authority and the [employer] Act, this Virtual Visit was conducted, with patient's consent, to reduce the patient's risk of exposure to COVID-19 and provide continuity of care for an established patient. Services were provided through a video synchronous discussion virtually to substitute for in-person appointment. This visit was completed using doxy. me    Time in virtual visit: 16 minutes

## 2023-02-22 ENCOUNTER — PATIENT OUTREACH (OUTPATIENT)
Dept: CASE MANAGEMENT | Age: 88
End: 2023-02-22

## 2023-02-22 NOTE — PROGRESS NOTES
Patient has graduated from the Transitions of Care Coordination  program on 02/22/23 . Patient/family has the ability to self-manage at this time Care management goals have been completed. Patient was not referred to the [facility] team for further management. Goals Addressed                   This Visit's Progress     Attends follow-up appointments as directed. Patient has [facility] for PT,OT and SN for mobility,strengthening and conditioning. Patient had virtual follow up appointment on 2/21/23. [facility]. Neurology follow up with Dr. Lavern Bennett in April. Patient has Care Transition Nurse's contact information for any further questions, concerns, or needs.   Patients upcoming visits:    Future Appointments   Date Time Provider Alexandria Kang   4/19/2023 11:00 AM JESSIE Palumbo AMB

## 2023-03-08 ENCOUNTER — TELEPHONE (OUTPATIENT)
Dept: FAMILY MEDICINE CLINIC | Age: 88
End: 2023-03-08

## 2023-03-08 NOTE — TELEPHONE ENCOUNTER
Patient daughter Katiuska Duarte would like for Tai Emanuel to know her mother therapist says that her mother need a in home x ray done of her ( r ) foot she has a wound on her heel please give her a call @ [phone]

## 2023-03-09 ENCOUNTER — TELEPHONE (OUTPATIENT)
Dept: FAMILY MEDICINE CLINIC | Age: 88
End: 2023-03-09

## 2023-03-09 NOTE — TELEPHONE ENCOUNTER
Spoke with dtg. Therapist wants to get order for XR of the foot since pt is still having pain in the foot with bearing weight. Advised dtg to have them call to the office to get verbal order for xray.

## 2023-03-09 NOTE — TELEPHONE ENCOUNTER
Left detailed message to call [facility] with phone number to call to come out to eval foot pain and get xray done.

## 2023-03-17 ENCOUNTER — TELEPHONE (OUTPATIENT)
Dept: FAMILY MEDICINE CLINIC | Age: 88
End: 2023-03-17

## 2023-03-17 NOTE — TELEPHONE ENCOUNTER
Patients daughter, Yoko Zelaya called requesting a wound care nurse go look at the patients heels. She can be reached at [phone].  ------------------------------------------------------------------------------    Pt needs a Home Health nurse for wound care.

## 2023-03-17 NOTE — TELEPHONE ENCOUNTER
Patients daughter, Yelena Mendoza called requesting a wound care nurse go look at the patients heels. She can be reached at [phone].

## 2023-03-27 ENCOUNTER — TELEPHONE (OUTPATIENT)
Dept: FAMILY MEDICINE CLINIC | Age: 88
End: 2023-03-27

## 2023-03-27 NOTE — TELEPHONE ENCOUNTER
[facility] Dr. Az Ellison in regards to orders for wound care for the patient. She can be reached at [phone].

## 2023-03-27 NOTE — TELEPHONE ENCOUNTER
Dori Lr will be faxing the orders over to be signed for wound care. She was given the new fax number.

## 2023-03-29 ENCOUNTER — TELEPHONE (OUTPATIENT)
Dept: FAMILY MEDICINE CLINIC | Age: 88
End: 2023-03-29

## 2023-03-29 NOTE — TELEPHONE ENCOUNTER
[facility] called wanting to inform Dr. Janny Coley that between yesterday and today the patient has been having nose bleeds. She also stated the patient is taking a blood thinner and requested a call back to further discuss what she should do. She can be reached at [phone].

## 2023-03-30 ENCOUNTER — DOCUMENTATION ONLY (OUTPATIENT)
Dept: FAMILY MEDICINE CLINIC | Age: 88
End: 2023-03-30

## 2023-03-30 NOTE — PROGRESS NOTES
Faxed signed orders for wound care to [address] [place] [address] Patient Care [facility] via [employer].

## 2023-03-31 ENCOUNTER — TELEPHONE (OUTPATIENT)
Dept: FAMILY MEDICINE CLINIC | Age: 88
End: 2023-03-31

## 2023-03-31 NOTE — TELEPHONE ENCOUNTER
Patients daughter, Lachelle Espino called requesting an order for PT and OT be faxed to [address] [place] [address] at [phone]. She stated it needs to start the week of 4/10. Lachelle Espino can be reached at [phone] for additional information.

## 2023-04-06 ENCOUNTER — TELEPHONE (OUTPATIENT)
Dept: FAMILY MEDICINE CLINIC | Age: 88
End: 2023-04-06

## 2023-04-06 NOTE — TELEPHONE ENCOUNTER
Carin with [facility] would like for Josefina Julio to know they will be discharging patient from home health today her daughter Dk Hinkle wants her to get out patient therapy and also patient will need a referral for wound clinic please reach out to patient daughter @ [phone]

## 2023-04-18 NOTE — PROGRESS NOTES
[address] 84  In Office FOLLOW-UP VISIT         Jt Peace is a 81 y.o. female who presents today for the following:  Chief Complaint   Patient presents with    Chuck Sunshine 1/8/23 speech difficulties and right-sided weakness. Her brain MRI does reveal an acute left pontine stroke. ASSESSMENT AND PLAN  Patient is known to this practice. This is my first time seeing the patient. Chart and history reviewed in detail at today's office visit. 1. Cerebrovascular accident (CVA), unspecified mechanism ([facility]  Assessment & Plan:  Stable without recurrence of symptoms    Patient is to continue on Plavix 75 mg and Atorvastatin 40 mg daily per PCP and cardiology recommendations. Patient is to continue to work to all of his comorbid conditions as managed by PCP and other specialists as appropriate    RECOMMENDATIONS:  - BP goal is less than 140/90  - Goal HbA1c is less than 7.   - LDL less than 70     Stroke education was provided today in regards to risk factors for stroke and lifestyle modifications to help minimize the risk of future stroke. This included medication compliance, regular follow up with primary care physician,  and healthy lifestyle habits (nutrition/exercise). 2. Type 2 diabetes mellitus with diabetic chronic kidney disease, unspecified CKD stage, unspecified whether long term insulin use ([facility]  Assessment & Plan:  Stable    Patient is to continue to follow-up with PCP and other specialties as appropriate. Stroke education was provided today in regards to risk factors for stroke and lifestyle modifications to help minimize the risk of future stroke. This included medication compliance, regular follow up with primary care physician,  and healthy lifestyle habits (nutrition/exercise). 3. Essential hypertension  Assessment & Plan:  Stable at this time.     Patient is to continue to follow-up with PCP and other specialists as appropriate. At the time of my evaluation, it was noted patient has swelling in both lower extremity. She has been taking Lasix and using DENNYS hose. Patient was advised to take her time when switching position from sitting to standing, take her medication and recommendations as prescribed by PCP, and elevate legs when sitting and lying to decrease swelling. 4. Essential tremor  Assessment & Plan:  Patient verbalized experiencing tremors for years prior to her stroke. She denied experiencing tremors in the other extremities. At the time of my evaluation, patient had minimal tremor in the upper extremities. No Parkinson symptoms noted. Her tremors seemingly are highly unlikely essential tremors since her son and daughter start experiencing those as well. Since patient is not using her right hand at this time due to stroke and denied any issues with the left hand. No interventions needed at this time. Will continue to monitor patient for this. It was discussed the patient and family that insomnia, caffeine, stress/anxiety, fatigue, and temperature extremes can make tremors worse. Patient and family verbalized understanding and agreed to plan of care. Patient and/or family was given time to ask questions and voice concerns. I believe all questions concerns were adequately addressed at this  office visit. Patient and/or family also verbalized agreement and understanding of the above-stated plan    Complex neurologic decision making secondary any or all of the following to include unclear etiology, and /or polypharmacy, and/or significant comorbid conditions, and/or use of high-risk medications which complicate the decision making process related to patient's neurologic diagnosis          ICD-10-CM ICD-9-CM    1. Cerebrovascular accident (CVA), unspecified mechanism ([facility]  I63.9 434.91       2.  Type 2 diabetes mellitus with diabetic chronic kidney disease, unspecified CKD stage, unspecified whether long term insulin use (HCC)  E11.22 250.40      585.9       3. Essential hypertension  I10 401.9       4. Essential tremor  G25.0 333.1             I attest that 40 minutes was spent on today's visit reviewing medical records and diagnostic testing deemed pertinent to this patient's care, along with direct time spent at patient's visit including the history, physical assessment and plan, discussing diagnosis and management along with documentation. HPI  Historical Data  Patient is known to the practice and was previously seen by Deny Sunshine     The patient is a pleasant 81-year-old female with acute onset of speech difficulties and right-sided weakness. Her brain MRI does reveal an acute left pontine stroke. 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 The patient does have an aspirin allergy. Continue Plavix 75 mg daily. This was previously discussed with the patient and her granddaughter at the bedside. Unclear if chronic microhemorrhages were related to prior hypertensive ischemic events. We will need to watch her neurological examination closely however does have multiple risk factors for ischemic stroke. diabetes. Updated hemoglobin A1c is 6.8. continue aggressive glycemic control   Dyslipidemia: . Patient needs intensive statin therapy  Hypertension: Aggressive blood pressure control with goal blood pressure less than 140/90  The patient is unable to obtain a CTA due to contrast allergy. carotid Doppler study with no significant stenosis  Echocardiogram completed this am. Results pending  The patient will need aggressive physical, occupational, and speech therapy.       Neurologic diagnosis  Stroke follow-up post hospital  Tremors- new    Other significant comorbid conditions/concerns  CVA  Hyperlipidemia  Hypertension  Type 2 diabetes  Mitral valve disease      Interim Data:   Stroke  OV 4/19/23    Patient verbalized she has been doing well with the exception of struggle with weakness in the right upper and lower extremity. She denies any recurrence of stroke symptoms. She completed home physical therapy and will start [facility] tomorrow. She continues to take her prescribed medications as ordered. She denies any side effects. She had a fall this month in which she tripped over her walker but no injuries. She denies any tingling and pain in the lower extremities. Tremors  OV 4/19/23 - new symptoms    Patient verbalized she was experiencing tremors in the hands mostly on her right extremity before the stroke. She could not tell me exactly when this tremor started but she verbalized she has them for years. Patient denies any difficulty with tremors but daughter verbalized she is to have issue with feeding herself in which she had to use a bib in the past.      She is right-handed. Since the stroke, she is learning how to feed herself with the left but denies any worsening tremors in the left. She denies any tremors in other extremities and head tremors. Son  and daughter started to experience tremors in the hands as well. Pertinent diagnostic data    Results from Hospital Encounter encounter on 01/07/23    MRI BRAIN WO CONT    Impression  1. Subacute infarct in the left maninder. 2. Generalized parenchymal volume loss and mild chronic microvascular ischemic  disease. 3. Small chronic hemorrhage in the right occipital lobe. CT Results (most recent):  Results from Hospital Encounter encounter on 01/07/23    CT HEAD WO CONT    Narrative  EXAM:  CT HEAD WO CONT    INDICATION:   change mental status    COMPARISON: CT head 1/7/2023, MRI brain 1/7/2023. TECHNIQUE: Unenhanced CT of the head was performed using 5 mm images.  Brain and  bone windows were generated. CT dose reduction was achieved through use of a  standardized protocol tailored for this examination and automatic exposure  control for dose modulation. FINDINGS:  The ventricles are normal in size and position. Basilar cisterns are patent. No  midline shift. Evolving subacute infarct in the left maninder. There is no evidence  of acute hemorrhage. Unchanged scattered periventricular and deep white matter  hypodensities, consistent with mild chronic microangiopathic ischemic changes. The paranasal sinuses, mastoid air cells, and middle ears are clear. The orbital  contents are within normal limits. There are no significant osseous or  extracranial soft tissue lesions. Impression  1. Evolving subacute infarct in the left maninder. No evidence of acute hemorrhage. 01/07/23    ECHO ADULT FOLLOW-UP OR LIMITED 01/09/2023 1/9/2023    Interpretation Summary    Left Ventricle: Normal left ventricular systolic function with a visually estimated EF of 55 - 60%. Left ventricle size is normal. Normal wall thickness. Normal wall motion. Normal diastolic function. Signed by: Alyssia Cardoza MD on 1/9/2023  2:34 PM      Carotid duplex bilateral on 1/9/2023  Interpretation Summary    The right ICA is normal.  There is intimal thickening present in the left ICA. The right vertebral is antegrade. The left vertebral is antegrade.        Lab Results   Component Value Date/Time    Hemoglobin A1c 6.8 (H) 01/08/2023 12:57 AM    Hemoglobin A1c 7.0 (H) 07/20/2021 03:48 PM    Hemoglobin A1c 10.4 (H) 01/28/2014 08:40 AM      Lab Results   Component Value Date/Time    Cholesterol, total 225 (H) 01/08/2023 12:57 AM    HDL Cholesterol 55 01/08/2023 12:57 AM    LDL, calculated 144.2 (H) 01/08/2023 12:57 AM    VLDL, calculated 25.8 01/08/2023 12:57 AM    Triglyceride 129 01/08/2023 12:57 AM    CHOL/HDL Ratio 4.1 01/08/2023 12:57 AM            Allergies   Allergen Reactions    Latex Contact Dermatitis    Aspirin Palpitations    Glipizide Shortness of Breath and Swelling    Bactrim Ds [Sulfamethoxazole-Trimethoprim] Other (comments)     Patient experienced pain in legs and buttock and muscle cramping. Contrast Agent [Iodine] Hives and Itching     Itchy throat    Amaryl [Glimepiride] Other (comments)     \"nervous feeling\"    Avocado Rash     Pt states she bruises. Feldene [Piroxicam] Diarrhea    Januvia [Sitagliptin] Other (comments)     Upset stomach    Levofloxacin Unknown (comments)    Losartan Itching     Caused congestion per stated by pt    Telford Hives    Oxycodone Diarrhea and Nausea Only    Pcn [Penicillins] Rash    Pineapple Rash    Prednisone Nausea and Vomiting    Tramadol Nausea Only    Tylenol [Acetaminophen] Palpitations    Welchol [Colesevelam] Other (comments)     Pt states \"made throat feel raw\"    Zestril [Lisinopril] Cough    Zetia [Ezetimibe] Nausea Only       Current Outpatient Medications   Medication Sig    atorvastatin (LIPITOR) 40 mg tablet Take 1 Tablet by mouth daily. docosahexaenoic acid/epa (FISH OIL PO) Take 1,000 mg by mouth daily. glyBURIDE (DIABETA) 5 mg tablet TAKE 1 TABLET BY MOUTH  DAILY AFTER BREAKFAST    metoprolol succinate (TOPROL-XL) 25 mg XL tablet Take 0.5 Tablets by mouth daily. clopidogreL (PLAVIX) 75 mg tab Take 1 Tablet by mouth daily. furosemide (LASIX) 20 mg tablet TAKE 1 TABLET BY MOUTH  DAILY    Accu-Chek Ashley Plus test strp strip USE TO CHECK BLOOD SUGAR  TWICE DAILY    latanoprost (XALATAN) 0.005 % ophthalmic solution Administer 1 Drop to both eyes nightly. cholecalciferol (VITAMIN D3) (2,000 UNITS /50 MCG) cap capsule Take 1 Capsule by mouth daily. GARLIC PO Take 179 mg by mouth daily. cod liver oil Cap Take 1 Capsule by mouth daily. No current facility-administered medications for this visit.        Past medical history/surgical history, family history, and social history have been reviewed for today's visit      Review of Systems   Constitutional: Negative. HENT: Negative. Eyes: Negative. Respiratory:  Positive for cough (at night only). Cardiovascular:  Positive for leg swelling (legs). Gastrointestinal: Negative. Genitourinary: Negative. Musculoskeletal:  Positive for falls and joint pain (ankles - Advil gel at bedtime). Skin: Negative. Neurological:  Positive for weakness (right upper and lower). Endo/Heme/Allergies: Negative. Psychiatric/Behavioral: Negative. A ten system review of constitutional, cardiovascular, respiratory, musculoskeletal, endocrine, skin, SHEENT, genitourinary, psychiatric and neurologic systems was obtained and is unremarkable except as mentioned under HPI          EXAMINATION:     Visit Vitals  /70 (BP 1 Location: Left upper arm, BP Patient Position: Sitting, BP Cuff Size: Large adult)   Pulse 80   Temp 97.9 °F (36.6 °C) (Temporal)   Resp 16   Ht 5' 2\" (1.575 m)   Wt 176 lb (79.8 kg)   LMP 04/28/1972   SpO2 97%   BMI 32.19 kg/m²         General appearance: Patient is well-developed and well-nourished in no apparent distress and well groomed. Psych/mental health:  Affect: Appropriate    PHQ  3 most recent PHQ Screens 10/24/2022   Little interest or pleasure in doing things Several days   Feeling down, depressed, irritable, or hopeless Several days   Total Score PHQ 2 2       HEENT:   Normocephalic  With evidence of trauma: No  Full range of motion head neck: Yes  Tenderness to palpation of the head neck region: No      Cardiovascular:     Extremities warm to touch: Yes  Extremity swelling: in the lower extremity bilaterally. She wearing compression socks.   Discoloration: No  Evidence of PVD: No    Respiratory:   Dyspnea on exertion: No   Abnormal effort on casual observation: No   Use of portable oxygen: No   Evidence of cyanosis: No     Musculoskeletal:   Evidence of significant bone deformities: No   Spinal curvature: No     Integumentary:    Obvious bruising: No   Lacerations or discoloration on casual observation: No       Neurological Examination:   Mental Status:        MMSE  No flowsheet data found. Formal testing was not completed    there was nothing concerning on general observation and discussion. Alert oriented and appropriate to general conversation  Normal processing on general observation  Followed conversation and responded seemingly appropriate throughout the office visit  No word finding difficulties noted on casual observation  Able to follow directions without difficulty     Cranial Nerves:    EOMs intact gaze is conjugate  No nystagmus is appreciated  Facial motor intact bilaterally  Hearing intact to conversation  Voice with normal projection, no evidence of secretion pooling  Shoulder shrug decreased in the right   No tongue deviation appreciated     Motor:   Normal bulk  Minimal tremor in the right and left upper and extremity  No abnormal movements appreciated on today's exam  Moves extremities spontaneously and with purpose  5/5 x in the left upper and lowe  4/5  in the upper and lower  3/5 in the right upper       Sensation: Intact to light touch    Coordination/Cerebellar: Finger to nose intact using the left hand    Gait: Ambulates using a walker    Reflexes:       Biceps Triceps Brachiorad Knee Achilles   Right 2+ 2+ 2+ 1+ 1+   Left 2+ 2+ 2+ 1+ 1+       Fall risk assessment  Fall Risk Assessment, last 12 mths 4/19/2023   Able to walk? Yes   Fall in past 12 months? 1   Do you feel unsteady? 1   Are you worried about falling 1   Is TUG test greater than 12 seconds? -   Is the gait abnormal? 1   Number of falls in past 12 months 1   Fall with injury? 0         Follow-up and Dispositions    Return in about 6 months (around 10/19/2023) for with me.      JESSIE Alas

## 2023-04-19 ENCOUNTER — OFFICE VISIT (OUTPATIENT)
Dept: NEUROLOGY | Age: 88
End: 2023-04-19

## 2023-04-19 ENCOUNTER — TELEPHONE (OUTPATIENT)
Dept: FAMILY MEDICINE CLINIC | Age: 88
End: 2023-04-19

## 2023-04-19 VITALS
HEART RATE: 80 BPM | HEIGHT: 62 IN | TEMPERATURE: 97.9 F | SYSTOLIC BLOOD PRESSURE: 120 MMHG | DIASTOLIC BLOOD PRESSURE: 70 MMHG | BODY MASS INDEX: 32.39 KG/M2 | WEIGHT: 176 LBS | RESPIRATION RATE: 16 BRPM | OXYGEN SATURATION: 97 %

## 2023-04-19 DIAGNOSIS — I63.9 CEREBROVASCULAR ACCIDENT (CVA), UNSPECIFIED MECHANISM (HCC): Primary | ICD-10-CM

## 2023-04-19 DIAGNOSIS — G25.0 ESSENTIAL TREMOR: ICD-10-CM

## 2023-04-19 DIAGNOSIS — I10 ESSENTIAL HYPERTENSION: ICD-10-CM

## 2023-04-19 DIAGNOSIS — L97.429 HEEL ULCERATION, LEFT, WITH UNSPECIFIED SEVERITY (HCC): Primary | ICD-10-CM

## 2023-04-19 DIAGNOSIS — E11.22 TYPE 2 DIABETES MELLITUS WITH DIABETIC CHRONIC KIDNEY DISEASE, UNSPECIFIED CKD STAGE, UNSPECIFIED WHETHER LONG TERM INSULIN USE (HCC): ICD-10-CM

## 2023-04-19 RX ORDER — ATORVASTATIN CALCIUM 40 MG/1
40 TABLET, FILM COATED ORAL DAILY
COMMUNITY
Start: 2023-02-09

## 2023-04-19 NOTE — PROGRESS NOTES
Chief Complaint   Patient presents with    Papa Weber 1/8/23 speech difficulties and right-sided weakness. Her brain MRI does reveal an acute left pontine stroke.      Patient C/O right heel pain from ulcer she will be starting [facility] as OP 4/20/23

## 2023-04-19 NOTE — ASSESSMENT & PLAN NOTE
Stable without recurrence of symptoms    Patient is to continue on Plavix 75 mg and Atorvastatin 40 mg daily per PCP and cardiology recommendations. Patient is to continue to work to all of his comorbid conditions as managed by PCP and other specialists as appropriate    RECOMMENDATIONS:  - BP goal is less than 140/90  - Goal HbA1c is less than 7.   - LDL less than 70     Stroke education was provided today in regards to risk factors for stroke and lifestyle modifications to help minimize the risk of future stroke. This included medication compliance, regular follow up with primary care physician,  and healthy lifestyle habits (nutrition/exercise).

## 2023-04-19 NOTE — ASSESSMENT & PLAN NOTE
Patient verbalized experiencing tremors for years prior to her stroke. She denied experiencing tremors in the other extremities. At the time of my evaluation, patient had minimal tremor in the upper extremities. No Parkinson symptoms noted. Her tremors seemingly are highly unlikely essential tremors since her son and daughter start experiencing those as well. Since patient is not using her right hand at this time due to stroke and denied any issues with the left hand. No interventions needed at this time. Will continue to monitor patient for this. It was discussed the patient and family that insomnia, caffeine, stress/anxiety, fatigue, and temperature extremes can make tremors worse. Patient and family verbalized understanding and agreed to plan of care.

## 2023-04-19 NOTE — PATIENT INSTRUCTIONS
As per our discussion,    Overall, you have been doing well from his stroke perspective. Continue to take your medication as prescribed. Patient is to continue to work to all of his comorbid conditions as managed by PCP and other specialists as appropriate    RECOMMENDATIONS:  - BP goal is less than 140/90  - Goal HbA1c is less than 7.   - LDL less than 70      Stroke education was provided today in regards to risk factors for stroke and lifestyle modifications to help minimize the risk of future stroke. This included medication compliance, regular follow up with primary care physician,  and healthy lifestyle habits (nutrition/exercise). As for the tremors in the hands, it is suspected that your symptoms are likely essential tremors. Since you have not been using your right hand at this time since the stroke and your tremors are stable on the left hand, will not make any changes at this time. Will continue to monitor you for this. If tremors are getting worse, we may start you on some medications that are available for tremors. It was a pleasure to meet you both and I will see you back in 6 to 8 months.     I hope you have a wonderful summer!!

## 2023-04-19 NOTE — TELEPHONE ENCOUNTER
Spoke to [facility] and [place] at [facility] and she stated the pt needed another referral for the L heel wound care because for the first referral the pt has been discharged. [facility]. The last referral was on 3/22/23. I will call and notify the pt daughter Martha Berumen.

## 2023-04-19 NOTE — ASSESSMENT & PLAN NOTE
Stable at this time. Patient is to continue to follow-up with PCP and other specialists as appropriate. At the time of my evaluation, it was noted patient has swelling in both lower extremity. She has been taking Lasix and using DENNYS hose. Patient was advised to take her time when switching position from sitting to standing, take her medication and recommendations as prescribed by PCP, and elevate legs when sitting and lying to decrease swelling.

## 2023-04-19 NOTE — TELEPHONE ENCOUNTER
Patient daughter Bang Thomas states that the wound care nurse is no longer coming out to see her mother they told her they contacted the office on 4/6 to let Myrtle Hernandez know that patient still need to be seen for wound care on her heel please give Bang Thomas a call @ [phone]

## 2023-04-19 NOTE — ASSESSMENT & PLAN NOTE
Stable    Patient is to continue to follow-up with PCP and other specialties as appropriate. Stroke education was provided today in regards to risk factors for stroke and lifestyle modifications to help minimize the risk of future stroke. This included medication compliance, regular follow up with primary care physician,  and healthy lifestyle habits (nutrition/exercise).

## 2023-04-21 ENCOUNTER — TELEPHONE (OUTPATIENT)
Dept: FAMILY MEDICINE CLINIC | Age: 88
End: 2023-04-21

## 2023-04-22 DIAGNOSIS — Z12.31 SCREENING MAMMOGRAM FOR HIGH-RISK PATIENT: Primary | ICD-10-CM

## 2023-04-27 DIAGNOSIS — I10 ESSENTIAL HYPERTENSION: ICD-10-CM

## 2023-04-29 RX ORDER — GLYBURIDE 5 MG/1
TABLET ORAL
COMMUNITY
Start: 2023-02-21 | End: 2023-06-05 | Stop reason: SDUPTHER

## 2023-04-29 RX ORDER — LATANOPROST 50 UG/ML
1 SOLUTION/ DROPS OPHTHALMIC
COMMUNITY
Start: 2021-12-07

## 2023-04-29 RX ORDER — CLOPIDOGREL BISULFATE 75 MG/1
TABLET ORAL DAILY
COMMUNITY
Start: 2023-02-21

## 2023-04-29 RX ORDER — KETOCONAZOLE 20 MG/G
CREAM TOPICAL 2 TIMES DAILY PRN
COMMUNITY
Start: 2023-02-21 | End: 2023-06-05

## 2023-04-29 RX ORDER — METOPROLOL SUCCINATE 25 MG/1
TABLET, EXTENDED RELEASE ORAL DAILY
COMMUNITY
Start: 2023-02-21

## 2023-04-29 RX ORDER — FUROSEMIDE 20 MG/1
TABLET ORAL DAILY
COMMUNITY
Start: 2022-11-08

## 2023-05-01 RX ORDER — METOPROLOL SUCCINATE 25 MG/1
TABLET, EXTENDED RELEASE ORAL
Qty: 15 TABLET | Refills: 0 | Status: SHIPPED | OUTPATIENT
Start: 2023-05-01

## 2023-05-08 ENCOUNTER — TELEPHONE (OUTPATIENT)
Age: 88
End: 2023-05-08

## 2023-05-09 RX ORDER — ATORVASTATIN CALCIUM 40 MG/1
40 TABLET, FILM COATED ORAL DAILY
Qty: 90 TABLET | Refills: 1 | Status: SHIPPED | OUTPATIENT
Start: 2023-05-09

## 2023-05-09 NOTE — TELEPHONE ENCOUNTER
This med is listed as historical. Please sign if appropriate.     Last appointment: 2/21/23  Next appointment: 6/5/23    Requested Prescriptions     Pending Prescriptions Disp Refills    atorvastatin (LIPITOR) 40 MG tablet 90 tablet 1     Sig: Take 1 tablet by mouth daily

## 2023-05-16 ENCOUNTER — TELEPHONE (OUTPATIENT)
Age: 88
End: 2023-05-16

## 2023-05-18 ENCOUNTER — TELEPHONE (OUTPATIENT)
Age: 88
End: 2023-05-18

## 2023-05-18 NOTE — TELEPHONE ENCOUNTER
Patients daughter Ziggy Gray would like return call regarding  [id number] [facility] Notes faxed.   Fax # [phone]

## 2023-06-05 ENCOUNTER — OFFICE VISIT (OUTPATIENT)
Age: 88
End: 2023-06-05
Payer: MEDICARE

## 2023-06-05 VITALS
RESPIRATION RATE: 14 BRPM | WEIGHT: 171 LBS | DIASTOLIC BLOOD PRESSURE: 72 MMHG | HEIGHT: 62 IN | BODY MASS INDEX: 31.47 KG/M2 | HEART RATE: 82 BPM | SYSTOLIC BLOOD PRESSURE: 121 MMHG | OXYGEN SATURATION: 95 % | TEMPERATURE: 98.6 F

## 2023-06-05 DIAGNOSIS — I10 ESSENTIAL (PRIMARY) HYPERTENSION: Primary | ICD-10-CM

## 2023-06-05 DIAGNOSIS — E11.21 TYPE 2 DIABETES MELLITUS WITH DIABETIC NEPHROPATHY, WITHOUT LONG-TERM CURRENT USE OF INSULIN (HCC): ICD-10-CM

## 2023-06-05 DIAGNOSIS — E78.2 MIXED HYPERLIPIDEMIA: ICD-10-CM

## 2023-06-05 DIAGNOSIS — G81.91 RIGHT HEMIPARESIS (HCC): ICD-10-CM

## 2023-06-05 DIAGNOSIS — M15.9 PRIMARY OSTEOARTHRITIS INVOLVING MULTIPLE JOINTS: ICD-10-CM

## 2023-06-05 DIAGNOSIS — Z86.73 HISTORY OF CARDIOEMBOLIC CEREBROVASCULAR ACCIDENT (CVA): ICD-10-CM

## 2023-06-05 DIAGNOSIS — Z79.899 ENCOUNTER FOR LONG-TERM (CURRENT) USE OF MEDICATIONS: ICD-10-CM

## 2023-06-05 DIAGNOSIS — B37.2 CANDIDAL INTERTRIGO: ICD-10-CM

## 2023-06-05 LAB
GLUCOSE, POC: 79 MG/DL
HBA1C MFR BLD: 6.2 %

## 2023-06-05 PROCEDURE — 82947 ASSAY GLUCOSE BLOOD QUANT: CPT | Performed by: FAMILY MEDICINE

## 2023-06-05 PROCEDURE — 99214 OFFICE O/P EST MOD 30 MIN: CPT | Performed by: FAMILY MEDICINE

## 2023-06-05 PROCEDURE — 1123F ACP DISCUSS/DSCN MKR DOCD: CPT | Performed by: FAMILY MEDICINE

## 2023-06-05 PROCEDURE — 83036 HEMOGLOBIN GLYCOSYLATED A1C: CPT | Performed by: FAMILY MEDICINE

## 2023-06-05 PROCEDURE — 3044F HG A1C LEVEL LT 7.0%: CPT | Performed by: FAMILY MEDICINE

## 2023-06-05 PROCEDURE — PBSHW AMB POC GLUCOSE, QUANTITATIVE, BLOOD: Performed by: FAMILY MEDICINE

## 2023-06-05 PROCEDURE — PBSHW AMB POC HEMOGLOBIN A1C: Performed by: FAMILY MEDICINE

## 2023-06-05 RX ORDER — DORZOLAMIDE HYDROCHLORIDE AND TIMOLOL MALEATE 20; 5 MG/ML; MG/ML
SOLUTION/ DROPS OPHTHALMIC
COMMUNITY

## 2023-06-05 RX ORDER — GLYBURIDE 5 MG/1
2.5 TABLET ORAL
Qty: 30 TABLET | Refills: 0
Start: 2023-06-05

## 2023-06-05 RX ORDER — NYSTATIN 100000 [USP'U]/G
POWDER TOPICAL
Qty: 60 G | Refills: 3 | Status: SHIPPED | OUTPATIENT
Start: 2023-06-05

## 2023-06-05 RX ORDER — NYSTATIN 100000 [USP'U]/G
POWDER TOPICAL
Qty: 60 G | Refills: 3 | Status: SHIPPED | OUTPATIENT
Start: 2023-06-05 | End: 2023-06-05 | Stop reason: SDUPTHER

## 2023-06-05 RX ORDER — CLOTRIMAZOLE AND BETAMETHASONE DIPROPIONATE 10; .64 MG/G; MG/G
CREAM TOPICAL
Qty: 45 G | Refills: 1 | Status: SHIPPED | OUTPATIENT
Start: 2023-06-05

## 2023-06-05 SDOH — ECONOMIC STABILITY: HOUSING INSECURITY
IN THE LAST 12 MONTHS, WAS THERE A TIME WHEN YOU DID NOT HAVE A STEADY PLACE TO SLEEP OR SLEPT IN A SHELTER (INCLUDING NOW)?: NO

## 2023-06-05 SDOH — ECONOMIC STABILITY: FOOD INSECURITY: WITHIN THE PAST 12 MONTHS, THE FOOD YOU BOUGHT JUST DIDN'T LAST AND YOU DIDN'T HAVE MONEY TO GET MORE.: NEVER TRUE

## 2023-06-05 SDOH — ECONOMIC STABILITY: FOOD INSECURITY: WITHIN THE PAST 12 MONTHS, YOU WORRIED THAT YOUR FOOD WOULD RUN OUT BEFORE YOU GOT MONEY TO BUY MORE.: NEVER TRUE

## 2023-06-05 SDOH — ECONOMIC STABILITY: INCOME INSECURITY: HOW HARD IS IT FOR YOU TO PAY FOR THE VERY BASICS LIKE FOOD, HOUSING, MEDICAL CARE, AND HEATING?: NOT HARD AT ALL

## 2023-06-05 ASSESSMENT — PATIENT HEALTH QUESTIONNAIRE - PHQ9
SUM OF ALL RESPONSES TO PHQ9 QUESTIONS 1 & 2: 0
SUM OF ALL RESPONSES TO PHQ QUESTIONS 1-9: 0
2. FEELING DOWN, DEPRESSED OR HOPELESS: 0
1. LITTLE INTEREST OR PLEASURE IN DOING THINGS: 0
SUM OF ALL RESPONSES TO PHQ QUESTIONS 1-9: 0

## 2023-06-05 ASSESSMENT — ENCOUNTER SYMPTOMS
CONSTIPATION: 0
ABDOMINAL PAIN: 0
COUGH: 0
BLOOD IN STOOL: 0
CHEST TIGHTNESS: 0
SHORTNESS OF BREATH: 0
RHINORRHEA: 0

## 2023-06-05 NOTE — PROGRESS NOTES
Chief Complaint   Patient presents with    Follow-up     follow up after rehab       1. \"Have you been to the ER, urgent care clinic since your last visit? Hospitalized since your last visit? \" No    2. \"Have you seen or consulted any other health care providers outside of the [address] [place] [address] since your last visit? \" Dr Everton Mckeon neurologist     3. For patients aged 39-70: Has the patient had a colonoscopy / FIT/ Cologuard? N/A      If the patient is female:    4. For patients aged 41-77: Has the patient had a mammogram within the past 2 years? N/A - based on age       11. For patients aged 21-65: Has the patient had a pap smear? N/A - based on age    Patient is here today for follow up from rehab. Would like to discuss her gait and when she will be able o walk normally again.      Health Maintenance Due   Topic Date Due    COVID-19 Vaccine (4 - Booster for Moderna series) 01/04/2022
facility-administered medications for this visit. Family History   Problem Relation Age of Onset    Stroke Mother     Hypertension Mother     Stroke Father     Hypertension Father     Hypertension Brother       Social History     Socioeconomic History    Marital status:      Spouse name: None    Number of children: None    Years of education: None    Highest education level: None   Tobacco Use    Smoking status: Never    Smokeless tobacco: Never   Vaping Use    Vaping Use: Never used   Substance and Sexual Activity    Alcohol use: No     Alcohol/week: 0.0 standard drinks    Drug use: No    Sexual activity: Not Currently     Social Determinants of Health     Financial Resource Strain: Low Risk     Difficulty of Paying Living Expenses: Not hard at all   Food Insecurity: No Food Insecurity    Worried About Running Out of Food in the Last Year: Never true    Ran Out of Food in the Last Year: Never true   Transportation Needs: Unknown    Lack of Transportation (Non-Medical): No   Housing Stability: Unknown    Unstable Housing in the Last Year: No        Review of Systems   Constitutional:  Negative for activity change. HENT:  Negative for congestion and rhinorrhea. Respiratory:  Negative for cough, chest tightness and shortness of breath. Cardiovascular:  Negative for chest pain, palpitations and leg swelling. Gastrointestinal:  Negative for abdominal pain, blood in stool and constipation. Endocrine: Negative for polyuria. Genitourinary:  Negative for difficulty urinating, frequency, hematuria and urgency. Musculoskeletal:  Negative for arthralgias, joint swelling and myalgias. Skin:  Negative for rash. Rash under the fold of the skin in the lower abd. Allergic/Immunologic: Negative for environmental allergies. Neurological:  Negative for dizziness, light-headedness and headaches. Psychiatric/Behavioral:  Negative for dysphoric mood and sleep disturbance.  The patient is not

## 2023-06-06 LAB
ALBUMIN SERPL-MCNC: 3.6 G/DL (ref 3.5–5)
ALBUMIN/GLOB SERPL: 0.9 (ref 1.1–2.2)
ALP SERPL-CCNC: 114 U/L (ref 45–117)
ALT SERPL-CCNC: 30 U/L (ref 12–78)
ANION GAP SERPL CALC-SCNC: 5 MMOL/L (ref 5–15)
AST SERPL-CCNC: 23 U/L (ref 15–37)
BILIRUB SERPL-MCNC: 0.3 MG/DL (ref 0.2–1)
BUN SERPL-MCNC: 20 MG/DL (ref 6–20)
BUN/CREAT SERPL: 15 (ref 12–20)
CALCIUM SERPL-MCNC: 9.6 MG/DL (ref 8.5–10.1)
CHLORIDE SERPL-SCNC: 109 MMOL/L (ref 97–108)
CHOLEST SERPL-MCNC: 201 MG/DL
CO2 SERPL-SCNC: 28 MMOL/L (ref 21–32)
CREAT SERPL-MCNC: 1.3 MG/DL (ref 0.55–1.02)
ERYTHROCYTE [DISTWIDTH] IN BLOOD BY AUTOMATED COUNT: 15.3 % (ref 11.5–14.5)
GLOBULIN SER CALC-MCNC: 3.9 G/DL (ref 2–4)
GLUCOSE SERPL-MCNC: 67 MG/DL (ref 65–100)
HCT VFR BLD AUTO: 39.6 % (ref 35–47)
HDLC SERPL-MCNC: 59 MG/DL
HDLC SERPL: 3.4 (ref 0–5)
HGB BLD-MCNC: 12.6 G/DL (ref 11.5–16)
LDLC SERPL CALC-MCNC: 124.2 MG/DL (ref 0–100)
MCH RBC QN AUTO: 26.4 PG (ref 26–34)
MCHC RBC AUTO-ENTMCNC: 31.8 G/DL (ref 30–36.5)
MCV RBC AUTO: 83 FL (ref 80–99)
NRBC # BLD: 0 K/UL (ref 0–0.01)
NRBC BLD-RTO: 0 PER 100 WBC
PLATELET # BLD AUTO: 267 K/UL (ref 150–400)
PMV BLD AUTO: 10.4 FL (ref 8.9–12.9)
POTASSIUM SERPL-SCNC: 4.7 MMOL/L (ref 3.5–5.1)
PROT SERPL-MCNC: 7.5 G/DL (ref 6.4–8.2)
RBC # BLD AUTO: 4.77 M/UL (ref 3.8–5.2)
SODIUM SERPL-SCNC: 142 MMOL/L (ref 136–145)
TRIGL SERPL-MCNC: 89 MG/DL
VLDLC SERPL CALC-MCNC: 17.8 MG/DL
WBC # BLD AUTO: 5.8 K/UL (ref 3.6–11)

## 2023-08-04 ENCOUNTER — TELEPHONE (OUTPATIENT)
Age: 88
End: 2023-08-04

## 2023-08-04 NOTE — TELEPHONE ENCOUNTER
----- Message from [employer] sent at 8/4/2023  2:56 PM EDT -----  Subject: Message to Provider    QUESTIONS  Information for Provider? [employer] [facility] needing chart notes from   12/30-03/30/2023 for wound care. Fax? [phone]  ---------------------------------------------------------------------------  --------------  [address]  [phone]; Do not leave any message, patient will call back for answer  ---------------------------------------------------------------------------  --------------  SCRIPT ANSWERS  Relationship to Patient? Covered Entity  Covered Entity Type? Durable Medical Equipment? Representative Name?  Penny Merlin

## 2023-08-07 ENCOUNTER — TELEPHONE (OUTPATIENT)
Age: 88
End: 2023-08-07

## 2023-08-07 NOTE — TELEPHONE ENCOUNTER
----- Message from Marybel Branch sent at 8/4/2023  2:56 PM EDT -----  Subject: Message to Provider    QUESTIONS  Information for Provider? [employer] needing chart notes from   12/30-03/30/2023 for wound care. Fax? [phone]  ---------------------------------------------------------------------------  --------------  Chelita Canton Tulio  [phone]; Do not leave any message, patient will call back for answer  ---------------------------------------------------------------------------  --------------  SCRIPT ANSWERS  Relationship to Patient? Covered Entity  Covered Entity Type? Durable Medical Equipment? Representative Name?  Lucy Narayanan

## 2023-08-09 RX ORDER — FUROSEMIDE 20 MG/1
20 TABLET ORAL DAILY
Qty: 90 TABLET | Refills: 3 | Status: SHIPPED | OUTPATIENT
Start: 2023-08-09

## 2023-08-09 NOTE — TELEPHONE ENCOUNTER
Last appointment: 6/5/23  Next appointment: 9/5/23  Previous refill encounter(s): 11/8/22 #90 with 3 refills    Requested Prescriptions     Pending Prescriptions Disp Refills    furosemide (LASIX) 20 MG tablet 90 tablet 3     Sig: Take 1 tablet by mouth daily         For Pharmacy Admin Tracking Only    Program: Medication Refill  CPA in place:    Recommendation Provided To:    Intervention Detail: New Rx: 1, reason: Patient Preference  Intervention Accepted By:   Oliverio Ruby Closed?:    Time Spent (min): 5

## 2023-09-05 ENCOUNTER — OFFICE VISIT (OUTPATIENT)
Age: 88
End: 2023-09-05
Payer: MEDICARE

## 2023-09-05 VITALS
HEART RATE: 75 BPM | BODY MASS INDEX: 32.09 KG/M2 | TEMPERATURE: 98.8 F | OXYGEN SATURATION: 98 % | HEIGHT: 62 IN | RESPIRATION RATE: 18 BRPM | WEIGHT: 174.4 LBS | DIASTOLIC BLOOD PRESSURE: 68 MMHG | SYSTOLIC BLOOD PRESSURE: 136 MMHG

## 2023-09-05 DIAGNOSIS — E11.21 TYPE 2 DIABETES MELLITUS WITH DIABETIC NEPHROPATHY, WITHOUT LONG-TERM CURRENT USE OF INSULIN (HCC): ICD-10-CM

## 2023-09-05 DIAGNOSIS — Z79.899 ENCOUNTER FOR LONG-TERM (CURRENT) USE OF MEDICATIONS: ICD-10-CM

## 2023-09-05 DIAGNOSIS — E78.2 MIXED HYPERLIPIDEMIA: ICD-10-CM

## 2023-09-05 DIAGNOSIS — Z86.73 HISTORY OF CARDIOEMBOLIC CEREBROVASCULAR ACCIDENT (CVA): ICD-10-CM

## 2023-09-05 DIAGNOSIS — M15.9 PRIMARY OSTEOARTHRITIS INVOLVING MULTIPLE JOINTS: ICD-10-CM

## 2023-09-05 DIAGNOSIS — I10 ESSENTIAL (PRIMARY) HYPERTENSION: Primary | ICD-10-CM

## 2023-09-05 LAB
GLUCOSE, POC: 160 MG/DL
HBA1C MFR BLD: 7 %

## 2023-09-05 PROCEDURE — 99214 OFFICE O/P EST MOD 30 MIN: CPT | Performed by: FAMILY MEDICINE

## 2023-09-05 PROCEDURE — 1123F ACP DISCUSS/DSCN MKR DOCD: CPT | Performed by: FAMILY MEDICINE

## 2023-09-05 PROCEDURE — PBSHW AMB POC GLUCOSE, QUANTITATIVE, BLOOD: Performed by: FAMILY MEDICINE

## 2023-09-05 PROCEDURE — 82947 ASSAY GLUCOSE BLOOD QUANT: CPT | Performed by: FAMILY MEDICINE

## 2023-09-05 PROCEDURE — 3044F HG A1C LEVEL LT 7.0%: CPT | Performed by: FAMILY MEDICINE

## 2023-09-05 PROCEDURE — 83036 HEMOGLOBIN GLYCOSYLATED A1C: CPT | Performed by: FAMILY MEDICINE

## 2023-09-05 PROCEDURE — PBSHW AMB POC HEMOGLOBIN A1C: Performed by: FAMILY MEDICINE

## 2023-09-05 RX ORDER — BLOOD SUGAR DIAGNOSTIC
STRIP MISCELLANEOUS
COMMUNITY
Start: 2023-08-07

## 2023-09-05 SDOH — ECONOMIC STABILITY: INCOME INSECURITY: HOW HARD IS IT FOR YOU TO PAY FOR THE VERY BASICS LIKE FOOD, HOUSING, MEDICAL CARE, AND HEATING?: NOT HARD AT ALL

## 2023-09-05 SDOH — ECONOMIC STABILITY: FOOD INSECURITY: WITHIN THE PAST 12 MONTHS, THE FOOD YOU BOUGHT JUST DIDN'T LAST AND YOU DIDN'T HAVE MONEY TO GET MORE.: NEVER TRUE

## 2023-09-05 SDOH — ECONOMIC STABILITY: FOOD INSECURITY: WITHIN THE PAST 12 MONTHS, YOU WORRIED THAT YOUR FOOD WOULD RUN OUT BEFORE YOU GOT MONEY TO BUY MORE.: NEVER TRUE

## 2023-09-05 ASSESSMENT — PATIENT HEALTH QUESTIONNAIRE - PHQ9
2. FEELING DOWN, DEPRESSED OR HOPELESS: 0
SUM OF ALL RESPONSES TO PHQ QUESTIONS 1-9: 0
1. LITTLE INTEREST OR PLEASURE IN DOING THINGS: 0
SUM OF ALL RESPONSES TO PHQ QUESTIONS 1-9: 0
SUM OF ALL RESPONSES TO PHQ9 QUESTIONS 1 & 2: 0

## 2023-09-05 ASSESSMENT — ENCOUNTER SYMPTOMS
SHORTNESS OF BREATH: 0
ABDOMINAL PAIN: 0
CONSTIPATION: 0
COUGH: 0
BLOOD IN STOOL: 0
CHEST TIGHTNESS: 0
RHINORRHEA: 0

## 2023-09-05 NOTE — PROGRESS NOTES
Chief Complaint   Patient presents with    Follow-up       1. \"Have you been to the ER, urgent care clinic since your last visit? Hospitalized since your last visit? \"   No  2. \"Have you seen or consulted any other health care providers outside of the [address] [place] [address] since your last visit? \" No     3. For patients aged 43-73: Has the patient had a colonoscopy / FIT/ Cologuard? N/A      If the patient is female:    4. For patients aged 43-66: Has the patient had a mammogram within the past 2 years? N/a      5. For patients aged 21-65: Has the patient had a pap smear?  N/A     Health Maintenance Due   Topic Date Due    Flu vaccine (1) 08/01/2023
control  cardiovascular risk and specific lipid/LDL goals reviewed  reviewed medications and side effects in detail  specific diabetic recommendations: low cholesterol diet, weight control and daily exercise discussed, all medications, side effects and compliance discussed carefully, foot care discussed and Podiatry visits discussed, annual eye examinations at Ophthalmology discussed, and glycohemoglobin and other lab monitoring discussed         I have discussed diagnosis listed in this note with pt and/or family. I have discussed treatment plans and options and the risk/benefit analysis of those options, including safe use of medications and possible medication side effects. Through the use of shared decision making we have agreed to the above plan. The patient has received an after-visit summary and questions were answered concerning future plans and follow up. Advise pt of any urgent changes then to proceed to the ER.             Chad Galan MD

## 2023-09-08 NOTE — TELEPHONE ENCOUNTER
Last appointment: 9/5/23  Next appointment: 12/6/23  Previous refill encounter(s): 2/21/23 #30 with 5 refills    Requested Prescriptions     Pending Prescriptions Disp Refills    clopidogrel (PLAVIX) 75 MG tablet [Pharmacy Med Name: Clopidogrel Bisulfate 75 MG Oral Tablet] 90 tablet 1     Sig: Take 1 tablet by mouth once daily         For Pharmacy Admin Tracking Only    Program: Medication Refill  CPA in place:    Recommendation Provided To:    Intervention Detail: New Rx: 1, reason: Patient Preference  Intervention Accepted By:   Samuel Hammonds Closed?:    Time Spent (min): 5

## 2023-09-11 RX ORDER — CLOPIDOGREL BISULFATE 75 MG/1
TABLET ORAL
Qty: 90 TABLET | Refills: 1 | Status: SHIPPED | OUTPATIENT
Start: 2023-09-11

## 2023-09-12 ENCOUNTER — TELEPHONE (OUTPATIENT)
Age: 88
End: 2023-09-12

## 2023-09-12 NOTE — TELEPHONE ENCOUNTER
Spoke to MsNisa Yessica and she requested the original [facility] [employer] driving form mailed back to her. The form was scanned in the pt chart and mailed back to the pt daughter.

## 2023-10-15 DIAGNOSIS — E11.21 TYPE 2 DIABETES MELLITUS WITH DIABETIC NEPHROPATHY, WITHOUT LONG-TERM CURRENT USE OF INSULIN (HCC): Primary | ICD-10-CM

## 2023-10-17 RX ORDER — BLOOD SUGAR DIAGNOSTIC
STRIP MISCELLANEOUS
Qty: 200 STRIP | Refills: 3 | Status: SHIPPED | OUTPATIENT
Start: 2023-10-17

## 2023-10-17 NOTE — TELEPHONE ENCOUNTER
Last appointment: 9/5/23  Next appointment: 12/6/23  Previous refill encounter(s): 9/14/22 #200 with 3 refills    Requested Prescriptions     Pending Prescriptions Disp Refills    ACCU-CHEK DONTA PLUS strip [Pharmacy Med Name: Accu-Chek Donta Plus In Vitro Strip] 200 strip 3     Sig: Use to check blood sugar twice daily, Dx E11.21         For Pharmacy Admin Tracking Only    Program: Medication Refill  CPA in place:    Recommendation Provided To:    Intervention Detail: New Rx: 1, reason: Patient Preference  Intervention Accepted By:   Jen Calhoun Closed?:    Time Spent (min): 5

## 2023-11-17 ENCOUNTER — OFFICE VISIT (OUTPATIENT)
Age: 88
End: 2023-11-17
Payer: MEDICARE

## 2023-11-17 VITALS
TEMPERATURE: 97.5 F | WEIGHT: 174 LBS | DIASTOLIC BLOOD PRESSURE: 75 MMHG | OXYGEN SATURATION: 98 % | HEIGHT: 62 IN | SYSTOLIC BLOOD PRESSURE: 115 MMHG | RESPIRATION RATE: 16 BRPM | HEART RATE: 76 BPM | BODY MASS INDEX: 32.02 KG/M2

## 2023-11-17 DIAGNOSIS — M54.2 NECK PAIN ON LEFT SIDE: Primary | ICD-10-CM

## 2023-11-17 DIAGNOSIS — M25.512 LEFT SHOULDER PAIN, UNSPECIFIED CHRONICITY: ICD-10-CM
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
## 2023-11-17 DIAGNOSIS — Z91.89 STROKE RISK: ICD-10-CM

## 2023-11-17 DIAGNOSIS — G25.0 ESSENTIAL TREMOR: ICD-10-CM

## 2023-11-17 PROCEDURE — 99215 OFFICE O/P EST HI 40 MIN: CPT

## 2023-11-17 PROCEDURE — 1123F ACP DISCUSS/DSCN MKR DOCD: CPT

## 2023-11-17 ASSESSMENT — ENCOUNTER SYMPTOMS
ALLERGIC/IMMUNOLOGIC NEGATIVE: 1
GASTROINTESTINAL NEGATIVE: 1
RESPIRATORY NEGATIVE: 1
TROUBLE SWALLOWING: 1

## 2023-11-17 ASSESSMENT — PATIENT HEALTH QUESTIONNAIRE - PHQ9
1. LITTLE INTEREST OR PLEASURE IN DOING THINGS: 0
SUM OF ALL RESPONSES TO PHQ9 QUESTIONS 1 & 2: 0
SUM OF ALL RESPONSES TO PHQ QUESTIONS 1-9: 0
2. FEELING DOWN, DEPRESSED OR HOPELESS: 0
SUM OF ALL RESPONSES TO PHQ QUESTIONS 1-9: 0

## 2023-11-17 NOTE — ASSESSMENT & PLAN NOTE
Patient verbalized experiencing neck pain on the left side that radiated to the left shoulder. Pain resolved after using an ointment over-the-counter    It is likely her symptoms are related due to arthritis. Bilateral carotid duplex completed on 1/9/2023 showed normal right ICA. There is intimal thickening present in the left ICA. The right vertebral is antegrade. The left vertebral is antegrade. We will not repeat Doppler at this time but will obtain x-ray of the neck to rule out any other etiology of her symptoms.

## 2023-11-17 NOTE — ASSESSMENT & PLAN NOTE
Stable without recurrence of symptoms     Patient is to continue on Plavix 75 mg and atorvastatin 80 mg daily. Patient is to continue to work to all of his comorbid conditions as managed by PCP and other specialists as appropriate    RECOMMENDATIONS:  - BP goal is less than 140/90  - Goal HbA1c is less than 7.   - LDL less than 70     Stroke education was provided today in regards to risk factors for stroke and lifestyle modifications to help minimize the risk of future stroke. This included medication compliance, regular follow up with primary care physician,  and healthy lifestyle habits (nutrition/exercise).

## 2023-11-17 NOTE — PROGRESS NOTES
SpO2: 98%   Weight: 78.9 kg (174 lb)   Height: 1.575 m (5' 2\")          General appearance: Patient is well-developed and well-nourished in no apparent distress and well groomed. Psych/mental health:  Affect: Appropriate    PHQ       No data to display                HEENT:   Normocephalic  With evidence of trauma: No  Full range of motion head neck: Yes  Tenderness to palpation of the head neck region: No      Cardiovascular:     Extremities warm to touch: Yes  Extremity swelling: No  Discoloration: No  Evidence of PVD: No    Respiratory:   Dyspnea on exertion: No   Abnormal effort on casual observation: No   Use of portable oxygen: No   Evidence of cyanosis: No     Musculoskeletal:   Evidence of significant bone deformities: No   Spinal curvature: No     Integumentary:    Obvious bruising: No   Lacerations or discoloration on casual observation: No       Neurological Examination:   Mental Status:        MMSE       Formal testing was not completed    there was nothing concerning on general observation and discussion. Alert oriented and appropriate to general conversation  Normal processing on general observation  Followed conversation and responded seemingly appropriate throughout the office visit  No word finding difficulties noted on casual observation  Able to follow directions without difficulty     Cranial Nerves:    EOMs intact gaze is conjugate  No nystagmus is appreciated  Facial motor intact bilaterally  Hearing intact to conversation  Voice with normal projection, no evidence of secretion pooling  Shoulder shrug intact bilaterally  No tongue deviation appreciated     Motor:   Normal bulk  No tremor appreciated on today's exam  No abnormal movements appreciated on today's exam  Moves extremities spontaneously and with purpose  5/5 x 4      Sensation: Intact to light touch    Coordination/Cerebellar: Finger-to-nose intact bilateral    Gait: Ambulates using a cane.   Unsteady    Reflexes: Decreased

## 2023-11-17 NOTE — ASSESSMENT & PLAN NOTE
Patient left shoulder pain may likely related due to arthritis. We will obtain an x-ray of the left shoulder to rule out any injury or any degenerative changes. Will not start on any medication at this time since patient has been getting relief from over-the-counter ointment. We will make recommendation based on what x-ray shows. Patient deferred any physical therapy since she had physical therapy from April to August 2023.

## 2023-11-17 NOTE — PATIENT INSTRUCTIONS
As per our discussion,    I encourage you to continue on Plavix 75 mg and atorvastatin 80 mg daily    Continue to work to all of his comorbid conditions as managed by PCP and other specialists as appropriate    RECOMMENDATIONS:  - BP goal is less than 140/90  - Goal HbA1c is less than 7.   - LDL less than 70      I provided stroke education today in regards to risk factors for stroke and lifestyle modifications to help minimize the risk of future stroke. This included medication compliance, regular follow up with primary care physician,  and healthy lifestyle habits (nutrition/exercise)     As for the tremors in your hands, we will continue to monitor you for this. We will not start you on any medication since your symptoms are not bothersome at this time. For your left side of your neck and shoulder pain, will obtain x-ray to see to look for any injury or malformation. Since you have been using a cream over-the-counter which helps with your pain, you can continue to do so. As for the falls, I highly encourage you that you always have an ambulatory device with you either a walker or a rollator to prevent falls. It was a pleasure to see you and your daughter today    I will see you back in 8 months or sooner if needed    Please do not hesitate to reach out for any questions or concerns.

## 2023-11-17 NOTE — ASSESSMENT & PLAN NOTE
At the time of my evaluation today, it was noted patient was unsteady even with the cane    I will recommend patient use a rollator or a walker for ambulation to prevent falls. Physical therapy was discussed with patient but she verbalized had a long period of physical therapy from April to August of 2023 and deferred at this time. Fall safety was extensively explained to patient today. She verbalized understanding and agreed with plan of care.

## 2023-11-29 ENCOUNTER — TELEPHONE (OUTPATIENT)
Age: 88
End: 2023-11-29

## 2023-11-29 NOTE — TELEPHONE ENCOUNTER
----- Message from [address] sent at 11/29/2023 10:44 AM EST -----  Subject: Message to Provider    QUESTIONS  Information for Provider? Vita Gunderson would like to speak to the   p[provider about her mom  license.   ---------------------------------------------------------------------------  --------------  [address] Tulio  [phone]; OK to leave message on voicemail  ---------------------------------------------------------------------------  --------------  SCRIPT ANSWERS  Relationship to Patient?  Self

## 2023-12-06 ENCOUNTER — OFFICE VISIT (OUTPATIENT)
Age: 88
End: 2023-12-06
Payer: MEDICARE

## 2023-12-06 ENCOUNTER — TRANSCRIBE ORDERS (OUTPATIENT)
Facility: HOSPITAL | Age: 88
End: 2023-12-06

## 2023-12-06 ENCOUNTER — HOSPITAL ENCOUNTER (OUTPATIENT)
Facility: HOSPITAL | Age: 88
Discharge: HOME | End: 2023-12-09
Payer: MEDICARE

## 2023-12-06 VITALS
TEMPERATURE: 98 F | SYSTOLIC BLOOD PRESSURE: 139 MMHG | RESPIRATION RATE: 17 BRPM | HEART RATE: 75 BPM | OXYGEN SATURATION: 97 % | WEIGHT: 173.2 LBS | HEIGHT: 62 IN | DIASTOLIC BLOOD PRESSURE: 70 MMHG | BODY MASS INDEX: 31.87 KG/M2

## 2023-12-06 DIAGNOSIS — M25.512 LEFT SHOULDER PAIN, UNSPECIFIED CHRONICITY: ICD-10-CM

## 2023-12-06 DIAGNOSIS — M54.2 NECK PAIN ON LEFT SIDE: Primary | ICD-10-CM

## 2023-12-06 DIAGNOSIS — G81.91 RIGHT HEMIPARESIS (HCC): ICD-10-CM

## 2023-12-06 DIAGNOSIS — E11.21 TYPE 2 DIABETES MELLITUS WITH DIABETIC NEPHROPATHY, WITHOUT LONG-TERM CURRENT USE OF INSULIN (HCC): ICD-10-CM

## 2023-12-06 DIAGNOSIS — Z79.899 ENCOUNTER FOR LONG-TERM (CURRENT) USE OF MEDICATIONS: ICD-10-CM

## 2023-12-06 DIAGNOSIS — M15.9 PRIMARY OSTEOARTHRITIS INVOLVING MULTIPLE JOINTS: ICD-10-CM

## 2023-12-06 DIAGNOSIS — T23.221S: ICD-10-CM

## 2023-12-06 DIAGNOSIS — Z00.00 MEDICARE ANNUAL WELLNESS VISIT, SUBSEQUENT: Primary | ICD-10-CM

## 2023-12-06 DIAGNOSIS — E78.2 MIXED HYPERLIPIDEMIA: ICD-10-CM

## 2023-12-06 DIAGNOSIS — M54.2 NECK PAIN ON LEFT SIDE: ICD-10-CM

## 2023-12-06 DIAGNOSIS — Z91.81 AT HIGH RISK FOR FALLS: ICD-10-CM

## 2023-12-06 DIAGNOSIS — I10 ESSENTIAL (PRIMARY) HYPERTENSION: ICD-10-CM

## 2023-12-06 DIAGNOSIS — Z86.73 HISTORY OF CARDIOEMBOLIC CEREBROVASCULAR ACCIDENT (CVA): ICD-10-CM

## 2023-12-06 PROBLEM — G25.0 ESSENTIAL TREMOR: Status: ACTIVE | Noted: 2023-04-19

## 2023-12-06 PROBLEM — E78.5 HYPERLIPIDEMIA, UNSPECIFIED: Status: ACTIVE | Noted: 2023-01-18

## 2023-12-06 PROBLEM — I63.9 CEREBROVASCULAR ACCIDENT (HCC): Status: ACTIVE | Noted: 2023-06-02

## 2023-12-06 PROBLEM — F01.50 MIXED DEMENTIA (HCC): Status: ACTIVE | Noted: 2022-05-18

## 2023-12-06 PROBLEM — R26.2 DIFFICULTY IN WALKING, NOT ELSEWHERE CLASSIFIED: Status: ACTIVE | Noted: 2023-01-17

## 2023-12-06 PROBLEM — E46 PROTEIN-CALORIE MALNUTRITION (HCC): Status: ACTIVE | Noted: 2023-01-17

## 2023-12-06 PROBLEM — R27.8 OTHER LACK OF COORDINATION: Status: ACTIVE | Noted: 2023-01-17

## 2023-12-06 PROBLEM — M62.81 MUSCLE WEAKNESS (GENERALIZED): Status: ACTIVE | Noted: 2023-01-17

## 2023-12-06 PROBLEM — I69.114: Status: ACTIVE | Noted: 2023-01-18

## 2023-12-06 PROBLEM — F02.80 MIXED DEMENTIA (HCC): Status: ACTIVE | Noted: 2022-05-18

## 2023-12-06 PROBLEM — R00.2 PALPITATIONS: Status: ACTIVE | Noted: 2022-05-18

## 2023-12-06 PROBLEM — E11.22 TYPE 2 DIABETES MELLITUS WITH CHRONIC KIDNEY DISEASE (HCC): Status: ACTIVE | Noted: 2022-11-15

## 2023-12-06 PROBLEM — E78.5 DYSLIPIDEMIA: Status: ACTIVE | Noted: 2023-06-02

## 2023-12-06 PROBLEM — G30.9 MIXED DEMENTIA (HCC): Status: ACTIVE | Noted: 2022-05-18

## 2023-12-06 PROBLEM — I63.9 CVA (CEREBRAL VASCULAR ACCIDENT) (HCC): Status: ACTIVE | Noted: 2023-01-07

## 2023-12-06 PROBLEM — I63.9 CEREBRAL INFARCTION, UNSPECIFIED (HCC): Status: ACTIVE | Noted: 2023-01-18

## 2023-12-06 LAB
GLUCOSE, POC: 98 MG/DL
HBA1C MFR BLD: 7.6 %

## 2023-12-06 PROCEDURE — PBSHW AMB POC GLUCOSE BLOOD, BY GLUCOSE MONITORING DEVICE: Performed by: FAMILY MEDICINE

## 2023-12-06 PROCEDURE — 99214 OFFICE O/P EST MOD 30 MIN: CPT | Performed by: FAMILY MEDICINE

## 2023-12-06 PROCEDURE — 82962 GLUCOSE BLOOD TEST: CPT | Performed by: FAMILY MEDICINE

## 2023-12-06 PROCEDURE — 73030 X-RAY EXAM OF SHOULDER: CPT

## 2023-12-06 PROCEDURE — G0439 PPPS, SUBSEQ VISIT: HCPCS | Performed by: FAMILY MEDICINE

## 2023-12-06 PROCEDURE — 90715 TDAP VACCINE 7 YRS/> IM: CPT | Performed by: FAMILY MEDICINE

## 2023-12-06 PROCEDURE — 83036 HEMOGLOBIN GLYCOSYLATED A1C: CPT | Performed by: FAMILY MEDICINE

## 2023-12-06 PROCEDURE — PBSHW TDAP, ADACEL, (AGE 10Y-64Y), IM: Performed by: FAMILY MEDICINE

## 2023-12-06 PROCEDURE — 70360 X-RAY EXAM OF NECK: CPT

## 2023-12-06 PROCEDURE — PBSHW AMB POC HEMOGLOBIN A1C: Performed by: FAMILY MEDICINE

## 2023-12-06 PROCEDURE — 3044F HG A1C LEVEL LT 7.0%: CPT | Performed by: FAMILY MEDICINE

## 2023-12-06 PROCEDURE — 1123F ACP DISCUSS/DSCN MKR DOCD: CPT | Performed by: FAMILY MEDICINE

## 2023-12-06 SDOH — ECONOMIC STABILITY: INCOME INSECURITY: HOW HARD IS IT FOR YOU TO PAY FOR THE VERY BASICS LIKE FOOD, HOUSING, MEDICAL CARE, AND HEATING?: NOT HARD AT ALL

## 2023-12-06 SDOH — ECONOMIC STABILITY: FOOD INSECURITY: WITHIN THE PAST 12 MONTHS, YOU WORRIED THAT YOUR FOOD WOULD RUN OUT BEFORE YOU GOT MONEY TO BUY MORE.: NEVER TRUE

## 2023-12-06 SDOH — ECONOMIC STABILITY: FOOD INSECURITY: WITHIN THE PAST 12 MONTHS, THE FOOD YOU BOUGHT JUST DIDN'T LAST AND YOU DIDN'T HAVE MONEY TO GET MORE.: NEVER TRUE

## 2023-12-06 ASSESSMENT — ENCOUNTER SYMPTOMS
RHINORRHEA: 0
CONSTIPATION: 0
COUGH: 0
SHORTNESS OF BREATH: 0
CHEST TIGHTNESS: 0
ABDOMINAL PAIN: 0
BLOOD IN STOOL: 0

## 2023-12-06 ASSESSMENT — ANXIETY QUESTIONNAIRES
4. TROUBLE RELAXING: 0
3. WORRYING TOO MUCH ABOUT DIFFERENT THINGS: 0
6. BECOMING EASILY ANNOYED OR IRRITABLE: 0
1. FEELING NERVOUS, ANXIOUS, OR ON EDGE: 0
7. FEELING AFRAID AS IF SOMETHING AWFUL MIGHT HAPPEN: 0
5. BEING SO RESTLESS THAT IT IS HARD TO SIT STILL: 0
GAD7 TOTAL SCORE: 0
7. FEELING AFRAID AS IF SOMETHING AWFUL MIGHT HAPPEN: 0
IF YOU CHECKED OFF ANY PROBLEMS ON THIS QUESTIONNAIRE, HOW DIFFICULT HAVE THESE PROBLEMS MADE IT FOR YOU TO DO YOUR WORK, TAKE CARE OF THINGS AT HOME, OR GET ALONG WITH OTHER PEOPLE: NOT DIFFICULT AT ALL
GAD7 TOTAL SCORE: 0
2. NOT BEING ABLE TO STOP OR CONTROL WORRYING: 0
5. BEING SO RESTLESS THAT IT IS HARD TO SIT STILL: 0
4. TROUBLE RELAXING: 0
IF YOU CHECKED OFF ANY PROBLEMS ON THIS QUESTIONNAIRE, HOW DIFFICULT HAVE THESE PROBLEMS MADE IT FOR YOU TO DO YOUR WORK, TAKE CARE OF THINGS AT HOME, OR GET ALONG WITH OTHER PEOPLE: NOT DIFFICULT AT ALL
6. BECOMING EASILY ANNOYED OR IRRITABLE: 0
GAD7 TOTAL SCORE: 0
1. FEELING NERVOUS, ANXIOUS, OR ON EDGE: 0
2. NOT BEING ABLE TO STOP OR CONTROL WORRYING: 0
3. WORRYING TOO MUCH ABOUT DIFFERENT THINGS: 0

## 2023-12-06 ASSESSMENT — PATIENT HEALTH QUESTIONNAIRE - PHQ9
1. LITTLE INTEREST OR PLEASURE IN DOING THINGS: 0
SUM OF ALL RESPONSES TO PHQ QUESTIONS 1-9: 0
2. FEELING DOWN, DEPRESSED OR HOPELESS: 0
SUM OF ALL RESPONSES TO PHQ9 QUESTIONS 1 & 2: 0
SUM OF ALL RESPONSES TO PHQ QUESTIONS 1-9: 0
2. FEELING DOWN, DEPRESSED OR HOPELESS: 0
SUM OF ALL RESPONSES TO PHQ QUESTIONS 1-9: 0
1. LITTLE INTEREST OR PLEASURE IN DOING THINGS: 0
SUM OF ALL RESPONSES TO PHQ QUESTIONS 1-9: 0
SUM OF ALL RESPONSES TO PHQ9 QUESTIONS 1 & 2: 0
SUM OF ALL RESPONSES TO PHQ QUESTIONS 1-9: 0

## 2023-12-06 ASSESSMENT — COLUMBIA-SUICIDE SEVERITY RATING SCALE - C-SSRS
4. HAVE YOU HAD THESE THOUGHTS AND HAD SOME INTENTION OF ACTING ON THEM?: NO
5. HAVE YOU STARTED TO WORK OUT OR WORKED OUT THE DETAILS OF HOW TO KILL YOURSELF? DO YOU INTEND TO CARRY OUT THIS PLAN?: NO
3. HAVE YOU BEEN THINKING ABOUT HOW YOU MIGHT KILL YOURSELF?: NO
7. DID THIS OCCUR IN THE LAST THREE MONTHS: NO

## 2023-12-07 LAB
ALBUMIN SERPL-MCNC: 3.3 G/DL (ref 3.5–5)
ALBUMIN/GLOB SERPL: 0.9 (ref 1.1–2.2)
ALP SERPL-CCNC: 135 U/L (ref 45–117)
ALT SERPL-CCNC: 34 U/L (ref 12–78)
ANION GAP SERPL CALC-SCNC: 4 MMOL/L (ref 5–15)
AST SERPL-CCNC: 23 U/L (ref 15–37)
BILIRUB SERPL-MCNC: 0.4 MG/DL (ref 0.2–1)
BUN SERPL-MCNC: 25 MG/DL (ref 6–20)
BUN/CREAT SERPL: 20 (ref 12–20)
CALCIUM SERPL-MCNC: 8.8 MG/DL (ref 8.5–10.1)
CHLORIDE SERPL-SCNC: 115 MMOL/L (ref 97–108)
CHOLEST SERPL-MCNC: 132 MG/DL
CO2 SERPL-SCNC: 26 MMOL/L (ref 21–32)
CREAT SERPL-MCNC: 1.22 MG/DL (ref 0.55–1.02)
ERYTHROCYTE [DISTWIDTH] IN BLOOD BY AUTOMATED COUNT: 14.1 % (ref 11.5–14.5)
GLOBULIN SER CALC-MCNC: 3.7 G/DL (ref 2–4)
GLUCOSE SERPL-MCNC: 141 MG/DL (ref 65–100)
HCT VFR BLD AUTO: 35.7 % (ref 35–47)
HDLC SERPL-MCNC: 52 MG/DL
HDLC SERPL: 2.5 (ref 0–5)
HGB BLD-MCNC: 11.8 G/DL (ref 11.5–16)
LDLC SERPL CALC-MCNC: 62.8 MG/DL (ref 0–100)
MCH RBC QN AUTO: 27.5 PG (ref 26–34)
MCHC RBC AUTO-ENTMCNC: 33.1 G/DL (ref 30–36.5)
MCV RBC AUTO: 83.2 FL (ref 80–99)
NRBC # BLD: 0 K/UL (ref 0–0.01)
NRBC BLD-RTO: 0 PER 100 WBC
PLATELET # BLD AUTO: 236 K/UL (ref 150–400)
PMV BLD AUTO: 10.7 FL (ref 8.9–12.9)
POTASSIUM SERPL-SCNC: 4.1 MMOL/L (ref 3.5–5.1)
PROT SERPL-MCNC: 7 G/DL (ref 6.4–8.2)
RBC # BLD AUTO: 4.29 M/UL (ref 3.8–5.2)
SODIUM SERPL-SCNC: 145 MMOL/L (ref 136–145)
TRIGL SERPL-MCNC: 86 MG/DL
VLDLC SERPL CALC-MCNC: 17.2 MG/DL
WBC # BLD AUTO: 6.9 K/UL (ref 3.6–11)

## 2023-12-07 NOTE — RESULT ENCOUNTER NOTE
X-ray of the neck also showed arthritis but worse when compared to previous study in 2016. There was no injury or fracture. No intervention at this time since your symptoms resolved after using the ointment over-the-counter. Will continue to monitor you for this. If your symptoms worsen, may consider refer you to orthopedics for evaluation and management.     Thank you

## 2023-12-07 NOTE — RESULT ENCOUNTER NOTE
Ms. Jose Luis Sandra of the left shoulder showed arthritis. There was no injury or fracture. No intervention at this time since your symptoms resolved after using the ointment over-the-counter. Will continue to monitor you for this. If your symptoms worsen, may consider refer you to orthopedics for evaluation and management.

## 2023-12-08 ENCOUNTER — TELEPHONE (OUTPATIENT)
Age: 88
End: 2023-12-08

## 2023-12-08 ENCOUNTER — TELEPHONE (OUTPATIENT)
Dept: PHARMACY | Facility: CLINIC | Age: 88
End: 2023-12-08

## 2023-12-08 NOTE — TELEPHONE ENCOUNTER
----- Message from Ahsan Manjarrez, [address] [place] [address] - NP sent at 12/7/2023  1:21 PM EST -----  X-ray of the neck also showed arthritis but worse when compared to previous study in 2016. There was no injury or fracture. No intervention at this time since your symptoms resolved after using the ointment over-the-counter. Will continue to monitor you for this. If your symptoms worsen, may consider refer you to orthopedics for evaluation and management. Thank you           Message  Received: GREG Carrasco - ABDULLAHI Gordon LPN  Ms. Enmanuel Sutton of the left shoulder showed arthritis. There was no injury or fracture. No intervention at this time since your symptoms resolved after using the ointment over-the-counter. Will continue to monitor you for this. If your symptoms worsen, may consider refer you to orthopedics for evaluation and management. Called pt ,verified pt with two pt identifiers,  relayed both messages above to pt word for word. Pt verbalized understanding of x-ray results and had no further questions.

## 2023-12-08 NOTE — TELEPHONE ENCOUNTER
Pharmacy: 1  Intervention Detail: Adherence Monitorin  Intervention Accepted By: Pharmacy: 1  Gap Closed?: No   Time Spent (min): 20

## 2023-12-08 NOTE — TELEPHONE ENCOUNTER
Glucose monitor sent to Dr. Santana Sosa for approval.  Pt daughter requested Accu Chek glucose monitor.

## 2023-12-08 NOTE — TELEPHONE ENCOUNTER
Patients daughter Tanvi Sosa is calling regarding her mother's Glucose machine, she said it stopped working and needs to get another one called in.   Please give the daughter a call @ [phone]

## 2023-12-12 ENCOUNTER — CLINICAL DOCUMENTATION (OUTPATIENT)
Age: 88
End: 2023-12-12

## 2023-12-12 NOTE — PROGRESS NOTES
Rx for Accu Check Glucose Monitor and Supplies faxed to [address] [place] [address],[address] [facility] today at [phone] today.

## 2023-12-14 ENCOUNTER — TELEPHONE (OUTPATIENT)
Age: 88
End: 2023-12-14

## 2023-12-14 ENCOUNTER — CLINICAL DOCUMENTATION (OUTPATIENT)
Age: 88
End: 2023-12-14

## 2023-12-14 NOTE — PROGRESS NOTES
[employer] and they stated they did not receive the Rx on 12/8/23. I faxed it again 5 times today to make sure they received it. Pt daughter Audelia Villagomez called and made aware.

## 2023-12-14 NOTE — TELEPHONE ENCOUNTER
Patient daughter Nieves Bautista states that her mother glucose monitor machine has stop working she wants to know has Gabe Finders put in a RX for her to get another one please give her a call @ [phone]   -------------------------------------------------------------------------------------------------------------------------    Spoke with pt's daughter Nieves Bautista, stated she called pharmacy which says they did not receive order for new glucose monitoring kit. I have re-sent request to Dr. Mikey Michele, patients pharmacy has been verified [facility] [address]

## 2023-12-14 NOTE — TELEPHONE ENCOUNTER
Patient daughter Breanna Rosario states that her mother glucose monitor machine has stop working she wants to know has Marcos Hobson put in a RX for her to get another one please give her a call @ [phone]

## 2023-12-14 NOTE — TELEPHONE ENCOUNTER
Angie ( pharmacist ) with [facility] states that the script that was sent over today it has void all over it she can not read it her contact number is

## 2023-12-14 NOTE — TELEPHONE ENCOUNTER
Called and spoke to the pharmacy about the script paper to let her the know the script is not voided. I did a verbal with the script and it was read to the pharmacist.  She stated the pt could not get the supplies that were on indicated on the script unless the provider sent in a script for each supply separately. I told her the pt daughter just requested the Accu Chek glucose monitor that was on the script that was faxed over. Ms. Breanna Rosario was called and made aware.
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
## 2023-12-20 ENCOUNTER — TELEPHONE (OUTPATIENT)
Age: 88
End: 2023-12-20

## 2023-12-20 NOTE — TELEPHONE ENCOUNTER
Patient daughter Yessica would like a call from [facility] regarding the machine she can be reached @ [phone]

## 2023-12-20 NOTE — TELEPHONE ENCOUNTER
Spoke to Ms. Juan and she sated she did get the pt monitor and supplies that were ordered by Dr. aGitan.  She will give me a call back to let me know if the test strips the pt already has will fit.

## 2024-01-11 DIAGNOSIS — E11.21 TYPE 2 DIABETES MELLITUS WITH DIABETIC NEPHROPATHY, WITHOUT LONG-TERM CURRENT USE OF INSULIN (HCC): ICD-10-CM

## 2024-01-11 RX ORDER — GLYBURIDE 5 MG/1
2.5 TABLET ORAL
Qty: 50 TABLET | Refills: 3 | Status: SHIPPED | OUTPATIENT
Start: 2024-01-11

## 2024-01-11 NOTE — TELEPHONE ENCOUNTER
Current chart dose shows 2.5mg/day. Rx has been pended with updated instructions. Please sign if appropriate.    Pharmacy is requesting a 100 d/s with refills enough to last a year.    Last appointment: 12/6/23  Next appointment: 4/8/24  Previous refill encounter(s): 12/12/22    Requested Prescriptions     Pending Prescriptions Disp Refills    glyBURIDE (DIABETA) 5 MG tablet [Pharmacy Med Name: GLYBURIDE  5MG  TAB] 50 tablet 3     Sig: Take 0.5 tablets by mouth daily (with breakfast)         For Pharmacy Admin Tracking Only    Program: Medication Refill  CPA in place:    Recommendation Provided To:   Intervention Detail: New Rx: 1, reason: Patient Preference  Intervention Accepted By:   Gap Closed?:    Time Spent (min): 5

## 2024-02-08 RX ORDER — ATORVASTATIN CALCIUM 80 MG/1
80 TABLET, FILM COATED ORAL NIGHTLY
Qty: 90 TABLET | Refills: 3 | Status: SHIPPED | OUTPATIENT
Start: 2024-02-08

## 2024-02-08 NOTE — TELEPHONE ENCOUNTER
Last appointment: 12/6/23  Next appointment: 4/8/24  Previous refill encounter(s): 6/15/23 #30 with 5 refills    Requested Prescriptions     Pending Prescriptions Disp Refills    atorvastatin (LIPITOR) 80 MG tablet [Pharmacy Med Name: Atorvastatin Calcium 80 MG Oral Tablet] 90 tablet 3     Sig: TAKE 1 TABLET BY MOUTH ONCE DAILY AT BEDTIME         For Pharmacy Admin Tracking Only    Program: Medication Refill  CPA in place:    Recommendation Provided To:   Intervention Detail: New Rx: 1, reason: Patient Preference  Intervention Accepted By:   Gap Closed?:    Time Spent (min): 5

## 2024-03-11 RX ORDER — CLOPIDOGREL BISULFATE 75 MG/1
TABLET ORAL
Qty: 90 TABLET | Refills: 1 | Status: SHIPPED | OUTPATIENT
Start: 2024-03-11

## 2024-03-11 NOTE — TELEPHONE ENCOUNTER
Last appointment: 12/6/23  Next appointment: 4/8/24  Previous refill encounter(s): 9/11/23 #90 with 1 refill    Requested Prescriptions     Pending Prescriptions Disp Refills    clopidogrel (PLAVIX) 75 MG tablet [Pharmacy Med Name: Clopidogrel Bisulfate 75 MG Oral Tablet] 90 tablet 1     Sig: Take 1 tablet by mouth once daily         For Pharmacy Admin Tracking Only    Program: Medication Refill  CPA in place:    Recommendation Provided To:   Intervention Detail: New Rx: 1, reason: Patient Preference  Intervention Accepted By:   Gap Closed?:    Time Spent (min): 5

## 2024-04-08 ENCOUNTER — OFFICE VISIT (OUTPATIENT)
Age: 89
End: 2024-04-08
Payer: MEDICARE

## 2024-04-08 VITALS
HEART RATE: 69 BPM | SYSTOLIC BLOOD PRESSURE: 134 MMHG | RESPIRATION RATE: 16 BRPM | WEIGHT: 175.2 LBS | TEMPERATURE: 98.4 F | DIASTOLIC BLOOD PRESSURE: 64 MMHG | HEIGHT: 62 IN | BODY MASS INDEX: 32.24 KG/M2 | OXYGEN SATURATION: 100 %

## 2024-04-08 DIAGNOSIS — G81.91 RIGHT HEMIPARESIS (HCC): ICD-10-CM

## 2024-04-08 DIAGNOSIS — E78.2 MIXED HYPERLIPIDEMIA: ICD-10-CM

## 2024-04-08 DIAGNOSIS — I10 ESSENTIAL (PRIMARY) HYPERTENSION: Primary | ICD-10-CM

## 2024-04-08 DIAGNOSIS — Z79.899 ENCOUNTER FOR LONG-TERM (CURRENT) USE OF MEDICATIONS: ICD-10-CM

## 2024-04-08 DIAGNOSIS — Z86.73 HISTORY OF CARDIOEMBOLIC CEREBROVASCULAR ACCIDENT (CVA): ICD-10-CM

## 2024-04-08 DIAGNOSIS — E11.21 TYPE 2 DIABETES MELLITUS WITH DIABETIC NEPHROPATHY, WITHOUT LONG-TERM CURRENT USE OF INSULIN (HCC): ICD-10-CM

## 2024-04-08 DIAGNOSIS — M15.9 PRIMARY OSTEOARTHRITIS INVOLVING MULTIPLE JOINTS: ICD-10-CM

## 2024-04-08 LAB
ALBUMIN SERPL-MCNC: 3.5 G/DL (ref 3.5–5)
ALBUMIN/GLOB SERPL: 1 (ref 1.1–2.2)
ALP SERPL-CCNC: 149 U/L (ref 45–117)
ALT SERPL-CCNC: 37 U/L (ref 12–78)
ANION GAP SERPL CALC-SCNC: 1 MMOL/L (ref 5–15)
AST SERPL-CCNC: 20 U/L (ref 15–37)
BILIRUB SERPL-MCNC: 0.7 MG/DL (ref 0.2–1)
BUN SERPL-MCNC: 22 MG/DL (ref 6–20)
BUN/CREAT SERPL: 18 (ref 12–20)
CALCIUM SERPL-MCNC: 9.4 MG/DL (ref 8.5–10.1)
CHLORIDE SERPL-SCNC: 111 MMOL/L (ref 97–108)
CHOLEST SERPL-MCNC: 134 MG/DL
CO2 SERPL-SCNC: 30 MMOL/L (ref 21–32)
CREAT SERPL-MCNC: 1.23 MG/DL (ref 0.55–1.02)
GLOBULIN SER CALC-MCNC: 3.5 G/DL (ref 2–4)
GLUCOSE SERPL-MCNC: 137 MG/DL (ref 65–100)
GLUCOSE, POC: 160 MG/DL
HBA1C MFR BLD: 8.1 %
HDLC SERPL-MCNC: 55 MG/DL
HDLC SERPL: 2.4 (ref 0–5)
LDLC SERPL CALC-MCNC: 61.2 MG/DL (ref 0–100)
POTASSIUM SERPL-SCNC: 4.4 MMOL/L (ref 3.5–5.1)
PROT SERPL-MCNC: 7 G/DL (ref 6.4–8.2)
SODIUM SERPL-SCNC: 142 MMOL/L (ref 136–145)
TRIGL SERPL-MCNC: 89 MG/DL
VLDLC SERPL CALC-MCNC: 17.8 MG/DL

## 2024-04-08 PROCEDURE — 91320 SARSCV2 VAC 30MCG TRS-SUC IM: CPT | Performed by: FAMILY MEDICINE

## 2024-04-08 PROCEDURE — 99214 OFFICE O/P EST MOD 30 MIN: CPT | Performed by: FAMILY MEDICINE

## 2024-04-08 PROCEDURE — 82962 GLUCOSE BLOOD TEST: CPT | Performed by: FAMILY MEDICINE

## 2024-04-08 PROCEDURE — 83036 HEMOGLOBIN GLYCOSYLATED A1C: CPT | Performed by: FAMILY MEDICINE

## 2024-04-08 RX ORDER — FUROSEMIDE 20 MG/1
10 TABLET ORAL DAILY
Qty: 90 TABLET | Refills: 3
Start: 2024-04-08

## 2024-04-08 RX ORDER — METOPROLOL SUCCINATE 25 MG/1
12.5 TABLET, EXTENDED RELEASE ORAL DAILY
Qty: 30 TABLET | Refills: 0
Start: 2024-04-08

## 2024-04-08 RX ORDER — FUROSEMIDE 20 MG/1
20 TABLET ORAL DAILY
Qty: 100 TABLET | Refills: 3 | OUTPATIENT
Start: 2024-04-08

## 2024-04-08 ASSESSMENT — PATIENT HEALTH QUESTIONNAIRE - PHQ9
2. FEELING DOWN, DEPRESSED OR HOPELESS: NOT AT ALL
SUM OF ALL RESPONSES TO PHQ QUESTIONS 1-9: 0
1. LITTLE INTEREST OR PLEASURE IN DOING THINGS: NOT AT ALL
SUM OF ALL RESPONSES TO PHQ QUESTIONS 1-9: 0
SUM OF ALL RESPONSES TO PHQ QUESTIONS 1-9: 0
SUM OF ALL RESPONSES TO PHQ9 QUESTIONS 1 & 2: 0
SUM OF ALL RESPONSES TO PHQ QUESTIONS 1-9: 0

## 2024-04-08 ASSESSMENT — ENCOUNTER SYMPTOMS
ABDOMINAL PAIN: 0
CHEST TIGHTNESS: 0
COUGH: 0
CONSTIPATION: 0
SHORTNESS OF BREATH: 0
RHINORRHEA: 0

## 2024-04-08 NOTE — PROGRESS NOTES
Subjective:      Patient ID: Kelsey Tucker is a 89 y.o. female.    HPI  Follow up on chronic medical problems.  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  Has residual right very mild hemiparesis.  She is now on a walker.  Speech has return to her baseline.  Her dt reports that she sometimes does have issues with her memory.    TTE with no shunt, did not comment on thrombus. EF normal.  Plavix per Neurology, has ASA allergy.  Pt was transferred from hospital to SNF for further rehab and d/c home with her dtg on 2/6/2023.    She has now transitioned back to her own home on 9/11/2023 and has assistance coming in to help.       Spirits are good.  Independent with her ADLs.  Cooking her meals.  Appetite is good.  Sleeping well on most night.    HTN follow up:  Compliant w/ meds, low salt diet, and exercise.  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  On low dose beta blocker.  No further palpitations.    Home bp monitoring 120-130s/70s.  Pt has bp log.  Swelling is overall improved with her compression stocking.  No headache or dizziness.  No chest pain and SOB at her usual baseline.  Had cardiac eval and overall was stable.  Has been compliant with her medications.      DM type II follow up:  Compliant w/ meds, diabetic diet.  She has been better with watching diet.  Obtains home glucose monitoring averaging 100-130s.  Checks BS daily on most days and prn.  Pt does have BS log at visit today.  No low BS.    Denies any tingling sensation, polyuria and polydipsia.  No blurred vision.  No significant weight changes.  She is on glyburide d/t intolerance to glimepiride and glipizide.  She is not able to afford other options.  She has been very stable on her current regimen.     Hypercholesterolemia follow up:  Compliant low fat, low cholesterol diet.  Tolerating lipitor.

## 2024-04-08 NOTE — TELEPHONE ENCOUNTER
Pharmacy is requesting a 100 d/s with refills enough to last a year.    Last appointment: today  Next appointment: none    Requested Prescriptions     Pending Prescriptions Disp Refills    furosemide (LASIX) 20 MG tablet [Pharmacy Med Name: Furosemide 20 MG Oral Tablet] 100 tablet 3     Sig: TAKE 1 TABLET BY MOUTH DAILY         For Pharmacy Admin Tracking Only    Program: Medication Refill  CPA in place:    Recommendation Provided To:   Intervention Detail: New Rx: 1, reason: Patient Preference  Intervention Accepted By:   Gap Closed?:    Time Spent (min): 5

## 2024-04-08 NOTE — PROGRESS NOTES
Chief Complaint   Patient presents with    Follow-up     Patient is here today for a 4 month follow-up.       \"Have you been to the ER, urgent care clinic since your last visit?  Hospitalized since your last visit?\"    NO    “Have you seen or consulted any other health care providers outside of [facility] since your last visit?”    NO       Per orders of Dr. Ford, injection of Covid-19 vaccine was given in the Left deltoid . Patient tolerated it well. Patient instructed to report any adverse reaction to me immediately.      Vitals:    24 0923   BP: 134/64   Pulse: 69   Resp: 16   Temp: 98.4 °F (36.9 °C)   SpO2: 100%       Health Maintenance Due   Topic Date Due    Respiratory Syncytial Virus (RSV) Pregnant or age 60 yrs+ (1 - 1-dose 60+ series) Never done    Annual Wellness Visit (Medicare Advantage)  2024        The patient, Kelsey Tucker, identity was verified by name and .

## 2024-06-19 DIAGNOSIS — I10 ESSENTIAL (PRIMARY) HYPERTENSION: ICD-10-CM

## 2024-06-19 RX ORDER — FUROSEMIDE 20 MG/1
10 TABLET ORAL DAILY
Qty: 45 TABLET | Refills: 3 | Status: SHIPPED | OUTPATIENT
Start: 2024-06-19

## 2024-06-19 NOTE — TELEPHONE ENCOUNTER
Rx pended with updated dose of 10mg/day as per 4/8/24.    Last appointment: 4/8/24  Next appointment: 8/13/24  Previous refill encounter(s): 8/9/23    Requested Prescriptions     Pending Prescriptions Disp Refills    furosemide (LASIX) 20 MG tablet [Pharmacy Med Name: Furosemide 20 MG Oral Tablet] 45 tablet 3     Sig: Take 0.5 tablets by mouth daily         For Pharmacy Admin Tracking Only    Program: Medication Refill  CPA in place:    Recommendation Provided To:   Intervention Detail: New Rx: 1, reason: Patient Preference  Intervention Accepted By:   Gap Closed?:    Time Spent (min): 5

## 2024-08-13 ENCOUNTER — ANCILLARY PROCEDURE (OUTPATIENT)
Age: 89
End: 2024-08-13
Payer: MEDICARE

## 2024-08-13 ENCOUNTER — OFFICE VISIT (OUTPATIENT)
Age: 89
End: 2024-08-13
Payer: MEDICARE

## 2024-08-13 VITALS
BODY MASS INDEX: 32.89 KG/M2 | DIASTOLIC BLOOD PRESSURE: 65 MMHG | HEART RATE: 78 BPM | SYSTOLIC BLOOD PRESSURE: 132 MMHG | OXYGEN SATURATION: 99 % | TEMPERATURE: 98 F | RESPIRATION RATE: 20 BRPM | WEIGHT: 174.2 LBS | HEIGHT: 61 IN

## 2024-08-13 DIAGNOSIS — M25.512 ACUTE PAIN OF LEFT SHOULDER: ICD-10-CM

## 2024-08-13 DIAGNOSIS — Z79.899 ENCOUNTER FOR LONG-TERM (CURRENT) USE OF MEDICATIONS: ICD-10-CM

## 2024-08-13 DIAGNOSIS — Z00.00 MEDICARE ANNUAL WELLNESS VISIT, SUBSEQUENT: Primary | ICD-10-CM
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
## 2024-08-13 DIAGNOSIS — I10 ESSENTIAL (PRIMARY) HYPERTENSION: ICD-10-CM

## 2024-08-13 DIAGNOSIS — E11.21 TYPE 2 DIABETES MELLITUS WITH DIABETIC NEPHROPATHY, WITHOUT LONG-TERM CURRENT USE OF INSULIN (HCC): ICD-10-CM

## 2024-08-13 DIAGNOSIS — G81.91 RIGHT HEMIPARESIS (HCC): ICD-10-CM

## 2024-08-13 DIAGNOSIS — Z86.73 HISTORY OF CARDIOEMBOLIC CEREBROVASCULAR ACCIDENT (CVA): ICD-10-CM

## 2024-08-13 DIAGNOSIS — M15.9 PRIMARY OSTEOARTHRITIS INVOLVING MULTIPLE JOINTS: ICD-10-CM
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
## 2024-08-13 DIAGNOSIS — E78.2 MIXED HYPERLIPIDEMIA: ICD-10-CM

## 2024-08-13 PROBLEM — F01.50 MIXED DEMENTIA (HCC): Status: RESOLVED | Noted: 2022-05-18 | Resolved: 2024-08-13

## 2024-08-13 PROBLEM — F02.80 MIXED DEMENTIA (HCC): Status: RESOLVED | Noted: 2022-05-18 | Resolved: 2024-08-13

## 2024-08-13 PROBLEM — G30.9 MIXED DEMENTIA (HCC): Status: RESOLVED | Noted: 2022-05-18 | Resolved: 2024-08-13

## 2024-08-13 PROBLEM — E46 PROTEIN-CALORIE MALNUTRITION (HCC): Status: RESOLVED | Noted: 2023-01-17 | Resolved: 2024-08-13

## 2024-08-13 LAB
GLUCOSE, POC: 107 MG/DL
HBA1C MFR BLD: NORMAL %

## 2024-08-13 PROCEDURE — 82962 GLUCOSE BLOOD TEST: CPT | Performed by: FAMILY MEDICINE

## 2024-08-13 PROCEDURE — 1090F PRES/ABSN URINE INCON ASSESS: CPT | Performed by: FAMILY MEDICINE

## 2024-08-13 PROCEDURE — PBSHW AMB POC HEMOGLOBIN A1C: Performed by: FAMILY MEDICINE

## 2024-08-13 PROCEDURE — 73030 X-RAY EXAM OF SHOULDER: CPT | Performed by: FAMILY MEDICINE

## 2024-08-13 PROCEDURE — 1123F ACP DISCUSS/DSCN MKR DOCD: CPT | Performed by: FAMILY MEDICINE

## 2024-08-13 PROCEDURE — G8417 CALC BMI ABV UP PARAM F/U: HCPCS | Performed by: FAMILY MEDICINE

## 2024-08-13 PROCEDURE — G8427 DOCREV CUR MEDS BY ELIG CLIN: HCPCS | Performed by: FAMILY MEDICINE

## 2024-08-13 PROCEDURE — 1036F TOBACCO NON-USER: CPT | Performed by: FAMILY MEDICINE

## 2024-08-13 PROCEDURE — PBSHW AMB POC GLUCOSE BLOOD, BY GLUCOSE MONITORING DEVICE: Performed by: FAMILY MEDICINE

## 2024-08-13 PROCEDURE — 99214 OFFICE O/P EST MOD 30 MIN: CPT | Performed by: FAMILY MEDICINE

## 2024-08-13 PROCEDURE — G0439 PPPS, SUBSEQ VISIT: HCPCS | Performed by: FAMILY MEDICINE

## 2024-08-13 PROCEDURE — 83036 HEMOGLOBIN GLYCOSYLATED A1C: CPT | Performed by: FAMILY MEDICINE

## 2024-08-13 RX ORDER — ATORVASTATIN CALCIUM 80 MG/1
80 TABLET, FILM COATED ORAL NIGHTLY
Qty: 90 TABLET | Refills: 3 | Status: SHIPPED | OUTPATIENT
Start: 2024-08-13

## 2024-08-13 ASSESSMENT — LIFESTYLE VARIABLES
HOW OFTEN DO YOU HAVE A DRINK CONTAINING ALCOHOL: NEVER
HOW MANY STANDARD DRINKS CONTAINING ALCOHOL DO YOU HAVE ON A TYPICAL DAY: PATIENT DOES NOT DRINK

## 2024-08-13 ASSESSMENT — ENCOUNTER SYMPTOMS
CONSTIPATION: 0
COUGH: 0
ABDOMINAL PAIN: 0
SHORTNESS OF BREATH: 0
BLOOD IN STOOL: 0
RHINORRHEA: 0
CHEST TIGHTNESS: 0

## 2024-08-13 NOTE — PROGRESS NOTES
Chief Complaint   Patient presents with    Medicare AWV         Here for annual wellness.  She states she fell a week ago and is having pain in her left shoulder.        \"Have you been to the ER, urgent care clinic since your last visit?  Hospitalized since your last visit?\"    NO    “Have you seen or consulted any other health care providers outside of [facility] since your last visit?”    NO            Click Here for Release of Records Request    
Angelica Caldwell MD   clopidogrel (PLAVIX) 75 MG tablet Take 1 tablet by mouth once daily Yes Angelica Caldwell MD   glyBURIDE (DIABETA) 5 MG tablet Take 0.5 tablets by mouth daily (with breakfast) Yes Angelica Caldwell MD   blood glucose test strips (ACCU-CHEK GUIDE) strip Use to check blood sugar twice daily, Dx E11.21 Yes Angelica Caldwell MD   Accu-Chek Softclix Lancets MISC Use to check blood sugar twice daily, Dx E11.21 Yes Angelica Caldwell MD   nystatin (MYCOSTATIN) 517261 UNIT/GM powder Apply in area under the fold of the abdomen twice a day as needed. Yes Angelica Caldwell MD   GARLIC PO Take by mouth daily Yes Automatic Reconciliation, Ar   Cholecalciferol 50 MCG (2000 UT) CAPS Take by mouth daily Yes Automatic Reconciliation, Ar   latanoprost (XALATAN) 0.005 % ophthalmic solution Apply 1 drop to eye Yes Automatic Reconciliation, Ar       CareTeam (Including outside providers/suppliers regularly involved in providing care):   Patient Care Team:  Angelica Caldwell MD as PCP - General  Angelica Caldwell MD as PCP - Empaneled Provider  Padilla Goldman MD as Surgeon      Reviewed and updated this visit:  Tobacco  Allergies  Meds  Problems  Med Hx  Surg Hx  Soc Hx  Fam Hx              
in the Last Year: No      Lab Results   Component Value Date    WBC 6.9 12/06/2023    HGB 11.8 12/06/2023    HCT 35.7 12/06/2023    MCV 83.2 12/06/2023     12/06/2023     Lab Results   Component Value Date     04/08/2024    K 4.4 04/08/2024     (H) 04/08/2024    CO2 30 04/08/2024    BUN 22 (H) 04/08/2024    CREATININE 1.23 (H) 04/08/2024    GLUCOSE 137 (H) 04/08/2024    CALCIUM 9.4 04/08/2024    BILITOT 0.7 04/08/2024    ALKPHOS 149 (H) 04/08/2024    AST 20 04/08/2024    ALT 37 04/08/2024    LABGLOM 42 (L) 04/08/2024    GFRAA 53 (L) 06/22/2022    AGRATIO 0.9 (L) 01/07/2023    GLOB 3.5 04/08/2024     Lab Results   Component Value Date    CHOL 134 04/08/2024    TRIG 89 04/08/2024    HDL 55 04/08/2024    VLDL 17.8 04/08/2024    CHOLHDLRATIO 2.4 04/08/2024     Hemoglobin A1C   Date Value Ref Range Status   01/08/2023 6.8 (H) 4.0 - 5.6 % Final     Comment:     NEW METHOD  PLEASE NOTE NEW REFERENCE RANGE  (NOTE)  HbA1C Interpretive Ranges  <5.7              Normal  5.7 - 6.4         Consider Prediabetes  >6.5              Consider Diabetes       Hemoglobin A1C, POC   Date Value Ref Range Status   08/13/2024 7.7% % Final     Lab Results   Component Value Date    TSH 1.51 01/08/2023         Review of Systems   Constitutional:  Negative for activity change.   HENT:  Negative for congestion and rhinorrhea.    Respiratory:  Negative for cough, chest tightness and shortness of breath.    Cardiovascular:  Negative for chest pain, palpitations and leg swelling.   Gastrointestinal:  Negative for abdominal pain, blood in stool and constipation.   Endocrine: Negative for polyuria.   Genitourinary:  Negative for difficulty urinating, frequency, hematuria and urgency.   Musculoskeletal:  Positive for arthralgias. Negative for joint swelling and myalgias.   Skin:  Negative for rash.   Allergic/Immunologic: Negative for environmental allergies.   Neurological:  Negative for dizziness, light-headedness and headaches.

## 2024-08-15 ENCOUNTER — TELEPHONE (OUTPATIENT)
Age: 89
End: 2024-08-15

## 2024-08-15 ASSESSMENT — ENCOUNTER SYMPTOMS
RESPIRATORY NEGATIVE: 1
ALLERGIC/IMMUNOLOGIC NEGATIVE: 1
TROUBLE SWALLOWING: 1
GASTROINTESTINAL NEGATIVE: 1

## 2024-08-15 NOTE — TELEPHONE ENCOUNTER
LM with Dtg to bring pt back in to repeat her A1c level.  ? Error with her results reported as 7.7% but put in as 5.9%.

## 2024-08-16 ENCOUNTER — CLINICAL DOCUMENTATION (OUTPATIENT)
Age: 89
End: 2024-08-16

## 2024-08-16 ENCOUNTER — OFFICE VISIT (OUTPATIENT)
Age: 89
End: 2024-08-16

## 2024-08-16 ENCOUNTER — OFFICE VISIT (OUTPATIENT)
Age: 89
End: 2024-08-16
Payer: MEDICARE

## 2024-08-16 ENCOUNTER — TELEPHONE (OUTPATIENT)
Age: 89
End: 2024-08-16

## 2024-08-16 VITALS
HEIGHT: 61 IN | TEMPERATURE: 98.2 F | RESPIRATION RATE: 16 BRPM | WEIGHT: 173 LBS | HEART RATE: 105 BPM | OXYGEN SATURATION: 99 % | DIASTOLIC BLOOD PRESSURE: 60 MMHG | SYSTOLIC BLOOD PRESSURE: 120 MMHG | BODY MASS INDEX: 32.66 KG/M2

## 2024-08-16 DIAGNOSIS — G25.0 ESSENTIAL TREMOR: ICD-10-CM
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
## 2024-08-16 DIAGNOSIS — E11.21 TYPE 2 DIABETES MELLITUS WITH DIABETIC NEPHROPATHY, WITHOUT LONG-TERM CURRENT USE OF INSULIN (HCC): Primary | ICD-10-CM

## 2024-08-16 DIAGNOSIS — M54.2 NECK PAIN ON LEFT SIDE: ICD-10-CM

## 2024-08-16 DIAGNOSIS — M25.512 LEFT SHOULDER PAIN, UNSPECIFIED CHRONICITY: ICD-10-CM

## 2024-08-16 DIAGNOSIS — Z86.73 HISTORY OF STROKE: Primary | ICD-10-CM

## 2024-08-16 LAB — HBA1C MFR BLD: 8.1 %

## 2024-08-16 PROCEDURE — PBSHW AMB POC HEMOGLOBIN A1C: Performed by: FAMILY MEDICINE

## 2024-08-16 PROCEDURE — 83036 HEMOGLOBIN GLYCOSYLATED A1C: CPT | Performed by: FAMILY MEDICINE

## 2024-08-16 NOTE — PATIENT INSTRUCTIONS
As per our discussion,    The most concern for today is your safety.  I would advise that you use a walker or a rollator to ambulate.    For generalized weakness and gait abnormality, I will refer you to home health with [facility].  However, I do not know whether to reach your area.    You have been stable without recurrence of stroke.    Continue on Plavix 75 mg and atorvastatin 80 mg daily.    Patient is to continue to work to all of your comorbid conditions as managed by PCP and other specialists as appropriate    RECOMMENDATIONS:  - BP goal is less than 140/90  - Goal HbA1c is less than 7.   - LDL less than 70     Stroke education was provided today in regards to risk factors for stroke and lifestyle modifications to help minimize the risk of future stroke.    This included medication compliance, regular follow up with primary care physician,  and healthy lifestyle habits (nutrition/exercise).    As for the tremors, there are many factors that may worsen tremors which include lack of sleep, anxiety, depression, caffeine, alcohol.  Will continue to monitor you for this.    It was a pleasure to see you and your daughter today    Will see you back in a year or sooner if needed    Please do not hesitate to reach out for any questions or concerns.

## 2024-08-16 NOTE — TELEPHONE ENCOUNTER
Patient's daughter Yessica reporting  that patient will be coming in at 2 pm for Ha1c today. Patietn is already on schedule.

## 2024-08-16 NOTE — PROGRESS NOTES
hard for her to get around given she does not drive.  She requested to have home PT.    Will place an order for home health PT for her today.      Orders:  -     [facility]  4. Neck pain on left side  Assessment & Plan:  Stable without any worsening in symptoms.    Well-managed with ointment over-the-counter.    X-ray completed on 12/7/2023 showed increased cervical spine degenerative disease since 2016.    There is no additional recommendation at this time and we will continue to monitor patient for this.    May consider PT if symptoms worsen.    5. Left shoulder pain, unspecified chronicity  Assessment & Plan:  Stable without any  worsening symptoms.    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  No acute abnormality.    Will not start on any medication at this time since patient has been getting relief from over-the-counter ointment.    Will continue to monitor patient for this.  If her symptoms worsen, may consider physical therapy in the future.      Patient and/or family was given time to ask questions and voice concerns. I believe all questions concerns were adequately addressed at this  office visit.  Patient and/or family also verbalized agreement and understanding of the above-stated plan    Complex neurologic decision making secondary any or all of the following to include unclear etiology, and /or polypharmacy, and/or significant comorbid conditions, and/or use of high-risk medications which complicate the decision making process related to patient's neurologic diagnosis         Diagnosis Orders   1. History of stroke  [facility]      2. Essential tremor        3. Fall, sequela  [facility]      4. Neck pain on left side        5. Left shoulder pain, unspecified chronicity                I attest that 40 minutes was spent on today's visit reviewing medical records and diagnostic testing deemed pertinent to this
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
## 2024-08-17 NOTE — ASSESSMENT & PLAN NOTE
Patient verbalized falls    The last fall was 2 weeks ago and she was not using a walker.    Her current neurological examination reveals unsteadiness without any focal weakness.    Fall safety was discussed the patient and she was encouraged to use a walker or a rollator instead of a cane to prevent future falls.  She verbalized understanding.    She will also benefit from additional physical therapy at least for 6 weeks to help with strengthening of the lower extremities    Patient verbalized she lives in [place] and is hard for her to get around given she does not drive.  She requested to have home PT.    Will place an order for home health PT for her today.

## 2024-08-17 NOTE — ASSESSMENT & PLAN NOTE
Stable without recurrence of stroke symptoms     Patient is to continue on Plavix 75 mg and atorvastatin 80 mg daily.    Patient is to continue to work to all of her comorbid conditions as managed by PCP and other specialists as appropriate    RECOMMENDATIONS:  - BP goal is less than 140/90  - Goal HbA1c is less than 7.   - LDL less than 70     Stroke education was provided today in regards to risk factors for stroke and lifestyle modifications to help minimize the risk of future stroke.    This included medication compliance, regular follow up with primary care physician,  and healthy lifestyle habits (nutrition/exercise).

## 2024-08-17 NOTE — ASSESSMENT & PLAN NOTE
Stable without any worsening in symptoms.    Well-managed with ointment over-the-counter.    X-ray completed on 12/7/2023 showed increased cervical spine degenerative disease since 2016.    There is no additional recommendation at this time and we will continue to monitor patient for this.    May consider PT if symptoms worsen.

## 2024-08-17 NOTE — ASSESSMENT & PLAN NOTE
Stable without any  worsening symptoms.    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  No acute abnormality.    Will not start on any medication at this time since patient has been getting relief from over-the-counter ointment.    Will continue to monitor patient for this.  If her symptoms worsen, may consider physical therapy in the future.

## 2024-08-17 NOTE — ASSESSMENT & PLAN NOTE
Stable without any worsening in symptoms.    We will continue to monitor patient.    Factors that may worsen her symptoms which include caffeine, alcohol, lack of sleep, stress, anxiety were discussed with patient.

## 2024-08-22 ENCOUNTER — TELEPHONE (OUTPATIENT)
Age: 89
End: 2024-08-22

## 2024-08-22 NOTE — TELEPHONE ENCOUNTER
----- Message from GREG Karimi NP sent at 8/20/2024  5:00 PM EDT -----  Regarding: Home health via [facility] has been declined  Ivana,    Please let the patient know that home health order that was placed will not be done because patient lives outside of [facility] service area.  Again, if she does have a company near her home she would like to use, please let me know and I will place an order.    Thank you,  William        Called pt,verified pt with two pt identifiers, relayed message above to pt. She asked that I also call her daughter Yessica on PHI and explain to her. Pt verbalized understanding.        Called daughter Yessica and left voice message that I had spoke to the pt and that [facility] does not service that area, where she lives. Advised if they have another company in mind or want to call the insurance and see whom they can use, then they can call me back with that info. Advised to call me back if needed.

## 2024-09-04 RX ORDER — CLOPIDOGREL BISULFATE 75 MG/1
TABLET ORAL
Qty: 90 TABLET | Refills: 3 | Status: SHIPPED | OUTPATIENT
Start: 2024-09-04

## 2024-09-04 NOTE — TELEPHONE ENCOUNTER
Last appointment: 8/16/24  Next appointment: 11/13/24  Previous refill encounter(s): 3/11/24 #90 with 1 refill    Requested Prescriptions     Pending Prescriptions Disp Refills    clopidogrel (PLAVIX) 75 MG tablet [Pharmacy Med Name: Clopidogrel Bisulfate 75 MG Oral Tablet] 90 tablet 3     Sig: Take 1 tablet by mouth once daily         For Pharmacy Admin Tracking Only    Program: Medication Refill  CPA in place:    Recommendation Provided To:   Intervention Detail: New Rx: 1, reason: Patient Preference  Intervention Accepted By:   Gap Closed?:    Time Spent (min): 5
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
## 2024-10-04 ENCOUNTER — TELEPHONE (OUTPATIENT)
Age: 89
End: 2024-10-04

## 2024-10-04 NOTE — TELEPHONE ENCOUNTER
----- Message from jose c SILVA sent at 10/4/2024 10:52 AM EDT -----  Regarding: ECC Escalation To Practice  ECC Escalation To Practice      Type of Escalation: Acute Care Symptom  --------------------------------------------------------------------------------------------------------------------------    Information for Provider:  Patient is looking for appointment for: Symptom Rash  Reasons for Message: Patient disconnected     Additional Information the caller wants to reschedule her mother's appointment on 11- because the patient have rashes and I tried to book her an appointment for acute sick because of the symptoms but the caller disconnected.  --------------------------------------------------------------------------------------------------------------------------    Relationship to Patient: Covered Entity daughter     Call Back Info: OK to leave message on voicemail  Preferred Call Back Number: Phone [phone]

## 2024-11-13 ENCOUNTER — OFFICE VISIT (OUTPATIENT)
Age: 89
End: 2024-11-13
Payer: MEDICARE

## 2024-11-13 VITALS
TEMPERATURE: 97.4 F | WEIGHT: 174.2 LBS | OXYGEN SATURATION: 96 % | SYSTOLIC BLOOD PRESSURE: 135 MMHG | DIASTOLIC BLOOD PRESSURE: 65 MMHG | HEIGHT: 62 IN | RESPIRATION RATE: 22 BRPM | BODY MASS INDEX: 32.06 KG/M2 | HEART RATE: 69 BPM

## 2024-11-13 DIAGNOSIS — E11.21 TYPE 2 DIABETES MELLITUS WITH DIABETIC NEPHROPATHY, WITHOUT LONG-TERM CURRENT USE OF INSULIN (HCC): ICD-10-CM

## 2024-11-13 DIAGNOSIS — I10 ESSENTIAL (PRIMARY) HYPERTENSION: Primary | ICD-10-CM

## 2024-11-13 DIAGNOSIS — G81.91 RIGHT HEMIPARESIS (HCC): ICD-10-CM

## 2024-11-13 DIAGNOSIS — B37.2 CANDIDAL INTERTRIGO: ICD-10-CM

## 2024-11-13 DIAGNOSIS — E78.2 MIXED HYPERLIPIDEMIA: ICD-10-CM

## 2024-11-13 DIAGNOSIS — Z79.899 ENCOUNTER FOR LONG-TERM (CURRENT) USE OF MEDICATIONS: ICD-10-CM

## 2024-11-13 DIAGNOSIS — M15.0 PRIMARY OSTEOARTHRITIS INVOLVING MULTIPLE JOINTS: ICD-10-CM

## 2024-11-13 DIAGNOSIS — Z86.73 HISTORY OF CARDIOEMBOLIC CEREBROVASCULAR ACCIDENT (CVA): ICD-10-CM

## 2024-11-13 DIAGNOSIS — L02.229 BOIL OF TRUNK: ICD-10-CM

## 2024-11-13 LAB
ALBUMIN SERPL-MCNC: 3.6 G/DL (ref 3.5–5)
ALBUMIN/GLOB SERPL: 0.9 (ref 1.1–2.2)
ALP SERPL-CCNC: 158 U/L (ref 45–117)
ALT SERPL-CCNC: 36 U/L (ref 12–78)
ANION GAP SERPL CALC-SCNC: 4 MMOL/L (ref 2–12)
AST SERPL-CCNC: 20 U/L (ref 15–37)
BILIRUB SERPL-MCNC: 0.6 MG/DL (ref 0.2–1)
BUN SERPL-MCNC: 21 MG/DL (ref 6–20)
BUN/CREAT SERPL: 18 (ref 12–20)
CALCIUM SERPL-MCNC: 9.4 MG/DL (ref 8.5–10.1)
CHLORIDE SERPL-SCNC: 109 MMOL/L (ref 97–108)
CHOLEST SERPL-MCNC: 131 MG/DL
CO2 SERPL-SCNC: 30 MMOL/L (ref 21–32)
CREAT SERPL-MCNC: 1.15 MG/DL (ref 0.55–1.02)
ERYTHROCYTE [DISTWIDTH] IN BLOOD BY AUTOMATED COUNT: 14.2 % (ref 11.5–14.5)
GLOBULIN SER CALC-MCNC: 4 G/DL (ref 2–4)
GLUCOSE SERPL-MCNC: 132 MG/DL (ref 65–100)
GLUCOSE, POC: 145 MG/DL
HBA1C MFR BLD: 8.4 %
HCT VFR BLD AUTO: 38.9 % (ref 35–47)
HDLC SERPL-MCNC: 59 MG/DL
HDLC SERPL: 2.2 (ref 0–5)
HGB BLD-MCNC: 12.5 G/DL (ref 11.5–16)
LDLC SERPL CALC-MCNC: 56.8 MG/DL (ref 0–100)
MCH RBC QN AUTO: 26.8 PG (ref 26–34)
MCHC RBC AUTO-ENTMCNC: 32.1 G/DL (ref 30–36.5)
MCV RBC AUTO: 83.5 FL (ref 80–99)
NRBC # BLD: 0 K/UL (ref 0–0.01)
NRBC BLD-RTO: 0 PER 100 WBC
PLATELET # BLD AUTO: 277 K/UL (ref 150–400)
PMV BLD AUTO: 11.2 FL (ref 8.9–12.9)
POTASSIUM SERPL-SCNC: 4.9 MMOL/L (ref 3.5–5.1)
PROT SERPL-MCNC: 7.6 G/DL (ref 6.4–8.2)
RBC # BLD AUTO: 4.66 M/UL (ref 3.8–5.2)
SODIUM SERPL-SCNC: 143 MMOL/L (ref 136–145)
TRIGL SERPL-MCNC: 76 MG/DL
VLDLC SERPL CALC-MCNC: 15.2 MG/DL
WBC # BLD AUTO: 7 K/UL (ref 3.6–11)

## 2024-11-13 PROCEDURE — 99214 OFFICE O/P EST MOD 30 MIN: CPT | Performed by: FAMILY MEDICINE

## 2024-11-13 PROCEDURE — 83036 HEMOGLOBIN GLYCOSYLATED A1C: CPT | Performed by: FAMILY MEDICINE

## 2024-11-13 PROCEDURE — 82962 GLUCOSE BLOOD TEST: CPT | Performed by: FAMILY MEDICINE

## 2024-11-13 RX ORDER — CEPHALEXIN 500 MG/1
500 CAPSULE ORAL 3 TIMES DAILY
Qty: 30 CAPSULE | Refills: 0 | Status: SHIPPED | OUTPATIENT
Start: 2024-11-13

## 2024-11-13 RX ORDER — NYSTATIN 100000 [USP'U]/G
POWDER TOPICAL
Qty: 60 G | Refills: 3 | Status: SHIPPED | OUTPATIENT
Start: 2024-11-13

## 2024-11-13 SDOH — ECONOMIC STABILITY: FOOD INSECURITY: WITHIN THE PAST 12 MONTHS, YOU WORRIED THAT YOUR FOOD WOULD RUN OUT BEFORE YOU GOT MONEY TO BUY MORE.: NEVER TRUE

## 2024-11-13 SDOH — ECONOMIC STABILITY: INCOME INSECURITY: HOW HARD IS IT FOR YOU TO PAY FOR THE VERY BASICS LIKE FOOD, HOUSING, MEDICAL CARE, AND HEATING?: NOT VERY HARD

## 2024-11-13 SDOH — ECONOMIC STABILITY: FOOD INSECURITY: WITHIN THE PAST 12 MONTHS, THE FOOD YOU BOUGHT JUST DIDN'T LAST AND YOU DIDN'T HAVE MONEY TO GET MORE.: NEVER TRUE

## 2024-11-13 ASSESSMENT — ANXIETY QUESTIONNAIRES
IF YOU CHECKED OFF ANY PROBLEMS ON THIS QUESTIONNAIRE, HOW DIFFICULT HAVE THESE PROBLEMS MADE IT FOR YOU TO DO YOUR WORK, TAKE CARE OF THINGS AT HOME, OR GET ALONG WITH OTHER PEOPLE: NOT DIFFICULT AT ALL
2. NOT BEING ABLE TO STOP OR CONTROL WORRYING: NOT AT ALL
GAD7 TOTAL SCORE: 0
1. FEELING NERVOUS, ANXIOUS, OR ON EDGE: NOT AT ALL
6. BECOMING EASILY ANNOYED OR IRRITABLE: NOT AT ALL
7. FEELING AFRAID AS IF SOMETHING AWFUL MIGHT HAPPEN: NOT AT ALL
5. BEING SO RESTLESS THAT IT IS HARD TO SIT STILL: NOT AT ALL
GAD7 TOTAL SCORE: 0
4. TROUBLE RELAXING: NOT AT ALL
3. WORRYING TOO MUCH ABOUT DIFFERENT THINGS: NOT AT ALL

## 2024-11-13 ASSESSMENT — PATIENT HEALTH QUESTIONNAIRE - PHQ9
SUM OF ALL RESPONSES TO PHQ QUESTIONS 1-9: 0
2. FEELING DOWN, DEPRESSED OR HOPELESS: NOT AT ALL
1. LITTLE INTEREST OR PLEASURE IN DOING THINGS: NOT AT ALL
SUM OF ALL RESPONSES TO PHQ QUESTIONS 1-9: 0
SUM OF ALL RESPONSES TO PHQ9 QUESTIONS 1 & 2: 0

## 2024-11-13 ASSESSMENT — ENCOUNTER SYMPTOMS
SHORTNESS OF BREATH: 0
COUGH: 0
RHINORRHEA: 0
CHEST TIGHTNESS: 0
BLOOD IN STOOL: 0
CONSTIPATION: 0
ABDOMINAL PAIN: 0

## 2024-11-13 NOTE — PROGRESS NOTES
Chief Complaint   Patient presents with    3 Month Follow-Up         Here for routine 3 month follow up.          \"Have you been to the ER, urgent care clinic since your last visit?  Hospitalized since your last visit?\"    NO    “Have you seen or consulted any other health care providers outside of [facility] since your last visit?”    NO            Click Here for Release of Records Request

## 2024-11-13 NOTE — PROGRESS NOTES
Subjective:      Patient ID: Kelsey Tucker is a 89 y.o. female.    HPI  Follow up on chronic medical problems.  Overall doing well.     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  Has residual right very mild hemiparesis.  She is now on a walker.  Speech has return to her baseline.  Her dtg reports that she sometimes does have issues with her memory.    TTE with no shunt, did not comment on thrombus. EF normal.  Plavix per Neurology, has ASA allergy.  Pt was transferred from hospital to SNF for further rehab and d/c home with her dtg on 2/6/2023.    She has now transitioned back to her own home on 9/11/2023 and has assistance coming in to help.       Spirits are good.  Independent with her ADLs.  Cooking her meals.  Appetite is good.  Sleeping well on most night.    HTN follow up:  Compliant w/ meds, low salt diet, and exercise.  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  On low dose beta blocker.  No further palpitations.    Home bp monitoring 120-130s/70s.  Pt has bp log.  Swelling is overall improved with her compression stocking.  No headache or dizziness.  No chest pain and SOB at her usual baseline.  Had cardiac eval and overall was stable.  Has been compliant with her medications.      DM type II follow up:  Compliant w/ meds, diabetic diet.  She has been better with watching diet.  Obtains home glucose monitoring averaging 100-130s.  Checks BS daily on most days and prn.  Pt does have BS log at visit today.  No low BS.    Denies any tingling sensation, polyuria and polydipsia.  No blurred vision.  No significant weight changes.  She is on glyburide d/t intolerance to glimepiride and glipizide.  She is not able to afford other options.  She has been very stable on her current regimen.     Hypercholesterolemia follow up:  Compliant low fat, low cholesterol diet.  Tolerating lipitor.       Osteoarthritis:  Patient has osteoarthritis.  Has

## 2024-12-26 RX ORDER — BLOOD SUGAR DIAGNOSTIC
STRIP MISCELLANEOUS
Qty: 100 EACH | Refills: 0 | Status: SHIPPED | OUTPATIENT
Start: 2024-12-26

## 2024-12-26 NOTE — TELEPHONE ENCOUNTER
Last appointment: 11/13/24  Next appointment: 3/19/25  Previous refill encounter(s): 12/18/23    Requested Prescriptions     Pending Prescriptions Disp Refills    blood glucose test strips (ACCU-CHEK GUIDE) strip [Pharmacy Med Name: Accu-Chek Guide In Vitro Strip] 100 each 0     Sig: USE 1 STRIP TO CHECK GLUCOSE TWICE DAILY         For Pharmacy Admin Tracking Only    Program: Medication Refill  CPA in place:    Recommendation Provided To:   Intervention Detail: New Rx: 1, reason: Patient Preference  Intervention Accepted By:   Gap Closed?:    Time Spent (min): 5

## 2025-01-22 DIAGNOSIS — E11.21 TYPE 2 DIABETES MELLITUS WITH DIABETIC NEPHROPATHY, WITHOUT LONG-TERM CURRENT USE OF INSULIN (HCC): ICD-10-CM

## 2025-01-22 RX ORDER — GLYBURIDE 5 MG/1
TABLET ORAL
Qty: 50 TABLET | Refills: 2 | Status: SHIPPED | OUTPATIENT
Start: 2025-01-22

## 2025-02-11 DIAGNOSIS — E11.21 TYPE 2 DIABETES MELLITUS WITH DIABETIC NEPHROPATHY, WITHOUT LONG-TERM CURRENT USE OF INSULIN (HCC): Primary | ICD-10-CM

## 2025-02-12 RX ORDER — BLOOD SUGAR DIAGNOSTIC
STRIP MISCELLANEOUS
Qty: 200 STRIP | Refills: 3 | Status: SHIPPED | OUTPATIENT
Start: 2025-02-12

## 2025-02-12 NOTE — TELEPHONE ENCOUNTER
Last appointment: 11/13/24  Next appointment: 3/19/25  Previous refill encounter(s): 12/26/24 #100    Requested Prescriptions     Pending Prescriptions Disp Refills    blood glucose test strips (ACCU-CHEK GUIDE) strip [Pharmacy Med Name: Accu-Chek Guide In Vitro Strip] 200 strip 3     Sig: USE 1 STRIP TO CHECK GLUCOSE TWICE DAILY. Dx E11.21         For Pharmacy Admin Tracking Only    Program: Medication Refill  CPA in place:    Recommendation Provided To:   Intervention Detail: New Rx: 1, reason: Patient Preference  Intervention Accepted By:   Gap Closed?:    Time Spent (min): 5

## 2025-03-16 SDOH — ECONOMIC STABILITY: TRANSPORTATION INSECURITY
IN THE PAST 12 MONTHS, HAS LACK OF TRANSPORTATION KEPT YOU FROM MEETINGS, WORK, OR FROM GETTING THINGS NEEDED FOR DAILY LIVING?: NO

## 2025-03-16 SDOH — ECONOMIC STABILITY: INCOME INSECURITY: IN THE LAST 12 MONTHS, WAS THERE A TIME WHEN YOU WERE NOT ABLE TO PAY THE MORTGAGE OR RENT ON TIME?: NO

## 2025-03-16 SDOH — ECONOMIC STABILITY: FOOD INSECURITY: WITHIN THE PAST 12 MONTHS, YOU WORRIED THAT YOUR FOOD WOULD RUN OUT BEFORE YOU GOT MONEY TO BUY MORE.: NEVER TRUE

## 2025-03-16 SDOH — ECONOMIC STABILITY: FOOD INSECURITY: WITHIN THE PAST 12 MONTHS, THE FOOD YOU BOUGHT JUST DIDN'T LAST AND YOU DIDN'T HAVE MONEY TO GET MORE.: NEVER TRUE

## 2025-03-16 SDOH — ECONOMIC STABILITY: TRANSPORTATION INSECURITY
IN THE PAST 12 MONTHS, HAS THE LACK OF TRANSPORTATION KEPT YOU FROM MEDICAL APPOINTMENTS OR FROM GETTING MEDICATIONS?: NO

## 2025-03-19 ENCOUNTER — OFFICE VISIT (OUTPATIENT)
Age: 89
End: 2025-03-19
Payer: COMMERCIAL

## 2025-03-19 VITALS
HEART RATE: 62 BPM | WEIGHT: 175.6 LBS | BODY MASS INDEX: 32.31 KG/M2 | DIASTOLIC BLOOD PRESSURE: 46 MMHG | OXYGEN SATURATION: 99 % | TEMPERATURE: 97.6 F | HEIGHT: 62 IN | SYSTOLIC BLOOD PRESSURE: 131 MMHG | RESPIRATION RATE: 16 BRPM

## 2025-03-19 DIAGNOSIS — Z86.73 HISTORY OF CARDIOEMBOLIC CEREBROVASCULAR ACCIDENT (CVA): ICD-10-CM

## 2025-03-19 DIAGNOSIS — M19.039 WRIST ARTHRITIS: ICD-10-CM

## 2025-03-19 DIAGNOSIS — R82.90 NONSPECIFIC FINDINGS ON EXAMINATION OF URINE: ICD-10-CM

## 2025-03-19 DIAGNOSIS — M15.0 PRIMARY OSTEOARTHRITIS INVOLVING MULTIPLE JOINTS: ICD-10-CM

## 2025-03-19 DIAGNOSIS — Z79.899 ENCOUNTER FOR LONG-TERM (CURRENT) USE OF MEDICATIONS: ICD-10-CM

## 2025-03-19 DIAGNOSIS — R35.0 URINARY FREQUENCY: ICD-10-CM

## 2025-03-19 DIAGNOSIS — I10 ESSENTIAL (PRIMARY) HYPERTENSION: Primary | ICD-10-CM

## 2025-03-19 DIAGNOSIS — E11.21 TYPE 2 DIABETES MELLITUS WITH DIABETIC NEPHROPATHY, WITHOUT LONG-TERM CURRENT USE OF INSULIN (HCC): ICD-10-CM

## 2025-03-19 DIAGNOSIS — E78.2 MIXED HYPERLIPIDEMIA: ICD-10-CM

## 2025-03-19 LAB
GLUCOSE, POC: 148 MG/DL
HBA1C MFR BLD: 8.1 %

## 2025-03-19 PROCEDURE — 82962 GLUCOSE BLOOD TEST: CPT | Performed by: FAMILY MEDICINE

## 2025-03-19 PROCEDURE — G2211 COMPLEX E/M VISIT ADD ON: HCPCS | Performed by: FAMILY MEDICINE

## 2025-03-19 PROCEDURE — 3052F HG A1C>EQUAL 8.0%<EQUAL 9.0%: CPT | Performed by: FAMILY MEDICINE

## 2025-03-19 PROCEDURE — 1123F ACP DISCUSS/DSCN MKR DOCD: CPT | Performed by: FAMILY MEDICINE

## 2025-03-19 PROCEDURE — 83036 HEMOGLOBIN GLYCOSYLATED A1C: CPT | Performed by: FAMILY MEDICINE

## 2025-03-19 PROCEDURE — 99214 OFFICE O/P EST MOD 30 MIN: CPT | Performed by: FAMILY MEDICINE

## 2025-03-19 RX ORDER — SPIRONOLACTONE 25 MG/1
TABLET ORAL
COMMUNITY
Start: 2025-02-16 | End: 2025-03-19 | Stop reason: SDUPTHER

## 2025-03-19 RX ORDER — GLYBURIDE 5 MG/1
5 TABLET ORAL
Qty: 50 TABLET | Refills: 2 | Status: CANCELLED | OUTPATIENT
Start: 2025-03-19

## 2025-03-19 RX ORDER — SPIRONOLACTONE 25 MG/1
12.5 TABLET ORAL DAILY
COMMUNITY
Start: 2025-03-19

## 2025-03-19 RX ORDER — METOPROLOL SUCCINATE 25 MG/1
12.5 TABLET, EXTENDED RELEASE ORAL EVERY EVENING
COMMUNITY
Start: 2025-03-19

## 2025-03-19 RX ORDER — GLYBURIDE 5 MG/1
2.5 TABLET ORAL
Qty: 15 TABLET | Refills: 0 | Status: SHIPPED | OUTPATIENT
Start: 2025-03-19 | End: 2025-03-19 | Stop reason: SDUPTHER

## 2025-03-19 RX ORDER — FUROSEMIDE 20 MG/1
10 TABLET ORAL DAILY
COMMUNITY
Start: 2025-03-19

## 2025-03-19 RX ORDER — GLYBURIDE 5 MG/1
2.5 TABLET ORAL
Qty: 50 TABLET | Refills: 3 | Status: SHIPPED | OUTPATIENT
Start: 2025-03-19

## 2025-03-19 ASSESSMENT — ENCOUNTER SYMPTOMS
CHEST TIGHTNESS: 0
BLOOD IN STOOL: 0
CONSTIPATION: 0
SHORTNESS OF BREATH: 0
COUGH: 0
ABDOMINAL PAIN: 0
RHINORRHEA: 0

## 2025-03-19 ASSESSMENT — PATIENT HEALTH QUESTIONNAIRE - PHQ9
SUM OF ALL RESPONSES TO PHQ QUESTIONS 1-9: 0
2. FEELING DOWN, DEPRESSED OR HOPELESS: NOT AT ALL
SUM OF ALL RESPONSES TO PHQ QUESTIONS 1-9: 0
1. LITTLE INTEREST OR PLEASURE IN DOING THINGS: NOT AT ALL

## 2025-03-19 NOTE — PROGRESS NOTES
Chief Complaint   Patient presents with    Follow-up     Patient is here today for a 4 month follow up       \"Have you been to the ER, urgent care clinic since your last visit?  Hospitalized since your last visit?\"    NO    “Have you seen or consulted any other health care providers outside of [facility] since your last visit?”    NO        Vitals:    25 1012 25 1019   BP: (!) 145/67 (!) 131/46   BP Site: Left Upper Arm Right Upper Arm   Patient Position: Sitting Sitting   BP Cuff Size: Large Adult Large Adult   Pulse: 62    Resp: 16    Temp: 97.6 °F (36.4 °C)    TempSrc: Oral    SpO2: 99%    Weight: 79.7 kg (175 lb 9.6 oz)    Height: 1.575 m (5' 2\")         Click Here for Release of Records Request      There were no vitals filed for this visit.    There are no preventive care reminders to display for this patient.     The patient, Kelsey Tucker, identity was verified by name and .   
diclofenac sodium (VOLTAREN) 1 % GEL; Apply 2 g topically 4 times daily as needed for Pain  Instructions for exercises given and reviewed with pt.    Pt also to use heat to the area 3-4 times a day over the next several days until sx are improved.      Encounter for long-term (current) use of medications  -     Basic Metabolic Panel; Future  -     Basic Metabolic Panel    Urinary frequency  -     Urinalysis with Microscopic; Future  -     Urinalysis with Microscopic    Nonspecific findings on examination of urine  -     Culture, Urine; Future  -     Culture, Urine      Return in about 4 months (around 7/19/2025).  reviewed diet, exercise and weight control  cardiovascular risk and specific lipid/LDL goals reviewed  reviewed medications and side effects in detail  specific diabetic recommendations: low cholesterol diet, weight control and daily exercise discussed, all medications, side effects and compliance discussed carefully, annual eye examinations at Ophthalmology discussed, and glycohemoglobin and other lab monitoring discussed      I have discussed diagnosis listed in this note with pt and/or family. I have discussed treatment plans and options and the risk/benefit analysis of those options, including safe use of medications and possible medication side effects.   Through the use of shared decision making we have agreed to the above plan. The patient has received an after-visit summary and questions were answered concerning future plans and follow up.  Advise pt of any urgent changes then to proceed to the ER.            Angelica Caldwell MD

## 2025-03-20 ENCOUNTER — RESULTS FOLLOW-UP (OUTPATIENT)
Age: 89
End: 2025-03-20

## 2025-03-20 LAB
ANION GAP SERPL CALC-SCNC: 5 MMOL/L (ref 2–12)
APPEARANCE UR: CLEAR
BACTERIA SPEC CULT: NORMAL
BACTERIA URNS QL MICRO: ABNORMAL /HPF
BILIRUB UR QL: NEGATIVE
BUN SERPL-MCNC: 21 MG/DL (ref 6–20)
BUN/CREAT SERPL: 18 (ref 12–20)
CALCIUM SERPL-MCNC: 9.7 MG/DL (ref 8.5–10.1)
CC UR VC: NORMAL
CHLORIDE SERPL-SCNC: 110 MMOL/L (ref 97–108)
CO2 SERPL-SCNC: 26 MMOL/L (ref 21–32)
COLOR UR: ABNORMAL
CREAT SERPL-MCNC: 1.16 MG/DL (ref 0.55–1.02)
EPITH CASTS URNS QL MICRO: ABNORMAL /LPF
GLUCOSE SERPL-MCNC: 125 MG/DL (ref 65–100)
GLUCOSE UR STRIP.AUTO-MCNC: NEGATIVE MG/DL
HGB UR QL STRIP: NEGATIVE
KETONES UR QL STRIP.AUTO: NEGATIVE MG/DL
LEUKOCYTE ESTERASE UR QL STRIP.AUTO: ABNORMAL
NITRITE UR QL STRIP.AUTO: NEGATIVE
PH UR STRIP: 6 (ref 5–8)
POTASSIUM SERPL-SCNC: 4.5 MMOL/L (ref 3.5–5.1)
PROT UR STRIP-MCNC: NEGATIVE MG/DL
RBC #/AREA URNS HPF: ABNORMAL /HPF (ref 0–5)
SERVICE CMNT-IMP: NORMAL
SODIUM SERPL-SCNC: 141 MMOL/L (ref 136–145)
SP GR UR REFRACTOMETRY: 1.01 (ref 1–1.03)
UROBILINOGEN UR QL STRIP.AUTO: 0.2 EU/DL (ref 0.2–1)
WBC URNS QL MICRO: ABNORMAL /HPF (ref 0–4)

## 2025-04-01 DIAGNOSIS — E78.2 MIXED HYPERLIPIDEMIA: ICD-10-CM

## 2025-04-01 RX ORDER — ATORVASTATIN CALCIUM 80 MG/1
80 TABLET, FILM COATED ORAL NIGHTLY
Qty: 90 TABLET | Refills: 3 | Status: SHIPPED | OUTPATIENT
Start: 2025-04-01

## 2025-04-01 NOTE — TELEPHONE ENCOUNTER
Pt is requesting this be sent to new mailorder, [facility].    Last appointment: 3/19/25  Next appointment: 7/22/25  Previous refill encounter(s): 8/13/24    Requested Prescriptions     Pending Prescriptions Disp Refills    atorvastatin (LIPITOR) 80 MG tablet 90 tablet 3     Sig: Take 1 tablet by mouth nightly         For Pharmacy Admin Tracking Only    Program: Medication Refill  CPA in place:    Recommendation Provided To:   Intervention Detail: New Rx: 1, reason: Patient Preference  Intervention Accepted By:   Gap Closed?:    Time Spent (min): 5

## 2025-07-11 RX ORDER — CLOPIDOGREL BISULFATE 75 MG/1
75 TABLET ORAL DAILY
Qty: 90 TABLET | Refills: 0 | OUTPATIENT
Start: 2025-07-11

## 2025-07-11 RX ORDER — CLOPIDOGREL BISULFATE 75 MG/1
75 TABLET ORAL DAILY
Qty: 90 TABLET | Refills: 0 | Status: SHIPPED | OUTPATIENT
Start: 2025-07-11

## 2025-07-14 DIAGNOSIS — I10 ESSENTIAL (PRIMARY) HYPERTENSION: ICD-10-CM

## 2025-07-14 RX ORDER — FUROSEMIDE 20 MG/1
10 TABLET ORAL DAILY
Qty: 45 TABLET | Refills: 3 | Status: SHIPPED | OUTPATIENT
Start: 2025-07-14

## 2025-07-14 NOTE — TELEPHONE ENCOUNTER
Last appointment: 3/19/25  Next appointment: 7/22/25  Previous refill encounter(s): 6/19/24    Requested Prescriptions     Pending Prescriptions Disp Refills    furosemide (LASIX) 20 MG tablet 45 tablet 3     Sig: Take 0.5 tablets by mouth daily         For Pharmacy Admin Tracking Only    Program: Medication Refill  CPA in place:    Recommendation Provided To:   Intervention Detail: New Rx: 1, reason: Patient Preference  Intervention Accepted By:   Gap Closed?:    Time Spent (min): 5